# Patient Record
Sex: FEMALE | Race: OTHER | HISPANIC OR LATINO
[De-identification: names, ages, dates, MRNs, and addresses within clinical notes are randomized per-mention and may not be internally consistent; named-entity substitution may affect disease eponyms.]

---

## 2018-11-30 ENCOUNTER — TRANSCRIPTION ENCOUNTER (OUTPATIENT)
Age: 83
End: 2018-11-30

## 2018-12-01 ENCOUNTER — INPATIENT (INPATIENT)
Facility: HOSPITAL | Age: 83
LOS: 2 days | Discharge: NURSING, REHAB OR LONG-TERM CARE | End: 2018-12-04
Attending: ORTHOPAEDIC SURGERY | Admitting: ORTHOPAEDIC SURGERY
Payer: MEDICARE

## 2018-12-01 ENCOUNTER — RESULT REVIEW (OUTPATIENT)
Age: 83
End: 2018-12-01

## 2018-12-01 VITALS
OXYGEN SATURATION: 99 % | RESPIRATION RATE: 16 BRPM | HEART RATE: 120 BPM | TEMPERATURE: 98 F | SYSTOLIC BLOOD PRESSURE: 144 MMHG | DIASTOLIC BLOOD PRESSURE: 101 MMHG

## 2018-12-01 DIAGNOSIS — D72.829 ELEVATED WHITE BLOOD CELL COUNT, UNSPECIFIED: ICD-10-CM

## 2018-12-01 DIAGNOSIS — Z01.818 ENCOUNTER FOR OTHER PREPROCEDURAL EXAMINATION: ICD-10-CM

## 2018-12-01 DIAGNOSIS — S72.009A FRACTURE OF UNSPECIFIED PART OF NECK OF UNSPECIFIED FEMUR, INITIAL ENCOUNTER FOR CLOSED FRACTURE: ICD-10-CM

## 2018-12-01 PROBLEM — Z00.00 ENCOUNTER FOR PREVENTIVE HEALTH EXAMINATION: Status: ACTIVE | Noted: 2018-12-01

## 2018-12-01 LAB
ALBUMIN SERPL ELPH-MCNC: 4.2 G/DL — SIGNIFICANT CHANGE UP (ref 3.3–5)
ALP SERPL-CCNC: 241 U/L — HIGH (ref 40–120)
ALT FLD-CCNC: 20 U/L — SIGNIFICANT CHANGE UP (ref 4–33)
APPEARANCE UR: CLEAR — SIGNIFICANT CHANGE UP
APTT BLD: 22.8 SEC — LOW (ref 27.5–36.3)
AST SERPL-CCNC: 34 U/L — HIGH (ref 4–32)
BACTERIA # UR AUTO: NEGATIVE — SIGNIFICANT CHANGE UP
BASOPHILS # BLD AUTO: 0.01 K/UL — SIGNIFICANT CHANGE UP (ref 0–0.2)
BASOPHILS NFR BLD AUTO: 0.1 % — SIGNIFICANT CHANGE UP (ref 0–2)
BASOPHILS NFR SPEC: 0 % — SIGNIFICANT CHANGE UP (ref 0–2)
BILIRUB SERPL-MCNC: 2.8 MG/DL — HIGH (ref 0.2–1.2)
BILIRUB UR-MCNC: NEGATIVE — SIGNIFICANT CHANGE UP
BLASTS # FLD: 0 % — SIGNIFICANT CHANGE UP (ref 0–0)
BLOOD UR QL VISUAL: SIGNIFICANT CHANGE UP
BUN SERPL-MCNC: 28 MG/DL — HIGH (ref 7–23)
CALCIUM SERPL-MCNC: 9.9 MG/DL — SIGNIFICANT CHANGE UP (ref 8.4–10.5)
CHLORIDE SERPL-SCNC: 101 MMOL/L — SIGNIFICANT CHANGE UP (ref 98–107)
CO2 SERPL-SCNC: 24 MMOL/L — SIGNIFICANT CHANGE UP (ref 22–31)
COLOR SPEC: YELLOW — SIGNIFICANT CHANGE UP
CREAT SERPL-MCNC: 0.6 MG/DL — SIGNIFICANT CHANGE UP (ref 0.5–1.3)
EOSINOPHIL # BLD AUTO: 0 K/UL — SIGNIFICANT CHANGE UP (ref 0–0.5)
EOSINOPHIL NFR BLD AUTO: 0 % — SIGNIFICANT CHANGE UP (ref 0–6)
EOSINOPHIL NFR FLD: 0 % — SIGNIFICANT CHANGE UP (ref 0–6)
GIANT PLATELETS BLD QL SMEAR: PRESENT — SIGNIFICANT CHANGE UP
GLUCOSE SERPL-MCNC: 113 MG/DL — HIGH (ref 70–99)
GLUCOSE UR-MCNC: NEGATIVE — SIGNIFICANT CHANGE UP
HCT VFR BLD CALC: 41.9 % — SIGNIFICANT CHANGE UP (ref 34.5–45)
HGB BLD-MCNC: 13.4 G/DL — SIGNIFICANT CHANGE UP (ref 11.5–15.5)
IMM GRANULOCYTES # BLD AUTO: 0.07 # — SIGNIFICANT CHANGE UP
IMM GRANULOCYTES NFR BLD AUTO: 0.6 % — SIGNIFICANT CHANGE UP (ref 0–1.5)
INR BLD: 1.1 — SIGNIFICANT CHANGE UP (ref 0.88–1.17)
KETONES UR-MCNC: SIGNIFICANT CHANGE UP
LEUKOCYTE ESTERASE UR-ACNC: NEGATIVE — SIGNIFICANT CHANGE UP
LYMPHOCYTES # BLD AUTO: 0.45 K/UL — LOW (ref 1–3.3)
LYMPHOCYTES # BLD AUTO: 4.1 % — LOW (ref 13–44)
LYMPHOCYTES NFR SPEC AUTO: 0.9 % — LOW (ref 13–44)
MAGNESIUM SERPL-MCNC: 2.3 MG/DL — SIGNIFICANT CHANGE UP (ref 1.6–2.6)
MANUAL SMEAR VERIFICATION: SIGNIFICANT CHANGE UP
MCHC RBC-ENTMCNC: 32 % — SIGNIFICANT CHANGE UP (ref 32–36)
MCHC RBC-ENTMCNC: 32.2 PG — SIGNIFICANT CHANGE UP (ref 27–34)
MCV RBC AUTO: 100.7 FL — HIGH (ref 80–100)
METAMYELOCYTES # FLD: 0 % — SIGNIFICANT CHANGE UP (ref 0–1)
MONOCYTES # BLD AUTO: 1.07 K/UL — HIGH (ref 0–0.9)
MONOCYTES NFR BLD AUTO: 9.8 % — SIGNIFICANT CHANGE UP (ref 2–14)
MONOCYTES NFR BLD: 11.4 % — HIGH (ref 2–9)
MORPHOLOGY BLD-IMP: NORMAL — SIGNIFICANT CHANGE UP
MYELOCYTES NFR BLD: 0 % — SIGNIFICANT CHANGE UP (ref 0–0)
NEUTROPHIL AB SER-ACNC: 87.7 % — HIGH (ref 43–77)
NEUTROPHILS # BLD AUTO: 9.3 K/UL — HIGH (ref 1.8–7.4)
NEUTROPHILS NFR BLD AUTO: 85.4 % — HIGH (ref 43–77)
NEUTS BAND # BLD: 0 % — SIGNIFICANT CHANGE UP (ref 0–6)
NITRITE UR-MCNC: NEGATIVE — SIGNIFICANT CHANGE UP
NRBC # FLD: 0 — SIGNIFICANT CHANGE UP
OTHER - HEMATOLOGY %: 0 — SIGNIFICANT CHANGE UP
PH UR: 5.5 — SIGNIFICANT CHANGE UP (ref 5–8)
PHOSPHATE SERPL-MCNC: 2.7 MG/DL — SIGNIFICANT CHANGE UP (ref 2.5–4.5)
PLATELET # BLD AUTO: 199 K/UL — SIGNIFICANT CHANGE UP (ref 150–400)
PLATELET COUNT - ESTIMATE: NORMAL — SIGNIFICANT CHANGE UP
PMV BLD: 9 FL — SIGNIFICANT CHANGE UP (ref 7–13)
POTASSIUM SERPL-MCNC: 3.5 MMOL/L — SIGNIFICANT CHANGE UP (ref 3.5–5.3)
POTASSIUM SERPL-SCNC: 3.5 MMOL/L — SIGNIFICANT CHANGE UP (ref 3.5–5.3)
PROMYELOCYTES # FLD: 0 % — SIGNIFICANT CHANGE UP (ref 0–0)
PROT SERPL-MCNC: 7.3 G/DL — SIGNIFICANT CHANGE UP (ref 6–8.3)
PROT UR-MCNC: SIGNIFICANT CHANGE UP
PROTHROM AB SERPL-ACNC: 12.6 SEC — SIGNIFICANT CHANGE UP (ref 9.8–13.1)
RBC # BLD: 4.16 M/UL — SIGNIFICANT CHANGE UP (ref 3.8–5.2)
RBC # FLD: 15.1 % — HIGH (ref 10.3–14.5)
RBC CASTS # UR COMP ASSIST: SIGNIFICANT CHANGE UP (ref 0–?)
RH IG SCN BLD-IMP: POSITIVE — SIGNIFICANT CHANGE UP
SODIUM SERPL-SCNC: 142 MMOL/L — SIGNIFICANT CHANGE UP (ref 135–145)
SP GR SPEC: 1.02 — SIGNIFICANT CHANGE UP (ref 1–1.04)
TROPONIN T, HIGH SENSITIVITY: 25 NG/L — SIGNIFICANT CHANGE UP (ref ?–14)
TROPONIN T, HIGH SENSITIVITY: 28 NG/L — SIGNIFICANT CHANGE UP (ref ?–14)
UROBILINOGEN FLD QL: NORMAL — SIGNIFICANT CHANGE UP
VARIANT LYMPHS # BLD: 0 % — SIGNIFICANT CHANGE UP
WBC # BLD: 10.9 K/UL — HIGH (ref 3.8–10.5)
WBC # FLD AUTO: 10.9 K/UL — HIGH (ref 3.8–10.5)
WBC UR QL: SIGNIFICANT CHANGE UP (ref 0–?)

## 2018-12-01 PROCEDURE — 88305 TISSUE EXAM BY PATHOLOGIST: CPT | Mod: 26

## 2018-12-01 PROCEDURE — 93010 ELECTROCARDIOGRAM REPORT: CPT

## 2018-12-01 PROCEDURE — 73522 X-RAY EXAM HIPS BI 3-4 VIEWS: CPT | Mod: 26

## 2018-12-01 PROCEDURE — 88311 DECALCIFY TISSUE: CPT | Mod: 26

## 2018-12-01 PROCEDURE — 71045 X-RAY EXAM CHEST 1 VIEW: CPT | Mod: 26

## 2018-12-01 PROCEDURE — 73564 X-RAY EXAM KNEE 4 OR MORE: CPT | Mod: 26,50

## 2018-12-01 PROCEDURE — 73552 X-RAY EXAM OF FEMUR 2/>: CPT | Mod: 26,RT

## 2018-12-01 PROCEDURE — 99223 1ST HOSP IP/OBS HIGH 75: CPT | Mod: GC

## 2018-12-01 RX ORDER — DOCUSATE SODIUM 100 MG
100 CAPSULE ORAL THREE TIMES A DAY
Qty: 0 | Refills: 0 | Status: DISCONTINUED | OUTPATIENT
Start: 2018-12-01 | End: 2018-12-04

## 2018-12-01 RX ORDER — OXYCODONE HYDROCHLORIDE 5 MG/1
2.5 TABLET ORAL EVERY 4 HOURS
Qty: 0 | Refills: 0 | Status: DISCONTINUED | OUTPATIENT
Start: 2018-12-01 | End: 2018-12-03

## 2018-12-01 RX ORDER — SENNA PLUS 8.6 MG/1
2 TABLET ORAL AT BEDTIME
Qty: 0 | Refills: 0 | Status: DISCONTINUED | OUTPATIENT
Start: 2018-12-01 | End: 2018-12-04

## 2018-12-01 RX ORDER — SODIUM CHLORIDE 9 MG/ML
1000 INJECTION INTRAMUSCULAR; INTRAVENOUS; SUBCUTANEOUS
Qty: 0 | Refills: 0 | Status: COMPLETED | OUTPATIENT
Start: 2018-12-01 | End: 2018-12-02

## 2018-12-01 RX ORDER — ENOXAPARIN SODIUM 100 MG/ML
40 INJECTION SUBCUTANEOUS ONCE
Qty: 0 | Refills: 0 | Status: COMPLETED | OUTPATIENT
Start: 2018-12-01 | End: 2018-12-01

## 2018-12-01 RX ORDER — ACETAMINOPHEN 500 MG
975 TABLET ORAL EVERY 8 HOURS
Qty: 0 | Refills: 0 | Status: DISCONTINUED | OUTPATIENT
Start: 2018-12-01 | End: 2018-12-03

## 2018-12-01 RX ORDER — OXYCODONE HYDROCHLORIDE 5 MG/1
5 TABLET ORAL EVERY 4 HOURS
Qty: 0 | Refills: 0 | Status: DISCONTINUED | OUTPATIENT
Start: 2018-12-01 | End: 2018-12-03

## 2018-12-01 RX ORDER — LANOLIN ALCOHOL/MO/W.PET/CERES
3 CREAM (GRAM) TOPICAL AT BEDTIME
Qty: 0 | Refills: 0 | Status: DISCONTINUED | OUTPATIENT
Start: 2018-12-01 | End: 2018-12-04

## 2018-12-01 RX ADMIN — Medication 100 MILLIGRAM(S): at 22:40

## 2018-12-01 RX ADMIN — SENNA PLUS 2 TABLET(S): 8.6 TABLET ORAL at 22:40

## 2018-12-01 RX ADMIN — SODIUM CHLORIDE 125 MILLILITER(S): 9 INJECTION INTRAMUSCULAR; INTRAVENOUS; SUBCUTANEOUS at 15:44

## 2018-12-01 NOTE — ED PROVIDER NOTE - OBJECTIVE STATEMENT
89 y/o female no PMH presents to fall/difficulty ambulating/FTT. Pt. arrives with sister who is aiding in history - states that 2 days ago sister hear thump upstairs, apparently patient was in the kitchen upstairs and slipped on the tile floor falling down on her knees but was able to get up on her own and did not hit her head. Fall was unwitnessed - sister states that since the fall patient has had difficulty ambulating even with walker/cane and today couldn't walk at all and was crawling on floor to try to use bathroom and could not even lift herself up to get into bed which prompted sister to call EMS. Pt. lives at home upstairs from sister - before fall was ambulatory on her own and did not require assistance. Pt. denies head trauma nausea vomit slurred speech.

## 2018-12-01 NOTE — CONSULT NOTE ADULT - ASSESSMENT
89 yo F with no PMH presents for mechanical fall found to have right femoral neck subcapital fracture. Consult requested for pre-op evaluation. 89 yo F with no PMH presents for mechanical fall found to have right femoral neck subcapital fracture. Consult requested for pre-op evaluation for right hip hemiarthroplasty with possible total hip replacement.

## 2018-12-01 NOTE — PATIENT PROFILE ADULT - LIVES WITH
sibling(s) (2 sisters), private home, (+) steps-to-enter/exit home with Unilateral Handrail, (+) [id number] in the home with Bilateral Handrails/sibling(s)

## 2018-12-01 NOTE — ED ADULT NURSE NOTE - OBJECTIVE STATEMENT
Pt. A&Ox3, coming from home via EMS for fall. States she became weak and fell 2 days ago. c/o pain and stiffness to right knee. Knee appears swollen. Unable to ambulate today due to pain. Normally ambulates with cane at baseline. Lives with her 2 sisters. #20g IV placed in left FA, labs sent. MD at bedside for eval. VS done as charted, breathing well on RA. Will continue to monitor.

## 2018-12-01 NOTE — CONSULT NOTE ADULT - PROBLEM SELECTOR RECOMMENDATION 9
-RCRI score is 0  -patient with METS>4 -RCRI score is 0  -patient with METS>4  -patient is at low risk for a moderate risk procedure  -patient is medically optimized for the procedure -Patient seen for pre-op evaluation. Denies any cardiac or pulmonary history. Without any known medical problems. Cardiac exam without any murmurs. EKG initially with qtc in 600s however repeat EKG with qtc in 400s. No q waves noted.  -Check magnesium and phos and supplement if low.  -RCRI score is 0  -patient with METS>4  -patient is at low risk for a moderate risk procedure  -Risks/benefits discussed with patient and her sister Radha and she and her sister wishes to proceed with planned procedure.

## 2018-12-01 NOTE — ED PROVIDER NOTE - MEDICAL DECISION MAKING DETAILS
89 y/o female c/o bl knee pain/leg pain w/ difficulty ambulating  -labs, xray  -case management evaluation  -probable admission for pt eval and rehab

## 2018-12-01 NOTE — ED ADULT TRIAGE NOTE - CHIEF COMPLAINT QUOTE
Pt fell 2 days ago for unknown reason and hasn't been able to ambulate since then.  Came today for increased weakness.  Denies Cp/SOB/dizziness/lightheadedness/N/V/D, or fever.  Barely able to bear weight.  Ryan LE with no external rotations or shortening.  Denies PMH.  Not on any meds.  HR irregular in triage

## 2018-12-01 NOTE — ED PROVIDER NOTE - ATTENDING CONTRIBUTION TO CARE
Attending note:   After face to face evaluation of this patient, I concur with above noted hx, pe, and care plan for this patient.  89 y/o F with slip and fall 2 days ago c/o r knee pain and difficulty ambulating.  PAtient crawling today unable to get up.   Evaluation in progress

## 2018-12-01 NOTE — CONSULT NOTE ADULT - SUBJECTIVE AND OBJECTIVE BOX
HPI:      PAST MEDICAL & SURGICAL HISTORY:  No pertinent past medical history      Review of Systems:   CONSTITUTIONAL: No fever, weight loss, or fatigue  EYES: No eye pain, visual disturbances, or discharge  ENMT:  No difficulty hearing, tinnitus, vertigo; No sinus or throat pain  NECK: No pain or stiffness  BREASTS: No pain, masses, or nipple discharge  RESPIRATORY: No cough, wheezing, chills or hemoptysis; No shortness of breath  CARDIOVASCULAR: No chest pain, palpitations, dizziness, or leg swelling  GASTROINTESTINAL: No abdominal or epigastric pain. No nausea, vomiting, or hematemesis; No diarrhea or constipation. No melena or hematochezia.  GENITOURINARY: No dysuria, frequency, hematuria, or incontinence  NEUROLOGICAL: No headaches, memory loss, loss of strength, numbness, or tremors  SKIN: No itching, burning, rashes, or lesions   LYMPH NODES: No enlarged glands  ENDOCRINE: No heat or cold intolerance; No hair loss  MUSCULOSKELETAL: No joint pain or swelling; No muscle, back, or extremity pain  PSYCHIATRIC: No depression, anxiety, mood swings, or difficulty sleeping  HEME/LYMPH: No easy bruising, or bleeding gums  ALLERY AND IMMUNOLOGIC: No hives or eczema    Allergies    No Known Allergies    Intolerances        Social History:     FAMILY HISTORY:      MEDICATIONS  (STANDING):  docusate sodium 100 milliGRAM(s) Oral three times a day  enoxaparin Injectable 40 milliGRAM(s) SubCutaneous once  senna 2 Tablet(s) Oral at bedtime  sodium chloride 0.9%. 1000 milliLiter(s) (125 mL/Hr) IV Continuous <Continuous>    MEDICATIONS  (PRN):  acetaminophen   Tablet .. 975 milliGRAM(s) Oral every 8 hours PRN Mild Pain (1 - 3)  melatonin 3 milliGRAM(s) Oral at bedtime PRN Insomnia  oxyCODONE    IR 2.5 milliGRAM(s) Oral every 4 hours PRN Moderate Pain (4 - 6)  oxyCODONE    IR 5 milliGRAM(s) Oral every 4 hours PRN Severe Pain (7 - 10)        CAPILLARY BLOOD GLUCOSE        I&O's Summary      PHYSICAL EXAM:  GENERAL: NAD, well-developed  HEAD:  Atraumatic, Normocephalic  EYES: EOMI, PERRLA, conjunctiva and sclera clear  NECK: Supple, No JVD  CHEST/LUNG: Clear to auscultation bilaterally; No wheeze  HEART: Regular rate and rhythm; No murmurs, rubs, or gallops  ABDOMEN: Soft, Nontender, Nondistended; Bowel sounds present  EXTREMITIES:  2+ Peripheral Pulses, No clubbing, cyanosis, or edema  PSYCH: AAOx3  NEUROLOGY: non-focal  SKIN: No rashes or lesions    LABS:                        13.4   10.90 )-----------( 199      ( 01 Dec 2018 12:50 )             41.9     12-01    142  |  101  |  28<H>  ----------------------------<  113<H>  3.5   |  24  |  0.60    Ca    9.9      01 Dec 2018 12:50    TPro  7.3  /  Alb  4.2  /  TBili  2.8<H>  /  DBili  x   /  AST  34<H>  /  ALT  20  /  AlkPhos  241<H>  12-01    PT/INR - ( 01 Dec 2018 15:35 )   PT: 12.6 SEC;   INR: 1.10          PTT - ( 01 Dec 2018 15:35 )  PTT:22.8 SEC          RADIOLOGY & ADDITIONAL TESTS:    Imaging Personally Reviewed:    Consultant(s) Notes Reviewed:      Care Discussed with Consultants/Other Providers: HPI:  89 y/o female no PMH presents fall. Pt fell two days ago at home.    PAST MEDICAL & SURGICAL HISTORY:  No pertinent past medical history      Review of Systems:   CONSTITUTIONAL: No fever, weight loss, or fatigue  EYES: No eye pain, visual disturbances, or discharge  ENMT:  No difficulty hearing, tinnitus, vertigo; No sinus or throat pain  NECK: No pain or stiffness  BREASTS: No pain, masses, or nipple discharge  RESPIRATORY: No cough, wheezing, chills or hemoptysis; No shortness of breath  CARDIOVASCULAR: No chest pain, palpitations, dizziness, or leg swelling  GASTROINTESTINAL: No abdominal or epigastric pain. No nausea, vomiting, or hematemesis; No diarrhea or constipation. No melena or hematochezia.  GENITOURINARY: No dysuria, frequency, hematuria, or incontinence  NEUROLOGICAL: No headaches, memory loss, loss of strength, numbness, or tremors  SKIN: No itching, burning, rashes, or lesions   LYMPH NODES: No enlarged glands  ENDOCRINE: No heat or cold intolerance; No hair loss  MUSCULOSKELETAL: No joint pain or swelling; No muscle, back, or extremity pain  PSYCHIATRIC: No depression, anxiety, mood swings, or difficulty sleeping  HEME/LYMPH: No easy bruising, or bleeding gums  ALLERY AND IMMUNOLOGIC: No hives or eczema    Allergies    No Known Allergies    Intolerances        Social History:     FAMILY HISTORY:      MEDICATIONS  (STANDING):  docusate sodium 100 milliGRAM(s) Oral three times a day  enoxaparin Injectable 40 milliGRAM(s) SubCutaneous once  senna 2 Tablet(s) Oral at bedtime  sodium chloride 0.9%. 1000 milliLiter(s) (125 mL/Hr) IV Continuous <Continuous>    MEDICATIONS  (PRN):  acetaminophen   Tablet .. 975 milliGRAM(s) Oral every 8 hours PRN Mild Pain (1 - 3)  melatonin 3 milliGRAM(s) Oral at bedtime PRN Insomnia  oxyCODONE    IR 2.5 milliGRAM(s) Oral every 4 hours PRN Moderate Pain (4 - 6)  oxyCODONE    IR 5 milliGRAM(s) Oral every 4 hours PRN Severe Pain (7 - 10)        CAPILLARY BLOOD GLUCOSE        I&O's Summary      PHYSICAL EXAM:  GENERAL: NAD, well-developed  HEAD:  Atraumatic, Normocephalic  EYES: EOMI, PERRLA, conjunctiva and sclera clear  NECK: Supple, No JVD  CHEST/LUNG: Clear to auscultation bilaterally; No wheeze  HEART: Regular rate and rhythm; No murmurs, rubs, or gallops  ABDOMEN: Soft, Nontender, Nondistended; Bowel sounds present  EXTREMITIES:  2+ Peripheral Pulses, No clubbing, cyanosis, or edema  PSYCH: AAOx3  NEUROLOGY: non-focal  SKIN: No rashes or lesions    LABS:                        13.4   10.90 )-----------( 199      ( 01 Dec 2018 12:50 )             41.9     12-01    142  |  101  |  28<H>  ----------------------------<  113<H>  3.5   |  24  |  0.60    Ca    9.9      01 Dec 2018 12:50    TPro  7.3  /  Alb  4.2  /  TBili  2.8<H>  /  DBili  x   /  AST  34<H>  /  ALT  20  /  AlkPhos  241<H>  12-01    PT/INR - ( 01 Dec 2018 15:35 )   PT: 12.6 SEC;   INR: 1.10          PTT - ( 01 Dec 2018 15:35 )  PTT:22.8 SEC          RADIOLOGY & ADDITIONAL TESTS:    Imaging Personally Reviewed:    Consultant(s) Notes Reviewed:      Care Discussed with Consultants/Other Providers: HPI:  89 y/o female no PMH presents fall. Pt fell two days ago at home in the kitchen when she slip on a rug. No cp, dizziness at the time of fall. She landed on her knees and subsequently had difficulty ambulating with the cane. At baseline she ambulate with the cane is able to walk up a flight of stairway. No hx of CAD, CVA, DM, renal, pulmonary and liver disease. No hx of reaction to anesthesia.      PAST MEDICAL & SURGICAL HISTORY:  No pertinent past medical history      Review of Systems:   CONSTITUTIONAL: No fever, weight loss, or fatigue  EYES: No eye pain, visual disturbances, or discharge  ENMT:  No difficulty hearing, tinnitus, vertigo; No sinus or throat pain  NECK: No pain or stiffness  BREASTS: No pain, masses, or nipple discharge  RESPIRATORY: No cough, wheezing, chills or hemoptysis; No shortness of breath  CARDIOVASCULAR: No chest pain, palpitations, dizziness, or leg swelling  GASTROINTESTINAL: No abdominal or epigastric pain. No nausea, vomiting, or hematemesis; No diarrhea or constipation. No melena or hematochezia.  GENITOURINARY: No dysuria, frequency, hematuria, or incontinence  NEUROLOGICAL: No headaches, memory loss, loss of strength, numbness, or tremors  SKIN: No itching, burning, rashes, or lesions   LYMPH NODES: No enlarged glands  ENDOCRINE: No heat or cold intolerance; No hair loss  MUSCULOSKELETAL: No joint pain or swelling; No muscle, back, or extremity pain  PSYCHIATRIC: No depression, anxiety, mood swings, or difficulty sleeping  HEME/LYMPH: No easy bruising, or bleeding gums  ALLERY AND IMMUNOLOGIC: No hives or eczema    Allergies    No Known Allergies    Intolerances        Social History:     FAMILY HISTORY:      MEDICATIONS  (STANDING):  docusate sodium 100 milliGRAM(s) Oral three times a day  enoxaparin Injectable 40 milliGRAM(s) SubCutaneous once  senna 2 Tablet(s) Oral at bedtime  sodium chloride 0.9%. 1000 milliLiter(s) (125 mL/Hr) IV Continuous <Continuous>    MEDICATIONS  (PRN):  acetaminophen   Tablet .. 975 milliGRAM(s) Oral every 8 hours PRN Mild Pain (1 - 3)  melatonin 3 milliGRAM(s) Oral at bedtime PRN Insomnia  oxyCODONE    IR 2.5 milliGRAM(s) Oral every 4 hours PRN Moderate Pain (4 - 6)  oxyCODONE    IR 5 milliGRAM(s) Oral every 4 hours PRN Severe Pain (7 - 10)        CAPILLARY BLOOD GLUCOSE        I&O's Summary      PHYSICAL EXAM:  GENERAL: NAD, well-developed  HEAD:  Atraumatic, Normocephalic  EYES: EOMI, PERRLA, conjunctiva and sclera clear  NECK: Supple, No JVD  CHEST/LUNG: Clear to auscultation bilaterally; No wheeze  HEART: Regular rate and rhythm; No murmurs, rubs, or gallops  ABDOMEN: Soft, Nontender, Nondistended; Bowel sounds present  EXTREMITIES:  +erythema of right knee with no swelling or warmth, decreased active ROM of right hip  PSYCH: AAOx3  NEUROLOGY: non-focal  SKIN: No rashes or lesions    LABS:                        13.4   10.90 )-----------( 199      ( 01 Dec 2018 12:50 )             41.9     12-01    142  |  101  |  28<H>  ----------------------------<  113<H>  3.5   |  24  |  0.60    Ca    9.9      01 Dec 2018 12:50    TPro  7.3  /  Alb  4.2  /  TBili  2.8<H>  /  DBili  x   /  AST  34<H>  /  ALT  20  /  AlkPhos  241<H>  12-01    PT/INR - ( 01 Dec 2018 15:35 )   PT: 12.6 SEC;   INR: 1.10          PTT - ( 01 Dec 2018 15:35 )  PTT:22.8 SEC          RADIOLOGY & ADDITIONAL TESTS:    Imaging Personally Reviewed:    Consultant(s) Notes Reviewed:      Care Discussed with Consultants/Other Providers: HPI:  89 y/o female no PMH presents fall. Pt fell two days ago at home in the kitchen when she slip on a rug. No cp, dizziness at the time of fall. She landed on her knees and subsequently had difficulty ambulating with the cane. At baseline she ambulate with the cane is able to walk up a flight of stairway. No hx of CAD, CVA, DM, renal, pulmonary and liver disease. No hx of reaction to anesthesia. Denies taking any OTC or herbal meds.     PAST MEDICAL & SURGICAL HISTORY:  No pertinent past medical history      Review of Systems:   CONSTITUTIONAL: No fever, weight loss, or fatigue  EYES: No eye pain, visual disturbances, or discharge  ENMT:  No difficulty hearing, tinnitus, vertigo; No sinus or throat pain  NECK: No pain or stiffness  BREASTS: No pain, masses, or nipple discharge  RESPIRATORY: No cough, wheezing, chills or hemoptysis; No shortness of breath  CARDIOVASCULAR: No chest pain, palpitations, dizziness, or leg swelling  GASTROINTESTINAL: No abdominal or epigastric pain. No nausea, vomiting, or hematemesis; No diarrhea or constipation. No melena or hematochezia.  GENITOURINARY: No dysuria, frequency, hematuria, or incontinence  NEUROLOGICAL: No headaches, memory loss, loss of strength, numbness, or tremors  SKIN: No itching, burning, rashes, or lesions   LYMPH NODES: No enlarged glands  ENDOCRINE: No heat or cold intolerance; No hair loss  MUSCULOSKELETAL: No joint pain or swelling; No muscle, back, or extremity pain  PSYCHIATRIC: No depression, anxiety, mood swings, or difficulty sleeping  HEME/LYMPH: No easy bruising, or bleeding gums  ALLERY AND IMMUNOLOGIC: No hives or eczema    Allergies    No Known Allergies    Intolerances        Social History:     FAMILY HISTORY:      MEDICATIONS  (STANDING):  docusate sodium 100 milliGRAM(s) Oral three times a day  enoxaparin Injectable 40 milliGRAM(s) SubCutaneous once  senna 2 Tablet(s) Oral at bedtime  sodium chloride 0.9%. 1000 milliLiter(s) (125 mL/Hr) IV Continuous <Continuous>    MEDICATIONS  (PRN):  acetaminophen   Tablet .. 975 milliGRAM(s) Oral every 8 hours PRN Mild Pain (1 - 3)  melatonin 3 milliGRAM(s) Oral at bedtime PRN Insomnia  oxyCODONE    IR 2.5 milliGRAM(s) Oral every 4 hours PRN Moderate Pain (4 - 6)  oxyCODONE    IR 5 milliGRAM(s) Oral every 4 hours PRN Severe Pain (7 - 10)        CAPILLARY BLOOD GLUCOSE        I&O's Summary      PHYSICAL EXAM:  GENERAL: NAD, well-developed  HEAD:  Atraumatic, Normocephalic  EYES: EOMI, PERRLA, conjunctiva and sclera clear  NECK: Supple, No JVD  CHEST/LUNG: Clear to auscultation bilaterally; No wheeze  HEART: Regular rate and rhythm; No murmurs, rubs, or gallops  ABDOMEN: Soft, Nontender, Nondistended; Bowel sounds present  EXTREMITIES:  +erythema of right knee with no swelling or warmth, decreased active ROM of right hip  PSYCH: AAOx3  NEUROLOGY: non-focal  SKIN: No rashes or lesions    LABS:                        13.4   10.90 )-----------( 199      ( 01 Dec 2018 12:50 )             41.9     12-01    142  |  101  |  28<H>  ----------------------------<  113<H>  3.5   |  24  |  0.60    Ca    9.9      01 Dec 2018 12:50    TPro  7.3  /  Alb  4.2  /  TBili  2.8<H>  /  DBili  x   /  AST  34<H>  /  ALT  20  /  AlkPhos  241<H>  12-01    PT/INR - ( 01 Dec 2018 15:35 )   PT: 12.6 SEC;   INR: 1.10          PTT - ( 01 Dec 2018 15:35 )  PTT:22.8 SEC          RADIOLOGY & ADDITIONAL TESTS:  EKG: prolonged Qtc 633    Imaging Personally Reviewed:    Consultant(s) Notes Reviewed:      Care Discussed with Consultants/Other Providers: HPI:  89 y/o female no PMH presents fall. Pt fell two days ago at home in the kitchen when she slip on a rug. No cp, dizziness at the time of fall. She landed on her knees and subsequently had difficulty ambulating with the cane. At baseline she ambulate with the cane is able to walk up a flight of stairway. No hx of CAD, CVA, DM, renal, pulmonary and liver disease. No hx of reaction to anesthesia. Denies taking any OTC or herbal meds.     PAST MEDICAL & SURGICAL HISTORY:  No pertinent past medical history      Review of Systems:   CONSTITUTIONAL: No fever, weight loss, or fatigue  EYES: No eye pain, visual disturbances, or discharge  ENMT:  No difficulty hearing, tinnitus, vertigo; No sinus or throat pain  NECK: No pain or stiffness  BREASTS: No pain, masses, or nipple discharge  RESPIRATORY: No cough, wheezing, chills or hemoptysis; No shortness of breath  CARDIOVASCULAR: No chest pain, palpitations, dizziness, or leg swelling  GASTROINTESTINAL: No abdominal or epigastric pain. No nausea, vomiting, or hematemesis; No diarrhea or constipation. No melena or hematochezia.  GENITOURINARY: No dysuria, frequency, hematuria, or incontinence  NEUROLOGICAL: No headaches, memory loss, loss of strength, numbness, or tremors  SKIN: No itching, burning, rashes, or lesions   LYMPH NODES: No enlarged glands  ENDOCRINE: No heat or cold intolerance; No hair loss  MUSCULOSKELETAL: No joint pain or swelling; No muscle, back, or extremity pain  PSYCHIATRIC: No depression, anxiety, mood swings, or difficulty sleeping  HEME/LYMPH: No easy bruising, or bleeding gums  ALLERY AND IMMUNOLOGIC: No hives or eczema    Allergies    No Known Allergies    Intolerances        Social History:     FAMILY HISTORY:      MEDICATIONS  (STANDING):  docusate sodium 100 milliGRAM(s) Oral three times a day  enoxaparin Injectable 40 milliGRAM(s) SubCutaneous once  senna 2 Tablet(s) Oral at bedtime  sodium chloride 0.9%. 1000 milliLiter(s) (125 mL/Hr) IV Continuous <Continuous>    MEDICATIONS  (PRN):  acetaminophen   Tablet .. 975 milliGRAM(s) Oral every 8 hours PRN Mild Pain (1 - 3)  melatonin 3 milliGRAM(s) Oral at bedtime PRN Insomnia  oxyCODONE    IR 2.5 milliGRAM(s) Oral every 4 hours PRN Moderate Pain (4 - 6)  oxyCODONE    IR 5 milliGRAM(s) Oral every 4 hours PRN Severe Pain (7 - 10)        CAPILLARY BLOOD GLUCOSE        I&O's Summary      PHYSICAL EXAM:  GENERAL: NAD, well-developed  HEAD:  Atraumatic, Normocephalic  EYES: EOMI, PERRLA, conjunctiva and sclera clear  NECK: Supple, No JVD  CHEST/LUNG: Clear to auscultation bilaterally; No wheeze  HEART: Regular rate and rhythm; No murmurs, rubs, or gallops  ABDOMEN: Soft, Nontender, Nondistended; Bowel sounds present  EXTREMITIES:  +erythema of right knee with no swelling or warmth, decreased active ROM of right hip  PSYCH: AAOx3  NEUROLOGY: non-focal  SKIN: No rashes or lesions    LABS:                        13.4   10.90 )-----------( 199      ( 01 Dec 2018 12:50 )             41.9     12-01    142  |  101  |  28<H>  ----------------------------<  113<H>  3.5   |  24  |  0.60    Ca    9.9      01 Dec 2018 12:50    TPro  7.3  /  Alb  4.2  /  TBili  2.8<H>  /  DBili  x   /  AST  34<H>  /  ALT  20  /  AlkPhos  241<H>  12-01    PT/INR - ( 01 Dec 2018 15:35 )   PT: 12.6 SEC;   INR: 1.10          PTT - ( 01 Dec 2018 15:35 )  PTT:22.8 SEC          RADIOLOGY & ADDITIONAL TESTS:  EKG: prolonged Qtc 633 initially with repeat Qtc at 449    Imaging Personally Reviewed:    Consultant(s) Notes Reviewed:      Care Discussed with Consultants/Other Providers: HPI:  89 y/o female no PMH presents fall. Pt fell two days ago at home in the kitchen when she slip on a rug. No cp, dizziness at the time of fall. She landed on her knees and subsequently had difficulty ambulating with the cane. At baseline she ambulate with the cane is able to walk up a flight of stairway. No hx of CAD, CVA, DM, renal, pulmonary and liver disease. Unknown reaction to anesthesia as she has never had any surgeries in past. Denies taking any OTC or herbal meds. Denies having chest pain or SOB with ambulation.     PAST MEDICAL & SURGICAL HISTORY:  No pertinent past medical history      Review of Systems:   CONSTITUTIONAL: No fever, weight loss, or fatigue  EYES: No eye pain, visual disturbances, or discharge  ENMT:  No difficulty hearing, tinnitus, vertigo; No sinus or throat pain  NECK: No pain or stiffness  BREASTS: No pain, masses, or nipple discharge  RESPIRATORY: No cough, wheezing, chills or hemoptysis; No shortness of breath  CARDIOVASCULAR: No chest pain, palpitations, dizziness, or leg swelling  GASTROINTESTINAL: No abdominal or epigastric pain. No nausea, vomiting, or hematemesis; No diarrhea or constipation. No melena or hematochezia.  GENITOURINARY: No dysuria, frequency, hematuria, or incontinence  NEUROLOGICAL: No headaches, memory loss, loss of strength, numbness, or tremors  SKIN: No itching, burning, rashes, or lesions   LYMPH NODES: No enlarged glands  ENDOCRINE: No heat or cold intolerance; No hair loss  MUSCULOSKELETAL: No joint pain or swelling; No muscle, back, or extremity pain  PSYCHIATRIC: No depression, anxiety, mood swings, or difficulty sleeping  HEME/LYMPH: No easy bruising, or bleeding gums  ALLERY AND IMMUNOLOGIC: No hives or eczema    Allergies    No Known Allergies    Intolerances    Social History: Lives with sister. Social alcohol use.     FAMILY HISTORY:      MEDICATIONS  (STANDING):  docusate sodium 100 milliGRAM(s) Oral three times a day  enoxaparin Injectable 40 milliGRAM(s) SubCutaneous once  senna 2 Tablet(s) Oral at bedtime  sodium chloride 0.9%. 1000 milliLiter(s) (125 mL/Hr) IV Continuous <Continuous>    MEDICATIONS  (PRN):  acetaminophen   Tablet .. 975 milliGRAM(s) Oral every 8 hours PRN Mild Pain (1 - 3)  melatonin 3 milliGRAM(s) Oral at bedtime PRN Insomnia  oxyCODONE    IR 2.5 milliGRAM(s) Oral every 4 hours PRN Moderate Pain (4 - 6)  oxyCODONE    IR 5 milliGRAM(s) Oral every 4 hours PRN Severe Pain (7 - 10)    Vital Signs Last 24 Hrs  T(C): 36.9 (01 Dec 2018 18:04), Max: 36.9 (01 Dec 2018 11:38)  T(F): 98.5 (01 Dec 2018 18:04), Max: 98.5 (01 Dec 2018 18:04)  HR: 94 (01 Dec 2018 18:04) (94 - 120)  BP: 137/93 (01 Dec 2018 18:04) (137/93 - 155/94)  BP(mean): --  RR: 19 (01 Dec 2018 18:04) (16 - 22)  SpO2: 97% (01 Dec 2018 18:04) (96% - 99%)    PHYSICAL EXAM:  GENERAL: NAD, well-developed  HEAD:  Atraumatic, Normocephalic  EYES: EOMI, PERRLA, conjunctiva and sclera clear  NECK: Supple, No JVD  CHEST/LUNG: Clear to auscultation bilaterally; No wheeze  HEART: Regular rate and rhythm; No murmurs, rubs, or gallops  ABDOMEN: Soft, Nontender, Nondistended; Bowel sounds present  EXTREMITIES:  +erythema of right knee with no swelling or warmth, decreased active ROM of right hip  PSYCH: AAOx3  NEUROLOGY: non-focal  SKIN: No rashes or lesions    LABS:                        13.4   10.90 )-----------( 199      ( 01 Dec 2018 12:50 )             41.9     12-01    142  |  101  |  28<H>  ----------------------------<  113<H>  3.5   |  24  |  0.60    Ca    9.9      01 Dec 2018 12:50    TPro  7.3  /  Alb  4.2  /  TBili  2.8<H>  /  DBili  x   /  AST  34<H>  /  ALT  20  /  AlkPhos  241<H>  12-01    PT/INR - ( 01 Dec 2018 15:35 )   PT: 12.6 SEC;   INR: 1.10          PTT - ( 01 Dec 2018 15:35 )  PTT:22.8 SEC          RADIOLOGY & ADDITIONAL TESTS:  EKG: prolonged Qtc 633 initially with repeat Qtc at 449. PACs with no q waves or ST elevations/depression.     Imaging Personally Reviewed:    Consultant(s) Notes Reviewed:      Care Discussed with Consultants/Other Providers:

## 2018-12-01 NOTE — H&P ADULT - HISTORY OF PRESENT ILLNESS
89 year old female presented to the [facility] ED following fall at home about 2 days ago. Patient was walking in her house, when she tripped and fell onto her right side. She felt immediate pain and has not been able to ambulate since the fall. However, patient did not want to go to the hospital until she was convinced by her sister. She then presented to the [facility] ED on 12/1/18. No CP/SOB. No Head strike or LOC. No fevers/chills. Patient has history of L hip fracture in 2010 treated with hemiarthroplasty by Dr. Santiago. She ambulates with a cane at baseline.

## 2018-12-01 NOTE — H&P ADULT - ASSESSMENT
A/P: 89 year old female with R femoral neck fracture  - Admit to Ortho  - OR Planning  - Preoperative Labs: CBC, BMP, PT/INR, Type and Screen, UA, CXR, EKG  - Consent in chart  - Medicine Clearance  - NPO  - Hold anticoagulation for OR

## 2018-12-02 DIAGNOSIS — E87.6 HYPOKALEMIA: ICD-10-CM

## 2018-12-02 DIAGNOSIS — Z96.642 PRESENCE OF LEFT ARTIFICIAL HIP JOINT: Chronic | ICD-10-CM

## 2018-12-02 DIAGNOSIS — S72.009A FRACTURE OF UNSPECIFIED PART OF NECK OF UNSPECIFIED FEMUR, INITIAL ENCOUNTER FOR CLOSED FRACTURE: ICD-10-CM

## 2018-12-02 LAB
APTT BLD: 29 SEC — SIGNIFICANT CHANGE UP (ref 27.5–36.3)
BUN SERPL-MCNC: 20 MG/DL — SIGNIFICANT CHANGE UP (ref 7–23)
BUN SERPL-MCNC: 26 MG/DL — HIGH (ref 7–23)
CALCIUM SERPL-MCNC: 8.3 MG/DL — LOW (ref 8.4–10.5)
CALCIUM SERPL-MCNC: 9 MG/DL — SIGNIFICANT CHANGE UP (ref 8.4–10.5)
CHLORIDE SERPL-SCNC: 107 MMOL/L — SIGNIFICANT CHANGE UP (ref 98–107)
CHLORIDE SERPL-SCNC: 108 MMOL/L — HIGH (ref 98–107)
CO2 SERPL-SCNC: 23 MMOL/L — SIGNIFICANT CHANGE UP (ref 22–31)
CO2 SERPL-SCNC: 23 MMOL/L — SIGNIFICANT CHANGE UP (ref 22–31)
CREAT SERPL-MCNC: 0.56 MG/DL — SIGNIFICANT CHANGE UP (ref 0.5–1.3)
CREAT SERPL-MCNC: 0.64 MG/DL — SIGNIFICANT CHANGE UP (ref 0.5–1.3)
GLUCOSE SERPL-MCNC: 101 MG/DL — HIGH (ref 70–99)
GLUCOSE SERPL-MCNC: 111 MG/DL — HIGH (ref 70–99)
HCT VFR BLD CALC: 34.9 % — SIGNIFICANT CHANGE UP (ref 34.5–45)
HCT VFR BLD CALC: 38.1 % — SIGNIFICANT CHANGE UP (ref 34.5–45)
HGB BLD-MCNC: 11 G/DL — LOW (ref 11.5–15.5)
HGB BLD-MCNC: 12 G/DL — SIGNIFICANT CHANGE UP (ref 11.5–15.5)
INR BLD: 1.11 — SIGNIFICANT CHANGE UP (ref 0.88–1.17)
MAGNESIUM SERPL-MCNC: 2 MG/DL — SIGNIFICANT CHANGE UP (ref 1.6–2.6)
MCHC RBC-ENTMCNC: 31.5 % — LOW (ref 32–36)
MCHC RBC-ENTMCNC: 31.5 % — LOW (ref 32–36)
MCHC RBC-ENTMCNC: 31.5 PG — SIGNIFICANT CHANGE UP (ref 27–34)
MCHC RBC-ENTMCNC: 33 PG — SIGNIFICANT CHANGE UP (ref 27–34)
MCV RBC AUTO: 100 FL — SIGNIFICANT CHANGE UP (ref 80–100)
MCV RBC AUTO: 104.8 FL — HIGH (ref 80–100)
NRBC # FLD: 0 — SIGNIFICANT CHANGE UP
NRBC # FLD: 0.02 — SIGNIFICANT CHANGE UP
PHOSPHATE SERPL-MCNC: 2.3 MG/DL — LOW (ref 2.5–4.5)
PLATELET # BLD AUTO: 157 K/UL — SIGNIFICANT CHANGE UP (ref 150–400)
PLATELET # BLD AUTO: 195 K/UL — SIGNIFICANT CHANGE UP (ref 150–400)
PMV BLD: 9.1 FL — SIGNIFICANT CHANGE UP (ref 7–13)
PMV BLD: 9.2 FL — SIGNIFICANT CHANGE UP (ref 7–13)
POTASSIUM SERPL-MCNC: 3.3 MMOL/L — LOW (ref 3.5–5.3)
POTASSIUM SERPL-MCNC: 3.5 MMOL/L — SIGNIFICANT CHANGE UP (ref 3.5–5.3)
POTASSIUM SERPL-SCNC: 3.3 MMOL/L — LOW (ref 3.5–5.3)
POTASSIUM SERPL-SCNC: 3.5 MMOL/L — SIGNIFICANT CHANGE UP (ref 3.5–5.3)
PROTHROM AB SERPL-ACNC: 12.7 SEC — SIGNIFICANT CHANGE UP (ref 9.8–13.1)
RBC # BLD: 3.33 M/UL — LOW (ref 3.8–5.2)
RBC # BLD: 3.81 M/UL — SIGNIFICANT CHANGE UP (ref 3.8–5.2)
RBC # FLD: 15.2 % — HIGH (ref 10.3–14.5)
RBC # FLD: 15.2 % — HIGH (ref 10.3–14.5)
RH IG SCN BLD-IMP: POSITIVE — SIGNIFICANT CHANGE UP
SODIUM SERPL-SCNC: 142 MMOL/L — SIGNIFICANT CHANGE UP (ref 135–145)
SODIUM SERPL-SCNC: 143 MMOL/L — SIGNIFICANT CHANGE UP (ref 135–145)
WBC # BLD: 5.14 K/UL — SIGNIFICANT CHANGE UP (ref 3.8–10.5)
WBC # BLD: 7.04 K/UL — SIGNIFICANT CHANGE UP (ref 3.8–10.5)
WBC # FLD AUTO: 5.14 K/UL — SIGNIFICANT CHANGE UP (ref 3.8–10.5)
WBC # FLD AUTO: 7.04 K/UL — SIGNIFICANT CHANGE UP (ref 3.8–10.5)

## 2018-12-02 PROCEDURE — 27125 PARTIAL HIP REPLACEMENT: CPT | Mod: RT

## 2018-12-02 PROCEDURE — 72170 X-RAY EXAM OF PELVIS: CPT | Mod: 26

## 2018-12-02 PROCEDURE — 99232 SBSQ HOSP IP/OBS MODERATE 35: CPT

## 2018-12-02 RX ORDER — POTASSIUM PHOSPHATE, MONOBASIC POTASSIUM PHOSPHATE, DIBASIC 236; 224 MG/ML; MG/ML
15 INJECTION, SOLUTION INTRAVENOUS ONCE
Qty: 0 | Refills: 0 | Status: COMPLETED | OUTPATIENT
Start: 2018-12-02 | End: 2018-12-02

## 2018-12-02 RX ORDER — SODIUM CHLORIDE 9 MG/ML
1000 INJECTION, SOLUTION INTRAVENOUS
Qty: 0 | Refills: 0 | Status: DISCONTINUED | OUTPATIENT
Start: 2018-12-02 | End: 2018-12-03

## 2018-12-02 RX ORDER — CEFAZOLIN SODIUM 1 G
2000 VIAL (EA) INJECTION EVERY 8 HOURS
Qty: 0 | Refills: 0 | Status: COMPLETED | OUTPATIENT
Start: 2018-12-02 | End: 2018-12-03

## 2018-12-02 RX ORDER — ASPIRIN/CALCIUM CARB/MAGNESIUM 324 MG
325 TABLET ORAL
Qty: 0 | Refills: 0 | Status: DISCONTINUED | OUTPATIENT
Start: 2018-12-03 | End: 2018-12-04

## 2018-12-02 RX ORDER — POTASSIUM CHLORIDE 20 MEQ
20 PACKET (EA) ORAL ONCE
Qty: 0 | Refills: 0 | Status: COMPLETED | OUTPATIENT
Start: 2018-12-02 | End: 2018-12-02

## 2018-12-02 RX ORDER — ACETAMINOPHEN 500 MG
650 TABLET ORAL EVERY 6 HOURS
Qty: 0 | Refills: 0 | Status: DISCONTINUED | OUTPATIENT
Start: 2018-12-02 | End: 2018-12-04

## 2018-12-02 RX ADMIN — Medication 100 MILLIGRAM(S): at 17:13

## 2018-12-02 RX ADMIN — SODIUM CHLORIDE 85 MILLILITER(S): 9 INJECTION, SOLUTION INTRAVENOUS at 21:58

## 2018-12-02 RX ADMIN — POTASSIUM PHOSPHATE, MONOBASIC POTASSIUM PHOSPHATE, DIBASIC 62.5 MILLIMOLE(S): 236; 224 INJECTION, SOLUTION INTRAVENOUS at 06:02

## 2018-12-02 RX ADMIN — SODIUM CHLORIDE 125 MILLILITER(S): 9 INJECTION INTRAMUSCULAR; INTRAVENOUS; SUBCUTANEOUS at 17:12

## 2018-12-02 RX ADMIN — Medication 100 MILLIGRAM(S): at 22:00

## 2018-12-02 RX ADMIN — SODIUM CHLORIDE 125 MILLILITER(S): 9 INJECTION INTRAMUSCULAR; INTRAVENOUS; SUBCUTANEOUS at 02:34

## 2018-12-02 RX ADMIN — Medication 20 MILLIEQUIVALENT(S): at 19:22

## 2018-12-02 RX ADMIN — SENNA PLUS 2 TABLET(S): 8.6 TABLET ORAL at 22:00

## 2018-12-02 NOTE — BRIEF OPERATIVE NOTE - PROCEDURE
<<-----Click on this checkbox to enter Procedure Bipolar hemiarthroplasty of right hip  12/02/2018    Active  DSTAL

## 2018-12-03 ENCOUNTER — TRANSCRIPTION ENCOUNTER (OUTPATIENT)
Age: 83
End: 2018-12-03

## 2018-12-03 DIAGNOSIS — R00.0 TACHYCARDIA, UNSPECIFIED: ICD-10-CM

## 2018-12-03 DIAGNOSIS — Z29.9 ENCOUNTER FOR PROPHYLACTIC MEASURES, UNSPECIFIED: ICD-10-CM

## 2018-12-03 DIAGNOSIS — D50.0 IRON DEFICIENCY ANEMIA SECONDARY TO BLOOD LOSS (CHRONIC): ICD-10-CM

## 2018-12-03 LAB
BACTERIA UR CULT: SIGNIFICANT CHANGE UP
BUN SERPL-MCNC: 23 MG/DL — SIGNIFICANT CHANGE UP (ref 7–23)
CALCIUM SERPL-MCNC: 8.7 MG/DL — SIGNIFICANT CHANGE UP (ref 8.4–10.5)
CHLORIDE SERPL-SCNC: 109 MMOL/L — HIGH (ref 98–107)
CO2 SERPL-SCNC: 22 MMOL/L — SIGNIFICANT CHANGE UP (ref 22–31)
CREAT SERPL-MCNC: 0.68 MG/DL — SIGNIFICANT CHANGE UP (ref 0.5–1.3)
GLUCOSE SERPL-MCNC: 123 MG/DL — HIGH (ref 70–99)
HCT VFR BLD CALC: 34.2 % — LOW (ref 34.5–45)
HGB BLD-MCNC: 10.6 G/DL — LOW (ref 11.5–15.5)
MCHC RBC-ENTMCNC: 31 % — LOW (ref 32–36)
MCHC RBC-ENTMCNC: 32.1 PG — SIGNIFICANT CHANGE UP (ref 27–34)
MCV RBC AUTO: 103.6 FL — HIGH (ref 80–100)
NRBC # FLD: 0 — SIGNIFICANT CHANGE UP
PLATELET # BLD AUTO: 178 K/UL — SIGNIFICANT CHANGE UP (ref 150–400)
PMV BLD: 9.6 FL — SIGNIFICANT CHANGE UP (ref 7–13)
POTASSIUM SERPL-MCNC: 4.4 MMOL/L — SIGNIFICANT CHANGE UP (ref 3.5–5.3)
POTASSIUM SERPL-SCNC: 4.4 MMOL/L — SIGNIFICANT CHANGE UP (ref 3.5–5.3)
RBC # BLD: 3.3 M/UL — LOW (ref 3.8–5.2)
RBC # FLD: 14.8 % — HIGH (ref 10.3–14.5)
SODIUM SERPL-SCNC: 142 MMOL/L — SIGNIFICANT CHANGE UP (ref 135–145)
SPECIMEN SOURCE: SIGNIFICANT CHANGE UP
WBC # BLD: 8.26 K/UL — SIGNIFICANT CHANGE UP (ref 3.8–10.5)
WBC # FLD AUTO: 8.26 K/UL — SIGNIFICANT CHANGE UP (ref 3.8–10.5)

## 2018-12-03 PROCEDURE — 99233 SBSQ HOSP IP/OBS HIGH 50: CPT

## 2018-12-03 RX ORDER — TRAMADOL HYDROCHLORIDE 50 MG/1
25 TABLET ORAL
Qty: 0 | Refills: 0 | Status: DISCONTINUED | OUTPATIENT
Start: 2018-12-03 | End: 2018-12-03

## 2018-12-03 RX ORDER — POLYETHYLENE GLYCOL 3350 17 G/17G
17 POWDER, FOR SOLUTION ORAL DAILY
Qty: 0 | Refills: 0 | Status: DISCONTINUED | OUTPATIENT
Start: 2018-12-03 | End: 2018-12-04

## 2018-12-03 RX ORDER — ASCORBIC ACID 60 MG
500 TABLET,CHEWABLE ORAL DAILY
Qty: 0 | Refills: 0 | Status: DISCONTINUED | OUTPATIENT
Start: 2018-12-03 | End: 2018-12-04

## 2018-12-03 RX ORDER — FAMOTIDINE 10 MG/ML
20 INJECTION INTRAVENOUS DAILY
Qty: 0 | Refills: 0 | Status: DISCONTINUED | OUTPATIENT
Start: 2018-12-03 | End: 2018-12-04

## 2018-12-03 RX ORDER — TRAMADOL HYDROCHLORIDE 50 MG/1
25 TABLET ORAL
Qty: 0 | Refills: 0 | Status: DISCONTINUED | OUTPATIENT
Start: 2018-12-03 | End: 2018-12-04

## 2018-12-03 RX ORDER — ONDANSETRON 8 MG/1
4 TABLET, FILM COATED ORAL EVERY 6 HOURS
Qty: 0 | Refills: 0 | Status: DISCONTINUED | OUTPATIENT
Start: 2018-12-03 | End: 2018-12-04

## 2018-12-03 RX ORDER — SODIUM CHLORIDE 9 MG/ML
250 INJECTION INTRAMUSCULAR; INTRAVENOUS; SUBCUTANEOUS ONCE
Qty: 0 | Refills: 0 | Status: COMPLETED | OUTPATIENT
Start: 2018-12-03 | End: 2018-12-03

## 2018-12-03 RX ORDER — ACETAMINOPHEN 500 MG
650 TABLET ORAL EVERY 6 HOURS
Qty: 0 | Refills: 0 | Status: DISCONTINUED | OUTPATIENT
Start: 2018-12-03 | End: 2018-12-04

## 2018-12-03 RX ADMIN — Medication 1 TABLET(S): at 12:03

## 2018-12-03 RX ADMIN — Medication 650 MILLIGRAM(S): at 12:03

## 2018-12-03 RX ADMIN — Medication 3 MILLIGRAM(S): at 21:27

## 2018-12-03 RX ADMIN — Medication 650 MILLIGRAM(S): at 17:42

## 2018-12-03 RX ADMIN — Medication 100 MILLIGRAM(S): at 07:04

## 2018-12-03 RX ADMIN — Medication 100 MILLIGRAM(S): at 14:57

## 2018-12-03 RX ADMIN — Medication 100 MILLIGRAM(S): at 01:31

## 2018-12-03 RX ADMIN — Medication 975 MILLIGRAM(S): at 02:01

## 2018-12-03 RX ADMIN — Medication 500 MILLIGRAM(S): at 12:03

## 2018-12-03 RX ADMIN — Medication 325 MILLIGRAM(S): at 07:04

## 2018-12-03 RX ADMIN — Medication 975 MILLIGRAM(S): at 01:31

## 2018-12-03 RX ADMIN — POLYETHYLENE GLYCOL 3350 17 GRAM(S): 17 POWDER, FOR SOLUTION ORAL at 12:03

## 2018-12-03 RX ADMIN — SODIUM CHLORIDE 85 MILLILITER(S): 9 INJECTION, SOLUTION INTRAVENOUS at 08:01

## 2018-12-03 RX ADMIN — SODIUM CHLORIDE 1000 MILLILITER(S): 9 INJECTION INTRAMUSCULAR; INTRAVENOUS; SUBCUTANEOUS at 11:59

## 2018-12-03 RX ADMIN — Medication 325 MILLIGRAM(S): at 17:42

## 2018-12-03 RX ADMIN — SENNA PLUS 2 TABLET(S): 8.6 TABLET ORAL at 21:27

## 2018-12-03 RX ADMIN — FAMOTIDINE 20 MILLIGRAM(S): 10 INJECTION INTRAVENOUS at 14:57

## 2018-12-03 NOTE — OCCUPATIONAL THERAPY INITIAL EVALUATION ADULT - LOWER BODY DRESSING, PREVIOUS LEVEL OF FUNCTION, OT EVAL
independent Patient sent by Dr. 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 Foot XR shows DJD. Neva ABREU, scribed for and in the presence of ED Attending Dr. Briggs for this note.

## 2018-12-03 NOTE — PHYSICAL THERAPY INITIAL EVALUATION ADULT - GAIT DEVIATIONS NOTED, PT EVAL
decreased stride length/decreased weight-shifting ability/increased time in double stance/decreased michell

## 2018-12-03 NOTE — DISCHARGE NOTE ADULT - NS AS DC FOLLOWUP STROKE INST
Smoking Cessation/hip fracture, partial hip replacement, aspirin, fall prevention hip fracture, partial hip replacement, aspirin, fall prevention Influenza vaccination ([facility] Pub Date: August 7, 2015)/hip fracture, partial hip replacement, aspirin, fall prevention/Smoking Cessation Influenza vaccination ([facility] Pub Date: August 7, 2015)/hip fracture, partial hip replacement, aspirin, fall prevention

## 2018-12-03 NOTE — PROGRESS NOTE ADULT - PROBLEM SELECTOR PLAN 2
Pain well controlled; continue management and pain control per ortho recs with tylenol prn, and oxycodone prn -likely reactive secondary to pain/fracture; patient with no signs of active infection and hemodynamically stable; will continue to monitor closely.  -resolved

## 2018-12-03 NOTE — DISCHARGE NOTE ADULT - MEDICATION SUMMARY - MEDICATIONS TO TAKE
I will START or STAY ON the medications listed below when I get home from the hospital:    acetaminophen 325 mg oral tablet  -- 2 tab(s) by mouth every 6 hours as needed for Mild pain  -- Indication: For Pain control    traMADol 50 mg oral tablet  -- 0.5 tab(s) by mouth 2 times a day, As needed, as needed for moderate to severe pain  -- Indication: For Pain control    aspirin 325 mg oral delayed release tablet  -- 1 tab(s) by mouth 2 times a day (with meals)  -- Indication: For blood thinner for postop clot prevention MUST TAKE WITH FOOD    famotidine 20 mg oral tablet  -- 1 tab(s) by mouth once a day  -- Indication: For stomach protectant TAKE WITH BLOOD THINNER ASPIRIN    docusate sodium 100 mg oral capsule  -- 1 cap(s) by mouth 3 times a day  over the counter for constipation caused by pain meds   -- Indication: For stool softener    senna oral tablet  -- 2 tab(s) by mouth once a day (at bedtime)  over the counter for constipation caused by pain meds   -- Indication: For bowel stimulant    melatonin 3 mg oral tablet  -- 1 tab(s) by mouth once a day (at bedtime), As needed, Insomnia  -- Indication: For As needed for insomnia    calcium-vitamin D 500 mg-200 intl units oral tablet  -- 1 tab(s) by mouth once a day  -- Indication: For supplement    ascorbic acid 500 mg oral tablet  -- 1 tab(s) by mouth once a day  -- Indication: For supplement

## 2018-12-03 NOTE — DISCHARGE NOTE ADULT - HOSPITAL COURSE
89yo female is s/p Right hip hemiarthroplasty 12/2/2018, without any intraoperative complications, for femoral neck fracture after mechanical fall.  Patient is doing well and stable for discharge.  Patient is tolerating physical therapy: weight bearing to Right leg, out of bed for gait training, STRICT ANTERIOR AND POSTERIOR HIP PRECAUTIONS.  Keep dressing on as is until day of staple removal.  Staples/sutures to be removed in Dr. Norton's office 14 days from date of surgery.  Patient is on Aspirin (MUST TAKE WITH FOOD) for anticoagulation.  Orthopaedic Surgeon Dr. Norton would like patient to call private MD/Internist for appointment postop to maintain continuity of care.  Follow up with Dr. Norton's office in 1-2 weeks. 89yo female is s/p Right hip hemiarthroplasty 12/2/2018, without any intraoperative complications, for femoral neck fracture after mechanical fall.  Patient is doing well and stable for discharge.  Patient is tolerating physical therapy: weight bearing to Right leg, out of bed for gait training, STRICT ANTERIOR AND POSTERIOR HIP PRECAUTIONS.  Keep dressing on as is until day of staple removal.  Staples/sutures to be removed in Dr. Norton's office 14 days from date of surgery.  Patient is on Aspirin (MUST TAKE WITH FOOD) for anticoagulation.  Post operative dopplers negative for blood clot. Orthopaedic Surgeon Dr. Norton would like patient to call private MD/Internist for appointment postop to maintain continuity of care.  Follow up with Dr. Norton's office in 1-2 weeks.

## 2018-12-03 NOTE — PHYSICAL THERAPY INITIAL EVALUATION ADULT - ADDITIONAL COMMENTS
Pt. left in bed ost-PT, per RN request, in NAD with all lines/tubes intact & table/TV/call bell within reach.  RN Odette hubbard.  (+) bed alarm engaged.

## 2018-12-03 NOTE — OCCUPATIONAL THERAPY INITIAL EVALUATION ADULT - ADDITIONAL COMMENTS
89 year old F presented to the [facility] following fall at home about 2 days ago. Patient was walking in her house, when she tripped and fell onto her right side. Hip xray: sightly superolaterally displaced right femoral neck subcapital fracture.

## 2018-12-03 NOTE — DISCHARGE NOTE ADULT - CONDITIONS AT DISCHARGE
Pt. is afebrile and offers no complaints. In no acute distress. Right hip dressing: clean, dry and intact. Pt is ambulating with a walker, tolerating diet well, and voiding in adequate amounts. Pt. is afebrile and offers no complaints. In no acute distress. Right hip dressing: clean, dry and intact. Pt is ambulating with a walker, tolerating diet well, and voiding in adequate amounts. Right ischium suspected DTI 2.5 X 2- mepilex applied. Left buttock stage 1 1.5 X 1.5 mepilex applied. Pt. is afebrile and offers no complaints. In no acute distress. Right hip dressing: clean, dry and intact. Pt is ambulating with a walker, tolerating diet well, and voiding in adequate amounts. Mepilex applied to bilateral ischiums for skin protection. No pressure injury, discoloration of skin

## 2018-12-03 NOTE — DISCHARGE NOTE ADULT - CARE PLAN
Principal Discharge DX:	Closed fracture of neck of right femur, initial encounter  Goal:	pain control -> pain med may cause drowsiness, refrain from activities require decision making; physical therapy to help resume activities of daily living  Assessment and plan of treatment:	call Surgeon's office to make appointment in 1 week Dr. Norton [phone]  weight bearing as tolerated to Right leg

## 2018-12-03 NOTE — PHYSICAL THERAPY INITIAL EVALUATION ADULT - ACTIVE RANGE OF MOTION EXAMINATION, REHAB EVAL
deficits as listed below/Right LE WFL's except ~ 0-65 degrees hip flexion/extension deficits as listed below/Right LE WFL's except ~ 0-65 degrees hip flexion/extension (Active-Assistive ROM)

## 2018-12-03 NOTE — PHYSICAL THERAPY INITIAL EVALUATION ADULT - PATIENT PROFILE REVIEW, REHAB EVAL
ACTIVITY: out of bed --> chair; consult with Odette Claudio RN --> pt. OK for out of bed with PT, but should be returned to bed for safety/yes

## 2018-12-03 NOTE — DISCHARGE NOTE ADULT - INSTRUCTIONS
Make a follow up appointment with Dr. Norton. Call MD if you develop a fever, or if there is redness, swelling, drainage or pain not relieved by pain medication. No heavy lifting, bending, or straining to move your bowels. Take over the counter stool softeners as needed to prevent constipation which may be caused by pain medication. Make a follow up appointment with Dr. Norton. Call MD if you develop a fever, or if there is redness, swelling, drainage or pain not relieved by pain medication. No heavy lifting, bending, or straining to move your bowels. Take over the counter stool softeners as needed to prevent constipation which may be caused by pain medication. Pressure injury reduction techniques: turn and position patient Q 2-4 hours, provide adequate nutrition, encourage ambulation. Check mepilex daily for draining and change as instructed. resume same diet as prior to surgery

## 2018-12-03 NOTE — PHYSICAL THERAPY INITIAL EVALUATION ADULT - DIAGNOSIS, PT EVAL
closed fracture of neck of femur, s/p fall at home; ortho trauma/surgery --> s/p Right hip hemiarthroplasty

## 2018-12-03 NOTE — DISCHARGE NOTE ADULT - CARE PROVIDER_API CALL
Prakash Norton), Orthopaedic Surgery  [address] [address]  Phone: [phone]  Fax: [phone]

## 2018-12-03 NOTE — PROGRESS NOTE ADULT - PROBLEM SELECTOR PLAN 1
-likely reactive secondary to pain/fracture; patient with no signs of active infection and hemodynamically stable; will continue to monitor closely.  -resolved -could be 2/2 pain and blood loss  -recommend 250cc IVF bolus and encourage use of incentive spirometer  -monitor HR closely

## 2018-12-03 NOTE — DISCHARGE NOTE ADULT - REASON FOR ADMISSION
R Hip Fracture mechanical fall sustained Right hip femoral neck fracture  surgery Right hip hemiarthroplasty 12/2/2018

## 2018-12-03 NOTE — DISCHARGE NOTE ADULT - ADDITIONAL INSTRUCTIONS
post surgical care  89yo female is s/p Right hip hemiarthroplasty 12/2/2018, without any intraoperative complications, for femoral neck fracture after mechanical fall.  Patient is doing well and stable for discharge.  Patient is tolerating physical therapy: weight bearing to Right leg, out of bed for gait training, STRICT ANTERIOR AND POSTERIOR HIP PRECAUTIONS.  Keep dressing on as is until day of staple removal.  Staples/sutures to be removed in Dr. Norton's office 14 days from date of surgery.  Patient is on Aspirin (MUST TAKE WITH FOOD) for anticoagulation.  Orthopaedic Surgeon Dr. Norton would like patient to call private MD/Internist for appointment postop to maintain continuity of care.  Follow up with Dr. Norton's office in 1-2 weeks.

## 2018-12-03 NOTE — OCCUPATIONAL THERAPY INITIAL EVALUATION ADULT - PERTINENT HX OF CURRENT PROBLEM, REHAB EVAL
89 year old F presented to the [facility] following fall at home about 2 days ago. Patient was walking in her house, when she tripped and fell onto her right side. Hip x-ray: sightly superolaterally displaced right femoral neck subcapital fracture. 89 year old F presented to the [facility] following fall at home about 2 days ago. Patient was walking in her house, when she tripped and fell onto her right side. Hip xray: sightly superolaterally displaced right femoral neck subcapital fracture. Pt now s/p right hip hemiarthroplasty

## 2018-12-03 NOTE — PHYSICAL THERAPY INITIAL EVALUATION ADULT - PERTINENT HX OF CURRENT PROBLEM, REHAB EVAL
Pt. is a 89 y/o female admitted to [facility] on 12/01/18 with a dx of closed fracture of neck of femur, s/p fall at home.  PT consult request secondary to ortho trauma/surgery --> s/p Right hip hemiarthroplasty.  No PMH/PSH.  CXR = opacity.

## 2018-12-03 NOTE — DISCHARGE NOTE ADULT - PLAN OF CARE
pain control -> pain med may cause drowsiness, refrain from activities require decision making; physical therapy to help resume activities of daily living call Surgeon's office to make appointment in 1 week Dr. Norton [phone]  weight bearing as tolerated to Right leg

## 2018-12-03 NOTE — OCCUPATIONAL THERAPY INITIAL EVALUATION ADULT - LIVES WITH, PROFILE
Pt lives with sisters in a house with steps to manage. Pt has access to both a stall shower and bathtub within home.

## 2018-12-03 NOTE — DISCHARGE NOTE ADULT - PATIENT PORTAL LINK FT
You can access the [facility] Patient Portal, offered by [facility], by registering with the following website: [email or web address]

## 2018-12-03 NOTE — PHYSICAL THERAPY INITIAL EVALUATION ADULT - CRITERIA FOR SKILLED THERAPEUTIC INTERVENTIONS
impairments found/rehab potential/therapy frequency/anticipated discharge recommendation/risk reduction/prevention/Inpatient restorative rehab/predicted duration of therapy intervention

## 2018-12-03 NOTE — PROGRESS NOTE ADULT - PROBLEM SELECTOR PLAN 3
repleted, f/u -Pain well controlled; continue management and pain control per ortho recs with tylenol prn, and oxycodone prn  -would recommend d/c'ing oxycodone  -TOV today  -PT lucy

## 2018-12-03 NOTE — PHYSICAL THERAPY INITIAL EVALUATION ADULT - LIVES WITH, PROFILE
other relative/(2 sisters), private home, (+) steps-to-enter/exit home with Unilateral Handrail, (+) [id number] in the home with Bilateral Handrails

## 2018-12-03 NOTE — PHYSICAL THERAPY INITIAL EVALUATION ADULT - ASR EQUIP NEEDS DISCH PT EVAL
current recommendation for inpatient rehab; if pt. is discharged home, a rolling walker will likely be required for safe discharge (pt. currently owns and uses a Straight cane and a "walking stick"

## 2018-12-04 VITALS
TEMPERATURE: 98 F | SYSTOLIC BLOOD PRESSURE: 132 MMHG | DIASTOLIC BLOOD PRESSURE: 76 MMHG | RESPIRATION RATE: 17 BRPM | HEART RATE: 102 BPM | OXYGEN SATURATION: 98 %

## 2018-12-04 PROCEDURE — 93970 EXTREMITY STUDY: CPT | Mod: 26

## 2018-12-04 PROCEDURE — 99233 SBSQ HOSP IP/OBS HIGH 50: CPT

## 2018-12-04 PROCEDURE — 99238 HOSP IP/OBS DSCHRG MGMT 30/<: CPT

## 2018-12-04 RX ORDER — ASCORBIC ACID 60 MG
1 TABLET,CHEWABLE ORAL
Qty: 0 | Refills: 0 | DISCHARGE
Start: 2018-12-04

## 2018-12-04 RX ORDER — SENNA PLUS 8.6 MG/1
2 TABLET ORAL
Qty: 0 | Refills: 0 | COMMUNITY
Start: 2018-12-04

## 2018-12-04 RX ORDER — FAMOTIDINE 10 MG/ML
1 INJECTION INTRAVENOUS
Qty: 0 | Refills: 0 | COMMUNITY
Start: 2018-12-04

## 2018-12-04 RX ORDER — DOCUSATE SODIUM 100 MG
1 CAPSULE ORAL
Qty: 0 | Refills: 0 | DISCHARGE
Start: 2018-12-04

## 2018-12-04 RX ORDER — ACETAMINOPHEN 500 MG
2 TABLET ORAL
Qty: 0 | Refills: 0 | DISCHARGE
Start: 2018-12-04

## 2018-12-04 RX ORDER — ACETAMINOPHEN 500 MG
2 TABLET ORAL
Qty: 0 | Refills: 0 | COMMUNITY
Start: 2018-12-04

## 2018-12-04 RX ORDER — SENNA PLUS 8.6 MG/1
2 TABLET ORAL
Qty: 0 | Refills: 0 | DISCHARGE
Start: 2018-12-04

## 2018-12-04 RX ORDER — ASPIRIN/CALCIUM CARB/MAGNESIUM 324 MG
1 TABLET ORAL
Qty: 0 | Refills: 0 | COMMUNITY
Start: 2018-12-04

## 2018-12-04 RX ORDER — LANOLIN ALCOHOL/MO/W.PET/CERES
1 CREAM (GRAM) TOPICAL
Qty: 0 | Refills: 0 | DISCHARGE
Start: 2018-12-04

## 2018-12-04 RX ORDER — DOCUSATE SODIUM 100 MG
1 CAPSULE ORAL
Qty: 0 | Refills: 0 | COMMUNITY
Start: 2018-12-04

## 2018-12-04 RX ORDER — TRAMADOL HYDROCHLORIDE 50 MG/1
0.5 TABLET ORAL
Qty: 0 | Refills: 0 | COMMUNITY
Start: 2018-12-04

## 2018-12-04 RX ADMIN — Medication 325 MILLIGRAM(S): at 06:00

## 2018-12-04 RX ADMIN — FAMOTIDINE 20 MILLIGRAM(S): 10 INJECTION INTRAVENOUS at 13:30

## 2018-12-04 RX ADMIN — Medication 100 MILLIGRAM(S): at 06:00

## 2018-12-04 RX ADMIN — Medication 500 MILLIGRAM(S): at 13:30

## 2018-12-04 RX ADMIN — Medication 100 MILLIGRAM(S): at 13:30

## 2018-12-04 RX ADMIN — Medication 650 MILLIGRAM(S): at 06:01

## 2018-12-04 RX ADMIN — Medication 650 MILLIGRAM(S): at 13:30

## 2018-12-04 RX ADMIN — Medication 650 MILLIGRAM(S): at 06:00

## 2018-12-04 RX ADMIN — POLYETHYLENE GLYCOL 3350 17 GRAM(S): 17 POWDER, FOR SOLUTION ORAL at 13:30

## 2018-12-04 RX ADMIN — Medication 1 TABLET(S): at 13:30

## 2018-12-04 NOTE — PROGRESS NOTE ADULT - REASON FOR ADMISSION
R Hip Fracture

## 2018-12-04 NOTE — PROGRESS NOTE ADULT - PROBLEM SELECTOR PLAN 5
-likely secondary to post-op changes; pt hemodynamically stable; will monitor   -patient with elevated MCV, would recommend checking B12 and folic acid as outpatient
-likely secondary to post-op changes; pt hemodynamically stable; will monitor   -patient with elevated MCV, would recommend checking B12 and folic acid

## 2018-12-04 NOTE — PROGRESS NOTE ADULT - PROBLEM SELECTOR PLAN 3
-Pain well controlled; continue management and pain control per ortho recs with tylenol prn and tramadol prn  -passed TOV   -PT eval recommending rehab

## 2018-12-04 NOTE — PROGRESS NOTE ADULT - PROBLEM SELECTOR PLAN 2
-likely reactive secondary to pain/fracture; patient with no signs of active infection and hemodynamically stable  -resolved

## 2018-12-04 NOTE — PROGRESS NOTE ADULT - PROBLEM SELECTOR PLAN 1
-could be 2/2 pain and blood loss vs. pain vs. thrombus  -patient s/p 250cc IVF bolus yesterday ; no improvement in tachycardia longterm  -continue to encourage use of incentive spirometer  -will obtain bilateral LE dopplers to r/o DVT  -monitor HR closely

## 2018-12-04 NOTE — PROGRESS NOTE ADULT - SUBJECTIVE AND OBJECTIVE BOX
CHIEF COMPLAINT: f/u hip fracture    SUBJECTIVE / OVERNIGHT EVENTS: Patient seen and examined. No acute events overnight. Pain well controlled and patient without any complaints. Patient slightly delirious on rounds this morning per orthopedic PA.    MEDICATIONS  (STANDING):  aspirin enteric coated 325 milliGRAM(s) Oral two times a day  docusate sodium 100 milliGRAM(s) Oral three times a day  lactated ringers. 1000 milliLiter(s) (85 mL/Hr) IV Continuous <Continuous>  senna 2 Tablet(s) Oral at bedtime  sodium chloride 0.9% Bolus 250 milliLiter(s) IV Bolus once    MEDICATIONS  (PRN):  acetaminophen   Tablet .. 650 milliGRAM(s) Oral every 6 hours PRN Temp greater or equal to 38C (100.4F)  acetaminophen   Tablet .. 975 milliGRAM(s) Oral every 8 hours PRN Mild Pain (1 - 3)  melatonin 3 milliGRAM(s) Oral at bedtime PRN Insomnia  oxyCODONE    IR 2.5 milliGRAM(s) Oral every 4 hours PRN Moderate Pain (4 - 6)  oxyCODONE    IR 5 milliGRAM(s) Oral every 4 hours PRN Severe Pain (7 - 10)    VITALS:  T(F): 98.2 (18 @ 10:24), Max: 99.5 (18 @ 15:00)  HR: 100 (18 @ 10:24) (80 - 105)  BP: 125/77 (18 @ 10:24) (114/85 - 145/94)  RR: 16 (18 @ 10:24) (16 - 32)  SpO2: 97% (18 @ 10:24)    PHYSICAL EXAM:  GENERAL: NAD, well-developed  NECK: Supple, No JVD  CHEST/LUNG: Clear to auscultation bilaterally; No wheeze  HEART: Regular rate and rhythm; No murmurs, rubs, or gallops  ABDOMEN: Soft, Nontender, Nondistended; Bowel sounds present  EXTREMITIES:  2+ Peripheral Pulses, No clubbing, cyanosis, or edema  SKIN: Dressing C/D/I    LABS:              10.6                 142  | 22   | 23           8.26  >-----------< 178     ------------------------< 123                   34.2                 4.4  | 109  | 0.68                                         Ca 8.7   Mg x     Ph x        H/H trend  - @ 06:10   HgB  10.6  HCT  34.2  12- @ 11:42   HgB  11.0  HCT  34.9  12 @ 03:49   HgB  12.0  HCT  38.1  12- @ 12:50   HgB  13.4  HCT  41.9       TPro  7.3  /  Alb  4.2      TBili  2.8  /  DBili  x         AST  34  /  ALT  20            AlkPhos  241      INR: 1.11 ;    PT: 12.7 SEC;    PTT: 29.0 SEC    Urinalysis Basic - ( 01 Dec 2018 15:40 )  Color: YELLOW / Appearance: CLEAR / S.024 / pH: 5.5  Gluc: NEGATIVE / Ketone: TRACE  / Bili: NEGATIVE / Urobili: NORMAL   Blood: TRACE / Protein: SMALL / Nitrite: NEGATIVE   Leuk Esterase: NEGATIVE / RBC: 3-5 / WBC 3-5   Sq Epi: x / Non Sq Epi: x / Bacteria: NEGATIVE    RADIOLOGY & ADDITIONAL TESTS:  Imaging Personally Reviewed: [x] Yes  < from: Xray Pelvis AP only (18 @ 11:27) >  IMPRESSION:  Cementless right hip hemiarthroplasty prosthesis implanted.    Intact and aligned hardware and no periprosthetic fractures.    Postoperative soft tissue changes.    Surgical skin staples overlie operative site.    Correlate with intraoperative findings.    Intact aligned and uncomplicated left hip prosthesis with cemented   femoral stem.    < end of copied text >    [ ] Consultant(s) Notes Reviewed:  [x] Care Discussed with Consultants/Other Providers: Orthopedic PA - discussed management of delirium
No acute events overnight. Pain well controlled with pain medication.    Procedure: R Hip Hemiarthroplasty  Surgeon: Errol        142  |  101  |  28<H>  ----------------------------<  113<H>  3.5   |  24  |  0.60    Ca    9.9      01 Dec 2018 12:50  Phos  2.7       Mg     2.3         TPro  7.3  /  Alb  4.2  /  TBili  2.8<H>  /  DBili  x   /  AST  34<H>  /  ALT  20  /  AlkPhos  241<H>                            13.4   10.90 )-----------( 199      ( 01 Dec 2018 12:50 )             41.9     PT/INR - ( 01 Dec 2018 15:35 )   PT: 12.6 SEC;   INR: 1.10          PTT - ( 01 Dec 2018 15:35 )  PTT:22.8 SEC  Urinalysis Basic - ( 01 Dec 2018 15:40 )    Color: YELLOW / Appearance: CLEAR / S.024 / pH: 5.5  Gluc: NEGATIVE / Ketone: TRACE  / Bili: NEGATIVE / Urobili: NORMAL   Blood: TRACE / Protein: SMALL / Nitrite: NEGATIVE   Leuk Esterase: NEGATIVE / RBC: 3-5 / WBC 3-5   Sq Epi: x / Non Sq Epi: x / Bacteria: NEGATIVE        - EKG in chart  - UA negative    89y Female to undergo Right hip hemiarthroplasty  - NPO  - IVF when NPO  - Consent in chart  - Appreciate medical clearance  - Plan for OR today
Orthopaedic Surgery Progress Note    Subjective:   Patient seen and examined  No acute events overnight  Pain well controlled    Objective:  T(C): 36.9 (18 @ 05:55), Max: 37.5 (18 @ 15:00)  HR: 99 (18 @ 05:55) (75 - 105)  BP: 130/82 (18 @ 05:55) (114/85 - 148/107)  RR: 16 (18 @ 05:55) (10 - 32)  SpO2: 96% (18 @ 05:55) (92% - 100%)  Wt(kg): --     @ 07:01  -   @ 07:00  --------------------------------------------------------  IN: 0 mL / OUT: 700 mL / NET: -700 mL     @ 07:01  -   @ 06:02  --------------------------------------------------------  IN: 625 mL / OUT: 1305 mL / NET: -680 mL        PE    NAD, AAOx2  RLE:   dressing C/D/I  motor intact GS/TA/EHL  SILT S/S/SP/DP  WWP                          11.0   5.14  )-----------( 157      ( 02 Dec 2018 11:42 )             34.9         143  |  108<H>  |  20  ----------------------------<  111<H>  3.3<L>   |  23  |  0.56    Ca    8.3<L>      02 Dec 2018 11:42  Phos  2.3       Mg     2.0         TPro  7.3  /  Alb  4.2  /  TBili  2.8<H>  /  DBili  x   /  AST  34<H>  /  ALT  20  /  AlkPhos  241<H>  01    PT/INR - ( 02 Dec 2018 03:49 )   PT: 12.7 SEC;   INR: 1.11          PTT - ( 02 Dec 2018 03:49 )  PTT:29.0 SEC  Urinalysis Basic - ( 01 Dec 2018 15:40 )    Color: YELLOW / Appearance: CLEAR / S.024 / pH: 5.5  Gluc: NEGATIVE / Ketone: TRACE  / Bili: NEGATIVE / Urobili: NORMAL   Blood: TRACE / Protein: SMALL / Nitrite: NEGATIVE   Leuk Esterase: NEGATIVE / RBC: 3-5 / WBC 3-5   Sq Epi: x / Non Sq Epi: x / Bacteria: NEGATIVE        89y Female s/p R hip maksim  - Pain control  - WBAT  - Anterior dislocation precautions  - PT/OT/OOB  - DVT ppx  - Dispo planning
Orthopaedic Surgery Progress Note    Subjective:   Patient seen and examined  No acute events overnight  Pain well controlled  Mild tachycardia overnight, improved after 1/4L bolus    Objective:  T(C): 36.4 (12-04-18 @ 05:42), Max: 37.3 (12-03-18 @ 17:10)  HR: 101 (12-04-18 @ 05:42) (90 - 101)  BP: 141/81 (12-03-18 @ 21:23) (125/77 - 149/95)  RR: 16 (12-04-18 @ 05:42) (16 - 17)  SpO2: 98% (12-04-18 @ 05:42) (95% - 98%)  Wt(kg): --    12-02 @ 07:01  -  12-03 @ 07:00  --------------------------------------------------------  IN: 625 mL / OUT: 1305 mL / NET: -680 mL    12-03 @ 07:01  -  12-04 @ 06:10  --------------------------------------------------------  IN: 0 mL / OUT: 526 mL / NET: -526 mL        PE    NAD, ambulating from bathroom with walker  RLE:   dressing C/D/I  motor intact GS/TA/EHL  SILT S/S/SP/DP  WWP                          10.6   8.26  )-----------( 178      ( 03 Dec 2018 06:10 )             34.2     12-03    142  |  109<H>  |  23  ----------------------------<  123<H>  4.4   |  22  |  0.68    Ca    8.7      03 Dec 2018 06:10            89y Female s/p R hip maksim  - Pain control  - WBAT  - Anterior dislocation precautions  - PT/OT/OOB  - DVT ppx  - Dispo planning
Patient is a 89y old  Female who presents with a chief complaint of R Hip Fracture (02 Dec 2018 02:03)    SUBJECTIVE / OVERNIGHT EVENTS:  S/p surgery earlier today.  Doing well.  C/o legs feeling little heavy.  No chest pain, SOB.    MEDICATIONS  (STANDING):  ceFAZolin   IVPB 2000 milliGRAM(s) IV Intermittent every 8 hours  docusate sodium 100 milliGRAM(s) Oral three times a day  senna 2 Tablet(s) Oral at bedtime    MEDICATIONS  (PRN):  acetaminophen   Tablet .. 650 milliGRAM(s) Oral every 6 hours PRN Temp greater or equal to 38C (100.4F)  acetaminophen   Tablet .. 975 milliGRAM(s) Oral every 8 hours PRN Mild Pain (1 - 3)  melatonin 3 milliGRAM(s) Oral at bedtime PRN Insomnia  oxyCODONE    IR 2.5 milliGRAM(s) Oral every 4 hours PRN Moderate Pain (4 - 6)  oxyCODONE    IR 5 milliGRAM(s) Oral every 4 hours PRN Severe Pain (7 - 10)    I&O's Summary    01 Dec 2018 07:01  -  02 Dec 2018 07:00  --------------------------------------------------------  IN: 0 mL / OUT: 700 mL / NET: -700 mL    02 Dec 2018 07:01  -  02 Dec 2018 20:07  --------------------------------------------------------  IN: 625 mL / OUT: 480 mL / NET: 145 mL    Vital Signs Last 24 Hrs  T(C): 36.9 (02 Dec 2018 16:59), Max: 37.5 (02 Dec 2018 15:00)  T(F): 98.5 (02 Dec 2018 16:59), Max: 99.5 (02 Dec 2018 15:00)  HR: 104 (02 Dec 2018 16:59) (75 - 105)  BP: 144/88 (02 Dec 2018 16:59) (114/85 - 148/107)  BP(mean): 91 (02 Dec 2018 13:45) (80 - 104)  RR: 18 (02 Dec 2018 16:59) (10 - 32)  SpO2: 99% (02 Dec 2018 16:59) (92% - 100%)    PHYSICAL EXAM:  GENERAL: NAD, well-developed  HEAD:  Atraumatic, Normocephalic  EYES: EOMI, PERRLA, conjunctiva and sclera clear  NECK: Supple, No JVD  CHEST/LUNG: Clear to auscultation bilaterally; No wheeze  HEART: Regular rate and rhythm; No murmurs, rubs, or gallops  ABDOMEN: Soft, Nontender, Nondistended; Bowel sounds present  EXTREMITIES:  2+ Peripheral Pulses, No clubbing, cyanosis, or edema  PSYCH: calm, interactive, oriented Dec 3, 2018  NEUROLOGY: non-focal  SKIN: No rashes or lesions    LABS:             11.0   5.14  )-----------( 157      ( 02 Dec 2018 11:42 )             34.9     143  |  108<H>  |  20  ----------------------------<  111<H>  3.3<L>   |  23  |  0.56    Ca    8.3<L>      02 Dec 2018 11:42  Phos  2.3     12-02  Mg     2.0     12-02    TPro  7.3  /  Alb  4.2  /  TBili  2.8<H>  /  DBili  x   /  AST  34<H>  /  ALT  20  /  AlkPhos  241<H>  12-01    PT/INR - ( 02 Dec 2018 03:49 )   PT: 12.7 SEC;   INR: 1.11     PTT - ( 02 Dec 2018 03:49 )  PTT:29.0 SEC    Urinalysis Basic - ( 01 Dec 2018 15:40 )  Color: YELLOW / Appearance: CLEAR / S.024 / pH: 5.5  Gluc: NEGATIVE / Ketone: TRACE  / Bili: NEGATIVE / Urobili: NORMAL   Blood: TRACE / Protein: SMALL / Nitrite: NEGATIVE   Leuk Esterase: NEGATIVE / RBC: 3-5 / WBC 3-5   Sq Epi: x / Non Sq Epi: x / Bacteria: NEGATIVE    RADIOLOGY & ADDITIONAL TESTS:    Imaging Personally Reviewed:    Consultant(s) Notes Reviewed:      Care Discussed with Consultants/Other Providers: ortho
S/B Orthopaedic Attending - Prakash Norton MD    S/P Left hip Hemiarthroplasty POD#2    Afebrile  Vital Signs Stable    Left Hip: Wound Dry                        No Swelling                        Nil NVD                        Pain - controlled    PA- Soft  Calves - Soft    Plan:   1. Physical Therapy  2. WBAT Walker  3. Hip Precautions   5. Static Quads  6. Gluteal Sets  7. SCDS  8. Incentive spirometer  9. Ice Compress q4h for 20 minutes  10. Elevation - 2 pillows under heels.  11. Anticoagulation - Aspirin 325mg PO q12 x 6 weeks  12. D/C Planning - Rehab today  13. Office Review 2 weeks post
CHIEF COMPLAINT: f/u tachycardia    SUBJECTIVE / OVERNIGHT EVENTS: Patient seen and examined. No acute events overnight. Pain well controlled and patient without any complaints.    MEDICATIONS  (STANDING):  acetaminophen   Tablet .. 650 milliGRAM(s) Oral every 6 hours  ascorbic acid 500 milliGRAM(s) Oral daily  aspirin enteric coated 325 milliGRAM(s) Oral two times a day  calcium carbonate 1250 mG  + Vitamin D (OsCal 500 + D) 1 Tablet(s) Oral daily  docusate sodium 100 milliGRAM(s) Oral three times a day  famotidine    Tablet 20 milliGRAM(s) Oral daily  polyethylene glycol 3350 17 Gram(s) Oral daily  senna 2 Tablet(s) Oral at bedtime    MEDICATIONS  (PRN):  acetaminophen   Tablet .. 650 milliGRAM(s) Oral every 6 hours PRN Temp greater or equal to 38C (100.4F)  melatonin 3 milliGRAM(s) Oral at bedtime PRN Insomnia  ondansetron Injectable 4 milliGRAM(s) IV Push every 6 hours PRN Nausea and/or Vomiting  traMADol 25 milliGRAM(s) Oral two times a day PRN as needed for moderate to severe pain      VITALS:  T(F): 97.7 (12-04-18 @ 09:32), Max: 99.2 (12-03-18 @ 17:10)  HR: 102 (12-04-18 @ 09:32) (90 - 102)  BP: 132/76 (12-04-18 @ 09:32) (132/76 - 172/105)  RR: 17 (12-04-18 @ 09:32) (16 - 17)  SpO2: 98% (12-04-18 @ 09:32)    PHYSICAL EXAM:  GENERAL: NAD, well-developed  NECK: Supple, No JVD  CHEST/LUNG: Clear to auscultation bilaterally; No wheeze  HEART: Regular rate and rhythm; No murmurs, rubs, or gallops  ABDOMEN: Soft, Nontender, Nondistended; Bowel sounds present  EXTREMITIES:  2+ Peripheral Pulses, No clubbing, cyanosis, or edema  SKIN: Dressing C/D/I    LABS:              10.6                 142  | 22   | 23           8.26  >-----------< 178     ------------------------< 123                   34.2                 4.4  | 109  | 0.68                                         Ca 8.7   Mg x     Ph x        RADIOLOGY & ADDITIONAL TESTS:  Imaging Personally Reviewed: [x] Yes  LE doppler - pending    [ ] Consultant(s) Notes Reviewed:  [x] Care Discussed with Consultants/Other Providers: Orthopedic PA - discussed obtaining bilateral LE dopplers in setting of persistent tachycardia

## 2018-12-06 ENCOUNTER — INBOUND DOCUMENT (OUTPATIENT)
Age: 83
End: 2018-12-06

## 2018-12-07 LAB — SURGICAL PATHOLOGY STUDY: SIGNIFICANT CHANGE UP

## 2019-01-12 NOTE — PATIENT PROFILE ADULT - TOBACCO USE
LUNA MADRID:  Discussed case with Dr. Frank Qiu who is on call for Dr. Jennings who requests admission to Dr. Abreu.  Pt accepted to Dr. Abreu.  Tele PA text paged at this time. Never smoker

## 2019-02-19 ENCOUNTER — INPATIENT (INPATIENT)
Facility: HOSPITAL | Age: 84
LOS: 5 days | Discharge: NURSING, REHAB OR LONG-TERM CARE | End: 2019-02-25
Attending: HOSPITALIST | Admitting: HOSPITALIST
Payer: MEDICARE

## 2019-02-19 VITALS
DIASTOLIC BLOOD PRESSURE: 64 MMHG | RESPIRATION RATE: 16 BRPM | HEART RATE: 100 BPM | TEMPERATURE: 98 F | SYSTOLIC BLOOD PRESSURE: 114 MMHG | OXYGEN SATURATION: 99 %

## 2019-02-19 DIAGNOSIS — Z96.642 PRESENCE OF LEFT ARTIFICIAL HIP JOINT: Chronic | ICD-10-CM

## 2019-02-19 LAB
ALBUMIN SERPL ELPH-MCNC: 3.5 G/DL — SIGNIFICANT CHANGE UP (ref 3.3–5)
ALP SERPL-CCNC: 468 U/L — HIGH (ref 40–120)
ALT FLD-CCNC: 31 U/L — SIGNIFICANT CHANGE UP (ref 4–33)
ANION GAP SERPL CALC-SCNC: 17 MMO/L — HIGH (ref 7–14)
AST SERPL-CCNC: 67 U/L — HIGH (ref 4–32)
BASOPHILS # BLD AUTO: 0.02 K/UL — SIGNIFICANT CHANGE UP (ref 0–0.2)
BASOPHILS NFR BLD AUTO: 0.1 % — SIGNIFICANT CHANGE UP (ref 0–2)
BILIRUB SERPL-MCNC: 3.2 MG/DL — HIGH (ref 0.2–1.2)
BUN SERPL-MCNC: 25 MG/DL — HIGH (ref 7–23)
CALCIUM SERPL-MCNC: 9.2 MG/DL — SIGNIFICANT CHANGE UP (ref 8.4–10.5)
CHLORIDE SERPL-SCNC: 101 MMOL/L — SIGNIFICANT CHANGE UP (ref 98–107)
CO2 SERPL-SCNC: 21 MMOL/L — LOW (ref 22–31)
CREAT SERPL-MCNC: 0.77 MG/DL — SIGNIFICANT CHANGE UP (ref 0.5–1.3)
EOSINOPHIL # BLD AUTO: 0 K/UL — SIGNIFICANT CHANGE UP (ref 0–0.5)
EOSINOPHIL NFR BLD AUTO: 0 % — SIGNIFICANT CHANGE UP (ref 0–6)
GLUCOSE SERPL-MCNC: 116 MG/DL — HIGH (ref 70–99)
HCT VFR BLD CALC: 37.2 % — SIGNIFICANT CHANGE UP (ref 34.5–45)
HGB BLD-MCNC: 11.1 G/DL — LOW (ref 11.5–15.5)
IMM GRANULOCYTES NFR BLD AUTO: 0.4 % — SIGNIFICANT CHANGE UP (ref 0–1.5)
LYMPHOCYTES # BLD AUTO: 0.27 K/UL — LOW (ref 1–3.3)
LYMPHOCYTES # BLD AUTO: 1.7 % — LOW (ref 13–44)
MCHC RBC-ENTMCNC: 29.8 % — LOW (ref 32–36)
MCHC RBC-ENTMCNC: 31.4 PG — SIGNIFICANT CHANGE UP (ref 27–34)
MCV RBC AUTO: 105.1 FL — HIGH (ref 80–100)
MONOCYTES # BLD AUTO: 1.01 K/UL — HIGH (ref 0–0.9)
MONOCYTES NFR BLD AUTO: 6.5 % — SIGNIFICANT CHANGE UP (ref 2–14)
NEUTROPHILS # BLD AUTO: 14.1 K/UL — HIGH (ref 1.8–7.4)
NEUTROPHILS NFR BLD AUTO: 91.3 % — HIGH (ref 43–77)
NRBC # FLD: 0 K/UL — LOW (ref 25–125)
PLATELET # BLD AUTO: 226 K/UL — SIGNIFICANT CHANGE UP (ref 150–400)
PMV BLD: 8.7 FL — SIGNIFICANT CHANGE UP (ref 7–13)
POTASSIUM SERPL-MCNC: 3.8 MMOL/L — SIGNIFICANT CHANGE UP (ref 3.5–5.3)
POTASSIUM SERPL-SCNC: 3.8 MMOL/L — SIGNIFICANT CHANGE UP (ref 3.5–5.3)
PROT SERPL-MCNC: 6 G/DL — SIGNIFICANT CHANGE UP (ref 6–8.3)
RBC # BLD: 3.54 M/UL — LOW (ref 3.8–5.2)
RBC # FLD: 15.2 % — HIGH (ref 10.3–14.5)
SODIUM SERPL-SCNC: 139 MMOL/L — SIGNIFICANT CHANGE UP (ref 135–145)
WBC # BLD: 15.46 K/UL — HIGH (ref 3.8–10.5)
WBC # FLD AUTO: 15.46 K/UL — HIGH (ref 3.8–10.5)

## 2019-02-19 RX ORDER — SODIUM CHLORIDE 9 MG/ML
1000 INJECTION INTRAMUSCULAR; INTRAVENOUS; SUBCUTANEOUS ONCE
Qty: 0 | Refills: 0 | Status: COMPLETED | OUTPATIENT
Start: 2019-02-19 | End: 2019-02-19

## 2019-02-19 NOTE — ED PROVIDER NOTE - ATTENDING CONTRIBUTION TO CARE
AJM: Patient seen with resident and agree with above note. 89F PMH L hip fx in Dec 2018 s/p hip arthroplasty presents with weakness.  Pt states this afternoon, she was trying to get off stool to her walker and was unable to.  She called out to her sister who lives with her at this time who found patient with her leg in an odd position.  Pt was brought to ED for further care.  Pt denies falling.  Denies head trauma, LOC. States she does have pain in her back with lifting her leg.  Denies sob, weakness, cough.  Denies nausea or vomiting. Denies loss of urinary or fecal incontinence. On exam pt is rigoring. concern for bacteremia/sepsis. will obtain sepsis workup, abx, admit

## 2019-02-19 NOTE — ED PROVIDER NOTE - OBJECTIVE STATEMENT
89F PMH L hip fx in Dec 2018 s/p hip arthroplasty 89F PMH L hip fx in Dec 2018 s/p hip arthroplasty presents with weakness.  Pt states this afternoon, she was trying to get off stool to her walker and was unable to.  She called out to her sister who lives with her at this time who found patient with her leg in an odd position.  Pt was brought to ED for further care.  Pt denies falling.  Denies head trauma, LOC.  States she does have pain in her back with lifting her leg.  Denies sob, weakness, cough.  Denies nausea or vomiting. Denies loss of urinary or fecal incontinence.

## 2019-02-19 NOTE — ED PROVIDER NOTE - CLINICAL SUMMARY MEDICAL DECISION MAKING FREE TEXT BOX
89F PMH fall presenting with subjective weakness.  Will r/o infectious causes of weakness.  CBC, CMP, UA, blood cultures pending.

## 2019-02-19 NOTE — ED PROVIDER NOTE - NEURO NEGATIVE STATEMENT, MLM
no loss of consciousness, no gait abnormality, no headache, no sensory deficits; endorses weakness in legs bilaterally

## 2019-02-19 NOTE — ED ADULT NURSE NOTE - OBJECTIVE STATEMENT
89 yo F received to room 20 AAO4, nonambulatory c/o acute onset weakness. pt states she was trying to get up from a chair this afternoon and she had "no strength in her legs," baseline ambulates around apt with a walker, no recent falls/trauma, no infectious symptoms, no neuro deficits, denies any pmhx, hip sx in December 2018, on daily ASA, appears comfortable in stretcher, VSS, 20G placed in right forearm, labs drawn and sent, awaiting MD ayala and further orders at this time, will ctm

## 2019-02-19 NOTE — ED PROVIDER NOTE - PROGRESS NOTE DETAILS
Pt reevaluated and states she is having chills.  Wrapped in blankets.  Pt found to have objective fevers with leukocytosis.  Will admit for infectious workup. AJM: pt signed out to dr jett pending lab results and ua. concern for sepsis given rigors. abx ordered

## 2019-02-19 NOTE — ED CLERICAL - NS ED CLERK NOTE PRE-ARRIVAL INFORMATION; ADDITIONAL PRE-ARRIVAL INFORMATION
This patient is enrolled in the comprehensive joint replacement (CJR) program and has active care navigation.     -This patient can be followed up by the care navigation team within 24 hours. To arrange close follow-up or to obtain additional clinical information about this patient, please call the contact number above.     -Please call the hospitalist for consultation ([phone]) PRIOR to admission or observation.  The hospitalist is part of the care team and can assist in acute medical management.     -Please call the orthopedic resident ([phone]) for ALL patients who are admitted or placed in observation.

## 2019-02-19 NOTE — ED ADULT TRIAGE NOTE - CHIEF COMPLAINT QUOTE
Pt c/o weakness and unable to walk since this am.  S/P hip Sx 12/1.  Denies fall or trauma. Pt c/o weakness and unable to walk since this am.  S/P hip Sx 12/4.  Denies fall or trauma.

## 2019-02-20 ENCOUNTER — TRANSCRIPTION ENCOUNTER (OUTPATIENT)
Age: 84
End: 2019-02-20

## 2019-02-20 DIAGNOSIS — K83.09 OTHER CHOLANGITIS: ICD-10-CM

## 2019-02-20 DIAGNOSIS — R26.9 UNSPECIFIED ABNORMALITIES OF GAIT AND MOBILITY: ICD-10-CM

## 2019-02-20 DIAGNOSIS — F43.29 ADJUSTMENT DISORDER WITH OTHER SYMPTOMS: ICD-10-CM

## 2019-02-20 DIAGNOSIS — R53.1 WEAKNESS: ICD-10-CM

## 2019-02-20 DIAGNOSIS — S72.009A FRACTURE OF UNSPECIFIED PART OF NECK OF UNSPECIFIED FEMUR, INITIAL ENCOUNTER FOR CLOSED FRACTURE: ICD-10-CM

## 2019-02-20 DIAGNOSIS — R62.7 ADULT FAILURE TO THRIVE: ICD-10-CM

## 2019-02-20 DIAGNOSIS — D72.825 BANDEMIA: ICD-10-CM

## 2019-02-20 DIAGNOSIS — F20.9 SCHIZOPHRENIA, UNSPECIFIED: ICD-10-CM

## 2019-02-20 DIAGNOSIS — R74.8 ABNORMAL LEVELS OF OTHER SERUM ENZYMES: ICD-10-CM

## 2019-02-20 DIAGNOSIS — D75.89 OTHER SPECIFIED DISEASES OF BLOOD AND BLOOD-FORMING ORGANS: ICD-10-CM

## 2019-02-20 DIAGNOSIS — E86.0 DEHYDRATION: ICD-10-CM

## 2019-02-20 DIAGNOSIS — Z29.9 ENCOUNTER FOR PROPHYLACTIC MEASURES, UNSPECIFIED: ICD-10-CM

## 2019-02-20 LAB
ALBUMIN SERPL ELPH-MCNC: 3.1 G/DL — LOW (ref 3.3–5)
ALP SERPL-CCNC: 440 U/L — HIGH (ref 40–120)
ALT FLD-CCNC: 35 U/L — HIGH (ref 4–33)
ANION GAP SERPL CALC-SCNC: 19 MMO/L — HIGH (ref 7–14)
ANISOCYTOSIS BLD QL: SLIGHT — SIGNIFICANT CHANGE UP
APPEARANCE UR: SIGNIFICANT CHANGE UP
AST SERPL-CCNC: 75 U/L — HIGH (ref 4–32)
B PERT DNA SPEC QL NAA+PROBE: NOT DETECTED — SIGNIFICANT CHANGE UP
BACTERIA # UR AUTO: NEGATIVE — SIGNIFICANT CHANGE UP
BASE EXCESS BLDV CALC-SCNC: -13 MMOL/L — SIGNIFICANT CHANGE UP
BASE EXCESS BLDV CALC-SCNC: 0 MMOL/L — SIGNIFICANT CHANGE UP
BASOPHILS # BLD AUTO: 0.03 K/UL — SIGNIFICANT CHANGE UP (ref 0–0.2)
BASOPHILS NFR BLD AUTO: 0.2 % — SIGNIFICANT CHANGE UP (ref 0–2)
BASOPHILS NFR SPEC: 0 % — SIGNIFICANT CHANGE UP (ref 0–2)
BILIRUB SERPL-MCNC: 4.6 MG/DL — HIGH (ref 0.2–1.2)
BILIRUB UR-MCNC: SIGNIFICANT CHANGE UP
BLASTS # FLD: 0 % — SIGNIFICANT CHANGE UP (ref 0–0)
BLOOD GAS VENOUS - CREATININE: < 0.36 MG/DL — LOW (ref 0.5–1.3)
BLOOD GAS VENOUS - CREATININE: SIGNIFICANT CHANGE UP MG/DL (ref 0.5–1.3)
BLOOD UR QL VISUAL: NEGATIVE — SIGNIFICANT CHANGE UP
BUN SERPL-MCNC: 24 MG/DL — HIGH (ref 7–23)
C PNEUM DNA SPEC QL NAA+PROBE: NOT DETECTED — SIGNIFICANT CHANGE UP
CALCIUM SERPL-MCNC: 8.6 MG/DL — SIGNIFICANT CHANGE UP (ref 8.4–10.5)
CHLORIDE BLDV-SCNC: 108 MMOL/L — SIGNIFICANT CHANGE UP (ref 96–108)
CHLORIDE BLDV-SCNC: > 120 MMOL/L — HIGH (ref 96–108)
CHLORIDE SERPL-SCNC: 105 MMOL/L — SIGNIFICANT CHANGE UP (ref 98–107)
CO2 SERPL-SCNC: 20 MMOL/L — LOW (ref 22–31)
COLOR SPEC: YELLOW — SIGNIFICANT CHANGE UP
CREAT SERPL-MCNC: 0.74 MG/DL — SIGNIFICANT CHANGE UP (ref 0.5–1.3)
EOSINOPHIL # BLD AUTO: 0 K/UL — SIGNIFICANT CHANGE UP (ref 0–0.5)
EOSINOPHIL NFR BLD AUTO: 0 % — SIGNIFICANT CHANGE UP (ref 0–6)
EOSINOPHIL NFR FLD: 0 % — SIGNIFICANT CHANGE UP (ref 0–6)
FLUAV H1 2009 PAND RNA SPEC QL NAA+PROBE: NOT DETECTED — SIGNIFICANT CHANGE UP
FLUAV H1 RNA SPEC QL NAA+PROBE: NOT DETECTED — SIGNIFICANT CHANGE UP
FLUAV H3 RNA SPEC QL NAA+PROBE: NOT DETECTED — SIGNIFICANT CHANGE UP
FLUAV SUBTYP SPEC NAA+PROBE: NOT DETECTED — SIGNIFICANT CHANGE UP
FLUBV RNA SPEC QL NAA+PROBE: NOT DETECTED — SIGNIFICANT CHANGE UP
GAS PNL BLDV: 139 MMOL/L — SIGNIFICANT CHANGE UP (ref 136–146)
GAS PNL BLDV: 143 MMOL/L — SIGNIFICANT CHANGE UP (ref 136–146)
GIANT PLATELETS BLD QL SMEAR: PRESENT — SIGNIFICANT CHANGE UP
GLUCOSE BLDV-MCNC: 61 — LOW (ref 70–99)
GLUCOSE BLDV-MCNC: 83 — SIGNIFICANT CHANGE UP (ref 70–99)
GLUCOSE SERPL-MCNC: 79 MG/DL — SIGNIFICANT CHANGE UP (ref 70–99)
GLUCOSE UR-MCNC: NEGATIVE — SIGNIFICANT CHANGE UP
HADV DNA SPEC QL NAA+PROBE: NOT DETECTED — SIGNIFICANT CHANGE UP
HCO3 BLDV-SCNC: 14 MMOL/L — LOW (ref 20–27)
HCO3 BLDV-SCNC: 24 MMOL/L — SIGNIFICANT CHANGE UP (ref 20–27)
HCOV PNL SPEC NAA+PROBE: SIGNIFICANT CHANGE UP
HCT VFR BLD CALC: 35.1 % — SIGNIFICANT CHANGE UP (ref 34.5–45)
HCT VFR BLDV CALC: 19.6 % — CRITICAL LOW (ref 34.5–45)
HCT VFR BLDV CALC: 33.7 % — LOW (ref 34.5–45)
HGB BLD-MCNC: 10.6 G/DL — LOW (ref 11.5–15.5)
HGB BLDV-MCNC: 10.9 G/DL — LOW (ref 11.5–15.5)
HGB BLDV-MCNC: 6.2 G/DL — CRITICAL LOW (ref 11.5–15.5)
HMPV RNA SPEC QL NAA+PROBE: NOT DETECTED — SIGNIFICANT CHANGE UP
HPIV1 RNA SPEC QL NAA+PROBE: NOT DETECTED — SIGNIFICANT CHANGE UP
HPIV2 RNA SPEC QL NAA+PROBE: NOT DETECTED — SIGNIFICANT CHANGE UP
HPIV3 RNA SPEC QL NAA+PROBE: NOT DETECTED — SIGNIFICANT CHANGE UP
HPIV4 RNA SPEC QL NAA+PROBE: NOT DETECTED — SIGNIFICANT CHANGE UP
HYALINE CASTS # UR AUTO: HIGH
IMM GRANULOCYTES NFR BLD AUTO: 0.9 % — SIGNIFICANT CHANGE UP (ref 0–1.5)
INR BLD: 1.53 — HIGH (ref 0.88–1.17)
KETONES UR-MCNC: NEGATIVE — SIGNIFICANT CHANGE UP
LACTATE BLDV-MCNC: 1.7 MMOL/L — SIGNIFICANT CHANGE UP (ref 0.5–2)
LACTATE BLDV-MCNC: 2.1 MMOL/L — HIGH (ref 0.5–2)
LEUKOCYTE ESTERASE UR-ACNC: NEGATIVE — SIGNIFICANT CHANGE UP
LYMPHOCYTES # BLD AUTO: 0.29 K/UL — LOW (ref 1–3.3)
LYMPHOCYTES # BLD AUTO: 1.8 % — LOW (ref 13–44)
LYMPHOCYTES NFR SPEC AUTO: 0 % — LOW (ref 13–44)
MACROCYTES BLD QL: SLIGHT — SIGNIFICANT CHANGE UP
MAGNESIUM SERPL-MCNC: 1.8 MG/DL — SIGNIFICANT CHANGE UP (ref 1.6–2.6)
MCHC RBC-ENTMCNC: 30.2 % — LOW (ref 32–36)
MCHC RBC-ENTMCNC: 31.6 PG — SIGNIFICANT CHANGE UP (ref 27–34)
MCV RBC AUTO: 104.8 FL — HIGH (ref 80–100)
METAMYELOCYTES # FLD: 7 % — HIGH (ref 0–1)
METHOD TYPE: SIGNIFICANT CHANGE UP
MONOCYTES # BLD AUTO: 0.57 K/UL — SIGNIFICANT CHANGE UP (ref 0–0.9)
MONOCYTES NFR BLD AUTO: 3.5 % — SIGNIFICANT CHANGE UP (ref 2–14)
MONOCYTES NFR BLD: 7 % — SIGNIFICANT CHANGE UP (ref 2–9)
MYELOCYTES NFR BLD: 0 % — SIGNIFICANT CHANGE UP (ref 0–0)
NEUTROPHIL AB SER-ACNC: 49.1 % — SIGNIFICANT CHANGE UP (ref 43–77)
NEUTROPHILS # BLD AUTO: 15.43 K/UL — HIGH (ref 1.8–7.4)
NEUTROPHILS NFR BLD AUTO: 93.6 % — HIGH (ref 43–77)
NEUTS BAND # BLD: 35.1 % — HIGH (ref 0–6)
NITRITE UR-MCNC: NEGATIVE — SIGNIFICANT CHANGE UP
NRBC # FLD: 0 K/UL — LOW (ref 25–125)
ORGANISM # SPEC MICROSCOPIC CNT: SIGNIFICANT CHANGE UP
ORGANISM # SPEC MICROSCOPIC CNT: SIGNIFICANT CHANGE UP
OTHER - HEMATOLOGY %: 0 — SIGNIFICANT CHANGE UP
OVALOCYTES BLD QL SMEAR: SLIGHT — SIGNIFICANT CHANGE UP
PCO2 BLDV: 25 MMHG — LOW (ref 41–51)
PCO2 BLDV: 40 MMHG — LOW (ref 41–51)
PH BLDV: 7.3 PH — LOW (ref 7.32–7.43)
PH BLDV: 7.4 PH — SIGNIFICANT CHANGE UP (ref 7.32–7.43)
PH UR: 6 — SIGNIFICANT CHANGE UP (ref 5–8)
PHOSPHATE SERPL-MCNC: 3.2 MG/DL — SIGNIFICANT CHANGE UP (ref 2.5–4.5)
PLATELET # BLD AUTO: 188 K/UL — SIGNIFICANT CHANGE UP (ref 150–400)
PLATELET COUNT - ESTIMATE: NORMAL — SIGNIFICANT CHANGE UP
PMV BLD: 8.8 FL — SIGNIFICANT CHANGE UP (ref 7–13)
PO2 BLDV: 36 MMHG — SIGNIFICANT CHANGE UP (ref 35–40)
PO2 BLDV: 46 MMHG — HIGH (ref 35–40)
POIKILOCYTOSIS BLD QL AUTO: SLIGHT — SIGNIFICANT CHANGE UP
POTASSIUM BLDV-SCNC: 1.7 MMOL/L — CRITICAL LOW (ref 3.4–4.5)
POTASSIUM BLDV-SCNC: 3 MMOL/L — LOW (ref 3.4–4.5)
POTASSIUM SERPL-MCNC: 3.3 MMOL/L — LOW (ref 3.5–5.3)
POTASSIUM SERPL-SCNC: 3.3 MMOL/L — LOW (ref 3.5–5.3)
PROMYELOCYTES # FLD: 0 % — SIGNIFICANT CHANGE UP (ref 0–0)
PROT SERPL-MCNC: 5 G/DL — LOW (ref 6–8.3)
PROT UR-MCNC: 20 — SIGNIFICANT CHANGE UP
PROTHROM AB SERPL-ACNC: 17.2 SEC — HIGH (ref 9.8–13.1)
RBC # BLD: 3.35 M/UL — LOW (ref 3.8–5.2)
RBC # FLD: 15.3 % — HIGH (ref 10.3–14.5)
RBC CASTS # UR COMP ASSIST: SIGNIFICANT CHANGE UP (ref 0–?)
RSV RNA SPEC QL NAA+PROBE: NOT DETECTED — SIGNIFICANT CHANGE UP
RV+EV RNA SPEC QL NAA+PROBE: NOT DETECTED — SIGNIFICANT CHANGE UP
SAO2 % BLDV: 55.7 % — LOW (ref 60–85)
SAO2 % BLDV: 78.4 % — SIGNIFICANT CHANGE UP (ref 60–85)
SODIUM SERPL-SCNC: 144 MMOL/L — SIGNIFICANT CHANGE UP (ref 135–145)
SP GR SPEC: 1.02 — SIGNIFICANT CHANGE UP (ref 1–1.04)
SPECIMEN SOURCE: SIGNIFICANT CHANGE UP
SPECIMEN SOURCE: SIGNIFICANT CHANGE UP
SQUAMOUS # UR AUTO: SIGNIFICANT CHANGE UP
TSH SERPL-MCNC: 0.75 UIU/ML — SIGNIFICANT CHANGE UP (ref 0.27–4.2)
UROBILINOGEN FLD QL: HIGH
VARIANT LYMPHS # BLD: 1.8 % — SIGNIFICANT CHANGE UP
WBC # BLD: 16.46 K/UL — HIGH (ref 3.8–10.5)
WBC # FLD AUTO: 16.46 K/UL — HIGH (ref 3.8–10.5)
WBC UR QL: SIGNIFICANT CHANGE UP (ref 0–?)

## 2019-02-20 PROCEDURE — 99223 1ST HOSP IP/OBS HIGH 75: CPT | Mod: GC

## 2019-02-20 PROCEDURE — 43262 ENDO CHOLANGIOPANCREATOGRAPH: CPT | Mod: GC

## 2019-02-20 PROCEDURE — 99233 SBSQ HOSP IP/OBS HIGH 50: CPT | Mod: GC

## 2019-02-20 PROCEDURE — 90792 PSYCH DIAG EVAL W/MED SRVCS: CPT

## 2019-02-20 PROCEDURE — 99291 CRITICAL CARE FIRST HOUR: CPT

## 2019-02-20 PROCEDURE — 76705 ECHO EXAM OF ABDOMEN: CPT | Mod: 26

## 2019-02-20 PROCEDURE — 43264 ERCP REMOVE DUCT CALCULI: CPT | Mod: GC

## 2019-02-20 RX ORDER — POTASSIUM CHLORIDE 20 MEQ
20 PACKET (EA) ORAL ONCE
Qty: 0 | Refills: 0 | Status: COMPLETED | OUTPATIENT
Start: 2019-02-20 | End: 2019-02-20

## 2019-02-20 RX ORDER — SODIUM CHLORIDE 9 MG/ML
1000 INJECTION, SOLUTION INTRAVENOUS ONCE
Qty: 0 | Refills: 0 | Status: COMPLETED | OUTPATIENT
Start: 2019-02-20 | End: 2019-02-20

## 2019-02-20 RX ORDER — ACETAMINOPHEN 500 MG
650 TABLET ORAL ONCE
Qty: 0 | Refills: 0 | Status: COMPLETED | OUTPATIENT
Start: 2019-02-20 | End: 2019-02-20

## 2019-02-20 RX ORDER — POTASSIUM CHLORIDE 20 MEQ
10 PACKET (EA) ORAL
Qty: 0 | Refills: 0 | Status: COMPLETED | OUTPATIENT
Start: 2019-02-20 | End: 2019-02-20

## 2019-02-20 RX ORDER — SODIUM CHLORIDE 9 MG/ML
1000 INJECTION, SOLUTION INTRAVENOUS
Qty: 0 | Refills: 0 | Status: DISCONTINUED | OUTPATIENT
Start: 2019-02-20 | End: 2019-02-20

## 2019-02-20 RX ORDER — CEFTRIAXONE 500 MG/1
1 INJECTION, POWDER, FOR SOLUTION INTRAMUSCULAR; INTRAVENOUS ONCE
Qty: 0 | Refills: 0 | Status: COMPLETED | OUTPATIENT
Start: 2019-02-20 | End: 2019-02-20

## 2019-02-20 RX ORDER — PANTOPRAZOLE SODIUM 20 MG/1
40 TABLET, DELAYED RELEASE ORAL DAILY
Qty: 0 | Refills: 0 | Status: DISCONTINUED | OUTPATIENT
Start: 2019-02-20 | End: 2019-02-25

## 2019-02-20 RX ORDER — HEPARIN SODIUM 5000 [USP'U]/ML
5000 INJECTION INTRAVENOUS; SUBCUTANEOUS EVERY 12 HOURS
Qty: 0 | Refills: 0 | Status: DISCONTINUED | OUTPATIENT
Start: 2019-02-20 | End: 2019-02-25

## 2019-02-20 RX ORDER — SALICYLIC ACID 0.5 %
1 CLEANSER (GRAM) TOPICAL
Qty: 0 | Refills: 0 | Status: DISCONTINUED | OUTPATIENT
Start: 2019-02-20 | End: 2019-02-25

## 2019-02-20 RX ORDER — PIPERACILLIN AND TAZOBACTAM 4; .5 G/20ML; G/20ML
3.38 INJECTION, POWDER, LYOPHILIZED, FOR SOLUTION INTRAVENOUS EVERY 8 HOURS
Qty: 0 | Refills: 0 | Status: DISCONTINUED | OUTPATIENT
Start: 2019-02-20 | End: 2019-02-20

## 2019-02-20 RX ORDER — POTASSIUM CHLORIDE 20 MEQ
40 PACKET (EA) ORAL ONCE
Qty: 0 | Refills: 0 | Status: COMPLETED | OUTPATIENT
Start: 2019-02-20 | End: 2019-02-20

## 2019-02-20 RX ORDER — ERTAPENEM SODIUM 1 G/1
1000 INJECTION, POWDER, LYOPHILIZED, FOR SOLUTION INTRAMUSCULAR; INTRAVENOUS EVERY 24 HOURS
Qty: 0 | Refills: 0 | Status: DISCONTINUED | OUTPATIENT
Start: 2019-02-20 | End: 2019-02-25

## 2019-02-20 RX ADMIN — Medication 20 MILLIEQUIVALENT(S): at 14:07

## 2019-02-20 RX ADMIN — SODIUM CHLORIDE 1000 MILLILITER(S): 9 INJECTION INTRAMUSCULAR; INTRAVENOUS; SUBCUTANEOUS at 00:07

## 2019-02-20 RX ADMIN — Medication 100 MILLIEQUIVALENT(S): at 16:14

## 2019-02-20 RX ADMIN — PANTOPRAZOLE SODIUM 40 MILLIGRAM(S): 20 TABLET, DELAYED RELEASE ORAL at 14:07

## 2019-02-20 RX ADMIN — Medication 100 MILLIEQUIVALENT(S): at 23:38

## 2019-02-20 RX ADMIN — Medication 100 MILLIEQUIVALENT(S): at 19:42

## 2019-02-20 RX ADMIN — HEPARIN SODIUM 5000 UNIT(S): 5000 INJECTION INTRAVENOUS; SUBCUTANEOUS at 19:42

## 2019-02-20 RX ADMIN — Medication 1 APPLICATION(S): at 22:51

## 2019-02-20 RX ADMIN — Medication 650 MILLIGRAM(S): at 00:26

## 2019-02-20 RX ADMIN — ERTAPENEM SODIUM 120 MILLIGRAM(S): 1 INJECTION, POWDER, LYOPHILIZED, FOR SOLUTION INTRAMUSCULAR; INTRAVENOUS at 22:50

## 2019-02-20 RX ADMIN — SODIUM CHLORIDE 75 MILLILITER(S): 9 INJECTION, SOLUTION INTRAVENOUS at 05:36

## 2019-02-20 RX ADMIN — HEPARIN SODIUM 5000 UNIT(S): 5000 INJECTION INTRAVENOUS; SUBCUTANEOUS at 06:20

## 2019-02-20 RX ADMIN — CEFTRIAXONE 100 GRAM(S): 500 INJECTION, POWDER, FOR SOLUTION INTRAMUSCULAR; INTRAVENOUS at 00:27

## 2019-02-20 RX ADMIN — Medication 40 MILLIEQUIVALENT(S): at 07:55

## 2019-02-20 RX ADMIN — PIPERACILLIN AND TAZOBACTAM 25 GRAM(S): 4; .5 INJECTION, POWDER, LYOPHILIZED, FOR SOLUTION INTRAVENOUS at 10:24

## 2019-02-20 RX ADMIN — SODIUM CHLORIDE 4000 MILLILITER(S): 9 INJECTION, SOLUTION INTRAVENOUS at 13:49

## 2019-02-20 RX ADMIN — SODIUM CHLORIDE 1000 MILLILITER(S): 9 INJECTION, SOLUTION INTRAVENOUS at 06:55

## 2019-02-20 NOTE — DISCHARGE NOTE ADULT - MEDICATION SUMMARY - MEDICATIONS TO TAKE
I will START or STAY ON the medications listed below when I get home from the hospital:    acetaminophen 325 mg oral tablet  -- 2 tab(s) by mouth every 6 hours as needed for pain  -- Indication: For Pain control    vitamin A & D topical ointment  -- 1 application on skin 2 times a day  -- Indication: For Prophylactic measure    docusate sodium 100 mg oral capsule  -- 1 cap(s) by mouth 2 times a day, hold for loose stool  -- Indication: For constipation     senna oral tablet  -- 2 tab(s) by mouth once a day (at bedtime), As Needed - for constipation  -- Indication: For constipation     melatonin 3 mg oral tablet  -- 1 tab(s) by mouth once a day (at bedtime), As needed, Insomnia  -- Indication: For insomnia     Cipro 500 mg oral tablet  -- 1 tab(s) by mouth every 12 hours through 3/6/19  -- Indication: For E coli bacteremia    calcium-vitamin D 500 mg-200 intl units oral tablet  -- 1 tab(s) by mouth once a day  -- Indication: For Prophylactic measure    Multiple Vitamins oral tablet  -- 1 tab(s) by mouth once a day  -- Indication: For Prophylactic measure    ascorbic acid 500 mg oral tablet  -- 1 tab(s) by mouth once a day  -- Indication: For Prophylactic measure    cholecalciferol oral tablet  -- 1000 unit(s) by mouth once a day  -- Indication: For Prophylactic measure

## 2019-02-20 NOTE — H&P ADULT - HISTORY OF PRESENT ILLNESS
89 year old female, with past history significant for arthroplasty of the left hip (12/2018), presented to the ED secondary to weakness.  Seen and evaluated at bedside;    Vital signs upon ED presentation as follows:  BP = 89 year old female, with past history significant for Arthroplasty of the left hip (12/2018), and Gait difficulty (uses walker) presented to the ED secondary to weakness.  Seen and evaluated at bedside with sister present; very thin and dehydrated appearing.  NAD.  Patient reports back discomfort (unable to pinpoint exactly where) and inability to lift the leg due to heaviness.  Unable to clarify further.  Sister reports that patient, who goes up and down stairs independently, with use of walker, and whose bedroom is on the second floor, was found on the floor after she went to use the toilet.  Two sisters tried to lift patient, but were unable to do so and patient was unable to get up on her own.  Patient's right leg was bent awkwardly under her body and it was difficult to reposition same to it's normal stance.  Although a low stool was eventually placed under patient, she was still unable to get up off the floor.    No reports of fevers or sweating, but patient complains of chills; always feels cold.  Noted to be under multiple (at least 4 blankets, plus sheets) in the ED.  Describes decreased oral intake.  States that she feels hungry, but does is unable to eat much of her meals.    Sister comments that patient is very adverse to take any prescribed medications - including aspirin.    Vital signs upon ED presentation as follows:  BP = 114/64, HR = 100, RR = 16, T = 36.8 C/98.2 F (T-max = 38 C/100.4F), O2 Sat = 99% on RA.  Diagnosed with Weakness.  Bandemia of 35.1 noted.  Prescribed Ceftriaxone x one, Tylenol 650 mg x one and NaCl x one in the ED

## 2019-02-20 NOTE — H&P ADULT - PROBLEM SELECTOR PLAN 5
- MCV currently 105.1  - may be complicating gait difficulty  - TSH within acceptable range  - f/u Vitamin B12, folate levels in the AM  - fall precautions - unclear etiology, but likely associated with infection  - does not take medications at home  - does not consume alcohol  - follow trend with repeat lab-work

## 2019-02-20 NOTE — PROVIDER CONTACT NOTE (OTHER) - ASSESSMENT
Pt A&O4. Pt needs emergent ERCP as per provider. Pt is refusing ERCP and cannot understand risks of refusing procedure. at this and needs psych assessment for capacity.

## 2019-02-20 NOTE — H&P ADULT - PROBLEM SELECTOR PLAN 2
- in the setting of malnutrition, sepsis of unknown cause, deconditioning  - Nutrition consult in the AM (pls f/u)  - regular diet w/ Ensure 1 can BID  - IVF hydration  - f/u AM lab-work  - fall precautions  - ambulate with walker, as tolerated  - Physical therapy evaluation

## 2019-02-20 NOTE — H&P ADULT - PROBLEM SELECTOR PLAN 6
- appears to have left foot drop  - uses walker at baseline  - recent left hip arthroplasty (12/2018) and is s/p rehab  - f/u PT eval  - f/u AM lab-work - MCV currently 105.1  - may be complicating gait difficulty  - TSH within acceptable range  - f/u Vitamin B12, folate levels in the AM  - fall precautions

## 2019-02-20 NOTE — CONSULT NOTE ADULT - ASSESSMENT
89 yo F PMHx recent L hip arthroplasty s/p femoral neck fracture, initially p/w weakness 2/2 sepsis from ascending cholangitis w/ choledocholithiasis c/b gram negative bacteremia. She was noted to be hypotensive to 90s systolic unresponsive to fluids. She is now undergoing emergent ERCP.     - Please continue plan for emergent ERCP  - Please call MICU after ERCP for further evaluation    Sheridan Trivedi MD  Internal Medicine, PGY-3  Pager [phone]

## 2019-02-20 NOTE — PROVIDER CONTACT NOTE (OTHER) - ACTION/TREATMENT ORDERED:
Psych consulted, continue to monitor BP manually, reached out to MICU providers to assess pt. Continue educating pt on need for procedure.

## 2019-02-20 NOTE — PROVIDER CONTACT NOTE (CRITICAL VALUE NOTIFICATION) - ASSESSMENT
Pt A&O4. VS stable. Pt denies malaise, chills. Pt denies chest pain, SOB, dizziness. Pt has zosyn currently running.

## 2019-02-20 NOTE — PROVIDER CONTACT NOTE (OTHER) - SITUATION
Pt's BP 92/60 manually. pt has already received 1000 mL bolus of LR, 2nd 1000mL bolus running at this time. provider has been with pt throughout afternoon for low BP and US abdomen results

## 2019-02-20 NOTE — CONSULT NOTE ADULT - SUBJECTIVE AND OBJECTIVE BOX
HPI: 89 year old female, with past history significant for Arthroplasty of the left hip (2018), and Gait difficulty (uses walker) presented to the ED secondary to weakness.  Seen and evaluated at bedside with sister present; very thin and dehydrated appearing.  NAD.  Patient reports back discomfort (unable to pinpoint exactly where) and inability to lift the leg due to heaviness.  Unable to clarify further.  Sister reports that patient, who goes up and down stairs independently, with use of walker, and whose bedroom is on the second floor, was found on the floor after she went to use the toilet.  Two sisters tried to lift patient, but were unable to do so and patient was unable to get up on her own.  Patient's right leg was bent awkwardly under her body and it was difficult to reposition same to it's normal stance.  Although a low stool was eventually placed under patient, she was still unable to get up off the floor.    No reports of fevers or sweating, but patient complains of chills; always feels cold.  Noted to be under multiple (at least 4 blankets, plus sheets) in the ED.  Describes decreased oral intake.  States that she feels hungry, but does is unable to eat much of her meals.    Sister comments that patient is very adverse to take any prescribed medications - including aspirin.    Vital signs upon ED presentation as follows:  BP = 114/64, HR = 100, RR = 16, T = 36.8 C/98.2 F (T-max = 38 C/100.4F), O2 Sat = 99% on RA.  Diagnosed with Weakness.  Bandemia of 35.1 noted.  Prescribed Ceftriaxone x one, Tylenol 650 mg x one and NaCl x one in the ED (2019 04:31)      PAST MEDICAL & SURGICAL HISTORY:  Sacral ulcer: ~ resolved (during rehab)  Gait difficulty: ~ uses walker  History of total left hip replacement      Allergies    No Known Allergies    Intolerances        ANTIMICROBIALS:  piperacillin/tazobactam IVPB. 3.375 every 8 hours      OTHER MEDS:  heparin  Injectable 5000 Unit(s) SubCutaneous every 12 hours  lactated ringers. 1000 milliLiter(s) IV Continuous <Continuous>  pantoprazole   Suspension 40 milliGRAM(s) Oral daily  potassium chloride   Powder 20 milliEquivalent(s) Oral once  vitamin A &amp; D Ointment 1 Application(s) Topical two times a day      SOCIAL HISTORY:    Marital Status:    Occupation:   Lives with:     Substance Use (street drugs):   Tobacco Usage:    Alcohol Usage: Social EtOH    FAMILY HISTORY:  Family history of cerebrovascular accident (CVA) (Mother): mother  Family history of diabetes mellitus (Father, Mother): parents      ROS:  Unobtainable because:   All other systems negative     Constitutional: no fever, no chills, no weight loss, no night sweats  Eye: no eye pain, no redness, no vision changes  ENT:  no sore throat, no rhinorrhea  Cardiovascular:  no chest pain, no palpitation  Respiratory:  no SOB, no cough  GI:  no abd pain, no vomiting, no diarrhea  urinary: no dysuria, no hematuria, no flank pain  : no  discharge or bleeding  musculoskeletal:  no joint pain, no joint swelling  skin:  no rash  neurology:  no headache, no seizure, no change in mental status  psych: no anxiety, no depression     Physical Exam:    General:    NAD, non toxic  Head: atraumatic, normocephalic  Eyes: normal sclera and conjunctiva  ENT:   no oropharyngeal lesions, no LAD, neck supple  Cardio:    regular S1,S2, no murmur  Respiratory:   clear b/l, no wheezing  abd:   soft, BS +, not tender, no hepatosplenomegaly  :     no CVAT, no suprapubic tenderness, no celeste  Musculoskeletal : no joint swelling, no edema  Skin:    no rash  vascular: no lines, normal pulses  Neurologic:     no focal deficits  psych: normal affect, no suicidal ideation      Drug Dosing Weight  Height (cm): 165.1 (02 Dec 2018 07:19)  Weight (kg): 40.2 (02 Dec 2018 08:06)  BMI (kg/m2): 14.7 (02 Dec 2018 08:06)  BSA (m2): 1.4 (02 Dec 2018 08:06)    Vital Signs Last 24 Hrs  T(F): 97.8 (19 @ 07:43), Max: 100.4 (19 @ 23:51)    Vital Signs Last 24 Hrs  HR: 77 (19 @ 07:43) (77 - 100)  BP: 95/65 (19 @ 07:43) (95/65 - 134/79)  RR: 18 (19 @ 07:43)  SpO2: 97% (19 @ 07:43) (97% - 100%)  Wt(kg): --                          10.6   16.46 )-----------( 188      ( 2019 05:30 )             35.1           144  |  105  |  24<H>  ----------------------------<  79  3.3<L>   |  20<L>  |  0.74    Ca    8.6      2019 05:30  Phos  3.2       Mg     1.8         TPro  5.0<L>  /  Alb  3.1<L>  /  TBili  4.6<H>  /  DBili  x   /  AST  75<H>  /  ALT  35<H>  /  AlkPhos  440<H>        Urinalysis Basic - ( 2019 02:55 )    Color: YELLOW / Appearance: Lt TURBID / S.018 / pH: 6.0  Gluc: NEGATIVE / Ketone: NEGATIVE  / Bili: TRACE / Urobili: SMALL   Blood: NEGATIVE / Protein: 20 / Nitrite: NEGATIVE   Leuk Esterase: NEGATIVE / RBC: 3-5 / WBC 3-5   Sq Epi: OCC / Non Sq Epi: x / Bacteria: NEGATIVE        MICROBIOLOGY:  v              RADIOLOGY: HPI: 89 year old female, with past history significant for Arthroplasty of the right hip (2018), and Gait difficulty (uses walker) presented to the ED secondary to weakness. Pt states she was sitting on a bench, wanted to get up to use bathroom and couldn't. [place] very weak. No fall/trauma. C/o some lower back pain. Denies fevers, chills, cough, rhinorrhea, sore throat, cp, abd pain, n/v, diarrhea, dysuria, sob. Uses walker/cane to ambulate- denies bed sores. Of note recently admitted  to 18 for mechanical fall, hip fx requiring R hip hemiarthroplasty; no pain w hips or erythema.       PAST MEDICAL & SURGICAL HISTORY:  Sacral ulcer: ~ resolved (during rehab)  Gait difficulty: ~ uses walker  History of total left hip replacement      Allergies    No Known Allergies    Intolerances        ANTIMICROBIALS:  piperacillin/tazobactam IVPB. 3.375 every 8 hours      OTHER MEDS:  heparin  Injectable 5000 Unit(s) SubCutaneous every 12 hours  lactated ringers. 1000 milliLiter(s) IV Continuous <Continuous>  pantoprazole   Suspension 40 milliGRAM(s) Oral daily  potassium chloride   Powder 20 milliEquivalent(s) Oral once  vitamin A &amp; D Ointment 1 Application(s) Topical two times a day      SOCIAL HISTORY:  Lives with: sisters    Substance Use (street drugs): denies  Tobacco Usage:  denies  Alcohol Usage: denies    FAMILY HISTORY:  Family history of cerebrovascular accident (CVA) (Mother): mother  Family history of diabetes mellitus (Father, Mother): parents    ROS:   All other systems negative     Constitutional: no fever, no chills  Eye: no eye pain, no redness, no vision changes  ENT:  no sore throat, no rhinorrhea  Cardiovascular:  no chest pain, no palpitation  Respiratory:  no SOB, no cough  GI:  no abd pain, no vomiting, no diarrhea  urinary: no dysuria  musculoskeletal:  no joint pain, no joint swelling  skin:  no rash  neurology:  no headache    Physical Exam:    General:    NAD, non toxic  Head: atraumatic, normocephalic  Eyes: normal sclera and conjunctiva  ENT:   neck supple  Cardio:    regular S1,S2  Respiratory:   clear b/l, no wheezing  abd:   soft, BS +, not tender, no hepatosplenomegaly  :     no celeste  Musculoskeletal : no joint swelling, no edema, R hip surgical incision healed w no surrounding erythema or ttp  Skin:    no rash  Neurologic:     no focal deficits  psych: normal affect      Drug Dosing Weight  Height (cm): 165.1 (02 Dec 2018 07:19)  Weight (kg): 40.2 (02 Dec 2018 08:06)  BMI (kg/m2): 14.7 (02 Dec 2018 08:06)  BSA (m2): 1.4 (02 Dec 2018 08:06)    Vital Signs Last 24 Hrs  T(F): 97.8 (19 @ 07:43), Max: 100.4 (19 @ 23:51)    Vital Signs Last 24 Hrs  HR: 77 (19 @ 07:43) (77 - 100)  BP: 95/65 (19 @ 07:43) (95/65 - 134/79)  RR: 18 (19 @ 07:43)  SpO2: 97% (19 @ 07:43) (97% - 100%)  Wt(kg): --                          10.6   16.46 )-----------( 188      ( 2019 05:30 )             35.1       02-20    144  |  105  |  24<H>  ----------------------------<  79  3.3<L>   |  20<L>  |  0.74    Ca    8.6      2019 05:30  Phos  3.2     02-20  Mg     1.8         TPro  5.0<L>  /  Alb  3.1<L>  /  TBili  4.6<H>  /  DBili  x   /  AST  75<H>  /  ALT  35<H>  /  AlkPhos  440<H>  02-20      Urinalysis Basic - ( 2019 02:55 )    Color: YELLOW / Appearance: Lt TURBID / S.018 / pH: 6.0  Gluc: NEGATIVE / Ketone: NEGATIVE  / Bili: TRACE / Urobili: SMALL   Blood: NEGATIVE / Protein: 20 / Nitrite: NEGATIVE   Leuk Esterase: NEGATIVE / RBC: 3-5 / WBC 3-5   Sq Epi: OCC / Non Sq Epi: x / Bacteria: NEGATIVE        MICROBIOLOGY:  v              RADIOLOGY: HPI: 89 year old female, with past history significant for Arthroplasty of the right hip (2018), and Gait difficulty (uses walker) presented to the ED secondary to weakness. Pt states she was sitting on a bench, wanted to get up to use bathroom and couldn't. [place] very weak. No fall/trauma. C/o some lower back pain. Denies fevers, chills, cough, rhinorrhea, sore throat, cp, abd pain, n/v, diarrhea, dysuria, sob. Uses walker/cane to ambulate- denies bed sores. Of note recently admitted  to 18 for mechanical fall, hip fx requiring R hip hemiarthroplasty; no pain w hips or erythema.       PAST MEDICAL & SURGICAL HISTORY:  Sacral ulcer: ~ resolved (during rehab)  Gait difficulty: ~ uses walker  History of total left hip replacement      Allergies    No Known Allergies    Intolerances        ANTIMICROBIALS:  piperacillin/tazobactam IVPB. 3.375 every 8 hours      OTHER MEDS:  heparin  Injectable 5000 Unit(s) SubCutaneous every 12 hours  lactated ringers. 1000 milliLiter(s) IV Continuous <Continuous>  pantoprazole   Suspension 40 milliGRAM(s) Oral daily  potassium chloride   Powder 20 milliEquivalent(s) Oral once  vitamin A &amp; D Ointment 1 Application(s) Topical two times a day      SOCIAL HISTORY:  Lives with: sisters    Substance Use (street drugs): denies  Tobacco Usage:  denies  Alcohol Usage: denies    FAMILY HISTORY:  Family history of cerebrovascular accident (CVA) (Mother): mother  Family history of diabetes mellitus (Father, Mother): parents    ROS:   All other systems negative     Constitutional: no fever, no chills  Eye: no eye pain, no redness, no vision changes  ENT:  no sore throat, no rhinorrhea  Cardiovascular:  no chest pain, no palpitation  Respiratory:  no SOB, no cough  GI:  no abd pain, no vomiting, no diarrhea  urinary: no dysuria  musculoskeletal:  no joint pain, no joint swelling  skin:  no rash  neurology:  no headache    Physical Exam:    General:    NAD, non toxic  Head: atraumatic, normocephalic  Eyes: normal sclera and conjunctiva  ENT:   neck supple  Cardio:    regular S1,S2  Respiratory:   clear b/l, no wheezing  abd:   soft, BS +, not tender, no hepatosplenomegaly  :     no celeste  Musculoskeletal : no joint swelling, no edema, R hip surgical incision healed w no surrounding erythema or ttp  Skin:    no rash  Neurologic:     no focal deficits  psych: normal affect      Drug Dosing Weight  Height (cm): 165.1 (02 Dec 2018 07:19)  Weight (kg): 40.2 (02 Dec 2018 08:06)  BMI (kg/m2): 14.7 (02 Dec 2018 08:06)  BSA (m2): 1.4 (02 Dec 2018 08:06)    Vital Signs Last 24 Hrs  T(F): 97.8 (19 @ 07:43), Max: 100.4 (19 @ 23:51)    Vital Signs Last 24 Hrs  HR: 77 (19 @ 07:43) (77 - 100)  BP: 95/65 (19 @ 07:43) (95/65 - 134/79)  RR: 18 (19 @ 07:43)  SpO2: 97% (19 @ 07:43) (97% - 100%)  Wt(kg): --                          10.6   16.46 )-----------( 188      ( 2019 05:30 )             35.1       02-20    144  |  105  |  24<H>  ----------------------------<  79  3.3<L>   |  20<L>  |  0.74    Ca    8.6      2019 05:30  Phos  3.2     02-20  Mg     1.8         TPro  5.0<L>  /  Alb  3.1<L>  /  TBili  4.6<H>  /  DBili  x   /  AST  75<H>  /  ALT  35<H>  /  AlkPhos  440<H>  02-20      Urinalysis Basic - ( 2019 02:55 )    Color: YELLOW / Appearance: Lt TURBID / S.018 / pH: 6.0  Gluc: NEGATIVE / Ketone: NEGATIVE  / Bili: TRACE / Urobili: SMALL   Blood: NEGATIVE / Protein: 20 / Nitrite: NEGATIVE   Leuk Esterase: NEGATIVE / RBC: 3-5 / WBC 3-5   Sq Epi: OCC / Non Sq Epi: x / Bacteria: NEGATIVE        MICROBIOLOGY:  BCx now w GNR        RADIOLOGY:  No imaging HPI: 89 year old female, with past history significant for Arthroplasty of the right hip (2018), and Gait difficulty (uses walker) presented to the ED secondary to weakness. Pt states she was sitting on a bench, wanted to get up to use bathroom and couldn't. [place] very weak. No fall/trauma. C/o some lower back pain. Denies fevers, chills, cough, rhinorrhea, sore throat, cp, abd pain, n/v, diarrhea, dysuria, sob. Uses walker/cane to ambulate- denies bed sores. Of note recently admitted  to 18 for mechanical fall, hip fx requiring R hip hemiarthroplasty; no pain w hips or erythema.     PAST MEDICAL & SURGICAL HISTORY:  Sacral ulcer: ~ resolved (during rehab)  Gait difficulty: ~ uses walker  History of total left hip replacement    Allergies    No Known Allergies    ANTIMICROBIALS:  piperacillin/tazobactam IVPB. 3.375 every 8 hours    OTHER MEDS:  heparin  Injectable 5000 Unit(s) SubCutaneous every 12 hours  lactated ringers. 1000 milliLiter(s) IV Continuous <Continuous>  pantoprazole   Suspension 40 milliGRAM(s) Oral daily  potassium chloride   Powder 20 milliEquivalent(s) Oral once  vitamin A &amp; D Ointment 1 Application(s) Topical two times a day    SOCIAL HISTORY:  Lives with: sisters    Substance Use (street drugs): denies  Tobacco Usage:  denies  Alcohol Usage: denies    FAMILY HISTORY:  Family history of cerebrovascular accident (CVA) (Mother): mother  Family history of diabetes mellitus (Father, Mother): parents    ROS:   All other systems negative     Constitutional: no fever, no chills  Eye: no eye pain, no redness, no vision changes  ENT:  no sore throat, no rhinorrhea  Cardiovascular:  no chest pain, no palpitation  Respiratory:  no SOB, no cough  GI:  no abd pain, no vomiting, no diarrhea  urinary: no dysuria  musculoskeletal:  no joint pain, no joint swelling  skin:  no rash  neurology:  no headache    Physical Exam:    General:    NAD, non toxic  Head: atraumatic, normocephalic  Eyes: normal sclera and conjunctiva  ENT:   neck supple  Cardio:    regular S1,S2  Respiratory:   clear b/l, no wheezing  abd:   soft, BS +, not tender, no hepatosplenomegaly  :     no celeste  Musculoskeletal : no joint swelling, no edema, R hip surgical incision healed w no surrounding erythema or ttp  Skin:    no rash  Neurologic:     no focal deficits  psych: normal affect    Drug Dosing Weight  Height (cm): 165.1 (02 Dec 2018 07:19)  Weight (kg): 40.2 (02 Dec 2018 08:06)  BMI (kg/m2): 14.7 (02 Dec 2018 08:06)  BSA (m2): 1.4 (02 Dec 2018 08:06)    Vital Signs Last 24 Hrs  T(F): 97.8 (19 @ 07:43), Max: 100.4 (19 @ 23:51)    Vital Signs Last 24 Hrs  HR: 77 (19 @ 07:43) (77 - 100)  BP: 95/65 (19 @ 07:43) (95/65 - 134/79)  RR: 18 (19 @ 07:43)  SpO2: 97% (19 @ 07:43) (97% - 100%)    Labs:                        10.6   16.46 )-----------( 188      ( 2019 05:30 )             35.1     02-20    144  |  105  |  24<H>  ----------------------------<  79  3.3<L>   |  20<L>  |  0.74    Ca    8.6      2019 05:30  Phos  3.2     02-20  Mg     1.8         TPro  5.0<L>  /  Alb  3.1<L>  /  TBili  4.6<H>  /  DBili  x   /  AST  75<H>  /  ALT  35<H>  /  AlkPhos  440<H>  02-20    Urinalysis Basic - ( 2019 02:55 )    Color: YELLOW / Appearance: Lt TURBID / S.018 / pH: 6.0  Gluc: NEGATIVE / Ketone: NEGATIVE  / Bili: TRACE / Urobili: SMALL   Blood: NEGATIVE / Protein: 20 / Nitrite: NEGATIVE   Leuk Esterase: NEGATIVE / RBC: 3-5 / WBC 3-5   Sq Epi: OCC / Non Sq Epi: x / Bacteria: NEGATIVE    MICROBIOLOGY:  BCx now w GNR E coli    RADIOLOGY:     USG:    IMPRESSION:     Severe intra and extrahepatic biliary ductal dilatation with evidence of   choledocholithiasis. The common bile duct is tortuous and the distal   portion is not well visualized. Cross-sectional imaging is recommended   for further evaluation/characterization.

## 2019-02-20 NOTE — BEHAVIORAL HEALTH ASSESSMENT NOTE - NSBHCHARTREVIEWVS_PSY_A_CORE FT
Vital Signs Last 24 Hrs  T(C): 36.6 (20 Feb 2019 13:26), Max: 38 (19 Feb 2019 23:51)  T(F): 97.9 (20 Feb 2019 13:26), Max: 100.4 (19 Feb 2019 23:51)  HR: 87 (20 Feb 2019 13:26) (77 - 100)  BP: 92/60 (20 Feb 2019 15:30) (90/52 - 134/79)  BP(mean): --  RR: 18 (20 Feb 2019 13:26) (16 - 18)  SpO2: 99% (20 Feb 2019 13:26) (97% - 100%)

## 2019-02-20 NOTE — CONSULT NOTE ADULT - ASSESSMENT
Impression:  1. Sepsis concerning for ascending cholangitis 2/2 cbd stone    Plan:  1. I explained to patient at length that she likely has cholangitis and needs urgent ERCP. I explained the procedure along with its risks and possibility of general anesthesia and she states she does not want any procedure even though it may be fatal delaying therapeutic ERCP. She understands that the stone may not pass by itself and that her cholangitis will worsen if no intervened on and she fully understands. Dr. Livingston medicine attending made aware and present at bedside for the discussion. This was relayed to her sister Radha Rouse as well.  2. Continue with antibiotics  3. iv fluids  4. Trend cbc, coags, cmp  5. Keep NPO Impression:  1. Sepsis concerning for ascending cholangitis 2/2 cbd stone    Plan:  1. I explained to patient at length that she likely has cholangitis and needs urgent ERCP. I explained the procedure along with its risks and possibility of general anesthesia and she states she does not want any procedure even though it may be fatal delaying therapeutic ERCP. She understands that the stone may not pass by itself and that her cholangitis will worsen if no intervened on and she fully understands. Dr. Livingston medicine attending made aware and present at bedside for the discussion. This was relayed to her sister Radha Rouse as well who states that the patient herself makes her own decisions.   2. Continue with antibiotics  3. iv fluids  4. Trend cbc, coags, cmp  5. Keep NPO Impression:  1. Sepsis concerning for ascending cholangitis 2/2 cbd stone    Plan:  1. Plan for ERCP urgently. risks of procedure explained to patient and family  2. Continue with antibiotics  3. iv fluids  4. Trend cbc, coags, cmp  5. Keep NPO

## 2019-02-20 NOTE — CONSULT NOTE ADULT - ATTENDING COMMENTS
89 year old female, with past history significant for Arthroplasty of the right hip (12/2018), and Gait difficulty (uses walker) presented to the ED secondary to weakness.  Fever, leukocytosis, bandemia  E coli bacteremia  USG with evidence for bile duct dilation, cholangitis  Patient presently refusing procedure but unclear capacity  With significant systemic infection, expand to Ertapenem  Overall, Cholangitis, E coli bacteremia, sepsis, hypotension, leukocytosis, fever  - Ertapenem 1g q 24  - DC Zosyn  - F/U GI. ? role for ERCP for source control  - Trend LFTs    Scott Shrestha MD  Pager [phone]  After 5pm and on weekends call [phone]    I was physically present for the key portions of the evaluation and management service provided. I saw and examined the patient. I agree with the above history, physical, and plan except for any discrepancies which I have documented in “Attending Attestation.” Please refer to “Attending Attestation” for final plan. 89 year old female, with past history significant for Arthroplasty of the right hip (12/2018), and Gait difficulty (uses walker) presented to the ED secondary to weakness.  Fever, leukocytosis, bandemia  E coli bacteremia  USG with evidence for bile duct dilation, cholangitis  Patient presently refusing procedure but unclear capacity  With significant systemic infection, expand to Ertapenem  Overall, Cholangitis, E coli bacteremia, sepsis, hypotension, leukocytosis, fever  - Ertapenem 1g q 24  - DC Zosyn  - F/U GI. ? role for ERCP for source control  - Trend LFTs  - F/U MICU eval  - Discussed with medicine NP    Scott Shrestha MD  Pager [phone]  After 5pm and on weekends call [phone]    I was physically present for the key portions of the evaluation and management service provided. I saw and examined the patient. I agree with the above history, physical, and plan except for any discrepancies which I have documented in “Attending Attestation.” Please refer to “Attending Attestation” for final plan.

## 2019-02-20 NOTE — CONSULT NOTE ADULT - ASSESSMENT
89 year old female, with past history significant for Arthroplasty of the right hip (12/2018), and Gait difficulty (uses walker) presented to the ED secondary to weakness 2/2 GNR bacteremia w suspicion of abd source given transaminitis.     BCx now growing GNR in 3/4 bottles  UA negative    - f/u abd u/s, if negative would consider ct a/p to look for focus of infection  - c/w zosyn at current dose for now  - monitor LFTs  - follow up cultures  - follow up pending cxr and xr hip and l-spine    full note/recs to follow 89 year old female, with past history significant for Arthroplasty of the right hip (12/2018), and Gait difficulty (uses walker) presented to the ED secondary to weakness 2/2 GNR bacteremia w suspicion of abd source given transaminitis.     BCx now growing GNR in 3/4 bottles  UA negative    - f/u abd u/s, if negative would consider ct a/p to look for focus of infection  - c/w zosyn at current dose for now  - monitor LFTs  - follow up cultures  - follow up pending cxr and xr hip and l-spine

## 2019-02-20 NOTE — PHYSICAL THERAPY INITIAL EVALUATION ADULT - PATIENT PROFILE REVIEW, REHAB EVAL
yes/PT orders received: ambulate with assistance. Consult with RN Mary LEES, pt OK to participate in PT evaluation.

## 2019-02-20 NOTE — H&P ADULT - CONSTITUTIONAL COMMENTS
very cachectic female with sunken eyes, bitemporal wasting, generalized muscle wasting w/ prominent bones, lying propped up in stretcher under multiple bed linen.  NAD.

## 2019-02-20 NOTE — DISCHARGE NOTE ADULT - CARE PROVIDER_API CALL
Bill Gastelum)  Gastroenterology; Internal Medicine  [address] [place] [address] [address]  [place] [address]  Phone: [phone]  Fax: [phone]  Follow Up Time:

## 2019-02-20 NOTE — DISCHARGE NOTE ADULT - HOSPITAL COURSE
89 year old female, with past history significant for Arthroplasty of the left hip (12/2018), and Gait difficulty (uses walker) presented to the ED secondary to weakness 89F recent L hip arthroplasty s/p femoral neck fracture, p/w weakness, hospital course significant for sepsis and gram negative mauri bacteremia 2/2 ascending cholangitis in setting of choledocholithasis, sepsis 2/2 ascending cholangitis with sonographic findings c/w with choledocholithiasis. s/p emergent ERCP and tolerated procedure well, treated w/IV ertapenem, pt was able to tolerate diet  Paranoid schizophrenia - Pt not on meds, no psych meds 89F recent L hip arthroplasty s/p femoral neck fracture, p/w weakness, hospital course significant for sepsis and gram negative mauri bacteremia 2/2 ascending cholangitis in setting of choledocholithasis, sepsis 2/2 ascending cholangitis with sonographic findings c/w with choledocholithiasis. s/p emergent ERCP and tolerated procedure well, treated w/IV ertapenem, pt was able to tolerate diet. Pt will need to have repeat ERCP in 4-6 weeks for stone and stent removal   Paranoid schizophrenia - Pt not on meds, no psych meds   Pt is optimized for discharge to rehab

## 2019-02-20 NOTE — H&P ADULT - FAMILY HISTORY
Father  Still living? Unknown  Family history of diabetes mellitus, Age at diagnosis: Age Unknown     Mother  Still living? No  Family history of diabetes mellitus, Age at diagnosis: Age Unknown  Family history of cerebrovascular accident (CVA), Age at diagnosis: Age Unknown

## 2019-02-20 NOTE — DISCHARGE NOTE ADULT - PLAN OF CARE
resolved infection You were diagnosed w/ascending cholangitis, underwent emergent ERCP   - Initial blood cultures were positive for  E coli, Repeat BCx negative   Treated w/ IV ertapenem, pansensitive,   - You will need to scheduled a follow up appt with GI for repeat  ERCP in 4-6 weeks for stone and stent removal. *****************[facility]; Internal Medicine. [address] [place] [address], [address]. [place] [address]. Phone: [phone]                                                                                     Please complete total of 6 weeks of antibiotic as ordered. You were diagnosed w/ascending cholangitis, underwent emergent ERCP   - Initial blood cultures were positive for  E coli, Repeat BCx negative   Treated w/ IV ertapenem and switched to Cipro 500mg every 12 through 3/6/19  - You will need to scheduled a follow up appt with GI for repeat  ERCP in 4-6 weeks for stone and stent removal. *****************[facility]; Internal Medicine. [address] [place] [address], [address]. [place] [address]. Phone: [phone]                                                                                     Please complete total of 6 weeks of antibiotic as ordered.

## 2019-02-20 NOTE — CHART NOTE - NSCHARTNOTEFT_GEN_A_CORE
ABd US showed: Severe intra and extrahepatic biliary ductal dilatation with evidence of choledocholithiasis. Tortuous common bile duct. Pt is refusing ERCP. Psych consult called for capacity.   ADS [id number]

## 2019-02-20 NOTE — PROGRESS NOTE ADULT - SUBJECTIVE AND OBJECTIVE BOX
Patient is a 89y old  Female who presents with a chief complaint of Weakness (2019 11:32)      SUBJECTIVE / OVERNIGHT EVENTS: Pt seen and examined early this afternoon as she was in ultrasound.  Currently, pt states she feels much better.  Pt now growing 3/4 GNR rods in Bcx, sonogram results reviewed.  Progressively more hypotensive, however mentating well.       MEDICATIONS  (STANDING):  heparin  Injectable 5000 Unit(s) SubCutaneous every 12 hours  lactated ringers Bolus 1000 milliLiter(s) IV Bolus once  pantoprazole   Suspension 40 milliGRAM(s) Oral daily  piperacillin/tazobactam IVPB. 3.375 Gram(s) IV Intermittent every 8 hours  potassium chloride   Powder 20 milliEquivalent(s) Oral once  potassium chloride  10 mEq/100 mL IVPB 10 milliEquivalent(s) IV Intermittent every 1 hour  vitamin A &amp; D Ointment 1 Application(s) Topical two times a day        Vital Signs Last 24 Hrs  T(C): 36.6 (2019 13:26), Max: 38 (2019 23:51)  T(F): 97.9 (2019 13:26), Max: 100.4 (2019 23:51)  HR: 87 (2019 13:26) (77 - 100)  BP: 90/52 (2019 13:26) (90/52 - 134/79)  BP(mean): --  RR: 18 (2019 13:26) (16 - 18)  SpO2: 99% (2019 13:26) (97% - 100%)  CAPILLARY BLOOD GLUCOSE        PHYSICAL EXAM  GENERAL: NAD, well-developed, frail appearing   HEAD:  Atraumatic, Normocephalic  EYES: EOMI, PERRLA, conjunctiva and sclera clear  NECK: Supple, No JVD  CHEST/LUNG: Clear to auscultation bilaterally; No wheeze  HEART: Regular rate and rhythm; No murmurs, rubs, or gallops  ABDOMEN: Soft, Nontender, Nondistended; Bowel sounds present, negative deng's sign   EXTREMITIES:  2+ Peripheral Pulses, No clubbing, cyanosis, or edema  PSYCH: AAOx3  SKIN: No rashes or lesions    LABS:                        10.6   16.46 )-----------( 188      ( 2019 05:30 )             35.1         144  |  105  |  24<H>  ----------------------------<  79  3.3<L>   |  20<L>  |  0.74    Ca    8.6      2019 05:30  Phos  3.2       Mg     1.8         TPro  5.0<L>  /  Alb  3.1<L>  /  TBili  4.6<H>  /  DBili  x   /  AST  75<H>  /  ALT  35<H>  /  AlkPhos  440<H>        Urinalysis Basic - ( 2019 02:55 )    Color: YELLOW / Appearance: Lt TURBID / S.018 / pH: 6.0  Gluc: NEGATIVE / Ketone: NEGATIVE  / Bili: TRACE / Urobili: SMALL   Blood: NEGATIVE / Protein: 20 / Nitrite: NEGATIVE   Leuk Esterase: NEGATIVE / RBC: 3-5 / WBC 3-5   Sq Epi: OCC / Non Sq Epi: x / Bacteria: NEGATIVE      Culture - Blood (19 @ 00:43)    Specimen Source: BLOOD VENOUS    Gram Stain Blood:   ***** CRITICAL RESULT *****  PERSON CALLED / READ-BACK: MELISSA MIX/EDDIE/CAROLYN  DATE / TIME CALLED: 19 1223  CALLED BY: DEJA SANTA^Gram Neg Rods  AFTER: 10 HOURS INCUBATION  BOTTLE: ANAEROBIC BOTTLE    Culture - Blood (19 @ 00:43)    Culture - Blood:   ***Blood Panel PCR results on this specimen are available  approximately 3 hours after the Gram stain result***  Gram stain, PCR, and/or culture results may not always  correspond due to difference in methodologies  ------------------------------------------------------------  This PCR assay was performed using [employer].  The  following targets are tested for:  Enterococcus, vancomycin  resistant enterococci, Listeria monocytogenes,  coagulase  negative staphylococci, S. aureus, methicillin resistant S.  aureus, Streptococcus agalactiae (Group B), S. pneumoniae,  S. pyogenes (Group A), Acinetobacter baumannii, Enterobacter  cloacae, E. coli, Klebsiella oxytoca, K. pneumoniae, Proteus  sp., Serratia marcescens, Haemophilus influenzae, Neisseria  meningitidis, Pseudomonas aeruginosa, Candida albicans, C.  glabrata, C. krusei, C. parapsilosis, C. tropicalis and the  KPC resistance gene.  **NOTE: Due to technical problems, Proteus sp. will NOT be  reported as part of the BCID paneluntil further notice.    -  Escherichia coli: + DETECT SACHI    Specimen Source: BLOOD VENOUS    Organism: BLOOD CULTURE PCR    Gram Stain Blood:   ***** CRITICAL RESULT *****  PERSON CALLED / READ-BACK: MELISSA MIX/EDDIE/Y  DATE / TIME CALLED: 19 1212  CALLED BY: DEJA SANTA^Gram Neg Rods  AFTER: 10 HOURS INCUBATION  BOTTLE: AEROBIC   ANAEROBIC BOTTLES    Organism Identification: BLOOD CULTURE PCR    Method Type: PCR      RADIOLOGY & ADDITIONAL TESTS:    Imaging Personally Reviewed:  < from: US Abdomen Limited (19 @ 12:23) >  IMPRESSION:     Severe intra and extrahepatic biliary ductal dilatation with evidence of   choledocholithiasis. The common bile ductis tortuous and the distal   portion is not well visualized. Cross-sectional imaging is recommended   for further evaluation/characterization.      < end of copied text >    Care Discussed with Consultants/Other Providers: GI

## 2019-02-20 NOTE — PROGRESS NOTE ADULT - PROBLEM SELECTOR PLAN 1
- Admitted with sepsis 2/2 ascending cholangitis with sonographic findings c/w with choledocholithiasis.  - Advanced GI team aware, to evaluate urgently  - Will bolus additional 1L LR now and resume maintenance fluids.  If no response, will need ICU consult for pressor support  - Keep NPO   - Continue with IV Zosyn, repeat Bcx in AM, f/u sensitivities of positive Bcx  - Bandemia in setting of acute infection   - Repeat Lactate in AM - Admitted with sepsis 2/2 ascending cholangitis with sonographic findings c/w with choledocholithiasis.  - Advanced GI team aware, to evaluate urgently  - Will bolus additional 1L LR now and resume maintenance fluids.  If no response, will need ICU consult for pressor support  - Keep NPO   - Continue with IV Zosyn, repeat Bcx in AM, f/u sensitivities of positive Bcx  - Bandemia in setting of acute infection   - Repeat Lactate in AM    ADDENDUM:  - Pt offered ERCP by GI team, however pt is adamantly refusing despite extensive counseling that infection may worsen and potentially lead to death.  The patient is unable to explain the implications of her infection and the consequences it may have if ERCP is not pursued.  To me, the patient lacks medical decision making capacity.  Psych also consulted to assist with capacity.  I also spoke with patient's sister (Radha), who states that patient has always been difficult and made decisions for her self.  There is now HCP/living will in place.    - ICU consulted as well as BP persistently low despite LR bolus. - Admitted with sepsis 2/2 ascending cholangitis with sonographic findings c/w with choledocholithiasis.  - Advanced GI team aware, to evaluate urgently  - Will bolus additional 1L LR now and resume maintenance fluids.  If no response, will need ICU consult for pressor support (on review of prior records, pt's baseline SBP is in 120s-130s)   - Keep NPO   - Continue with IV Zosyn, repeat Bcx in AM, f/u sensitivities of positive Bcx  - Bandemia in setting of acute infection   - Repeat Lactate in AM    ADDENDUM:  - Pt offered ERCP by GI team, however pt is adamantly refusing despite extensive counseling that infection may worsen and potentially lead to death.  The patient is unable to explain the implications of her infection and the consequences it may have if ERCP is not pursued.  To me, the patient lacks medical decision making capacity.  Psych also consulted to assist with capacity.  I also spoke with patient's sister (Radha), who states that patient has always been difficult and made decisions for her self.  There is now HCP/living will in place.    - ICU consulted as well as BP persistently low despite LR bolus. - Admitted with sepsis 2/2 ascending cholangitis with sonographic findings c/w with choledocholithiasis.  - Advanced GI team aware, to evaluate urgently  - Will bolus additional 1L LR now and resume maintenance fluids.  If no response, will need ICU consult for pressor support (on review of prior records, pt's baseline SBP is in 120s-130s)   - Keep NPO   - Continue with IV Zosyn, repeat Bcx in AM, f/u sensitivities of positive Bcx  - Bandemia in setting of acute infection   - Repeat Lactate in AM    ADDENDUM:  - Pt offered ERCP by GI team, however pt is adamantly refusing despite extensive counseling that infection may worsen and potentially lead to death.  The patient is unable to explain the implications of her infection and the consequences it may have if ERCP is not pursued.  To me, the patient lacks medical decision making capacity.  Psych also consulted to assist with capacity.  I also spoke with patient's sister (Radha), who states that patient has always been difficult and made decisions for her self.  There is now HCP/living will in place.    - ICU consulted as well as BP persistently low despite LR bolus.  Additional 30 minutes of critical care time spent

## 2019-02-20 NOTE — H&P ADULT - ASSESSMENT
89 year old female, with past history significant for Arthroplasty of the left hip (12/2018), and Gait difficulty (uses walker) presented to the ED secondary to weakness.  Vital signs upon ED presentation as follows:  BP = 114/64, HR = 100, RR = 16, T = 36.8 C/98.2 F (T-max = 38 C/100.4F), O2 Sat = 99% on RA.  Diagnosed with Weakness.

## 2019-02-20 NOTE — BEHAVIORAL HEALTH ASSESSMENT NOTE - NSBHCHARTREVIEWLAB_PSY_A_CORE FT
CBC Full  -  ( 20 Feb 2019 05:30 )  WBC Count : 16.46 K/uL  Hemoglobin : 10.6 g/dL  Hematocrit : 35.1 %  Platelet Count - Automated : 188 K/uL  Mean Cell Volume : 104.8 fL  Mean Cell Hemoglobin : 31.6 pg  Mean Cell Hemoglobin Concentration : 30.2 %  Auto Neutrophil # : 15.43 K/uL  Auto Lymphocyte # : 0.29 K/uL  Auto Monocyte # : 0.57 K/uL  Auto Eosinophil # : 0.00 K/uL  Auto Basophil # : 0.03 K/uL  Auto Neutrophil % : 93.6 %  Auto Lymphocyte % : 1.8 %  Auto Monocyte % : 3.5 %  Auto Eosinophil % : 0.0 %  Auto Basophil % : 0.2 %  02-20    144  |  105  |  24<H>  ----------------------------<  79  3.3<L>   |  20<L>  |  0.74    Ca    8.6      20 Feb 2019 05:30  Phos  3.2     02-20  Mg     1.8     02-20    TPro  5.0<L>  /  Alb  3.1<L>  /  TBili  4.6<H>  /  DBili  x   /  AST  75<H>  /  ALT  35<H>  /  AlkPhos  440<H>  02-20

## 2019-02-20 NOTE — BEHAVIORAL HEALTH ASSESSMENT NOTE - HPI (INCLUDE ILLNESS QUALITY, SEVERITY, DURATION, TIMING, CONTEXT, MODIFYING FACTORS, ASSOCIATED SIGNS AND SYMPTOMS)
Briefly, the patient is a 89 year old woman, single, retired, lives at home with 2 elderly sisters, has a medical history known for hip arthroplasty, no known psychiatric history, presented for weakness and decreased ambulation. Labs notable for leucocytosis, elevated bilirubin. US Abdomen showed dilated CBD with stone. Admitted for sepsis concerning for ascending cholangitis secondary to CBD stone. Patient needs an urgent ERCP which she is refusing. Psychiatry consultation requested to assess her capacity to refuse ERCP.    Met with the patient. Lying covered in her bed sheet, frail. She is alert, and oriented. When asked about the reasons for her admission, she states- ' I fell and I could not get up from the stool to my walker'. Discussed the current medical issues- CBD stone, cholangitis (inflammation of the biliary tract), sepsis (infection in your blood). When heard about this, she states- ' Oh I have that for years. No problem dear'. Discussed the risks of not doing the surgery-death and patient states- ' I would like to defer my opinion to later dear'. When asked to at least reiterate the information (above) given to her, she states- ' Oh I am fine dear'.   Mood is stable, denies any depressive symptoms, denies any si or hi, denies any psychosis when asked. Briefly, the patient is a 89 year old woman, single, retired, lives at home with 2 elderly sisters, has a medical history known for hip arthroplasty, with possible psychiatric history notable for schizophrenia (as per collateral documented in medicine notes), remote psychiatric admission in past, not on current psychotropics, presented for weakness and decreased ambulation. Labs notable for leucocytosis, elevated bilirubin. US Abdomen showed dilated CBD with stone. Admitted for sepsis concerning for ascending cholangitis secondary to CBD stone. Patient needs an urgent ERCP which she is refusing. Psychiatry consultation requested to assess her capacity to refuse ERCP.    Met with the patient. Lying covered in her bed sheet, frail. She is alert, and oriented. When asked about the reasons for her admission, she states- ' I fell and I could not get up from the stool to my walker'. Discussed the current medical issues- CBD stone, cholangitis (inflammation of the biliary tract), sepsis (infection in your blood). When heard about this, she states- ' Oh I have that for years. No problem dear'. Discussed the risks of not doing the surgery-death and patient states- ' I would like to defer my opinion to later dear'. When asked to at least reiterate the information (above) given to her, she states- ' Oh I am fine dear'.   Mood is stable, denies any depressive symptoms, denies any si or hi, denies any psychosis when asked.

## 2019-02-20 NOTE — PHYSICAL THERAPY INITIAL EVALUATION ADULT - ADDITIONAL COMMENTS
Patient reports she lives with her 2 sisters in a private house, 1 flight of stairs to negotiate. Patient reports she was previously independent in all ADLs and used a rolling walker to ambulate.     Patient was left sitting in chair, all lines/tubes intact and call treadwell within reach, EDDIE hubbard.

## 2019-02-20 NOTE — DISCHARGE NOTE ADULT - CARE PLAN
Principal Discharge DX:	Bandemia without diagnosis of specific infection  Secondary Diagnosis:	Weakness  Secondary Diagnosis:	Sacral ulcer  Secondary Diagnosis:	Failure to thrive in adult Principal Discharge DX:	Bandemia without diagnosis of specific infection  Goal:	resolved infection  Assessment and plan of treatment:	You were diagnosed w/ascending cholangitis, underwent emergent ERCP   - Initial blood cultures were positive for  E coli, Repeat BCx negative   Treated w/ IV ertapenem, pansensitive,   - You will need to scheduled a follow up appt with GI for repeat  ERCP in 4-6 weeks for stone and stent removal. *****************[facility]; Internal Medicine. [address] [place] [address], [address]. [place] [address]. Phone: [phone]                                                                                     Please complete total of 6 weeks of antibiotic as ordered.  Secondary Diagnosis:	Weakness  Secondary Diagnosis:	Sacral ulcer  Secondary Diagnosis:	Failure to thrive in adult Principal Discharge DX:	Bandemia without diagnosis of specific infection  Goal:	resolved infection  Assessment and plan of treatment:	You were diagnosed w/ascending cholangitis, underwent emergent ERCP   - Initial blood cultures were positive for  E coli, Repeat BCx negative   Treated w/ IV ertapenem and switched to Cipro 500mg every 12 through 3/6/19  - You will need to scheduled a follow up appt with GI for repeat  ERCP in 4-6 weeks for stone and stent removal. *****************[facility]; Internal Medicine. [address] [facility], [address]. [place] [address]. Phone: [phone]                                                                                     Please complete total of 6 weeks of antibiotic as ordered.

## 2019-02-20 NOTE — CONSULT NOTE ADULT - SUBJECTIVE AND OBJECTIVE BOX
CHIEF COMPLAINT: hypotension 2/2 sepsis from ascending cholangitis    HPI:  89 yo F PMHx recent L hip arthroplasty s/p femoral neck fracture, initially p/w weakness 2/2 sepsis from ascending cholangitis w/ choledocholithiasis c/b gram negative bacteremia. On assessment of patient appeared to be resting comfortably in bed.       PAST MEDICAL & SURGICAL HISTORY:  Sacral ulcer: ~ resolved (during rehab)  Gait difficulty: ~ uses walker  History of total left hip replacement      FAMILY HISTORY:  Family history of cerebrovascular accident (CVA) (Mother): mother  Family history of diabetes mellitus (Father, Mother): parents      SOCIAL HISTORY:  Smoking: __ packs x ___ years  EtOH Use:  Marital Status:  Occupation:  Recent Travel:  Country of Birth:  Advance Directives:    Allergies    No Known Allergies    Intolerances        HOME MEDICATIONS:    REVIEW OF SYSTEMS:  Constitutional:   Eyes:  ENT:  CV:  Resp:  GI:  :  MSK:  Integumentary:  Neurological:  Psychiatric:  Endocrine:  Hematologic/Lymphatic:  Allergic/Immunologic:  [ ] All other systems negative  [ ] Unable to assess ROS because ________    OBJECTIVE:  ICU Vital Signs Last 24 Hrs  T(C): 36.2 (2019 16:25), Max: 38 (2019 23:51)  T(F): 97.2 (2019 16:25), Max: 100.4 (2019 23:51)  HR: 79 (2019 16:25) (77 - 100)  BP: 98/62 (2019 16:25) (90/52 - 134/79)  BP(mean): --  ABP: --  ABP(mean): --  RR: 18 (2019 16:25) (16 - 18)  SpO2: 99% (2019 13:26) (97% - 100%)        CAPILLARY BLOOD GLUCOSE          PHYSICAL EXAM:  General:   HEENT:   Lymph Nodes:  Neck:   Respiratory:   Cardiovascular:   Abdomen:   Extremities:   Skin:   Neurological:  Psychiatry:    LINES:     HOSPITAL MEDICATIONS:  MEDICATIONS  (STANDING):  ertapenem  IVPB 1000 milliGRAM(s) IV Intermittent every 24 hours  heparin  Injectable 5000 Unit(s) SubCutaneous every 12 hours  pantoprazole   Suspension 40 milliGRAM(s) Oral daily  potassium chloride  10 mEq/100 mL IVPB 10 milliEquivalent(s) IV Intermittent every 1 hour  vitamin A &amp; D Ointment 1 Application(s) Topical two times a day    MEDICATIONS  (PRN):      LABS:                        10.6   16.46 )-----------( 188      ( 2019 05:30 )             35.1         144  |  105  |  24<H>  ----------------------------<  79  3.3<L>   |  20<L>  |  0.74    Ca    8.6      2019 05:30  Phos  3.2       Mg     1.8         TPro  5.0<L>  /  Alb  3.1<L>  /  TBili  4.6<H>  /  DBili  x   /  AST  75<H>  /  ALT  35<H>  /  AlkPhos  440<H>      PT/INR - ( 2019 14:45 )   PT: 17.2 SEC;   INR: 1.53            Urinalysis Basic - ( 2019 02:55 )    Color: YELLOW / Appearance: Lt TURBID / S.018 / pH: 6.0  Gluc: NEGATIVE / Ketone: NEGATIVE  / Bili: TRACE / Urobili: SMALL   Blood: NEGATIVE / Protein: 20 / Nitrite: NEGATIVE   Leuk Esterase: NEGATIVE / RBC: 3-5 / WBC 3-5   Sq Epi: OCC / Non Sq Epi: x / Bacteria: NEGATIVE        Venous Blood Gas:   @ 05:20  7.40/40/46/24/78.4  VBG Lactate: 2.1  Venous Blood Gas:   @ 00:10  7.30/25/36/14/55.7  VBG Lactate: 1.7      MICROBIOLOGY:     RADIOLOGY:  [ ] Reviewed and interpreted by me    EKG: CHIEF COMPLAINT: hypotension 2/2 sepsis from ascending cholangitis    HPI:  89 yo F PMHx recent L hip arthroplasty s/p femoral neck fracture, initially p/w weakness 2/2 sepsis from ascending cholangitis w/ choledocholithiasis c/b gram negative bacteremia. She was hypotensive to 92/60 and has received 3 L. MICU was consulted for concern of persistent hypotension despite hydration. GI was consulted for ERCP and is currently planned for urgent ERCP today. Patient did not appear to have capacity to refuse emergent lifesaving treatment as she was not able to relay the risks associated with procedure.     On assessment patient appeared comfortable, she had no complaints.       PAST MEDICAL & SURGICAL HISTORY:  Sacral ulcer: ~ resolved (during rehab)  Gait difficulty: ~ uses walker  History of total left hip replacement      FAMILY HISTORY:  Family history of cerebrovascular accident (CVA) (Mother): mother  Family history of diabetes mellitus (Father, Mother): parents      SOCIAL HISTORY:  Smoking: __ packs x ___ years  EtOH Use:  Marital Status:  Occupation:  Recent Travel:  Country of Birth:  Advance Directives:    Allergies    No Known Allergies    Intolerances        HOME MEDICATIONS:    REVIEW OF SYSTEMS:  No lightheadedness, dizziness, no shortness of breath, chest pain, abdominal pain, lower extremity swelling. Has had mild jaundice.     OBJECTIVE:  ICU Vital Signs Last 24 Hrs  T(C): 36.2 (2019 16:25), Max: 38 (2019 23:51)  T(F): 97.2 (2019 16:25), Max: 100.4 (2019 23:51)  HR: 79 (2019 16:25) (77 - 100)  BP: 98/62 (2019 16:25) (90/52 - 134/79)  BP(mean): --  ABP: --  ABP(mean): --  RR: 18 (2019 16:25) (16 - 18)  SpO2: 99% (2019 13:26) (97% - 100%)        CAPILLARY BLOOD GLUCOSE          PHYSICAL EXAM:  General: NAD, mild jaundice  Respiratory: CTAB   Cardiovascular: RRR  Abdomen: soft, distended, no guarding or rigidity  Extremities: no edema  Skin: no rashes, mild yellowing  Neurological: Oriented to name, location and date  Psychiatry: calm    LINES:     HOSPITAL MEDICATIONS:  MEDICATIONS  (STANDING):  ertapenem  IVPB 1000 milliGRAM(s) IV Intermittent every 24 hours  heparin  Injectable 5000 Unit(s) SubCutaneous every 12 hours  pantoprazole   Suspension 40 milliGRAM(s) Oral daily  potassium chloride  10 mEq/100 mL IVPB 10 milliEquivalent(s) IV Intermittent every 1 hour  vitamin A &amp; D Ointment 1 Application(s) Topical two times a day    MEDICATIONS  (PRN):      LABS:                        10.6   16.46 )-----------( 188      ( 2019 05:30 )             35.1     02    144  |  105  |  24<H>  ----------------------------<  79  3.3<L>   |  20<L>  |  0.74    Ca    8.6      2019 05:30  Phos  3.2       Mg     1.8         TPro  5.0<L>  /  Alb  3.1<L>  /  TBili  4.6<H>  /  DBili  x   /  AST  75<H>  /  ALT  35<H>  /  AlkPhos  440<H>      PT/INR - ( 2019 14:45 )   PT: 17.2 SEC;   INR: 1.53            Urinalysis Basic - ( 2019 02:55 )    Color: YELLOW / Appearance: Lt TURBID / S.018 / pH: 6.0  Gluc: NEGATIVE / Ketone: NEGATIVE  / Bili: TRACE / Urobili: SMALL   Blood: NEGATIVE / Protein: 20 / Nitrite: NEGATIVE   Leuk Esterase: NEGATIVE / RBC: 3-5 / WBC 3-5   Sq Epi: OCC / Non Sq Epi: x / Bacteria: NEGATIVE        Venous Blood Gas:   @ 05:20  7.40/40/46/24/78.4  VBG Lactate: 2.1  Venous Blood Gas:   @ 00:10  7.30/25/36/14/55.7  VBG Lactate: 1.7      MICROBIOLOGY:     RADIOLOGY:  [ ] Reviewed and interpreted by me    EKG:

## 2019-02-20 NOTE — H&P ADULT - PROBLEM SELECTOR PLAN 4
- MCV currently 105.1  - may be complicating gait difficulty  - TSH within acceptable range  - f/u Vitamin B12, folate levels in the AM  - fall precautions - unclear etiology, but likely associated with infection  - does not take medications at home  - does not consume alcohol  - follow trend with repeat lab-work - BUN/Cr = 25/0.77  - in the setting of decreased oral intake  - dry oral mucosa with pasty lips  - patient complains of thirst (but comments that she rarely feels thirsty)  - s/p 1 liter NaCl in the ED; 1 liter LR with maintenance IVF subsequently prescribed  - f/u for improvement

## 2019-02-20 NOTE — PROGRESS NOTE ADULT - PROBLEM SELECTOR PLAN 2
- MCV currently 105.1  - may be complicating gait difficulty  - TSH within acceptable range  - f/u Vitamin B12, folate levels in the AM  - fall precautions - IMPROVE: 3  - on HSQ for DVT ppx

## 2019-02-20 NOTE — CONSULT NOTE ADULT - SUBJECTIVE AND OBJECTIVE BOX
Chief Complaint:  Patient is a 89y old  Female who presents with a chief complaint of Weakness (2019 13:42)      Interval Events: 89 year old female brought in for weakness and unable to lift herself out of bed. She lives at home with her sisters and notes that she was weaker yesterday which prompted them to bring her in. In the hospital she has developed leukocytosis with elevated tbili. US abd showed dilated cbd with possible stone. She denies any abdominal pain, fevers or chills.    Allergies:  No Known Allergies      Hospital Medications:  heparin  Injectable 5000 Unit(s) SubCutaneous every 12 hours  pantoprazole   Suspension 40 milliGRAM(s) Oral daily  piperacillin/tazobactam IVPB. 3.375 Gram(s) IV Intermittent every 8 hours  potassium chloride   Powder 20 milliEquivalent(s) Oral once  potassium chloride  10 mEq/100 mL IVPB 10 milliEquivalent(s) IV Intermittent every 1 hour  vitamin A &amp; D Ointment 1 Application(s) Topical two times a day      PMHX/PSHX:  Sacral ulcer  Gait difficulty  No pertinent past medical history  History of total left hip replacement  No significant past surgical history      Family history:  no pertinent history in first degree relative    ROS:     General:  No fevers, chills  Eyes:  Good vision  ENT:  No odynophagia, dysphagia  CV:  No pain, palpitations  Resp:  No dyspnea, cough, tachypnea, wheezing  GI:  See HPI  Muscle:  No pain, weakness  Skin:  No rash, edema      PHYSICAL EXAM:     GENERAL:  No distress  HEENT: conjunctivae clear  CHEST:  Full & symmetric excursion, no increased effort  HEART:  Regular rhythm  ABDOMEN:  Soft, non-tender, non-distended  EXTREMITIES:  no edema  SKIN:  Dry/warm  NEURO:  Alert and oriented x4 with good understanding of current situation    Vital Signs:  Vital Signs Last 24 Hrs  T(C): 36.6 (2019 13:26), Max: 38 (2019 23:51)  T(F): 97.9 (2019 13:26), Max: 100.4 (2019 23:51)  HR: 87 (2019 13:26) (77 - 100)  BP: 90/52 (2019 13:26) (90/52 - 134/79)  BP(mean): --  RR: 18 (2019 13:26) (16 - 18)  SpO2: 99% (2019 13:26) (97% - 100%)  Daily     Daily     LABS:                        10.6   16.46 )-----------( 188      ( 2019 05:30 )             35.1     02-20    144  |  105  |  24<H>  ----------------------------<  79  3.3<L>   |  20<L>  |  0.74    Ca    8.6      2019 05:30  Phos  3.2     02-  Mg     1.8         TPro  5.0<L>  /  Alb  3.1<L>  /  TBili  4.6<H>  /  DBili  x   /  AST  75<H>  /  ALT  35<H>  /  AlkPhos  440<H>  -20    LIVER FUNCTIONS - ( 2019 05:30 )  Alb: 3.1 g/dL / Pro: 5.0 g/dL / ALK PHOS: 440 u/L / ALT: 35 u/L / AST: 75 u/L / GGT: x             Urinalysis Basic - ( 2019 02:55 )    Color: YELLOW / Appearance: Lt TURBID / S.018 / pH: 6.0  Gluc: NEGATIVE / Ketone: NEGATIVE  / Bili: TRACE / Urobili: SMALL   Blood: NEGATIVE / Protein: 20 / Nitrite: NEGATIVE   Leuk Esterase: NEGATIVE / RBC: 3-5 / WBC 3-5   Sq Epi: OCC / Non Sq Epi: x / Bacteria: NEGATIVE          Imaging:    < from: US Abdomen Limited (19 @ 12:23) >    FINDINGS:    Liver: Within normal limits.    Bile ducts: There is significant intra and extrahepatic biliary ductal   dilatation with the common bile duct measuring up to 20 mm. Common bile   duct appears tortuous and in the distal aspect contains echogenic   calculi. The distal CBD is not well visualized.    Gallbladder: Mildly distended without evidence of cholelithiasis. No   pericholecystic fluid or gallbladder wall thickening. No sonographic   Lindsey sign.        Pancreas: Limited evaluation.    Right kidney: 9.7 cm. No hydronephrosis.    Ascites: None.    IVC: Visualized portions are within normal limits.    IMPRESSION:     Severe intra and extrahepatic biliary ductal dilatation with evidence of   choledocholithiasis. The common bile ductis tortuous and the distal   portion is not well visualized. Cross-sectional imaging is recommended   for further evaluation/characterization.    These findings were discussed with  JEN Barnes at 2019 12:55 PM by   Dr. Mcdowell with read back confirmation.      < end of copied text > Chief Complaint:  Patient is a 89y old  Female who presents with a chief complaint of Weakness (2019 13:42)      Interval Events: 89 year old female brought in for weakness and unable to lift herself out of bed. She lives at home with her sisters and notes that she was weaker yesterday which prompted them to bring her in. In the hospital she has developed leukocytosis with elevated tbili. US abd showed dilated cbd with possible stone. She denies any abdominal pain, fevers or chills.    Allergies:  No Known Allergies      Hospital Medications:  heparin  Injectable 5000 Unit(s) SubCutaneous every 12 hours  pantoprazole   Suspension 40 milliGRAM(s) Oral daily  piperacillin/tazobactam IVPB. 3.375 Gram(s) IV Intermittent every 8 hours  potassium chloride   Powder 20 milliEquivalent(s) Oral once  potassium chloride  10 mEq/100 mL IVPB 10 milliEquivalent(s) IV Intermittent every 1 hour  vitamin A &amp; D Ointment 1 Application(s) Topical two times a day      PMHX/PSHX:  Sacral ulcer  Gait difficulty  No pertinent past medical history  History of total left hip replacement  No significant past surgical history      Family history:  no pertinent history in first degree relative    ROS:     General:  No fevers, chills  Eyes:  Good vision  ENT:  No odynophagia, dysphagia  CV:  No pain, palpitations  Resp:  No dyspnea, cough, tachypnea, wheezing  GI:  See HPI  Muscle:  No pain, weakness  Skin:  No rash, edema      PHYSICAL EXAM:     GENERAL:  No distress  HEENT: conjunctivae clear  CHEST:  Full & symmetric excursion, no increased effort  HEART:  Regular rhythm  ABDOMEN:  Soft, non-tender, non-distended  EXTREMITIES:  no edema  SKIN:  Dry/warm  NEURO:  Alert and oriented x4     Vital Signs:  Vital Signs Last 24 Hrs  T(C): 36.6 (2019 13:26), Max: 38 (2019 23:51)  T(F): 97.9 (2019 13:26), Max: 100.4 (2019 23:51)  HR: 87 (2019 13:26) (77 - 100)  BP: 90/52 (2019 13:26) (90/52 - 134/79)  BP(mean): --  RR: 18 (2019 13:26) (16 - 18)  SpO2: 99% (2019 13:26) (97% - 100%)  Daily     Daily     LABS:                        10.6   16.46 )-----------( 188      ( 2019 05:30 )             35.1     -20    144  |  105  |  24<H>  ----------------------------<  79  3.3<L>   |  20<L>  |  0.74    Ca    8.6      2019 05:30  Phos  3.2       Mg     1.8         TPro  5.0<L>  /  Alb  3.1<L>  /  TBili  4.6<H>  /  DBili  x   /  AST  75<H>  /  ALT  35<H>  /  AlkPhos  440<H>      LIVER FUNCTIONS - ( 2019 05:30 )  Alb: 3.1 g/dL / Pro: 5.0 g/dL / ALK PHOS: 440 u/L / ALT: 35 u/L / AST: 75 u/L / GGT: x             Urinalysis Basic - ( 2019 02:55 )    Color: YELLOW / Appearance: Lt TURBID / S.018 / pH: 6.0  Gluc: NEGATIVE / Ketone: NEGATIVE  / Bili: TRACE / Urobili: SMALL   Blood: NEGATIVE / Protein: 20 / Nitrite: NEGATIVE   Leuk Esterase: NEGATIVE / RBC: 3-5 / WBC 3-5   Sq Epi: OCC / Non Sq Epi: x / Bacteria: NEGATIVE          Imaging:    < from: US Abdomen Limited (19 @ 12:23) >    FINDINGS:    Liver: Within normal limits.    Bile ducts: There is significant intra and extrahepatic biliary ductal   dilatation with the common bile duct measuring up to 20 mm. Common bile   duct appears tortuous and in the distal aspect contains echogenic   calculi. The distal CBD is not well visualized.    Gallbladder: Mildly distended without evidence of cholelithiasis. No   pericholecystic fluid or gallbladder wall thickening. No sonographic   Lindsey sign.        Pancreas: Limited evaluation.    Right kidney: 9.7 cm. No hydronephrosis.    Ascites: None.    IVC: Visualized portions are within normal limits.    IMPRESSION:     Severe intra and extrahepatic biliary ductal dilatation with evidence of   choledocholithiasis. The common bile ductis tortuous and the distal   portion is not well visualized. Cross-sectional imaging is recommended   for further evaluation/characterization.    These findings were discussed with  JEN Barnes at 2019 12:55 PM by   Dr. Mcdowell with read back confirmation.      < end of copied text >

## 2019-02-20 NOTE — H&P ADULT - PROBLEM SELECTOR PLAN 3
- appears to have left foot drop  - uses walker at baseline  - recent left hip arthroplasty (12/2018) and is s/p rehab  - f/u PT eval  - f/u AM lab-work - significantly cachectic, muscle wasting, sunken eyes, generalized weakness  - reports decreased oral intake, despite hunger symptom; does not feel for food after starting to eat (no difficulty swallowing)  - albumin = 3.5 in the setting of dehydration  - Nutrition consult in the AM  - f/u AM lab-work  - may need GI consult  - will give short course of PPI for now

## 2019-02-20 NOTE — PHYSICAL THERAPY INITIAL EVALUATION ADULT - GAIT DEVIATIONS NOTED, PT EVAL
+Left foot drop/decreased step length/decreased michell/decreased velocity of limb motion/decreased stride length/decreased weight-shifting ability/increased time in double stance/footdrop

## 2019-02-20 NOTE — DISCHARGE NOTE ADULT - PATIENT PORTAL LINK FT
You can access the [facility] Portal, offered by [facility], by registering with the following website: [email or web address]

## 2019-02-20 NOTE — DISCHARGE NOTE ADULT - MEDICATION SUMMARY - MEDICATIONS TO STOP TAKING
I will STOP taking the medications listed below when I get home from the hospital:    traMADol 50 mg oral tablet  -- 0.5 tab(s) by mouth 2 times a day, As needed, as needed for moderate to severe pain    aspirin 325 mg oral delayed release tablet  -- 1 tab(s) by mouth 2 times a day (with meals)    famotidine 20 mg oral tablet  -- 1 tab(s) by mouth once a day

## 2019-02-20 NOTE — BEHAVIORAL HEALTH ASSESSMENT NOTE - SUMMARY
Briefly, the patient is a 89 year old woman, single, retired, lives at home with 2 elderly sisters, has a medical history known for hip arthroplasty, no known psychiatric history, presented for weakness and decreased ambulation. Labs notable for leucocytosis, elevated bilirubin. US Abdomen showed dilated CBD with stone. Admitted for sepsis concerning for ascending cholangitis secondary to CBD stone. Patient needs an urgent ERCP which she is refusing. Psychiatry consultation requested to assess her capacity to refuse ERCP.    STANDARD FOR CAPACITY: 4 elements required:  1. Understanding, i.e., the ability to comprehend the disclosed information about the nature and purpose of the procedures involved, as well as risks / benefits of treatment / nontreatment / participating / not participating AND  2. Appreciation, i.e., the ability to appreciate the significance of the disclosed information and the potential risks and benefits for one’s own situation and condition, AND  3. Reasoning, i.e., the ability to engage in a reasoning process about the risks and benefits of participating versus alternatives AND  4. Ability to express a choice about whether or not to participate.     Patient is alert, oriented, but is unable to appreciate her current medical issues, urgency of the situation, risks of refusing the surgery. Based on the above, at this point in time, patient appears to LACK reasonable capacity to make an informed choice regarding ERCP. It is advised that her sister be contacted to discuss risk-benefits of ERCP and to get consent. Briefly, the patient is a 89 year old woman, single, retired, lives at home with 2 elderly sisters, has a medical history known for hip arthroplasty, with possible psychiatric history notable for schizophrenia (as per collateral documented in medicine notes), remote psychiatric admission in past, not on current psychotropics, presented for weakness and decreased ambulation. Labs notable for leucocytosis, elevated bilirubin. US Abdomen showed dilated CBD with stone. Admitted for sepsis concerning for ascending cholangitis secondary to CBD stone. Patient needs an urgent ERCP which she is refusing. Psychiatry consultation requested to assess her capacity to refuse ERCP.    STANDARD FOR CAPACITY: 4 elements required:  1. Understanding, i.e., the ability to comprehend the disclosed information about the nature and purpose of the procedures involved, as well as risks / benefits of treatment / nontreatment / participating / not participating AND  2. Appreciation, i.e., the ability to appreciate the significance of the disclosed information and the potential risks and benefits for one’s own situation and condition, AND  3. Reasoning, i.e., the ability to engage in a reasoning process about the risks and benefits of participating versus alternatives AND  4. Ability to express a choice about whether or not to participate.     Patient is alert, oriented, but is unable to appreciate her current medical issues, urgency of the situation, risks of refusing the surgery. Based on the above, at this point in time, patient appears to LACK reasonable capacity to make an informed choice regarding ERCP. It is advised that her sister be contacted to discuss risk-benefits of ERCP and to get consent.

## 2019-02-20 NOTE — PHYSICAL THERAPY INITIAL EVALUATION ADULT - PERTINENT HX OF CURRENT PROBLEM, REHAB EVAL
Pt is a 89 year old female admitted to [facility] on 2/20 with weakness. PMH: Arthroplasty of the left hip (12/2018), and Gait difficulty.

## 2019-02-20 NOTE — CHART NOTE - NSCHARTNOTEFT_GEN_A_CORE
Pt remains persistently hypotensive despite 2nd LR bolus.  Repeat manual BP 98/60.  IV abx switched to Ertapenem for ESBL coverage.  Case discussed with ICU attending Dr. Tomas who will be accepting patient to MICU.  GI team made aware and will discuss scheduling of ERCP.    As per patient's sister, the pt also has a former diagnosis of paranoid schizophrenia, which she has not sought regular psychiatric care for and is not on any medications.  She has been psychiatrically hospitalized many years ago, but failed ot followup with any doctors post discharge.  Her sister Radha Rouse is willing to consent for any invasive procedures and is agreeable to ERCP.

## 2019-02-20 NOTE — CHART NOTE - NSCHARTNOTEFT_GEN_A_CORE
Patient seen and examined with attending interventional endoscopist.  Discussion with patient, attending; noted psych evaluation that patient unable to consent for herself and sister documentation of hx of untreated paranoid schizophrenia requiring hospitalization in the past.  Sister is next of kin and consented previously for patient.  Consent for emergent ERCP and (general anesthesia) obtained from sister given persistent hypotension/septic shock.    ERCP completed without complication.  One 10x5 plastic biliary stent placed after sphincterotomy performed. Good flow of bile post-procedure.  Patient will need repeat ERCP for stone and stent removal in 4-6 weeks.  Patient to return to floor/ICU for closer monitoring, IV antibiotics.

## 2019-02-20 NOTE — H&P ADULT - NSHPLABSRESULTS_GEN_ALL_CORE
10.6   16.46 )-----------( 188      ( 2019 05:30 )             35.1       02-    144  |  105  |  24<H>  ----------------------------<  79  3.3<L>   |  20<L>  |  0.74    Ca    8.6      2019 05:30  Phos  3.2       Mg     1.8         TPro  5.0<L>  /  Alb  3.1<L>  /  TBili  4.6<H>  /  DBili  x   /  AST  75<H>  /  ALT  35<H>  /  AlkPhos  440<H>            Urinalysis Basic - ( 2019 02:55 )    Color: YELLOW / Appearance: Lt TURBID / S.018 / pH: 6.0  Gluc: NEGATIVE / Ketone: NEGATIVE  / Bili: TRACE / Urobili: SMALL   Blood: NEGATIVE / Protein: 20 / Nitrite: NEGATIVE   Leuk Esterase: NEGATIVE / RBC: 3-5 / WBC 3-5   Sq Epi: OCC / Non Sq Epi: x / Bacteria: NEGATIVE      Lactate Trend    CAPILLARY BLOOD GLUCOSE

## 2019-02-20 NOTE — H&P ADULT - PROBLEM SELECTOR PLAN 7
- appears to have left foot drop  - uses walker at baseline  - recent left hip arthroplasty (12/2018) and is s/p rehab  - f/u PT eval  - f/u AM lab-work

## 2019-02-20 NOTE — H&P ADULT - PROBLEM SELECTOR PLAN 1
- bands = 35.1.  Lactate initially 1.7, now 2.1 after IVF hydration and abx  - no specific nidus of infection appreciated; UA turbid in appearance, but no LE, bacteria in sample (pt also asymptomatic relative to same)  - CXR ordered (pls f/u)  - has minor dry cough (attributed to dehydration, per patient)  - initially complained of non-localized back pain and no tenderness appreciated to mild/moderate palpation over the back  - f/u blood cultures, urine culture  - ensure optimal hydration  - s/p ceftriaxone 1 gram in the ED; will change to zosyn for now  - could add vancomycin if no improvement  - pls call ID consult  - if patient again febrile, suggest resending cultures - bands = 35.1.  Lactate initially 1.7, now 2.1 after IVF hydration and abx  - no specific nidus of infection appreciated; UA turbid in appearance, but no LE, bacteria in sample (pt also asymptomatic relative to same)  - CXR ordered (pls f/u)  - has minor dry cough (attributed to dehydration, per patient)  - initially complained of non-localized back pain and no tenderness appreciated to mild/moderate palpation over the back (f/u X-rays; ordered)  - complains of chills (no fevers or sweating)  - f/u blood cultures, urine culture  - ensure optimal hydration  - s/p ceftriaxone 1 gram in the ED; will change to zosyn for now  - could add vancomycin if no improvement  - pls call ID consult  - if patient again febrile, suggest resending cultures  - f/u vital signs - bands = 35.1.  Lactate initially 1.7, now 2.1 after IVF hydration and abx  - no specific nidus of infection appreciated; UA turbid in appearance, but no LE, bacteria in sample (pt also asymptomatic relative to same)  - RVP negative  - CXR ordered (pls f/u)  - has minor dry cough (attributed to dehydration, per patient)  - initially complained of non-localized back pain and no tenderness appreciated to mild/moderate palpation over the back (f/u X-rays; ordered)  - complains of chills (no fevers or sweating)  - f/u blood cultures, urine culture  - ensure optimal hydration  - s/p ceftriaxone 1 gram in the ED; will change to zosyn for now  - could add vancomycin if no improvement  - pls call ID consult  - if patient again febrile, suggest resending cultures  - f/u vital signs

## 2019-02-20 NOTE — DISCHARGE NOTE ADULT - CARE PROVIDERS DIRECT ADDRESSES
[email or web address]

## 2019-02-20 NOTE — H&P ADULT - MOTOR
weakness of left foot to dorsiflexion and plantar flexion, but able to lift all extremities against gravity

## 2019-02-21 DIAGNOSIS — F20.0 PARANOID SCHIZOPHRENIA: ICD-10-CM

## 2019-02-21 LAB
ALBUMIN SERPL ELPH-MCNC: 2.6 G/DL — LOW (ref 3.3–5)
ALBUMIN SERPL ELPH-MCNC: 2.6 G/DL — LOW (ref 3.3–5)
ALP SERPL-CCNC: 349 U/L — HIGH (ref 40–120)
ALP SERPL-CCNC: 349 U/L — HIGH (ref 40–120)
ALT FLD-CCNC: 28 U/L — SIGNIFICANT CHANGE UP (ref 4–33)
ALT FLD-CCNC: 28 U/L — SIGNIFICANT CHANGE UP (ref 4–33)
ANION GAP SERPL CALC-SCNC: 15 MMO/L — HIGH (ref 7–14)
ANION GAP SERPL CALC-SCNC: 15 MMO/L — HIGH (ref 7–14)
AST SERPL-CCNC: 40 U/L — HIGH (ref 4–32)
AST SERPL-CCNC: 40 U/L — HIGH (ref 4–32)
BACTERIA UR CULT: SIGNIFICANT CHANGE UP
BASOPHILS # BLD AUTO: 0.03 K/UL — SIGNIFICANT CHANGE UP (ref 0–0.2)
BASOPHILS NFR BLD AUTO: 0.1 % — SIGNIFICANT CHANGE UP (ref 0–2)
BILIRUB SERPL-MCNC: 1 MG/DL — SIGNIFICANT CHANGE UP (ref 0.2–1.2)
BILIRUB SERPL-MCNC: 1 MG/DL — SIGNIFICANT CHANGE UP (ref 0.2–1.2)
BUN SERPL-MCNC: 28 MG/DL — HIGH (ref 7–23)
BUN SERPL-MCNC: 28 MG/DL — HIGH (ref 7–23)
CALCIUM SERPL-MCNC: 8.7 MG/DL — SIGNIFICANT CHANGE UP (ref 8.4–10.5)
CALCIUM SERPL-MCNC: 8.7 MG/DL — SIGNIFICANT CHANGE UP (ref 8.4–10.5)
CHLORIDE SERPL-SCNC: 103 MMOL/L — SIGNIFICANT CHANGE UP (ref 98–107)
CHLORIDE SERPL-SCNC: 103 MMOL/L — SIGNIFICANT CHANGE UP (ref 98–107)
CO2 SERPL-SCNC: 20 MMOL/L — LOW (ref 22–31)
CO2 SERPL-SCNC: 20 MMOL/L — LOW (ref 22–31)
CREAT SERPL-MCNC: 0.75 MG/DL — SIGNIFICANT CHANGE UP (ref 0.5–1.3)
CREAT SERPL-MCNC: 0.75 MG/DL — SIGNIFICANT CHANGE UP (ref 0.5–1.3)
EOSINOPHIL # BLD AUTO: 0 K/UL — SIGNIFICANT CHANGE UP (ref 0–0.5)
EOSINOPHIL NFR BLD AUTO: 0 % — SIGNIFICANT CHANGE UP (ref 0–6)
GLUCOSE SERPL-MCNC: 118 MG/DL — HIGH (ref 70–99)
GLUCOSE SERPL-MCNC: 118 MG/DL — HIGH (ref 70–99)
HCT VFR BLD CALC: 36.6 % — SIGNIFICANT CHANGE UP (ref 34.5–45)
HGB BLD-MCNC: 11.1 G/DL — LOW (ref 11.5–15.5)
IMM GRANULOCYTES NFR BLD AUTO: 2.5 % — HIGH (ref 0–1.5)
INR BLD: 1.23 — HIGH (ref 0.88–1.17)
LACTATE SERPL-SCNC: 1.1 MMOL/L — SIGNIFICANT CHANGE UP (ref 0.5–2)
LYMPHOCYTES # BLD AUTO: 0.55 K/UL — LOW (ref 1–3.3)
LYMPHOCYTES # BLD AUTO: 2.5 % — LOW (ref 13–44)
MAGNESIUM SERPL-MCNC: 2 MG/DL — SIGNIFICANT CHANGE UP (ref 1.6–2.6)
MCHC RBC-ENTMCNC: 30.3 % — LOW (ref 32–36)
MCHC RBC-ENTMCNC: 31.1 PG — SIGNIFICANT CHANGE UP (ref 27–34)
MCV RBC AUTO: 102.5 FL — HIGH (ref 80–100)
MONOCYTES # BLD AUTO: 0.27 K/UL — SIGNIFICANT CHANGE UP (ref 0–0.9)
MONOCYTES NFR BLD AUTO: 1.2 % — LOW (ref 2–14)
NEUTROPHILS # BLD AUTO: 20.33 K/UL — HIGH (ref 1.8–7.4)
NEUTROPHILS NFR BLD AUTO: 93.7 % — HIGH (ref 43–77)
NRBC # FLD: 0 K/UL — LOW (ref 25–125)
ORGANISM # SPEC MICROSCOPIC CNT: SIGNIFICANT CHANGE UP
PHOSPHATE SERPL-MCNC: 3.9 MG/DL — SIGNIFICANT CHANGE UP (ref 2.5–4.5)
PLATELET # BLD AUTO: 202 K/UL — SIGNIFICANT CHANGE UP (ref 150–400)
PMV BLD: 9.1 FL — SIGNIFICANT CHANGE UP (ref 7–13)
POTASSIUM SERPL-MCNC: 4.9 MMOL/L — SIGNIFICANT CHANGE UP (ref 3.5–5.3)
POTASSIUM SERPL-MCNC: 4.9 MMOL/L — SIGNIFICANT CHANGE UP (ref 3.5–5.3)
POTASSIUM SERPL-SCNC: 4.9 MMOL/L — SIGNIFICANT CHANGE UP (ref 3.5–5.3)
POTASSIUM SERPL-SCNC: 4.9 MMOL/L — SIGNIFICANT CHANGE UP (ref 3.5–5.3)
PROT SERPL-MCNC: 5.3 G/DL — LOW (ref 6–8.3)
PROT SERPL-MCNC: 5.3 G/DL — LOW (ref 6–8.3)
PROTHROM AB SERPL-ACNC: 13.7 SEC — HIGH (ref 9.8–13.1)
RBC # BLD: 3.57 M/UL — LOW (ref 3.8–5.2)
RBC # FLD: 15.5 % — HIGH (ref 10.3–14.5)
SODIUM SERPL-SCNC: 138 MMOL/L — SIGNIFICANT CHANGE UP (ref 135–145)
SODIUM SERPL-SCNC: 138 MMOL/L — SIGNIFICANT CHANGE UP (ref 135–145)
SPECIMEN SOURCE: SIGNIFICANT CHANGE UP
WBC # BLD: 21.72 K/UL — HIGH (ref 3.8–10.5)
WBC # FLD AUTO: 21.72 K/UL — HIGH (ref 3.8–10.5)

## 2019-02-21 PROCEDURE — 99232 SBSQ HOSP IP/OBS MODERATE 35: CPT | Mod: GC

## 2019-02-21 PROCEDURE — 99233 SBSQ HOSP IP/OBS HIGH 50: CPT

## 2019-02-21 PROCEDURE — 99232 SBSQ HOSP IP/OBS MODERATE 35: CPT

## 2019-02-21 RX ADMIN — ERTAPENEM SODIUM 120 MILLIGRAM(S): 1 INJECTION, POWDER, LYOPHILIZED, FOR SOLUTION INTRAMUSCULAR; INTRAVENOUS at 19:56

## 2019-02-21 RX ADMIN — PANTOPRAZOLE SODIUM 40 MILLIGRAM(S): 20 TABLET, DELAYED RELEASE ORAL at 13:53

## 2019-02-21 RX ADMIN — Medication 1 APPLICATION(S): at 06:53

## 2019-02-21 RX ADMIN — HEPARIN SODIUM 5000 UNIT(S): 5000 INJECTION INTRAVENOUS; SUBCUTANEOUS at 19:55

## 2019-02-21 RX ADMIN — HEPARIN SODIUM 5000 UNIT(S): 5000 INJECTION INTRAVENOUS; SUBCUTANEOUS at 06:52

## 2019-02-21 NOTE — PROGRESS NOTE BEHAVIORAL HEALTH - RISK ASSESSMENT
low risk for self harm or hurting others: chronic schizophrenia but no overt psychosis and no agitation, no si or hi, very supportive family, dependent on family for needs, family states that there is no safety issues at home

## 2019-02-21 NOTE — PROGRESS NOTE ADULT - PROBLEM SELECTOR PLAN 2
- Pt not on meds, no psych followup as per sister.  Has been psychiatrically hospitalized many years ago   - Inpatient psych team following, pt currently not agitated, calm/cooperative

## 2019-02-21 NOTE — PROGRESS NOTE ADULT - SUBJECTIVE AND OBJECTIVE BOX
Patient is a 89y old  Female who presents with a chief complaint of Weakness (2019 06:21)      SUBJECTIVE / OVERNIGHT EVENTS: Pt with no acute complaints, denies abdominal pain, fevers/chills, chest pain, SOB.        MEDICATIONS  (STANDING):  ertapenem  IVPB 1000 milliGRAM(s) IV Intermittent every 24 hours  heparin  Injectable 5000 Unit(s) SubCutaneous every 12 hours  pantoprazole   Suspension 40 milliGRAM(s) Oral daily  vitamin A &amp; D Ointment 1 Application(s) Topical two times a day      Vital Signs Last 24 Hrs  T(C): 36.5 (2019 10:15), Max: 36.6 (2019 21:10)  T(F): 97.7 (2019 10:15), Max: 97.8 (2019 21:10)  HR: 89 (2019 10:15) (79 - 95)  BP: 117/79 (2019 10:15) (92/60 - 144/89)  BP(mean): --  RR: 18 (2019 10:15) (18 - 18)  SpO2: 98% (2019 10:15) (97% - 98%)  CAPILLARY BLOOD GLUCOSE        I&O's Summary    2019 07:01  -  2019 13:43  --------------------------------------------------------  IN: 150 mL / OUT: 0 mL / NET: 150 mL      PHYSICAL EXAM  GENERAL: NAD, well-developed, cachectic   HEAD:  Atraumatic, Normocephalic  EYES: EOMI, PERRLA, conjunctiva and sclera clear  NECK: Supple, No JVD  CHEST/LUNG: Clear to auscultation bilaterally; No wheeze  HEART: Regular rate and rhythm; No murmurs, rubs, or gallops  ABDOMEN: Soft, Nontender, Nondistended; Bowel sounds present  EXTREMITIES:  2+ Peripheral Pulses, No clubbing, cyanosis, or edema  PSYCH: AAOx2  SKIN: No rashes or lesions    LABS:                        11.1   21.72 )-----------( 202      ( 2019 06:06 )             36.6         138  |  103  |  28<H>  ----------------------------<  118<H>  4.9   |  20<L>  |  0.75    Ca    8.7      2019 06:06  Phos  3.9       Mg     2.0         TPro  5.3<L>  /  Alb  2.6<L>  /  TBili  1.0  /  DBili  x   /  AST  40<H>  /  ALT  28  /  AlkPhos  349<H>      PT/INR - ( 2019 06:06 )   PT: 13.7 SEC;   INR: 1.23         Urinalysis Basic - ( 2019 02:55 )    Color: YELLOW / Appearance: Lt TURBID / S.018 / pH: 6.0  Gluc: NEGATIVE / Ketone: NEGATIVE  / Bili: TRACE / Urobili: SMALL   Blood: NEGATIVE / Protein: 20 / Nitrite: NEGATIVE   Leuk Esterase: NEGATIVE / RBC: 3-5 / WBC 3-5   Sq Epi: OCC / Non Sq Epi: x / Bacteria: NEGATIVE      Culture - Urine (19 @ 04:52)    Culture - Urine:   NO GROWTH AT 24 HOURS    Specimen Source: URINE MIDSTREAM    Culture - Blood (19 @ 00:43)    Culture - Blood:   EC^Escherichia coli    Specimen Source: BLOOD VENOUS    Gram Stain Blood:   ***** CRITICAL RESULT *****  PERSON CALLED / READ-BACK: MELISSA MIX/EDDIE/Y  DATE / TIME CALLED: 19 1223  CALLED BY: DEJA SANTA^Gram Neg Rods  AFTER: 10 HOURS INCUBATION  BOTTLE: ANAEROBIC BOTTLE      RADIOLOGY & ADDITIONAL TESTS:    Imaging Personally Reviewed:  Culture - Blood (19 @ 00:43)    -  Escherichia coli: + DETECT SACHI    Culture - Blood:   ***Blood Panel PCR results on this specimen are available  approximately 3 hours after the Gram stain result***  Gram stain, PCR, and/or culture results may not always  correspond due to difference in methodologies  ------------------------------------------------------------  This PCR assay was performed using [employer].  The  following targets are tested for:  Enterococcus, vancomycin  resistant enterococci, Listeria monocytogenes,  coagulase  negative staphylococci, S. aureus, methicillin resistant S.  aureus, Streptococcus agalactiae (Group B), S. pneumoniae,  S. pyogenes (Group A), Acinetobacter baumannii, Enterobacter  cloacae, E. coli, Klebsiella oxytoca, K. pneumoniae, Proteus  sp., Serratia marcescens, Haemophilus influenzae, Neisseria  meningitidis, Pseudomonas aeruginosa, Candida albicans, C.  glabrata, C. krusei, C. parapsilosis, C. tropicalis and the  KPC resistance gene.  **NOTE: Due to technical problems, Proteus sp. will NOT be  reported as part of the BCID paneluntil further notice.    Specimen Source: BLOOD VENOUS    Organism: BLOOD CULTURE PCR    Organism: Escherichia coli    Gram Stain Blood:   ***** CRITICAL RESULT *****  PERSON CALLED / READ-BACK: MELISSA MIX/EDDIE/Y  DATE / TIME CALLED: 19 1212  CALLED BY: DEJA SANTA^Gram Neg Rods  AFTER: 10 HOURS INCUBATION  BOTTLE: AEROBIC   ANAEROBIC BOTTLES    Organism Identification: BLOOD CULTURE PCR  Escherichia coli    Method Type: PCR    Consultant(s) Notes Reviewed:  JACKIE CHAPPELL

## 2019-02-21 NOTE — PROGRESS NOTE BEHAVIORAL HEALTH - NSBHADMITCOUNSEL_PSY_A_CORE
importance of adherence to chosen treatment/instructions for management, treatment and follow up/risk factor reduction/client/family/caregiver education

## 2019-02-21 NOTE — PROGRESS NOTE BEHAVIORAL HEALTH - NSBHCHARTREVIEWLAB_PSY_A_CORE FT
CBC Full  -  ( 21 Feb 2019 06:06 )  WBC Count : 21.72 K/uL  Hemoglobin : 11.1 g/dL  Hematocrit : 36.6 %  Platelet Count - Automated : 202 K/uL  Mean Cell Volume : 102.5 fL  Mean Cell Hemoglobin : 31.1 pg  Mean Cell Hemoglobin Concentration : 30.3 %  Auto Neutrophil # : 20.33 K/uL  Auto Lymphocyte # : 0.55 K/uL  Auto Monocyte # : 0.27 K/uL  Auto Eosinophil # : 0.00 K/uL  Auto Basophil # : 0.03 K/uL  Auto Neutrophil % : 93.7 %  Auto Lymphocyte % : 2.5 %  Auto Monocyte % : 1.2 %  Auto Eosinophil % : 0.0 %  Auto Basophil % : 0.1 %  02-21    138  |  103  |  28<H>  ----------------------------<  118<H>  4.9   |  20<L>  |  0.75    Ca    8.7      21 Feb 2019 06:06  Phos  3.9     02-21  Mg     2.0     02-21    TPro  5.3<L>  /  Alb  2.6<L>  /  TBili  1.0  /  DBili  x   /  AST  40<H>  /  ALT  28  /  AlkPhos  349<H>  02-21

## 2019-02-21 NOTE — PROGRESS NOTE BEHAVIORAL HEALTH - SUMMARY
Briefly, the patient is a 89 year old woman, single, retired, lives at home with 2 elderly sisters, has a medical history known for hip arthroplasty, with possible psychiatric history notable for schizophrenia (as per collateral documented in medicine notes), remote psychiatric admission in past, not on current psychotropics, presented for weakness and decreased ambulation. Labs notable for leucocytosis, elevated bilirubin. US Abdomen showed dilated CBD with stone. Admitted for sepsis concerning for ascending cholangitis secondary to CBD stone. Patient needs an urgent ERCP which she is refusing. Psychiatry consultation requested to assess her capacity to refuse ERCP.    2/21: Remains at her psychiatric baseline with no si or hi or psychosis and no safety issues at home

## 2019-02-21 NOTE — PROGRESS NOTE ADULT - SUBJECTIVE AND OBJECTIVE BOX
Chief Complaint:  Patient is a 89y old  Female who presents with a chief complaint of Weakness (2019 17:46)      Interval Events: No adverse events overnight.  Pt remains on floor.  Pt is s/p emergent ERCP with biliary sphincterotomy and stent placement yesterday.    Allergies:  No Known Allergies      Hospital Medications:  ertapenem  IVPB 1000 milliGRAM(s) IV Intermittent every 24 hours  heparin  Injectable 5000 Unit(s) SubCutaneous every 12 hours  pantoprazole   Suspension 40 milliGRAM(s) Oral daily  vitamin A &amp; D Ointment 1 Application(s) Topical two times a day      PMHX/PSHX:  Sacral ulcer  Gait difficulty  No pertinent past medical history  History of total left hip replacement  No significant past surgical history      Family history:  Family history of cerebrovascular accident (CVA) (Mother)  Family history of diabetes mellitus (Father, Mother)  No pertinent family history in first degree relatives      ROS:     General:  No wt loss, fevers, chills, night sweats, fatigue,   Eyes:  Good vision, no reported pain  ENT:  No sore throat, pain, runny nose, dysphagia  CV:  No pain, palpitations, hypo/hypertension  Resp:  No dyspnea, cough, tachypnea, wheezing  GI:  See HPI  :  No pain, bleeding, incontinence, nocturia  Muscle:  No pain, weakness  Neuro:  No weakness, tingling, memory problems  Psych:  No fatigue, insomnia, mood problems, depression  Endocrine:  No polyuria, polydipsia, cold/heat intolerance  Heme:  No petechiae, ecchymosis, easy bruisability  Skin:  No rash, edema      PHYSICAL EXAM:     GENERAL:  Appears stated age, well-groomed, well-nourished, no distress  HEENT:  NC/AT,  conjunctivae clear, sclera -anicteric  CHEST:  Full & symmetric excursion, no increased effort  ABDOMEN:  Soft, non-tender, non-distended,   EXTREMITIES:  no cyanosis,clubbing or edema  SKIN:  No rash  NEURO:  Alert, oriented    Vital Signs:  Vital Signs Last 24 Hrs  T(C): 36.6 (2019 21:10), Max: 36.6 (2019 07:43)  T(F): 97.8 (2019 21:10), Max: 97.9 (2019 13:26)  HR: 81 (2019 21:10) (77 - 89)  BP: 120/83 (2019 21:10) (90/52 - 144/89)  BP(mean): --  RR: 18 (2019 21:10) (18 - 18)  SpO2: 97% (2019 21:10) (97% - 99%)  Daily Height in cm: 165.1 (2019 16:25)    Daily     LABS:                        10.6   16.46 )-----------( 188      ( 2019 05:30 )             35.1     0220    144  |  105  |  24<H>  ----------------------------<  79  3.3<L>   |  20<L>  |  0.74    Ca    8.6      2019 05:30  Phos  3.2       Mg     1.8         TPro  5.0<L>  /  Alb  3.1<L>  /  TBili  4.6<H>  /  DBili  x   /  AST  75<H>  /  ALT  35<H>  /  AlkPhos  440<H>  20    LIVER FUNCTIONS - ( 2019 05:30 )  Alb: 3.1 g/dL / Pro: 5.0 g/dL / ALK PHOS: 440 u/L / ALT: 35 u/L / AST: 75 u/L / GGT: x           PT/INR - ( 2019 14:45 )   PT: 17.2 SEC;   INR: 1.53            Urinalysis Basic - ( 2019 02:55 )    Color: YELLOW / Appearance: Lt TURBID / S.018 / pH: 6.0  Gluc: NEGATIVE / Ketone: NEGATIVE  / Bili: TRACE / Urobili: SMALL   Blood: NEGATIVE / Protein: 20 / Nitrite: NEGATIVE   Leuk Esterase: NEGATIVE / RBC: 3-5 / WBC 3-5   Sq Epi: OCC / Non Sq Epi: x / Bacteria: NEGATIVE          Imaging:  ERCP note to be uploaded to [facility]

## 2019-02-21 NOTE — PROGRESS NOTE ADULT - PROBLEM SELECTOR PLAN 1
- Admitted with sepsis 2/2 ascending cholangitis with sonographic findings c/w with choledocholithiasis.  Sepsis now resolving   - Pt now s/p emergent ERCP and tolerated procedure well  - Transaminitis/bilirubin improving, monitor daily   - F/U BCx sensitivities, repeat Bcx from this AM pending for clearance   - Continue with IV ertapenem for now  - Advance diet as tolerated

## 2019-02-21 NOTE — PROGRESS NOTE BEHAVIORAL HEALTH - NSBHFUPINTERVALHXFT_PSY_A_CORE
Met with the patient. Calm, pleasant, polite, engages well with MD. Denies any mood symptoms, denies any si or hi, denies any a/vh or paranoia. Alert and oriented  Care was coordinated with sister Maribel who is by her side who states that patient does have a history of schizophrenia, and was last hospitalized in the 50's. She has not been on medications, but remains psychiatrically stable. Patient is not keen on medications and this is her baseline. There is no aggression or agitation at home and there is no safety concerns.

## 2019-02-21 NOTE — PROGRESS NOTE BEHAVIORAL HEALTH - NSBHCONSULTMEDS_PSY_A_CORE FT
Even though patient has a diagnosis of schizophrenia, her last psychiatric hospitalization was in the 50's with no current psychosis, no agitation at home, no impulsivity and no safety concerns from family. Patient is not keen on medications at this point, and sister in agreement as well.   There is no acute need to start a psychotropic at this point.

## 2019-02-21 NOTE — PROGRESS NOTE BEHAVIORAL HEALTH - NSBHCHARTREVIEWVS_PSY_A_CORE FT
Vital Signs Last 24 Hrs  T(C): 36.7 (21 Feb 2019 20:40), Max: 37.1 (21 Feb 2019 14:00)  T(F): 98.1 (21 Feb 2019 20:40), Max: 98.8 (21 Feb 2019 14:00)  HR: 83 (21 Feb 2019 20:40) (83 - 96)  BP: 129/76 (21 Feb 2019 20:40) (117/79 - 129/76)  BP(mean): --  RR: 20 (21 Feb 2019 20:40) (16 - 20)  SpO2: 98% (21 Feb 2019 20:40) (95% - 98%)

## 2019-02-21 NOTE — PROGRESS NOTE BEHAVIORAL HEALTH - NSBHCONSULTFOLLOWAFTERCARE_PSY_A_CORE FT
if patient and family were to agree, she can f/u at the [facility] aopd: [phone], ext: 2

## 2019-02-22 LAB
-  AMIKACIN: SIGNIFICANT CHANGE UP
-  AMPICILLIN/SULBACTAM: SIGNIFICANT CHANGE UP
-  AMPICILLIN: SIGNIFICANT CHANGE UP
-  AZTREONAM: SIGNIFICANT CHANGE UP
-  CEFAZOLIN: SIGNIFICANT CHANGE UP
-  CEFEPIME: SIGNIFICANT CHANGE UP
-  CEFOXITIN: SIGNIFICANT CHANGE UP
-  CEFTAZIDIME: SIGNIFICANT CHANGE UP
-  CEFTRIAXONE: SIGNIFICANT CHANGE UP
-  CIPROFLOXACIN: SIGNIFICANT CHANGE UP
-  ERTAPENEM: SIGNIFICANT CHANGE UP
-  GENTAMICIN: SIGNIFICANT CHANGE UP
-  IMIPENEM: SIGNIFICANT CHANGE UP
-  LEVOFLOXACIN: SIGNIFICANT CHANGE UP
-  MEROPENEM: SIGNIFICANT CHANGE UP
-  PIPERACILLIN/TAZOBACTAM: SIGNIFICANT CHANGE UP
-  TIGECYCLINE: SIGNIFICANT CHANGE UP
-  TOBRAMYCIN: SIGNIFICANT CHANGE UP
-  TRIMETHOPRIM/SULFAMETHOXAZOLE: SIGNIFICANT CHANGE UP
ALBUMIN SERPL ELPH-MCNC: 2.7 G/DL — LOW (ref 3.3–5)
ALBUMIN SERPL ELPH-MCNC: 2.7 G/DL — LOW (ref 3.3–5)
ALP SERPL-CCNC: 302 U/L — HIGH (ref 40–120)
ALP SERPL-CCNC: 302 U/L — HIGH (ref 40–120)
ALT FLD-CCNC: 19 U/L — SIGNIFICANT CHANGE UP (ref 4–33)
ALT FLD-CCNC: 19 U/L — SIGNIFICANT CHANGE UP (ref 4–33)
ANION GAP SERPL CALC-SCNC: 12 MMO/L — SIGNIFICANT CHANGE UP (ref 7–14)
ANION GAP SERPL CALC-SCNC: 12 MMO/L — SIGNIFICANT CHANGE UP (ref 7–14)
AST SERPL-CCNC: 21 U/L — SIGNIFICANT CHANGE UP (ref 4–32)
AST SERPL-CCNC: 21 U/L — SIGNIFICANT CHANGE UP (ref 4–32)
BACTERIA BLD CULT: SIGNIFICANT CHANGE UP
BILIRUB SERPL-MCNC: 0.8 MG/DL — SIGNIFICANT CHANGE UP (ref 0.2–1.2)
BILIRUB SERPL-MCNC: 0.8 MG/DL — SIGNIFICANT CHANGE UP (ref 0.2–1.2)
BUN SERPL-MCNC: 23 MG/DL — SIGNIFICANT CHANGE UP (ref 7–23)
BUN SERPL-MCNC: 23 MG/DL — SIGNIFICANT CHANGE UP (ref 7–23)
CALCIUM SERPL-MCNC: 9.2 MG/DL — SIGNIFICANT CHANGE UP (ref 8.4–10.5)
CALCIUM SERPL-MCNC: 9.2 MG/DL — SIGNIFICANT CHANGE UP (ref 8.4–10.5)
CHLORIDE SERPL-SCNC: 109 MMOL/L — HIGH (ref 98–107)
CHLORIDE SERPL-SCNC: 109 MMOL/L — HIGH (ref 98–107)
CO2 SERPL-SCNC: 23 MMOL/L — SIGNIFICANT CHANGE UP (ref 22–31)
CO2 SERPL-SCNC: 23 MMOL/L — SIGNIFICANT CHANGE UP (ref 22–31)
CREAT SERPL-MCNC: 0.68 MG/DL — SIGNIFICANT CHANGE UP (ref 0.5–1.3)
CREAT SERPL-MCNC: 0.68 MG/DL — SIGNIFICANT CHANGE UP (ref 0.5–1.3)
E COLI DNA BLD POS QL NAA+NON-PROBE: SIGNIFICANT CHANGE UP
GLUCOSE SERPL-MCNC: 95 MG/DL — SIGNIFICANT CHANGE UP (ref 70–99)
GLUCOSE SERPL-MCNC: 95 MG/DL — SIGNIFICANT CHANGE UP (ref 70–99)
HCT VFR BLD CALC: 35.3 % — SIGNIFICANT CHANGE UP (ref 34.5–45)
HGB BLD-MCNC: 10.4 G/DL — LOW (ref 11.5–15.5)
MAGNESIUM SERPL-MCNC: 2.2 MG/DL — SIGNIFICANT CHANGE UP (ref 1.6–2.6)
MCHC RBC-ENTMCNC: 29.5 % — LOW (ref 32–36)
MCHC RBC-ENTMCNC: 30.8 PG — SIGNIFICANT CHANGE UP (ref 27–34)
MCV RBC AUTO: 104.4 FL — HIGH (ref 80–100)
METHOD TYPE: SIGNIFICANT CHANGE UP
NRBC # FLD: 0 K/UL — LOW (ref 25–125)
PHOSPHATE SERPL-MCNC: 2.7 MG/DL — SIGNIFICANT CHANGE UP (ref 2.5–4.5)
PLATELET # BLD AUTO: 178 K/UL — SIGNIFICANT CHANGE UP (ref 150–400)
PMV BLD: 9.1 FL — SIGNIFICANT CHANGE UP (ref 7–13)
POTASSIUM SERPL-MCNC: 4.4 MMOL/L — SIGNIFICANT CHANGE UP (ref 3.5–5.3)
POTASSIUM SERPL-MCNC: 4.4 MMOL/L — SIGNIFICANT CHANGE UP (ref 3.5–5.3)
POTASSIUM SERPL-SCNC: 4.4 MMOL/L — SIGNIFICANT CHANGE UP (ref 3.5–5.3)
POTASSIUM SERPL-SCNC: 4.4 MMOL/L — SIGNIFICANT CHANGE UP (ref 3.5–5.3)
PROT SERPL-MCNC: 5.5 G/DL — LOW (ref 6–8.3)
PROT SERPL-MCNC: 5.5 G/DL — LOW (ref 6–8.3)
RBC # BLD: 3.38 M/UL — LOW (ref 3.8–5.2)
RBC # FLD: 15.4 % — HIGH (ref 10.3–14.5)
SODIUM SERPL-SCNC: 144 MMOL/L — SIGNIFICANT CHANGE UP (ref 135–145)
SODIUM SERPL-SCNC: 144 MMOL/L — SIGNIFICANT CHANGE UP (ref 135–145)
SPECIMEN SOURCE: SIGNIFICANT CHANGE UP
SPECIMEN SOURCE: SIGNIFICANT CHANGE UP
VIT B12 SERPL-MCNC: 564 PG/ML — SIGNIFICANT CHANGE UP (ref 200–900)
WBC # BLD: 16.47 K/UL — HIGH (ref 3.8–10.5)
WBC # FLD AUTO: 16.47 K/UL — HIGH (ref 3.8–10.5)

## 2019-02-22 PROCEDURE — 99233 SBSQ HOSP IP/OBS HIGH 50: CPT

## 2019-02-22 PROCEDURE — 99232 SBSQ HOSP IP/OBS MODERATE 35: CPT

## 2019-02-22 RX ADMIN — Medication 1 APPLICATION(S): at 18:50

## 2019-02-22 RX ADMIN — HEPARIN SODIUM 5000 UNIT(S): 5000 INJECTION INTRAVENOUS; SUBCUTANEOUS at 05:33

## 2019-02-22 RX ADMIN — Medication 1 APPLICATION(S): at 05:33

## 2019-02-22 RX ADMIN — PANTOPRAZOLE SODIUM 40 MILLIGRAM(S): 20 TABLET, DELAYED RELEASE ORAL at 12:32

## 2019-02-22 RX ADMIN — ERTAPENEM SODIUM 120 MILLIGRAM(S): 1 INJECTION, POWDER, LYOPHILIZED, FOR SOLUTION INTRAMUSCULAR; INTRAVENOUS at 18:50

## 2019-02-22 RX ADMIN — HEPARIN SODIUM 5000 UNIT(S): 5000 INJECTION INTRAVENOUS; SUBCUTANEOUS at 18:50

## 2019-02-22 NOTE — PROGRESS NOTE ADULT - PROBLEM SELECTOR PLAN 1
- Admitted with sepsis 2/2 ascending cholangitis with sonographic findings c/w with choledocholithiasis.  Sepsis now resolving   - Pt now s/p emergent ERCP and tolerated procedure well  - Transaminitis/bilirubin improving, monitor daily   - F/U BCx sensitivities and repeat BCx   - Continue with IV ertapenem for now  - Advance diet as tolerated

## 2019-02-22 NOTE — DIETITIAN INITIAL EVALUATION ADULT. - NS AS NUTRI INTERV MEALS SNACK
Other (specify)/1. Suggest: PO diet rx: Clear Liquids; PO supplement: Ensure Clear 1 can x 3 daily (will provide additional ~660 Kcal, ~24 gm Protein);              2. Encourage & assist Pt with meals; Monitor PO diet tolerance;              3. Suggest: Once clinically indicated advance PO diet to Full Liquids -> Soft as tolerated;                4. Monitor labs, weekly weights, hydration status;

## 2019-02-22 NOTE — PROGRESS NOTE ADULT - SUBJECTIVE AND OBJECTIVE BOX
CC: F/U for Bacteremia    Saw/spoke to patient. No fevers, no chills. Feels improved. No new complaints.    Allergies  No Known Allergies    ANTIMICROBIALS:  ertapenem  IVPB 1000 every 24 hours    PE:    Vital Signs Last 24 Hrs  T(C): 36.4 (22 Feb 2019 05:31), Max: 37.1 (21 Feb 2019 14:00)  T(F): 97.5 (22 Feb 2019 05:31), Max: 98.8 (21 Feb 2019 14:00)  HR: 90 (22 Feb 2019 05:31) (83 - 96)  BP: 144/94 (22 Feb 2019 05:31) (118/75 - 144/94)  RR: 18 (22 Feb 2019 05:31) (16 - 20)  SpO2: 97% (22 Feb 2019 05:31) (95% - 98%)    Gen: AOx3, NAD, well appearing  CV: S1+S2 normal, nontachycardic  Resp: Clear bilat, no resp distress, no crackles/wheezes  Abd: Soft, nontender, +BS  Ext: No LE edema, no wounds    LABS:                        11.1   21.72 )-----------( 202      ( 21 Feb 2019 06:06 )             36.6     02-22    144  |  109<H>  |  23  ----------------------------<  95  4.4   |  23  |  0.68    Ca    9.2      22 Feb 2019 06:30  Phos  2.7     02-22  Mg     2.2     02-22    TPro  5.5<L>  /  Alb  2.7<L>  /  TBili  0.8  /  DBili  x   /  AST  21  /  ALT  19  /  AlkPhos  302<H>  02-22    MICROBIOLOGY:    BLOOD PERIPHERAL  02-21-19  NGTD    BLOOD VENOUS  02-20-19 --  --  BLOOD CULTURE PCR  Escherichia coli    RADIOLOGY:    2/20 USG:    IMPRESSION:     Severe intra and extrahepatic biliary ductal dilatation with evidence of   choledocholithiasis. The common bile duct is tortuous and the distal   portion is not well visualized. Cross-sectional imaging is recommended   for further evaluation/characterization.

## 2019-02-22 NOTE — PROGRESS NOTE ADULT - SUBJECTIVE AND OBJECTIVE BOX
CC: Patient is a 89y old  Female who presents with a chief complaint of Weakness (21 Feb 2019 13:43)    SUBJECTIVE / OVERNIGHT EVENTS: Feels much better, improved weakness and abdominal discomfort. Denies headache, malaise, fevers, chills, visual changes, chest pain, dyspnea, cough, abdominal pain, nausea, vomiting, diarrhea, constipation, dysuria, hematuria, polyuria, pruritis, joint pain    MEDICATIONS  (STANDING):  ertapenem  IVPB 1000 milliGRAM(s) IV Intermittent every 24 hours  heparin  Injectable 5000 Unit(s) SubCutaneous every 12 hours  pantoprazole   Suspension 40 milliGRAM(s) Oral daily  vitamin A &amp; D Ointment 1 Application(s) Topical two times a day    MEDICATIONS  (PRN):      Vital Signs Last 24 Hrs  T(C): 36.7 (22 Feb 2019 10:00), Max: 36.9 (21 Feb 2019 17:08)  T(F): 98 (22 Feb 2019 10:00), Max: 98.4 (21 Feb 2019 17:08)  HR: 85 (22 Feb 2019 10:00) (83 - 90)  BP: 135/95 (22 Feb 2019 10:00) (118/75 - 144/94)  BP(mean): --  RR: 18 (22 Feb 2019 10:00) (16 - 20)  SpO2: 99% (22 Feb 2019 10:00) (95% - 99%)  CAPILLARY BLOOD GLUCOSE    PHYSICAL EXAM:  GENERAL: NAD, well-developed  HEAD:  Atraumatic, Normocephalic  EYES: EOMI, conjunctiva and sclera clear  NECK: Supple, No JVD  CHEST/LUNG: Clear to auscultation bilaterally; No wheeze  HEART: Regular rate and rhythm; No murmurs, rubs, or gallops  ABDOMEN: Soft, Nontender, Nondistended; Bowel sounds present  EXTREMITIES:  2+ Peripheral Pulses, No clubbing, cyanosis, or edema  PSYCH: AAOx3  NEUROLOGY: non-focal  SKIN: No rashes or lesions    LABS:                        10.4   16.47 )-----------( 178      ( 22 Feb 2019 10:40 )             35.3     02-22    144  |  109<H>  |  23  ----------------------------<  95  4.4   |  23  |  0.68    Ca    9.2      22 Feb 2019 06:30  Phos  2.7     02-22  Mg     2.2     02-22    TPro  5.5<L>  /  Alb  2.7<L>  /  TBili  0.8  /  DBili  x   /  AST  21  /  ALT  19  /  AlkPhos  302<H>  02-22    PT/INR - ( 21 Feb 2019 06:06 )   PT: 13.7 SEC;   INR: 1.23            Consultant(s) Notes Reviewed:  ID, GI CC: Patient is a 89y old  Female who presents with a chief complaint of Weakness (21 Feb 2019 13:43)    SUBJECTIVE / OVERNIGHT EVENTS: Feels much better, improved weakness and abdominal discomfort. Denies headache, malaise, fevers, chills, visual changes, chest pain, dyspnea, cough, abdominal pain, nausea, vomiting, diarrhea, constipation, dysuria, hematuria, polyuria, pruritis, joint pain    MEDICATIONS  (STANDING):  ertapenem  IVPB 1000 milliGRAM(s) IV Intermittent every 24 hours  heparin  Injectable 5000 Unit(s) SubCutaneous every 12 hours  pantoprazole   Suspension 40 milliGRAM(s) Oral daily  vitamin A &amp; D Ointment 1 Application(s) Topical two times a day    MEDICATIONS  (PRN):      Vital Signs Last 24 Hrs  T(C): 36.7 (22 Feb 2019 10:00), Max: 36.9 (21 Feb 2019 17:08)  T(F): 98 (22 Feb 2019 10:00), Max: 98.4 (21 Feb 2019 17:08)  HR: 85 (22 Feb 2019 10:00) (83 - 90)  BP: 135/95 (22 Feb 2019 10:00) (118/75 - 144/94)  BP(mean): --  RR: 18 (22 Feb 2019 10:00) (16 - 20)  SpO2: 99% (22 Feb 2019 10:00) (95% - 99%)  CAPILLARY BLOOD GLUCOSE    PHYSICAL EXAM:  GENERAL: NAD, frail  HEAD:  Atraumatic, Normocephalic  EYES: EOMI, conjunctiva and sclera clear  NECK: Supple, No JVD  CHEST/LUNG: Clear to auscultation bilaterally; No wheeze  HEART: Regular rate and rhythm; No murmurs, rubs, or gallops  ABDOMEN: Soft, Nontender, Nondistended; Bowel sounds present  EXTREMITIES:  2+ Peripheral Pulses, No clubbing, cyanosis, or edema  PSYCH: AAOx3  NEUROLOGY: non-focal  SKIN: No rashes or lesions    LABS:                        10.4   16.47 )-----------( 178      ( 22 Feb 2019 10:40 )             35.3     02-22    144  |  109<H>  |  23  ----------------------------<  95  4.4   |  23  |  0.68    Ca    9.2      22 Feb 2019 06:30  Phos  2.7     02-22  Mg     2.2     02-22    TPro  5.5<L>  /  Alb  2.7<L>  /  TBili  0.8  /  DBili  x   /  AST  21  /  ALT  19  /  AlkPhos  302<H>  02-22    PT/INR - ( 21 Feb 2019 06:06 )   PT: 13.7 SEC;   INR: 1.23            Consultant(s) Notes Reviewed:  ID, GI

## 2019-02-22 NOTE — DIETITIAN INITIAL EVALUATION ADULT. - PHYSICAL APPEARANCE
underweight/emaciated/Nutrition focused physical exam conducted - found signs of malnutrition [ ] absent [X] present Subcutaneous fat loss: [moderate] Buccal fat region, [severe] Ribs region. Muscle wasting: [moderate] Temples region, [severe] Clavicle region [severe] Shoulder region;/other (specify)

## 2019-02-22 NOTE — DIETITIAN INITIAL EVALUATION ADULT. - NS AS NUTRI INTERV MEDICAL AND FOOD SUPPLEMENTS
Ensure Clear 1 can x 3 daily (will provide additional ~660 Kcal, ~24 gm Protein)/Commercial beverage

## 2019-02-22 NOTE — DIETITIAN INITIAL EVALUATION ADULT. - OTHER INFO
Nutrition Consult X Cachectic, weak female with decreased oral intake. Pt 89 yo female with Ascending Cholangitis; S/P ERCP. At time of visit Pt appears alert, oriented, and somewhat disoriented/forgetful (?). Per Pt her appetite usually is “enough”. No report of chewing/swallowing difficulty; no report of nausea/vomiting/diarrhea @ present. Of note Pt on Clear Liquids @ present. Pt wants to eat “substantial food”. Per Pt she did not move her bowel for past 3 day; she needs substantial food to help move her bowel. Per Pt her height: ~65” & her UBW: ~120#. Pt also stated she lost weight: ~10# in past 3 months 2/2 institutionalizations. Of note Pt’s weight: 100# (2/20/19). RDN offered PO supplements, Pt agreed to try Ensure Clear. At home Pt & her two other sisters usually prepare their own food reported. Food preferences discussed. At time of visit Pt physically appears very thin. Pt never been a big person reported. Nutrition focus physical findings mentioned above. Case discussed with Nurse. RDN remains available, Pt & nurse made aware.

## 2019-02-23 DIAGNOSIS — E43 UNSPECIFIED SEVERE PROTEIN-CALORIE MALNUTRITION: ICD-10-CM

## 2019-02-23 LAB
24R-OH-CALCIDIOL SERPL-MCNC: 20.6 NG/ML — LOW (ref 30–80)
ALBUMIN SERPL ELPH-MCNC: 2.9 G/DL — LOW (ref 3.3–5)
ALP SERPL-CCNC: 336 U/L — HIGH (ref 40–120)
ALT FLD-CCNC: 17 U/L — SIGNIFICANT CHANGE UP (ref 4–33)
ANION GAP SERPL CALC-SCNC: 10 MMO/L — SIGNIFICANT CHANGE UP (ref 7–14)
AST SERPL-CCNC: 22 U/L — SIGNIFICANT CHANGE UP (ref 4–32)
BACTERIA BLD CULT: SIGNIFICANT CHANGE UP
BASOPHILS # BLD AUTO: 0.01 K/UL — SIGNIFICANT CHANGE UP (ref 0–0.2)
BASOPHILS NFR BLD AUTO: 0.1 % — SIGNIFICANT CHANGE UP (ref 0–2)
BILIRUB SERPL-MCNC: 0.8 MG/DL — SIGNIFICANT CHANGE UP (ref 0.2–1.2)
BUN SERPL-MCNC: 14 MG/DL — SIGNIFICANT CHANGE UP (ref 7–23)
CALCIUM SERPL-MCNC: 9.2 MG/DL — SIGNIFICANT CHANGE UP (ref 8.4–10.5)
CHLORIDE SERPL-SCNC: 108 MMOL/L — HIGH (ref 98–107)
CO2 SERPL-SCNC: 27 MMOL/L — SIGNIFICANT CHANGE UP (ref 22–31)
CREAT SERPL-MCNC: 0.62 MG/DL — SIGNIFICANT CHANGE UP (ref 0.5–1.3)
EOSINOPHIL # BLD AUTO: 0.15 K/UL — SIGNIFICANT CHANGE UP (ref 0–0.5)
EOSINOPHIL NFR BLD AUTO: 1.9 % — SIGNIFICANT CHANGE UP (ref 0–6)
GLUCOSE SERPL-MCNC: 70 MG/DL — SIGNIFICANT CHANGE UP (ref 70–99)
HCT VFR BLD CALC: 40.2 % — SIGNIFICANT CHANGE UP (ref 34.5–45)
HGB BLD-MCNC: 11.7 G/DL — SIGNIFICANT CHANGE UP (ref 11.5–15.5)
IMM GRANULOCYTES NFR BLD AUTO: 0.4 % — SIGNIFICANT CHANGE UP (ref 0–1.5)
LYMPHOCYTES # BLD AUTO: 0.95 K/UL — LOW (ref 1–3.3)
LYMPHOCYTES # BLD AUTO: 12.2 % — LOW (ref 13–44)
MAGNESIUM SERPL-MCNC: 2.1 MG/DL — SIGNIFICANT CHANGE UP (ref 1.6–2.6)
MCHC RBC-ENTMCNC: 29.1 % — LOW (ref 32–36)
MCHC RBC-ENTMCNC: 30.9 PG — SIGNIFICANT CHANGE UP (ref 27–34)
MCV RBC AUTO: 106.1 FL — HIGH (ref 80–100)
MONOCYTES # BLD AUTO: 0.46 K/UL — SIGNIFICANT CHANGE UP (ref 0–0.9)
MONOCYTES NFR BLD AUTO: 5.9 % — SIGNIFICANT CHANGE UP (ref 2–14)
NEUTROPHILS # BLD AUTO: 6.2 K/UL — SIGNIFICANT CHANGE UP (ref 1.8–7.4)
NEUTROPHILS NFR BLD AUTO: 79.5 % — HIGH (ref 43–77)
NRBC # FLD: 0 K/UL — LOW (ref 25–125)
PHOSPHATE SERPL-MCNC: 2.4 MG/DL — LOW (ref 2.5–4.5)
PLATELET # BLD AUTO: 198 K/UL — SIGNIFICANT CHANGE UP (ref 150–400)
PMV BLD: 9.3 FL — SIGNIFICANT CHANGE UP (ref 7–13)
POTASSIUM SERPL-MCNC: 4 MMOL/L — SIGNIFICANT CHANGE UP (ref 3.5–5.3)
POTASSIUM SERPL-SCNC: 4 MMOL/L — SIGNIFICANT CHANGE UP (ref 3.5–5.3)
PROT SERPL-MCNC: 5.7 G/DL — LOW (ref 6–8.3)
RBC # BLD: 3.79 M/UL — LOW (ref 3.8–5.2)
RBC # FLD: 15.2 % — HIGH (ref 10.3–14.5)
SODIUM SERPL-SCNC: 145 MMOL/L — SIGNIFICANT CHANGE UP (ref 135–145)
WBC # BLD: 7.8 K/UL — SIGNIFICANT CHANGE UP (ref 3.8–10.5)
WBC # FLD AUTO: 7.8 K/UL — SIGNIFICANT CHANGE UP (ref 3.8–10.5)

## 2019-02-23 PROCEDURE — 99232 SBSQ HOSP IP/OBS MODERATE 35: CPT

## 2019-02-23 RX ORDER — SODIUM,POTASSIUM PHOSPHATES 278-250MG
1 POWDER IN PACKET (EA) ORAL
Qty: 0 | Refills: 0 | Status: COMPLETED | OUTPATIENT
Start: 2019-02-23 | End: 2019-02-24

## 2019-02-23 RX ORDER — CHOLECALCIFEROL (VITAMIN D3) 125 MCG
1000 CAPSULE ORAL DAILY
Qty: 0 | Refills: 0 | Status: DISCONTINUED | OUTPATIENT
Start: 2019-02-23 | End: 2019-02-25

## 2019-02-23 RX ADMIN — PANTOPRAZOLE SODIUM 40 MILLIGRAM(S): 20 TABLET, DELAYED RELEASE ORAL at 12:08

## 2019-02-23 RX ADMIN — ERTAPENEM SODIUM 120 MILLIGRAM(S): 1 INJECTION, POWDER, LYOPHILIZED, FOR SOLUTION INTRAMUSCULAR; INTRAVENOUS at 17:55

## 2019-02-23 RX ADMIN — Medication 1 APPLICATION(S): at 06:20

## 2019-02-23 RX ADMIN — Medication 1 TABLET(S): at 12:08

## 2019-02-23 RX ADMIN — HEPARIN SODIUM 5000 UNIT(S): 5000 INJECTION INTRAVENOUS; SUBCUTANEOUS at 17:55

## 2019-02-23 RX ADMIN — Medication 1 TABLET(S): at 17:54

## 2019-02-23 RX ADMIN — HEPARIN SODIUM 5000 UNIT(S): 5000 INJECTION INTRAVENOUS; SUBCUTANEOUS at 06:20

## 2019-02-23 RX ADMIN — Medication 1000 UNIT(S): at 12:08

## 2019-02-23 NOTE — PROGRESS NOTE ADULT - PROBLEM SELECTOR PLAN 3
Patient assessed by dietitian to have severe protein calorie malnutrition, this is in accordance with her general appearance. To c/w diet recommendations as per Nutrition team

## 2019-02-23 NOTE — PROGRESS NOTE ADULT - SUBJECTIVE AND OBJECTIVE BOX
CC: Patient is a 89y old  Female who presents with a chief complaint of Weakness (22 Feb 2019 14:10)    SUBJECTIVE / OVERNIGHT EVENTS: Denies headache, weakness, malaise, fevers, chills, visual changes, chest pain, dyspnea, cough, abdominal pain, nausea, vomiting, diarrhea, constipation, dysuria, hematuria, polyuria, pruritis, joint pain    MEDICATIONS  (STANDING):  cholecalciferol 1000 Unit(s) Oral daily  ertapenem  IVPB 1000 milliGRAM(s) IV Intermittent every 24 hours  heparin  Injectable 5000 Unit(s) SubCutaneous every 12 hours  pantoprazole   Suspension 40 milliGRAM(s) Oral daily  potassium acid phosphate/sodium acid phosphate tablet (K-PHOS No. 2) 1 Tablet(s) Oral three times a day with meals  vitamin A &amp; D Ointment 1 Application(s) Topical two times a day    MEDICATIONS  (PRN):      Vital Signs Last 24 Hrs  T(C): 36.6 (23 Feb 2019 12:02), Max: 36.8 (22 Feb 2019 17:03)  T(F): 97.8 (23 Feb 2019 12:02), Max: 98.3 (22 Feb 2019 17:03)  HR: 91 (23 Feb 2019 12:02) (79 - 91)  BP: 148/90 (23 Feb 2019 12:02) (130/73 - 156/94)  BP(mean): --  RR: 18 (23 Feb 2019 12:02) (18 - 18)  SpO2: 98% (23 Feb 2019 12:02) (94% - 98%)  CAPILLARY BLOOD GLUCOSE      PHYSICAL EXAM:  GENERAL: NAD, frail   HEAD:  Atraumatic, Normocephalic  EYES: EOMI, conjunctiva and sclera clear  NECK: Supple, No JVD  CHEST/LUNG: Clear to auscultation bilaterally; No wheeze  HEART: Regular rate and rhythm; No murmurs, rubs, or gallops  ABDOMEN: Soft, Nontender, Nondistended; Bowel sounds present  EXTREMITIES:  2+ Peripheral Pulses, No clubbing, cyanosis, or edema  PSYCH: AAOx3  NEUROLOGY: non-focal  SKIN: No rashes or lesions    LABS:                        11.7   7.80  )-----------( 198      ( 23 Feb 2019 05:41 )             40.2     02-23    145  |  108<H>  |  14  ----------------------------<  70  4.0   |  27  |  0.62    Ca    9.2      23 Feb 2019 05:41  Phos  2.4     02-23  Mg     2.1     02-23    TPro  5.7<L>  /  Alb  2.9<L>  /  TBili  0.8  /  DBili  x   /  AST  22  /  ALT  17  /  AlkPhos  336<H>  02-23

## 2019-02-24 LAB
ALBUMIN SERPL ELPH-MCNC: 2.9 G/DL — LOW (ref 3.3–5)
ALP SERPL-CCNC: 328 U/L — HIGH (ref 40–120)
ALT FLD-CCNC: 16 U/L — SIGNIFICANT CHANGE UP (ref 4–33)
ANION GAP SERPL CALC-SCNC: 10 MMO/L — SIGNIFICANT CHANGE UP (ref 7–14)
AST SERPL-CCNC: 17 U/L — SIGNIFICANT CHANGE UP (ref 4–32)
BASOPHILS # BLD AUTO: 0.01 K/UL — SIGNIFICANT CHANGE UP (ref 0–0.2)
BASOPHILS NFR BLD AUTO: 0.2 % — SIGNIFICANT CHANGE UP (ref 0–2)
BILIRUB SERPL-MCNC: 0.9 MG/DL — SIGNIFICANT CHANGE UP (ref 0.2–1.2)
BUN SERPL-MCNC: 11 MG/DL — SIGNIFICANT CHANGE UP (ref 7–23)
CALCIUM SERPL-MCNC: 8.9 MG/DL — SIGNIFICANT CHANGE UP (ref 8.4–10.5)
CHLORIDE SERPL-SCNC: 105 MMOL/L — SIGNIFICANT CHANGE UP (ref 98–107)
CO2 SERPL-SCNC: 29 MMOL/L — SIGNIFICANT CHANGE UP (ref 22–31)
CREAT SERPL-MCNC: 0.59 MG/DL — SIGNIFICANT CHANGE UP (ref 0.5–1.3)
EOSINOPHIL # BLD AUTO: 0.33 K/UL — SIGNIFICANT CHANGE UP (ref 0–0.5)
EOSINOPHIL NFR BLD AUTO: 7.7 % — HIGH (ref 0–6)
GLUCOSE SERPL-MCNC: 89 MG/DL — SIGNIFICANT CHANGE UP (ref 70–99)
HCT VFR BLD CALC: 40 % — SIGNIFICANT CHANGE UP (ref 34.5–45)
HGB BLD-MCNC: 12 G/DL — SIGNIFICANT CHANGE UP (ref 11.5–15.5)
IMM GRANULOCYTES NFR BLD AUTO: 0.2 % — SIGNIFICANT CHANGE UP (ref 0–1.5)
LYMPHOCYTES # BLD AUTO: 0.93 K/UL — LOW (ref 1–3.3)
LYMPHOCYTES # BLD AUTO: 21.6 % — SIGNIFICANT CHANGE UP (ref 13–44)
MAGNESIUM SERPL-MCNC: 2 MG/DL — SIGNIFICANT CHANGE UP (ref 1.6–2.6)
MCHC RBC-ENTMCNC: 30 % — LOW (ref 32–36)
MCHC RBC-ENTMCNC: 30.7 PG — SIGNIFICANT CHANGE UP (ref 27–34)
MCV RBC AUTO: 102.3 FL — HIGH (ref 80–100)
MONOCYTES # BLD AUTO: 0.43 K/UL — SIGNIFICANT CHANGE UP (ref 0–0.9)
MONOCYTES NFR BLD AUTO: 10 % — SIGNIFICANT CHANGE UP (ref 2–14)
NEUTROPHILS # BLD AUTO: 2.6 K/UL — SIGNIFICANT CHANGE UP (ref 1.8–7.4)
NEUTROPHILS NFR BLD AUTO: 60.3 % — SIGNIFICANT CHANGE UP (ref 43–77)
NRBC # FLD: 0 K/UL — LOW (ref 25–125)
PHOSPHATE SERPL-MCNC: 3 MG/DL — SIGNIFICANT CHANGE UP (ref 2.5–4.5)
PLATELET # BLD AUTO: 186 K/UL — SIGNIFICANT CHANGE UP (ref 150–400)
PMV BLD: 9 FL — SIGNIFICANT CHANGE UP (ref 7–13)
POTASSIUM SERPL-MCNC: 3.7 MMOL/L — SIGNIFICANT CHANGE UP (ref 3.5–5.3)
POTASSIUM SERPL-SCNC: 3.7 MMOL/L — SIGNIFICANT CHANGE UP (ref 3.5–5.3)
PROT SERPL-MCNC: 5.6 G/DL — LOW (ref 6–8.3)
RBC # BLD: 3.91 M/UL — SIGNIFICANT CHANGE UP (ref 3.8–5.2)
RBC # FLD: 14.9 % — HIGH (ref 10.3–14.5)
SODIUM SERPL-SCNC: 144 MMOL/L — SIGNIFICANT CHANGE UP (ref 135–145)
WBC # BLD: 4.31 K/UL — SIGNIFICANT CHANGE UP (ref 3.8–10.5)
WBC # FLD AUTO: 4.31 K/UL — SIGNIFICANT CHANGE UP (ref 3.8–10.5)

## 2019-02-24 PROCEDURE — 99232 SBSQ HOSP IP/OBS MODERATE 35: CPT

## 2019-02-24 RX ADMIN — Medication 1 TABLET(S): at 11:43

## 2019-02-24 RX ADMIN — HEPARIN SODIUM 5000 UNIT(S): 5000 INJECTION INTRAVENOUS; SUBCUTANEOUS at 05:29

## 2019-02-24 RX ADMIN — Medication 1 APPLICATION(S): at 05:29

## 2019-02-24 RX ADMIN — Medication 1 APPLICATION(S): at 17:54

## 2019-02-24 RX ADMIN — HEPARIN SODIUM 5000 UNIT(S): 5000 INJECTION INTRAVENOUS; SUBCUTANEOUS at 17:54

## 2019-02-24 RX ADMIN — Medication 1000 UNIT(S): at 11:43

## 2019-02-24 RX ADMIN — PANTOPRAZOLE SODIUM 40 MILLIGRAM(S): 20 TABLET, DELAYED RELEASE ORAL at 11:44

## 2019-02-24 RX ADMIN — ERTAPENEM SODIUM 120 MILLIGRAM(S): 1 INJECTION, POWDER, LYOPHILIZED, FOR SOLUTION INTRAMUSCULAR; INTRAVENOUS at 17:53

## 2019-02-24 NOTE — PROGRESS NOTE ADULT - SUBJECTIVE AND OBJECTIVE BOX
CC: Patient is a 89y old  Female who presents with a chief complaint of Weakness (23 Feb 2019 14:24)    SUBJECTIVE / OVERNIGHT EVENTS: Denies headache, weakness, malaise, fevers, chills, visual changes, chest pain, dyspnea, cough, abdominal pain, nausea, vomiting, diarrhea, constipation, dysuria, hematuria, polyuria, pruritis, joint pain    MEDICATIONS  (STANDING):  cholecalciferol 1000 Unit(s) Oral daily  ertapenem  IVPB 1000 milliGRAM(s) IV Intermittent every 24 hours  heparin  Injectable 5000 Unit(s) SubCutaneous every 12 hours  pantoprazole   Suspension 40 milliGRAM(s) Oral daily  vitamin A &amp; D Ointment 1 Application(s) Topical two times a day    MEDICATIONS  (PRN):      Vital Signs Last 24 Hrs  T(C): 36.6 (24 Feb 2019 09:51), Max: 36.7 (23 Feb 2019 17:29)  T(F): 97.9 (24 Feb 2019 09:51), Max: 98 (23 Feb 2019 17:29)  HR: 87 (24 Feb 2019 09:51) (77 - 87)  BP: 120/82 (24 Feb 2019 09:51) (120/82 - 151/92)  BP(mean): --  RR: 18 (24 Feb 2019 09:51) (18 - 18)  SpO2: 98% (24 Feb 2019 09:51) (96% - 98%)  CAPILLARY BLOOD GLUCOSE    PHYSICAL EXAM:  GENERAL: NAD, frail   HEAD:  Atraumatic, Normocephalic  EYES: EOMI, conjunctiva and sclera clear  NECK: Supple, No JVD  CHEST/LUNG: Clear to auscultation bilaterally; No wheeze  HEART: Regular rate and rhythm; No murmurs, rubs, or gallops  ABDOMEN: Soft, Nontender, Nondistended; Bowel sounds present  EXTREMITIES:  2+ Peripheral Pulses, No clubbing, cyanosis, or edema  PSYCH: AAOx3  NEUROLOGY: non-focal  SKIN: No rashes or lesions      LABS:                        12.0   4.31  )-----------( 186      ( 24 Feb 2019 06:20 )             40.0     02-24    144  |  105  |  11  ----------------------------<  89  3.7   |  29  |  0.59    Ca    8.9      24 Feb 2019 06:20  Phos  3.0     02-24  Mg     2.0     02-24    TPro  5.6<L>  /  Alb  2.9<L>  /  TBili  0.9  /  DBili  x   /  AST  17  /  ALT  16  /  AlkPhos  328<H>  02-24

## 2019-02-24 NOTE — PROGRESS NOTE ADULT - PROBLEM SELECTOR PLAN 1
Resolved sepsis 2/2 ascending cholangitis s/p emergent ERCP (no periprocedural complication)  - Repeat BCx negative to date   - Continue with IV ertapenem for now, inquire with ID about switching to PO abx  - Once ID recommendations established - medically stable for discharge - has to f/u with GI for repeat ERCP (see GI note 2/21/19)

## 2019-02-25 VITALS
OXYGEN SATURATION: 100 % | SYSTOLIC BLOOD PRESSURE: 132 MMHG | DIASTOLIC BLOOD PRESSURE: 82 MMHG | RESPIRATION RATE: 18 BRPM | HEART RATE: 88 BPM | TEMPERATURE: 98 F

## 2019-02-25 DIAGNOSIS — R78.81 BACTEREMIA: ICD-10-CM

## 2019-02-25 LAB
ALBUMIN SERPL ELPH-MCNC: 3.2 G/DL — LOW (ref 3.3–5)
ALP SERPL-CCNC: 360 U/L — HIGH (ref 40–120)
ALT FLD-CCNC: 17 U/L — SIGNIFICANT CHANGE UP (ref 4–33)
ANION GAP SERPL CALC-SCNC: 11 MMO/L — SIGNIFICANT CHANGE UP (ref 7–14)
AST SERPL-CCNC: 20 U/L — SIGNIFICANT CHANGE UP (ref 4–32)
BASOPHILS # BLD AUTO: 0.03 K/UL — SIGNIFICANT CHANGE UP (ref 0–0.2)
BASOPHILS NFR BLD AUTO: 0.6 % — SIGNIFICANT CHANGE UP (ref 0–2)
BILIRUB SERPL-MCNC: 0.9 MG/DL — SIGNIFICANT CHANGE UP (ref 0.2–1.2)
BUN SERPL-MCNC: 15 MG/DL — SIGNIFICANT CHANGE UP (ref 7–23)
CALCIUM SERPL-MCNC: 9.1 MG/DL — SIGNIFICANT CHANGE UP (ref 8.4–10.5)
CHLORIDE SERPL-SCNC: 102 MMOL/L — SIGNIFICANT CHANGE UP (ref 98–107)
CO2 SERPL-SCNC: 28 MMOL/L — SIGNIFICANT CHANGE UP (ref 22–31)
CREAT SERPL-MCNC: 0.56 MG/DL — SIGNIFICANT CHANGE UP (ref 0.5–1.3)
EOSINOPHIL # BLD AUTO: 0.58 K/UL — HIGH (ref 0–0.5)
EOSINOPHIL NFR BLD AUTO: 10.8 % — HIGH (ref 0–6)
GLUCOSE SERPL-MCNC: 88 MG/DL — SIGNIFICANT CHANGE UP (ref 70–99)
HCT VFR BLD CALC: 41.8 % — SIGNIFICANT CHANGE UP (ref 34.5–45)
HGB BLD-MCNC: 12.2 G/DL — SIGNIFICANT CHANGE UP (ref 11.5–15.5)
IMM GRANULOCYTES NFR BLD AUTO: 0.2 % — SIGNIFICANT CHANGE UP (ref 0–1.5)
LYMPHOCYTES # BLD AUTO: 1.18 K/UL — SIGNIFICANT CHANGE UP (ref 1–3.3)
LYMPHOCYTES # BLD AUTO: 21.9 % — SIGNIFICANT CHANGE UP (ref 13–44)
MAGNESIUM SERPL-MCNC: 2.2 MG/DL — SIGNIFICANT CHANGE UP (ref 1.6–2.6)
MCHC RBC-ENTMCNC: 29.2 % — LOW (ref 32–36)
MCHC RBC-ENTMCNC: 30.2 PG — SIGNIFICANT CHANGE UP (ref 27–34)
MCV RBC AUTO: 103.5 FL — HIGH (ref 80–100)
MONOCYTES # BLD AUTO: 0.6 K/UL — SIGNIFICANT CHANGE UP (ref 0–0.9)
MONOCYTES NFR BLD AUTO: 11.1 % — SIGNIFICANT CHANGE UP (ref 2–14)
NEUTROPHILS # BLD AUTO: 2.99 K/UL — SIGNIFICANT CHANGE UP (ref 1.8–7.4)
NEUTROPHILS NFR BLD AUTO: 55.4 % — SIGNIFICANT CHANGE UP (ref 43–77)
NRBC # FLD: 0 K/UL — LOW (ref 25–125)
PHOSPHATE SERPL-MCNC: 2.5 MG/DL — SIGNIFICANT CHANGE UP (ref 2.5–4.5)
PLATELET # BLD AUTO: 207 K/UL — SIGNIFICANT CHANGE UP (ref 150–400)
PMV BLD: 9.6 FL — SIGNIFICANT CHANGE UP (ref 7–13)
POTASSIUM SERPL-MCNC: 4.1 MMOL/L — SIGNIFICANT CHANGE UP (ref 3.5–5.3)
POTASSIUM SERPL-SCNC: 4.1 MMOL/L — SIGNIFICANT CHANGE UP (ref 3.5–5.3)
PROT SERPL-MCNC: 5.7 G/DL — LOW (ref 6–8.3)
RBC # BLD: 4.04 M/UL — SIGNIFICANT CHANGE UP (ref 3.8–5.2)
RBC # FLD: 14.8 % — HIGH (ref 10.3–14.5)
SODIUM SERPL-SCNC: 141 MMOL/L — SIGNIFICANT CHANGE UP (ref 135–145)
WBC # BLD: 5.39 K/UL — SIGNIFICANT CHANGE UP (ref 3.8–10.5)
WBC # FLD AUTO: 5.39 K/UL — SIGNIFICANT CHANGE UP (ref 3.8–10.5)

## 2019-02-25 PROCEDURE — 99232 SBSQ HOSP IP/OBS MODERATE 35: CPT

## 2019-02-25 PROCEDURE — 99239 HOSP IP/OBS DSCHRG MGMT >30: CPT

## 2019-02-25 RX ORDER — CHOLECALCIFEROL (VITAMIN D3) 125 MCG
1000 CAPSULE ORAL
Qty: 0 | Refills: 0 | DISCHARGE
Start: 2019-02-25

## 2019-02-25 RX ADMIN — Medication 1 TABLET(S): at 15:23

## 2019-02-25 RX ADMIN — Medication 1 APPLICATION(S): at 06:06

## 2019-02-25 RX ADMIN — HEPARIN SODIUM 5000 UNIT(S): 5000 INJECTION INTRAVENOUS; SUBCUTANEOUS at 06:06

## 2019-02-25 RX ADMIN — Medication 1000 UNIT(S): at 15:22

## 2019-02-25 NOTE — PROGRESS NOTE ADULT - SUBJECTIVE AND OBJECTIVE BOX
CC: F/U bacteremia    Saw/spoke to patient. No fevers, no chills. Doing well over weekend. Planned for transfer to rehab.    Allergies  No Known Allergies    ANTIMICROBIALS:  ertapenem  IVPB 1000 every 24 hours    PE:    Vital Signs Last 24 Hrs  T(C): 36.6 (25 Feb 2019 10:15), Max: 36.6 (24 Feb 2019 21:09)  T(F): 97.8 (25 Feb 2019 10:15), Max: 97.9 (24 Feb 2019 21:09)  HR: 84 (25 Feb 2019 10:15) (80 - 87)  BP: 134/88 (25 Feb 2019 10:15) (134/88 - 144/95)  RR: 18 (25 Feb 2019 10:15) (18 - 18)  SpO2: 98% (25 Feb 2019 06:04) (96% - 98%)    Gen: AOx3, NAD, non-toxic, pleasant  CV: S1+S2 normal, nontachycardic  Resp: Clear bilat, no resp distress, no crackles/wheezes  Abd: Soft, nontender, +BS  Ext: No LE edema, no wounds    LABS:                        12.2   5.39  )-----------( 207      ( 25 Feb 2019 06:36 )             41.8     02-25    141  |  102  |  15  ----------------------------<  88  4.1   |  28  |  0.56    Ca    9.1      25 Feb 2019 06:36  Phos  2.5     02-25  Mg     2.2     02-25    TPro  5.7<L>  /  Alb  3.2<L>  /  TBili  0.9  /  DBili  x   /  AST  20  /  ALT  17  /  AlkPhos  360<H>  02-25    MICROBIOLOGY:    BLOOD PERIPHERAL  02-21-19  NGTD        BLOOD VENOUS  02-20-19 --  --  BLOOD CULTURE PCR  Escherichia coli S FQ    RADIOLOGY:    2/20 USG:    IMPRESSION:     Severe intra and extrahepatic biliary ductal dilatation with evidence of   choledocholithiasis. The common bile duct is tortuous and the distal   portion is not well visualized. Cross-sectional imaging is recommended   for further evaluation/characterization.

## 2019-02-25 NOTE — PROGRESS NOTE ADULT - PROBLEM/PLAN-1
Patient notified that her 3/21/18 shows NO signs of disease recurrence. A lucency is noted in one of her molars - she does report issues/pain/temperature sensitivity in that area. She will make a dentist appt.
DISPLAY PLAN FREE TEXT

## 2019-02-25 NOTE — PROGRESS NOTE ADULT - PROBLEM SELECTOR PROBLEM 1
Ascending cholangitis

## 2019-02-25 NOTE — PROGRESS NOTE ADULT - ASSESSMENT
89 year old female, with past history significant for Arthroplasty of the right hip (12/2018), and Gait difficulty (uses walker) presented to the ED secondary to weakness.  Fever, leukocytosis, bandemia  E coli bacteremia, S FQ  USG with evidence for bile duct dilation, cholangitis  S/p ERCP stent placement  Marked improvement in bilirubinemia, clinically remains well  Leukocytosis resolved, no fevers  Overall, Cholangitis, E coli bacteremia, sepsis, hypotension, leukocytosis, fever  - Ertapenem 1g q 24  - When DC planning can change to Cipro 500mg q 12 through 3/6/19  - F/U GI, appreciate procedure; F/U for ? stent removal in future  - Discussed with medicine NP    Scott Shrestha MD  Pager [phone]  After 5pm and on weekends call [phone]
89 year old female, with past history significant for Arthroplasty of the right hip (12/2018), and Gait difficulty (uses walker) presented to the ED secondary to weakness.  Fever, leukocytosis, bandemia  E coli bacteremia, S pending  USG with evidence for bile duct dilation, cholangitis  S/p ERCP stent placement  Marked improvement in bilirubinemia  Still significant leukocytosis, no fevers, generally well appearing  Overall, Cholangitis, E coli bacteremia, sepsis, hypotension, leukocytosis, fever  - Ertapenem 1g q 24  - F/U GI, appreciate procedure  - F/U BCXs  - Trend LFTs  - If any signs acute worsening, consider single doses aminoglycoside (but anticipate Erta to be adequate)    Scott Shrestha MD  Pager [phone]  After 5pm and on weekends call [phone]
89 year old female, with past history significant for Arthroplasty of the right hip (12/2018), and Gait difficulty (uses walker) presented to the ED secondary to weakness.  Fever, leukocytosis, bandemia  E coli bacteremia, S pending  USG with evidence for bile duct dilation, cholangitis  S/p ERCP stent placement  Marked improvement in bilirubinemia  Still significant leukocytosis, no fevers, well appearing  Overall, Cholangitis, E coli bacteremia, sepsis, hypotension, leukocytosis, fever  - Ertapenem 1g q 24  - F/U GI, appreciate procedure  - F/U BCXs  - Trend LFTs (improving)  - Final abx plan depending on culture    Scott Shrestha MD  Pager [phone]  After 5pm and on weekends call [phone]
89F recent L hip arthroplasty s/p femoral neck fracture, p/w weakness, hospital course significant for sepsis and gram negative mauri bacteremia 2/2 ascending cholangitis in setting of choledocholithasis
Impression:  1) Acute cholangitis 2/2 CBD stone, s/p ERCP with biliary sphincterotomy and 10x5 plastic stent placement  2) H/o paranoid schizophrenia, per psych unable to consent for herself    Recs:  - followup LFTs today, CBC, fever curve, pressures  - followup clinically for post - ERCP abdominal pain  - CLD as tolerated this morning, if does well can advance diet  - continue IV antibiotics  - patient will need repeat ERCP in 4-6 weeks for stone and stent removal.  [phone]
89F recent L hip arthroplasty s/p femoral neck fracture, p/w weakness, hospital course significant for sepsis and gram negative mauri bacteremia 2/2 ascending cholangitis in setting of choledocholithasis
89 yo F recent L hip arthroplasty s/p femoral neck fracture, p/w weakness, hospital course significant for sepsis and gram negative mauri bacteremia 2/2 ascending cholangitis in setting of choledocholithiasis s/p ERCP, now clinically doing well.

## 2019-02-25 NOTE — PROGRESS NOTE ADULT - SUBJECTIVE AND OBJECTIVE BOX
Patient is a 89y old  Female who presents with a chief complaint of Weakness    SUBJECTIVE / OVERNIGHT EVENTS:    Reports she feels fine, no CP, SOB, f/c/n/v  Ate pancakes for breakfast, no stomach pain  Voiding, reports no BM     MEDICATIONS  (STANDING):  cholecalciferol 1000 Unit(s) Oral daily  ertapenem  IVPB 1000 milliGRAM(s) IV Intermittent every 24 hours  heparin  Injectable 5000 Unit(s) SubCutaneous every 12 hours  pantoprazole   Suspension 40 milliGRAM(s) Oral daily  vitamin A &amp; D Ointment 1 Application(s) Topical two times a day    T(C): 36.6 (02-25-19 @ 10:15), Max: 36.6 (02-24-19 @ 21:09)  HR: 84 (02-25-19 @ 10:15) (80 - 87)  BP: 134/88 (02-25-19 @ 10:15) (134/88 - 144/95)  RR: 18 (02-25-19 @ 10:15) (18 - 18)  SpO2: 98% (02-25-19 @ 06:04) (96% - 98%)    I&O's Summary    24 Feb 2019 07:01  -  25 Feb 2019 07:00  --------------------------------------------------------  IN: 0 mL / OUT: 350 mL / NET: -350 mL    PHYSICAL EXAM:  GENERAL: NAD, frail   HEAD:  Atraumatic, Normocephalic  CHEST/LUNG: Clear to auscultation bilaterally; No wheeze  HEART: Regular rate and rhythm; No murmurs, rubs, or gallops  ABDOMEN: Soft, Nontender, Nondistended; Bowel sounds present  EXTREMITIES:  2+ Peripheral Pulses, No clubbing, cyanosis, or edema  PSYCH: AAOx3, forgetful   NEUROLOGY: non-focal  SKIN: No rashes or lesions    LABS:                        12.2   5.39  )-----------( 207      ( 25 Feb 2019 06:36 )             41.8     02-25    141  |  102  |  15  ----------------------------<  88  4.1   |  28  |  0.56    Ca    9.1      25 Feb 2019 06:36  Phos  2.5     02-25  Mg     2.2     02-25    TPro  5.7<L>  /  Alb  3.2<L>  /  TBili  0.9  /  DBili  x   /  AST  20  /  ALT  17  /  AlkPhos  360<H>  02-25    Blood Cx: NG    Consultant(s) Notes Reviewed:    Care Discussed with Consultants/Other Providers:

## 2019-02-25 NOTE — PROGRESS NOTE ADULT - PROBLEM SELECTOR PLAN 2
Pt not on meds, no psych followup as per sister.  Has been psychiatrically hospitalized many years ago.  Inpatient psych team following, pt currently not agitated, calm/cooperative  - OP f/u Likely due to above, 2/20 cultures positive, cleared on 2/21  - c/w ertapenum for now, f/u ID

## 2019-02-25 NOTE — PROGRESS NOTE ADULT - PROBLEM SELECTOR PLAN 4
IMPROVE: 3 on HSQ for DVT ppx  DIspo planning to [facility], sisters have chosen a location  dispo time 32 min Patient assessed by dietitian to have severe protein calorie malnutrition. BMI <18 with low TP and albumin.   - c/w diet recommendations as per Nutrition team

## 2019-02-25 NOTE — PROGRESS NOTE ADULT - PROBLEM SELECTOR PLAN 5
IMPROVE: 3 on HSQ for DVT ppx  DIspo planning to [facility], sisters have chosen a location  dispo time 32 min

## 2019-02-25 NOTE — PROGRESS NOTE ADULT - PROBLEM SELECTOR PLAN 3
Patient assessed by dietitian to have severe protein calorie malnutrition, this is in accordance with her general appearance.   - c/w diet recommendations as per Nutrition team Pt not on meds, no psych followup as per sister.  Has been psychiatrically hospitalized many years ago.  Inpatient psych team following, pt currently not agitated, calm/cooperative  - OP f/u

## 2019-02-25 NOTE — PROGRESS NOTE ADULT - PROVIDER SPECIALTY LIST ADULT
Gastroenterology
Hospitalist
Infectious Disease
Hospitalist

## 2019-02-25 NOTE — PROGRESS NOTE ADULT - PROBLEM SELECTOR PLAN 1
Resolved; sepsis 2/2 ascending cholangitis s/p emergent ERCP   - Repeat BCx negative to date   - On IV ertapenem, pansensitive E coli, f/u ID re: abx choice, suspect 14 day course  - will need OP f/u with GI for repeat ERCP (see GI note 2/21/19)

## 2019-02-26 LAB
BACTERIA BLD CULT: SIGNIFICANT CHANGE UP
BACTERIA BLD CULT: SIGNIFICANT CHANGE UP

## 2019-11-03 ENCOUNTER — INPATIENT (INPATIENT)
Facility: HOSPITAL | Age: 84
LOS: 11 days | Discharge: NURSING, REHAB OR LONG-TERM CARE | End: 2019-11-15
Attending: INTERNAL MEDICINE | Admitting: INTERNAL MEDICINE
Payer: SELF-PAY

## 2019-11-03 VITALS
SYSTOLIC BLOOD PRESSURE: 139 MMHG | HEART RATE: 116 BPM | RESPIRATION RATE: 16 BRPM | DIASTOLIC BLOOD PRESSURE: 88 MMHG | TEMPERATURE: 98 F | OXYGEN SATURATION: 100 %

## 2019-11-03 DIAGNOSIS — Z96.642 PRESENCE OF LEFT ARTIFICIAL HIP JOINT: Chronic | ICD-10-CM

## 2019-11-03 PROCEDURE — 71045 X-RAY EXAM CHEST 1 VIEW: CPT | Mod: 26

## 2019-11-03 PROCEDURE — 73130 X-RAY EXAM OF HAND: CPT | Mod: 26,LT

## 2019-11-03 PROCEDURE — 73030 X-RAY EXAM OF SHOULDER: CPT | Mod: 26,LT

## 2019-11-03 PROCEDURE — 70450 CT HEAD/BRAIN W/O DYE: CPT | Mod: 26

## 2019-11-03 PROCEDURE — 73502 X-RAY EXAM HIP UNI 2-3 VIEWS: CPT | Mod: 26,RT

## 2019-11-03 NOTE — ED ADULT TRIAGE NOTE - CHIEF COMPLAINT QUOTE
Pt. from home with c/o unwitnessed fall. stating she landed she landed on her L arm; not sure what caused her to fall. As per sister she heard her fall and found her on the floor responsive. Noted with LAC to R forehead.

## 2019-11-03 NOTE — ED PROVIDER NOTE - CLINICAL SUMMARY MEDICAL DECISION MAKING FREE TEXT BOX
89yF accompanied by daughter with no significant pmh presents after unwitnessed fall with head trauma. Will evaluate with ekg, labs, UA, CT head, CXR, Xray L shoulder, Xray pelvis/R hip, pain management and reassess

## 2019-11-03 NOTE — ED PROVIDER NOTE - NS ED ROS FT
GENERAL: No fever or chills, EYES: no change in vision, HEENT: no trouble swallowing or speaking, CARDIAC: no chest pain, PULMONARY: no cough or SOB, GI: no abdominal pain, no nausea, no vomiting, no diarrhea or constipation, : No changes in urination, SKIN: no rashes, NEURO: no headache,    MSK: +L upper back  pain, R hip pain No joint pain ~Arik De Anda M.D. Resident

## 2019-11-03 NOTE — ED PROVIDER NOTE - PHYSICAL EXAMINATION
Gen: AAOx3, non-toxic  Head: NCAT, L forehead laceration  HEENT: EOMI, oral mucosa moist, normal conjunctiva  Lung: CTAB, no respiratory distress, speaking in full sentences  CV: RRR, no murmurs, rubs or gallops  Abd: soft, NTND, no guarding, no CVA tenderness  MSK: no visible deformities, ttp L posterior shoulder            Inability to elevate R leg 2/2 pain, ttp R hip  Neuro: No focal sensory or motor deficits  Skin: Warm, well perfused, no rash  Psych: normal affect.   ~Arik De Anda M.D. Resident
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
## 2019-11-03 NOTE — ED PROVIDER NOTE - PROGRESS NOTE DETAILS
Adilson LEWIS: Ortho has been consulted about fracture on Xray and will come evaluate patient Erik LEWIS: Pt signed out to me.  Initial XR showing distal radius fx with volar angulation.  Ortho consulted.  Dedicated wrist and forearm imaging done.  I spoke with radiology, who are unsure if this is an acute fracture.  Upon re-exam pt has no point tenderness to left wrist.  d/w ortho and contacted daughter who reports an old wrist fracture to that wrist.  Given this hx and no swelling or tenderness to wrist, will treat as a chronic fx.

## 2019-11-04 ENCOUNTER — TRANSCRIPTION ENCOUNTER (OUTPATIENT)
Age: 84
End: 2019-11-04

## 2019-11-04 DIAGNOSIS — S62.648A: ICD-10-CM

## 2019-11-04 DIAGNOSIS — E80.6 OTHER DISORDERS OF BILIRUBIN METABOLISM: ICD-10-CM

## 2019-11-04 DIAGNOSIS — Z29.9 ENCOUNTER FOR PROPHYLACTIC MEASURES, UNSPECIFIED: ICD-10-CM
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
## 2019-11-04 DIAGNOSIS — S92.919A UNSPECIFIED FRACTURE OF UNSPECIFIED TOE(S), INITIAL ENCOUNTER FOR CLOSED FRACTURE: ICD-10-CM

## 2019-11-04 DIAGNOSIS — Z02.9 ENCOUNTER FOR ADMINISTRATIVE EXAMINATIONS, UNSPECIFIED: ICD-10-CM

## 2019-11-04 DIAGNOSIS — E46 UNSPECIFIED PROTEIN-CALORIE MALNUTRITION: ICD-10-CM

## 2019-11-04 DIAGNOSIS — R74.8 ABNORMAL LEVELS OF OTHER SERUM ENZYMES: ICD-10-CM

## 2019-11-04 DIAGNOSIS — F20.9 SCHIZOPHRENIA, UNSPECIFIED: ICD-10-CM

## 2019-11-04 DIAGNOSIS — N18.2 CHRONIC KIDNEY DISEASE, STAGE 2 (MILD): ICD-10-CM

## 2019-11-04 PROBLEM — R26.9 UNSPECIFIED ABNORMALITIES OF GAIT AND MOBILITY: Chronic | Status: ACTIVE | Noted: 2019-02-20

## 2019-11-04 PROBLEM — L98.429 NON-PRESSURE CHRONIC ULCER OF BACK WITH UNSPECIFIED SEVERITY: Chronic | Status: ACTIVE | Noted: 2019-02-20

## 2019-11-04 LAB
ALBUMIN SERPL ELPH-MCNC: 3.9 G/DL — SIGNIFICANT CHANGE UP (ref 3.3–5)
ALP SERPL-CCNC: 299 U/L — HIGH (ref 40–120)
ALT FLD-CCNC: 18 U/L — SIGNIFICANT CHANGE UP (ref 4–33)
ANION GAP SERPL CALC-SCNC: 11 MMO/L — SIGNIFICANT CHANGE UP (ref 7–14)
APPEARANCE UR: CLEAR — SIGNIFICANT CHANGE UP
AST SERPL-CCNC: 26 U/L — SIGNIFICANT CHANGE UP (ref 4–32)
BACTERIA # UR AUTO: NEGATIVE — SIGNIFICANT CHANGE UP
BASOPHILS # BLD AUTO: 0.02 K/UL — SIGNIFICANT CHANGE UP (ref 0–0.2)
BASOPHILS NFR BLD AUTO: 0.2 % — SIGNIFICANT CHANGE UP (ref 0–2)
BILIRUB SERPL-MCNC: 1.4 MG/DL — HIGH (ref 0.2–1.2)
BILIRUB UR-MCNC: NEGATIVE — SIGNIFICANT CHANGE UP
BLOOD UR QL VISUAL: NEGATIVE — SIGNIFICANT CHANGE UP
BUN SERPL-MCNC: 26 MG/DL — HIGH (ref 7–23)
CALCIUM SERPL-MCNC: 9.9 MG/DL — SIGNIFICANT CHANGE UP (ref 8.4–10.5)
CHLORIDE SERPL-SCNC: 103 MMOL/L — SIGNIFICANT CHANGE UP (ref 98–107)
CO2 SERPL-SCNC: 28 MMOL/L — SIGNIFICANT CHANGE UP (ref 22–31)
COLOR SPEC: YELLOW — SIGNIFICANT CHANGE UP
CREAT SERPL-MCNC: 0.6 MG/DL — SIGNIFICANT CHANGE UP (ref 0.5–1.3)
EOSINOPHIL # BLD AUTO: 0.01 K/UL — SIGNIFICANT CHANGE UP (ref 0–0.5)
EOSINOPHIL NFR BLD AUTO: 0.1 % — SIGNIFICANT CHANGE UP (ref 0–6)
GLUCOSE SERPL-MCNC: 146 MG/DL — HIGH (ref 70–99)
GLUCOSE UR-MCNC: NEGATIVE — SIGNIFICANT CHANGE UP
HCT VFR BLD CALC: 40.8 % — SIGNIFICANT CHANGE UP (ref 34.5–45)
HGB BLD-MCNC: 12.9 G/DL — SIGNIFICANT CHANGE UP (ref 11.5–15.5)
HYALINE CASTS # UR AUTO: NEGATIVE — SIGNIFICANT CHANGE UP
IMM GRANULOCYTES NFR BLD AUTO: 0.6 % — SIGNIFICANT CHANGE UP (ref 0–1.5)
KETONES UR-MCNC: NEGATIVE — SIGNIFICANT CHANGE UP
LEUKOCYTE ESTERASE UR-ACNC: NEGATIVE — SIGNIFICANT CHANGE UP
LYMPHOCYTES # BLD AUTO: 0.42 K/UL — LOW (ref 1–3.3)
LYMPHOCYTES # BLD AUTO: 5 % — LOW (ref 13–44)
MCHC RBC-ENTMCNC: 31.6 % — LOW (ref 32–36)
MCHC RBC-ENTMCNC: 32.9 PG — SIGNIFICANT CHANGE UP (ref 27–34)
MCV RBC AUTO: 104.1 FL — HIGH (ref 80–100)
MONOCYTES # BLD AUTO: 0.93 K/UL — HIGH (ref 0–0.9)
MONOCYTES NFR BLD AUTO: 11 % — SIGNIFICANT CHANGE UP (ref 2–14)
NEUTROPHILS # BLD AUTO: 7.02 K/UL — SIGNIFICANT CHANGE UP (ref 1.8–7.4)
NEUTROPHILS NFR BLD AUTO: 83.1 % — HIGH (ref 43–77)
NITRITE UR-MCNC: NEGATIVE — SIGNIFICANT CHANGE UP
NRBC # FLD: 0 K/UL — SIGNIFICANT CHANGE UP (ref 0–0)
PH UR: 6 — SIGNIFICANT CHANGE UP (ref 5–8)
PLATELET # BLD AUTO: 197 K/UL — SIGNIFICANT CHANGE UP (ref 150–400)
PMV BLD: 8.7 FL — SIGNIFICANT CHANGE UP (ref 7–13)
POTASSIUM SERPL-MCNC: 3.7 MMOL/L — SIGNIFICANT CHANGE UP (ref 3.5–5.3)
POTASSIUM SERPL-SCNC: 3.7 MMOL/L — SIGNIFICANT CHANGE UP (ref 3.5–5.3)
PROT SERPL-MCNC: 6.7 G/DL — SIGNIFICANT CHANGE UP (ref 6–8.3)
PROT UR-MCNC: 30 — SIGNIFICANT CHANGE UP
RBC # BLD: 3.92 M/UL — SIGNIFICANT CHANGE UP (ref 3.8–5.2)
RBC # FLD: 14.6 % — HIGH (ref 10.3–14.5)
RBC CASTS # UR COMP ASSIST: SIGNIFICANT CHANGE UP (ref 0–?)
SODIUM SERPL-SCNC: 142 MMOL/L — SIGNIFICANT CHANGE UP (ref 135–145)
SP GR SPEC: 1.03 — SIGNIFICANT CHANGE UP (ref 1–1.04)
SQUAMOUS # UR AUTO: SIGNIFICANT CHANGE UP
UROBILINOGEN FLD QL: SIGNIFICANT CHANGE UP
WBC # BLD: 8.45 K/UL — SIGNIFICANT CHANGE UP (ref 3.8–10.5)
WBC # FLD AUTO: 8.45 K/UL — SIGNIFICANT CHANGE UP (ref 3.8–10.5)
WBC UR QL: SIGNIFICANT CHANGE UP (ref 0–?)

## 2019-11-04 PROCEDURE — 73110 X-RAY EXAM OF WRIST: CPT | Mod: 26,LT

## 2019-11-04 PROCEDURE — 99223 1ST HOSP IP/OBS HIGH 75: CPT

## 2019-11-04 PROCEDURE — 73090 X-RAY EXAM OF FOREARM: CPT | Mod: 26,LT

## 2019-11-04 PROCEDURE — 99024 POSTOP FOLLOW-UP VISIT: CPT

## 2019-11-04 RX ORDER — ENOXAPARIN SODIUM 100 MG/ML
30 INJECTION SUBCUTANEOUS DAILY
Refills: 0 | Status: DISCONTINUED | OUTPATIENT
Start: 2019-11-04 | End: 2019-11-07

## 2019-11-04 RX ORDER — TETANUS TOXOID, REDUCED DIPHTHERIA TOXOID AND ACELLULAR PERTUSSIS VACCINE, ADSORBED 5; 2.5; 8; 8; 2.5 [IU]/.5ML; [IU]/.5ML; UG/.5ML; UG/.5ML; UG/.5ML
0.5 SUSPENSION INTRAMUSCULAR ONCE
Refills: 0 | Status: COMPLETED | OUTPATIENT
Start: 2019-11-04 | End: 2019-11-04

## 2019-11-04 RX ORDER — MULTIVIT-MIN/FERROUS GLUCONATE 9 MG/15 ML
1 LIQUID (ML) ORAL DAILY
Refills: 0 | Status: DISCONTINUED | OUTPATIENT
Start: 2019-11-04 | End: 2019-11-04

## 2019-11-04 RX ORDER — TRAMADOL HYDROCHLORIDE 50 MG/1
25 TABLET ORAL EVERY 6 HOURS
Refills: 0 | Status: DISCONTINUED | OUTPATIENT
Start: 2019-11-04 | End: 2019-11-07

## 2019-11-04 RX ORDER — ACETAMINOPHEN 500 MG
650 TABLET ORAL EVERY 6 HOURS
Refills: 0 | Status: DISCONTINUED | OUTPATIENT
Start: 2019-11-04 | End: 2019-11-15

## 2019-11-04 RX ORDER — MORPHINE SULFATE 50 MG/1
4 CAPSULE, EXTENDED RELEASE ORAL ONCE
Refills: 0 | Status: DISCONTINUED | OUTPATIENT
Start: 2019-11-04 | End: 2019-11-04

## 2019-11-04 RX ORDER — MOXIFLOXACIN HYDROCHLORIDE TABLETS, 400 MG 400 MG/1
1 TABLET, FILM COATED ORAL
Qty: 0 | Refills: 0 | DISCHARGE

## 2019-11-04 RX ORDER — SODIUM CHLORIDE 9 MG/ML
1000 INJECTION INTRAMUSCULAR; INTRAVENOUS; SUBCUTANEOUS
Refills: 0 | Status: DISCONTINUED | OUTPATIENT
Start: 2019-11-04 | End: 2019-11-15

## 2019-11-04 RX ADMIN — TRAMADOL HYDROCHLORIDE 25 MILLIGRAM(S): 50 TABLET ORAL at 18:02

## 2019-11-04 RX ADMIN — Medication 1 TABLET(S): at 12:07

## 2019-11-04 RX ADMIN — TETANUS TOXOID, REDUCED DIPHTHERIA TOXOID AND ACELLULAR PERTUSSIS VACCINE, ADSORBED 0.5 MILLILITER(S): 5; 2.5; 8; 8; 2.5 SUSPENSION INTRAMUSCULAR at 00:15

## 2019-11-04 RX ADMIN — SODIUM CHLORIDE 75 MILLILITER(S): 9 INJECTION INTRAMUSCULAR; INTRAVENOUS; SUBCUTANEOUS at 12:08

## 2019-11-04 RX ADMIN — ENOXAPARIN SODIUM 30 MILLIGRAM(S): 100 INJECTION SUBCUTANEOUS at 12:08

## 2019-11-04 RX ADMIN — TRAMADOL HYDROCHLORIDE 25 MILLIGRAM(S): 50 TABLET ORAL at 17:16

## 2019-11-04 RX ADMIN — MORPHINE SULFATE 4 MILLIGRAM(S): 50 CAPSULE, EXTENDED RELEASE ORAL at 02:22

## 2019-11-04 NOTE — H&P ADULT - NSHPSOCIALHISTORY_GEN_ALL_CORE
no IVDU, tobacco or ETOH no IVDU, tobacco or ETOH  lives at home with sister, uses walker to ambulate

## 2019-11-04 NOTE — H&P ADULT - NSHPLABSRESULTS_GEN_ALL_CORE
admission labs personally reviewed: elevated alk phos, t bili, CKD2      EKG: self-reviewed, sinus tachycardia with LAD    CXR: self-reviewed, no focal consolidation admission labs personally reviewed: elevated alk phos, t bili, CKD2      EKG: self-reviewed, sinus tachycardia with LAD    CXR: self-reviewed, no new focal consolidation

## 2019-11-04 NOTE — H&P ADULT - PROBLEM SELECTOR PROBLEM 3
Alkaline phosphatase elevation Schizophrenia, unspecified type Closed nondisplaced fracture of proximal phalanx of middle finger, unspecified laterality, initial encounter

## 2019-11-04 NOTE — H&P ADULT - PROBLEM SELECTOR PLAN 5
-renally dose meds and monitor Cr -appears cachetic, nutrition consulted  -MCV elevated and will check B12 and folate

## 2019-11-04 NOTE — H&P ADULT - PROBLEM SELECTOR PLAN 8
Transitions of Care Status:  1.  Name of PCP: no PCP  2.  PCP Contacted on Admission: [ ] Y    [x ] N    3.  PCP contacted at Discharge: [ ] Y    [ ] N    [ ] N/A  4.  Post-Discharge Appointment Date and Location:  5.  Summary of Handoff given to PCP:
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
## 2019-11-04 NOTE — H&P ADULT - PROBLEM SELECTOR PLAN 6
-improve score 2 (age and limited mobility), dvt ppx: lovenox qd  -diet regular  -dispo: pending abn distension eval and PT eval -renally dose meds and monitor Cr

## 2019-11-04 NOTE — H&P ADULT - NSHPREVIEWOFSYSTEMS_GEN_ALL_CORE
REVIEW OF SYSTEMS:    CONSTITUTIONAL: No weakness, fevers or chills  EYES/ENT: No visual changes;  no throat pain   NECK: No pain or stiffness  RESPIRATORY: No cough, wheezing, hemoptysis; No shortness of breath  CARDIOVASCULAR: No chest pain or palpitations  GASTROINTESTINAL: No abdominal or epigastric pain. No nausea, vomiting, or hematemesis; No diarrhea or constipation. No melena or hematochezia.  GENITOURINARY: No dysuria, change in frequency or hematuria  NEUROLOGICAL: No numbness or weakness  SKIN: No itching, burning, rashes, or lesions   MSK: see above hpi   Psych: No depression   All other review of systems is negative unless indicated above.

## 2019-11-04 NOTE — H&P ADULT - NSHPPHYSICALEXAM_GEN_ALL_CORE
Vital Signs Last 24 Hrs  T(C): 36.7 (11-04-19 @ 08:58), Max: 36.8 (11-04-19 @ 01:06)  T(F): 98.1 (11-04-19 @ 08:58), Max: 98.2 (11-04-19 @ 01:06)  HR: 98 (11-04-19 @ 08:58) (53 - 116)  BP: 140/89 (11-04-19 @ 08:58) (137/75 - 153/85)  BP(mean): --  RR: 16 (11-04-19 @ 08:58) (16 - 18)  SpO2: 96% (11-04-19 @ 08:58) (93% - 100%)    GENERAL: NAD  HEENT: EOMI, MMM, no oropharyngeal lesions or erythema appreciated  Pulm: normal work of breathing, CTABL  CV: RRR, S1&S2+, no m/r/g appreciated  ABDOMEN: soft, nt, nd, no hepatosplenomegaly  MSK: nl ROM  EXTREMITIES:  no appreciable edema in b/l LE  Neuro: A&Ox3, no focal deficits  SKIN: warm and dry, no visible rash Vital Signs Last 24 Hrs  T(C): 36.7 (11-04-19 @ 08:58), Max: 36.8 (11-04-19 @ 01:06)  T(F): 98.1 (11-04-19 @ 08:58), Max: 98.2 (11-04-19 @ 01:06)  HR: 98 (11-04-19 @ 08:58) (53 - 116)  BP: 140/89 (11-04-19 @ 08:58) (137/75 - 153/85)  BP(mean): --  RR: 16 (11-04-19 @ 08:58) (16 - 18)  SpO2: 96% (11-04-19 @ 08:58) (93% - 100%)    GENERAL: NAD  HEENT: EOMI, MMM, no oropharyngeal lesions or erythema appreciated  Pulm: normal work of breathing, CTABL  CV: RRR, S1&S2+, no m/r/g appreciated  ABDOMEN: soft, distended, no rebound or guarding, non-tender  MSK: nl ROM  EXTREMITIES:  no appreciable edema in b/l LE  Neuro: A&Ox3, no focal deficits  SKIN: warm and dry, no visible rash Vital Signs Last 24 Hrs  T(C): 36.7 (11-04-19 @ 08:58), Max: 36.8 (11-04-19 @ 01:06)  T(F): 98.1 (11-04-19 @ 08:58), Max: 98.2 (11-04-19 @ 01:06)  HR: 98 (11-04-19 @ 08:58) (53 - 116)  BP: 140/89 (11-04-19 @ 08:58) (137/75 - 153/85)  BP(mean): --  RR: 16 (11-04-19 @ 08:58) (16 - 18)  SpO2: 96% (11-04-19 @ 08:58) (93% - 100%)    GENERAL: NAD  HEENT: EOMI, MMM, no oropharyngeal lesions or erythema appreciated  Pulm: normal work of breathing, CTABL  CV: RRR, S1&S2+, no m/r/g appreciated  ABDOMEN: soft, distended, no rebound or guarding, non-tender  MSK: left middle finger in splint, ROM in extremities intact   EXTREMITIES:  no appreciable edema in b/l LE  Neuro: A&Ox3, no focal deficits  SKIN: warm and dry, no visible rash

## 2019-11-04 NOTE — H&P ADULT - NSICDXFAMILYHX_GEN_ALL_CORE_FT
FAMILY HISTORY:  Family history of cerebrovascular accident (CVA), mother  Family history of diabetes mellitus, parents

## 2019-11-04 NOTE — H&P ADULT - PROBLEM SELECTOR PLAN 7
Transitions of Care Status:  1.  Name of PCP: no PCP  2.  PCP Contacted on Admission: [ ] Y    [x ] N    3.  PCP contacted at Discharge: [ ] Y    [ ] N    [ ] N/A  4.  Post-Discharge Appointment Date and Location:  5.  Summary of Handoff given to PCP: -improve score 2 (age and limited mobility), dvt ppx: lovenox qd  -diet regular  -dispo: pending abn distension eval and PT eval

## 2019-11-04 NOTE — H&P ADULT - PROBLEM SELECTOR PLAN 2
-in setting of inc alk phos (chronic) and abn distension   -will check RUQ US  -if bili continues to trend up, will c/s GI for eval given h/o choledocholithiasis and stent placement.   -needs outpt GI f/u for stent removal -in setting of inc alk phos (chronic) and abn distension   -will check RUQ US  -pending RUQ US results or if bili continues to trend up, will c/s GI for eval given h/o choledocholithiasis and stent placement.

## 2019-11-04 NOTE — H&P ADULT - PROBLEM SELECTOR PLAN 1
-admit after fall, appears to be mechanical in nature in setting of known gait instability. no symptoms prior to fall   -EKG sinus tachycardia, will give IVF and check orthostatics   -PT eval

## 2019-11-04 NOTE — DISCHARGE NOTE PROVIDER - HOSPITAL COURSE
89F h/o paranoid schizophrenia (managed off medication), cholelithiasis/choledocholithiasis, left hip arthroplasty, gait instability, presents after fall at home.  CT head negative Patient is a 89F h/o paranoid schizophrenia (managed off medication), cholelithiasis/choledocholithiasis, left hip arthroplasty, gait instability,    Presents after mechanical fall at home. Patient with +Phalanx fx and +Hyperbilirubinemia.     Ortho consulted for closed nondisplaced fracture of proximal phalanx of middle finger. splint placed. No need for orthopedic interventions    WBAT LUE. Plastics was also consulted . They recommend volar splint and outpatient follow up within 4 weeks.    Hyperbilirubinemia likely in setting of inc alk phos and abd distension ,RUQ US showed intrahepatic and extrahepatic biliary ductal dilation.     Small volume upper abdominal ascites . CT of  chest/abd/pelvis: Left lower lobe pulmonary embolus. Cholelithiasis and choledocholithiasis.     There was an age-indeterminate right pubic bone fracture. Bilateral LE US negative for DVT. Patient was started on a heparin drip.    GI consulted. Pt initally refusing stent removal, psych consulted for capacity, deemed not to have capacity and to defer to family     member next of kin: sister. Cardiology cleared for ERCP which was performed on 11/12. Eliquis initated on 11/13                orthostatics negative     PT eval: Rehab                                                         -improve score 2 (age and limited mobility),         PPX     -lovenox qd Patient is a 89F h/o paranoid schizophrenia (managed off medication), cholelithiasis/choledocholithiasis, left hip arthroplasty and gait instability    Present after mechanical fall at home. Orthostatics negative. Patient with +Phalanx fx and +Hyperbilirubinemia.     Ortho consulted for closed nondisplaced fracture of proximal phalanx of middle finger. splint placed. No need for orthopedic interventions    WBAT LUE. Plastics was also consulted . They recommend volar splint and outpatient follow up within 4 weeks.    Hyperbilirubinemia likely in setting of inc alk phos and abd distension ,RUQ US showed intrahepatic and extrahepatic biliary ductal dilation.     Small volume upper abdominal ascites . CT of  chest/abd/pelvis: Left lower lobe pulmonary embolus. Cholelithiasis and choledocholithiasis.     There was an age-indeterminate right pubic bone fracture. Bilateral LE US negative for DVT. Patient was started on a heparin drip.    GI consulted. Pt initally refused stent removal, psych consulted for capacity, deemed not to have capacity and deferred to family     member next of kin: sister. Cardiology cleared for ERCP. S/P ERCP w/ stent removal, on 11/12. Patient tolerated procedure well    Eliquis was  initiated on 11/13. HGB stable.        PT eval: Rehab

## 2019-11-04 NOTE — H&P ADULT - ASSESSMENT
89F no pmh accompanied by daughter, presents after fall at home, a/f fall eval, pain control and pending PT eval: 89F h/o paranoid schizophrenia (managed off medication), cholelithiasis/choledocholithiasis, left hip arthroplasty, gait instability, presents after fall at home and found to have abn distension in setting of inc alk phos and t bili, a/f eval, pain control and pending PT eval:

## 2019-11-04 NOTE — DISCHARGE NOTE PROVIDER - NSDCCPCAREPLAN_GEN_ALL_CORE_FT
PRINCIPAL DISCHARGE DIAGNOSIS  Diagnosis: Fall  Assessment and Plan of Treatment: PRINCIPAL DISCHARGE DIAGNOSIS  Diagnosis: Fall  Assessment and Plan of Treatment:       SECONDARY DISCHARGE DIAGNOSES  Diagnosis: Phalanx fracture, foot  Assessment and Plan of Treatment: result of your fall. You were followed by the orthopedic team during this admission. They recommended no need for surgical intervention    Diagnosis: Closed nondisplaced fracture of proximal phalanx of middle finger, unspecified laterality, initial encounter  Assessment and Plan of Treatment: A splint was placed. Please follow up with hand surgery within 4 weeks of discharge.    Diagnosis: Protein calorie malnutrition  Assessment and Plan of Treatment: encourage eating    Diagnosis: Schizophrenia, unspecified type  Assessment and Plan of Treatment: stable off medication. -appears cachetic      Diagnosis: Hyperbilirubinemia  Assessment and Plan of Treatment: Hyperbilirubinemia    Diagnosis: CKD (chronic kidney disease), stage II  Assessment and Plan of Treatment: renally dose meds and monitor Cr    Diagnosis: Pulmonary embolism  Assessment and Plan of Treatment: Continue Eliquis PRINCIPAL DISCHARGE DIAGNOSIS  Diagnosis: Fall  Assessment and Plan of Treatment: Rehab      SECONDARY DISCHARGE DIAGNOSES  Diagnosis: Fracture of phalanx of hand  Assessment and Plan of Treatment: result of your fall. You were followed by the orthopedic team during this admission. They recommended no need for surgical intervention. Hand surgery recommended placement of a splint. You will need to follow up outpatient within 4 weeks.    Diagnosis: Closed nondisplaced fracture of proximal phalanx of middle finger, unspecified laterality, initial encounter  Assessment and Plan of Treatment: A splint was placed. Please follow up with hand surgery within 4 weeks of discharge.    Diagnosis: Protein calorie malnutrition  Assessment and Plan of Treatment: encourage eating  b12 and folate normal.    Diagnosis: Schizophrenia, unspecified type  Assessment and Plan of Treatment: stable off medication. -appears cachetic      Diagnosis: Hyperbilirubinemia  Assessment and Plan of Treatment: Hyperbilirubinemia    Diagnosis: CKD (chronic kidney disease), stage II  Assessment and Plan of Treatment: renally dose meds and monitor Cr    Diagnosis: Pulmonary embolism  Assessment and Plan of Treatment: Continue Eliquis

## 2019-11-04 NOTE — H&P ADULT - PROBLEM SELECTOR PLAN 4
-appears cachetic, nutrition consulted -appears cachetic, nutrition consulted  -MCV elevated and will check B12 and folate stable off medication

## 2019-11-04 NOTE — DISCHARGE NOTE PROVIDER - NSDCMRMEDTOKEN_GEN_ALL_CORE_FT
calcium-vitamin D 500 mg-200 intl units oral tablet: 1 tab(s) orally once a day  Centrum oral tablet: 1 tab(s) orally once a day acetaminophen 325 mg oral tablet: 2 tab(s) orally every 6 hours, As needed, Mild Pain (1 - 3)  apixaban 5 mg oral tablet: 2 tab(s) orally every 12 hours  calcium-vitamin D 500 mg-200 intl units oral tablet: 1 tab(s) orally once a day  Centrum oral tablet: 1 tab(s) orally once a day  melatonin 3 mg oral tablet: 1 tab(s) orally once a day (at bedtime), As needed, Insomnia  metoprolol tartrate 25 mg oral tablet: 1 tab(s) orally 2 times a day  simvastatin 20 mg oral tablet: 1 tab(s) orally once a day (at bedtime)  traMADol 50 mg oral tablet: 0.5 tab(s) orally every 6 hours, As needed, moderate to severe pain (4-10)
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
## 2019-11-04 NOTE — CONSULT NOTE ADULT - SUBJECTIVE AND OBJECTIVE BOX
Orthopedic Surgery Consult Note    89yFemale presents with s/p mechanical fall. Patient endorses headstrike but no LOC. Denies radiation of pain. Denies numbness, tingling or burning in affected hand/fingers. Patient denies any other injuries. Denies pain to L wrist. Ortho consulted with concern for L distal radius fx on Xray. Per patients sister, patient had suffered previous L wrist fracture which was treated in a cast many years ago.     PAST MEDICAL & SURGICAL HISTORY:  Sacral ulcer: ~ resolved (during rehab)  Gait difficulty: ~ uses walker  History of total left hip replacement    Allergies    No Known Allergies    Intolerances          PE  Gen: NAD  LUE:  Skin intact, no deformity of wrist, + ecchymosis  Full painless ROM of wrist, NO +ttp about wrist, no ttp about elbow  Forearm comparments soft  Motor intact AIN/PIN/U  SILT M/U/R, 2+ rad    Imaging:  XR Left old well healed distal radius fx with volar angulation        A/P: 89yFemale with old left distal radius fracture. Currently on TTP on L wrist and has full ROM. No new orthopaedic injury at this time.   - No acute orthopaedic interventions  - WBAT LUE  -Ortho stable for DC Orthopedic Surgery Consult Note    89yFemale presents with s/p mechanical fall. Patient endorses headstrike but no LOC. Denies radiation of pain. Denies numbness, tingling or burning in affected hand/fingers. Patient denies any other injuries. Denies pain to L wrist. Ortho consulted with concern for L distal radius fx on Xray. Per patients sister, patient had suffered previous L wrist fracture which was treated in a cast many years ago.     PAST MEDICAL & SURGICAL HISTORY:  Sacral ulcer: ~ resolved (during rehab)  Gait difficulty: ~ uses walker  History of total left hip replacement    Allergies    No Known Allergies    Intolerances          PE  Gen: NAD  LUE:  Skin intact, no deformity of wrist, + ecchymosis  Full painless ROM of wrist, NO +ttp about wrist, no ttp about elbow  Forearm comparments soft  Motor intact AIN/PIN/U  SILT M/U/R, 2+ rad    Imaging:  XR Left old well healed distal radius fx with volar angulation        A/P: 89yFemale with old left distal radius fracture. Currently on TTP on L wrist and has full ROM. No new orthopaedic injury at this time. Also with L middle finger proximal phalynx fracture.  - No acute orthopaedic interventions for chronic healed distal radius fx  - WBAT LUE  - recommend hand surgery consult (Plastics) for proximal phalanx fracture  -Ortho stable for DC Orthopedic Surgery Consult Note    89yFemale presents with s/p mechanical fall. Patient endorses headstrike but no LOC. Denies radiation of pain. Denies numbness, tingling or burning in affected hand/fingers. Patient denies any other injuries. Denies pain to L wrist. Ortho consulted with concern for L distal radius fx on Xray. Per patients sister, patient had suffered previous L wrist fracture which was treated in a cast many years ago.     PAST MEDICAL & SURGICAL HISTORY:  Sacral ulcer: ~ resolved (during rehab)  Gait difficulty: ~ uses walker  History of total left hip replacement    Allergies    No Known Allergies    Intolerances          PE  Gen: NAD  LUE:  Skin intact, no deformity of wrist, + ecchymosis  Full painless ROM of wrist, NO +ttp about wrist, no ttp about elbow  Forearm comparments soft  Motor intact AIN/PIN/U  SILT M/U/R, 2+ rad    Imaging:  XR Left old well healed distal radius fx with volar angulation  L Middle finger proximal phalynx fx      A/P: 89yFemale with old left distal radius fracture. Currently on TTP on L wrist and has full ROM. No new orthopaedic injury at this time. Also with L middle finger proximal phalynx fracture.  - No acute orthopaedic interventions for chronic healed distal radius fx  - WBAT LUE  - recommend hand surgery consult (Plastics) for proximal phalanx fracture  -Ortho stable for DC

## 2019-11-04 NOTE — DISCHARGE NOTE PROVIDER - CARE PROVIDER_API CALL
Efrain Rudolph (DO)  Plastic Surgery  [address] [place] [address]  Phone: [phone]  Fax: [phone]  Follow Up Time: 1 month Efrain Rudolph (DO)  Plastic Surgery  [address] [place] [address]  Phone: [phone]  Fax: [phone]  Follow Up Time: 1 month

## 2019-11-05 LAB
ALBUMIN SERPL ELPH-MCNC: 3.3 G/DL — SIGNIFICANT CHANGE UP (ref 3.3–5)
ALP SERPL-CCNC: 227 U/L — HIGH (ref 40–120)
ALT FLD-CCNC: 14 U/L — SIGNIFICANT CHANGE UP (ref 4–33)
ANION GAP SERPL CALC-SCNC: 11 MMO/L — SIGNIFICANT CHANGE UP (ref 7–14)
APTT BLD: 33.1 SEC — SIGNIFICANT CHANGE UP (ref 27.5–36.3)
AST SERPL-CCNC: 21 U/L — SIGNIFICANT CHANGE UP (ref 4–32)
BACTERIA UR CULT: SIGNIFICANT CHANGE UP
BASOPHILS # BLD AUTO: 0.03 K/UL — SIGNIFICANT CHANGE UP (ref 0–0.2)
BASOPHILS NFR BLD AUTO: 0.6 % — SIGNIFICANT CHANGE UP (ref 0–2)
BILIRUB SERPL-MCNC: 1.7 MG/DL — HIGH (ref 0.2–1.2)
BUN SERPL-MCNC: 22 MG/DL — SIGNIFICANT CHANGE UP (ref 7–23)
CALCIUM SERPL-MCNC: 9.2 MG/DL — SIGNIFICANT CHANGE UP (ref 8.4–10.5)
CHLORIDE SERPL-SCNC: 108 MMOL/L — HIGH (ref 98–107)
CO2 SERPL-SCNC: 24 MMOL/L — SIGNIFICANT CHANGE UP (ref 22–31)
CREAT SERPL-MCNC: 0.54 MG/DL — SIGNIFICANT CHANGE UP (ref 0.5–1.3)
EOSINOPHIL # BLD AUTO: 0.07 K/UL — SIGNIFICANT CHANGE UP (ref 0–0.5)
EOSINOPHIL NFR BLD AUTO: 1.3 % — SIGNIFICANT CHANGE UP (ref 0–6)
FOLATE SERPL-MCNC: 15.6 NG/ML — SIGNIFICANT CHANGE UP (ref 4.7–20)
GLUCOSE SERPL-MCNC: 106 MG/DL — HIGH (ref 70–99)
HCT VFR BLD CALC: 39.5 % — SIGNIFICANT CHANGE UP (ref 34.5–45)
HGB BLD-MCNC: 12.2 G/DL — SIGNIFICANT CHANGE UP (ref 11.5–15.5)
IMM GRANULOCYTES NFR BLD AUTO: 0.2 % — SIGNIFICANT CHANGE UP (ref 0–1.5)
INR BLD: 1.17 — SIGNIFICANT CHANGE UP (ref 0.88–1.17)
LYMPHOCYTES # BLD AUTO: 1.06 K/UL — SIGNIFICANT CHANGE UP (ref 1–3.3)
LYMPHOCYTES # BLD AUTO: 20.3 % — SIGNIFICANT CHANGE UP (ref 13–44)
MAGNESIUM SERPL-MCNC: 2 MG/DL — SIGNIFICANT CHANGE UP (ref 1.6–2.6)
MCHC RBC-ENTMCNC: 30.9 % — LOW (ref 32–36)
MCHC RBC-ENTMCNC: 32.3 PG — SIGNIFICANT CHANGE UP (ref 27–34)
MCV RBC AUTO: 104.5 FL — HIGH (ref 80–100)
MONOCYTES # BLD AUTO: 0.65 K/UL — SIGNIFICANT CHANGE UP (ref 0–0.9)
MONOCYTES NFR BLD AUTO: 12.5 % — SIGNIFICANT CHANGE UP (ref 2–14)
NEUTROPHILS # BLD AUTO: 3.39 K/UL — SIGNIFICANT CHANGE UP (ref 1.8–7.4)
NEUTROPHILS NFR BLD AUTO: 65.1 % — SIGNIFICANT CHANGE UP (ref 43–77)
NRBC # FLD: 0 K/UL — SIGNIFICANT CHANGE UP (ref 0–0)
PHOSPHATE SERPL-MCNC: 2.4 MG/DL — LOW (ref 2.5–4.5)
PLATELET # BLD AUTO: 155 K/UL — SIGNIFICANT CHANGE UP (ref 150–400)
PMV BLD: 8.9 FL — SIGNIFICANT CHANGE UP (ref 7–13)
POTASSIUM SERPL-MCNC: 3.9 MMOL/L — SIGNIFICANT CHANGE UP (ref 3.5–5.3)
POTASSIUM SERPL-SCNC: 3.9 MMOL/L — SIGNIFICANT CHANGE UP (ref 3.5–5.3)
PROT SERPL-MCNC: 5.9 G/DL — LOW (ref 6–8.3)
PROTHROM AB SERPL-ACNC: 13.1 SEC — SIGNIFICANT CHANGE UP (ref 9.8–13.1)
RBC # BLD: 3.78 M/UL — LOW (ref 3.8–5.2)
RBC # FLD: 14.7 % — HIGH (ref 10.3–14.5)
SODIUM SERPL-SCNC: 143 MMOL/L — SIGNIFICANT CHANGE UP (ref 135–145)
SPECIMEN SOURCE: SIGNIFICANT CHANGE UP
VIT B12 SERPL-MCNC: 410 PG/ML — SIGNIFICANT CHANGE UP (ref 200–900)
WBC # BLD: 5.21 K/UL — SIGNIFICANT CHANGE UP (ref 3.8–10.5)
WBC # FLD AUTO: 5.21 K/UL — SIGNIFICANT CHANGE UP (ref 3.8–10.5)

## 2019-11-05 PROCEDURE — 76700 US EXAM ABDOM COMPLETE: CPT | Mod: 26

## 2019-11-05 RX ORDER — POTASSIUM PHOSPHATE, MONOBASIC POTASSIUM PHOSPHATE, DIBASIC 236; 224 MG/ML; MG/ML
15 INJECTION, SOLUTION INTRAVENOUS ONCE
Refills: 0 | Status: COMPLETED | OUTPATIENT
Start: 2019-11-05 | End: 2019-11-05

## 2019-11-05 RX ORDER — LANOLIN ALCOHOL/MO/W.PET/CERES
3 CREAM (GRAM) TOPICAL ONCE
Refills: 0 | Status: COMPLETED | OUTPATIENT
Start: 2019-11-05 | End: 2019-11-07

## 2019-11-05 RX ADMIN — Medication 1 TABLET(S): at 11:00

## 2019-11-05 RX ADMIN — POTASSIUM PHOSPHATE, MONOBASIC POTASSIUM PHOSPHATE, DIBASIC 62.5 MILLIMOLE(S): 236; 224 INJECTION, SOLUTION INTRAVENOUS at 10:54

## 2019-11-05 RX ADMIN — ENOXAPARIN SODIUM 30 MILLIGRAM(S): 100 INJECTION SUBCUTANEOUS at 11:00

## 2019-11-05 NOTE — PHYSICAL THERAPY INITIAL EVALUATION ADULT - MANUAL MUSCLE TESTING RESULTS, REHAB EVAL
cognition; Bilateral LEs grossly 3/5 throughout. Bilateral UEs grossly 3+/5 throughout/grossly assessed due to

## 2019-11-05 NOTE — PROGRESS NOTE ADULT - PROBLEM SELECTOR PLAN 2
US abdominal noted   will get CT chest/abdomen/pelvis   patient is extremely cachectic but states she has always been thin  refusing CT today   states will have it tomorrow

## 2019-11-05 NOTE — PHYSICAL THERAPY INITIAL EVALUATION ADULT - ADDITIONAL COMMENTS
Patient lives in a house with 4 steps to enter. Eight steps within. Patient lived with 2 sisters, whom are both of little help. Patient reports prior to admission, independence with a straight cane for ambulation.     X ray chest: No displaced rib fracture or pneumothorax. X ray left shoulder, hand and pelvis:  Fracture of proximal phalanx middle finger. Deformity of the distal radius, which may represent a chronic fracture deformity. The radiocarpal articulation is preserved. Correlate for point tenderness. No acute fracture involving the left shoulder, pelvis, or right hip.  Patient left semisupine in bed, all lines intact and RN Susana aware.

## 2019-11-05 NOTE — CHART NOTE - NSCHARTNOTEFT_GEN_A_CORE
NUTRITION SERVICES     Upon Nutritional Assessment by the Registered Dietitian your patient was determined to meet criteria/ has evidence of the following diagnosis/diagnoses:  [ ] Mild Protein Calorie Malnutrition   [ ] Moderate Protein Calorie Malnutrition   [X] Severe Protein Calorie Malnutrition   [ ] Unspecified Protein Calorie Malnutrition   [ ] Underweight / BMI <19  [ ] Morbid Obesity / BMI >40    Findings as based on:  •  Comprehensive nutritional assessment and consultation    Please refer to Initial Dietitian Evaluation via documents section of [facility] for further recommendations.    Rasheeda Mclean RD,CD/N,CDE

## 2019-11-05 NOTE — DIETITIAN INITIAL EVALUATION ADULT. - PHYSICAL APPEARANCE
.      Subcutaneous FAT LOSS:  [ ] Orbital fat pads region, [MILD-MODERATE] Buccal fat region, [ ]Triceps region,  [ ]Ribs region  MUSCLE WASTING: [MILD-MODERATE] Temples region, [SEVERE]  Clavicle region, [ ]Shoulder region, [ ]Scapula region, [SEVERE] Interosseous region,  [ ]thigh region, [ ]Calf region. underweight/.      Subcutaneous FAT LOSS:  [ ] Orbital fat pads region, [MILD-MODERATE] Buccal fat region, [ ]Triceps region,  [ ]Ribs region  MUSCLE WASTING: [MILD-MODERATE] Temples region, [SEVERE]  Clavicle region, [ ]Shoulder region, [ ]Scapula region, [SEVERE] Interosseous region,  [ ]thigh region, [ ]Calf region.

## 2019-11-05 NOTE — PHYSICAL THERAPY INITIAL EVALUATION ADULT - PATIENT PROFILE REVIEW, REHAB EVAL
PT orders received: Ambulate with assistance. Consult with RN Susana, patient may participate in PT evaluation./yes

## 2019-11-05 NOTE — DIETITIAN INITIAL EVALUATION ADULT. - ADD RECOMMEND
1. Continue with Regular diet and MVI Supplement for micronutrient coverage.  2. Pt refused Supplement at present.  Encourage PO intakes, assist with meals as needed.  3. Re-consult nutrition as needed.  4. MALNUTRITION ALERT --SEVERE.

## 2019-11-05 NOTE — DIETITIAN INITIAL EVALUATION ADULT. - OTHER INFO
Pt with h/o paranoid schizophrenia (managed off medication), cholelithiasis/choledocholithiasis, left hip arthroplasty, gait instability, presents after fall at home.      Pt appears cachectic, reports to completing 50% of meals in house and prior to admission. Denies chewing/swallowing difficulties. No nausea, vomiting. Pt states she is unsure of weight, therefore unable to state if she has lost any weight. Noted previous admission wt in 2/19--100#. Currently weighs 89#. Likely with weight loss based on inadequate po intakes and appearance of muscle and fat wasting. Discussed importance of adequate po intakes, suggested addition of nutritional supplement which Pt refused.

## 2019-11-05 NOTE — DIETITIAN INITIAL EVALUATION ADULT. - PROBLEM SELECTOR PLAN 7
-improve score 2 (age and limited mobility), dvt ppx: lovenox qd  -diet regular  -dispo: pending abn distension eval and PT eval

## 2019-11-05 NOTE — PHYSICAL THERAPY INITIAL EVALUATION ADULT - GENERAL OBSERVATIONS, REHAB EVAL
Patient received semisupine in bed, +heplock, in no apparent distress. Patient agreeable to participate in physical therapy evaluation.

## 2019-11-05 NOTE — DIETITIAN INITIAL EVALUATION ADULT. - PROBLEM SELECTOR PROBLEM 3
Closed nondisplaced fracture of proximal phalanx of middle finger, unspecified laterality, initial encounter

## 2019-11-05 NOTE — DIETITIAN INITIAL EVALUATION ADULT. - PROBLEM SELECTOR PLAN 2
-in setting of inc alk phos (chronic) and abn distension   -will check RUQ US  -pending RUQ US results or if bili continues to trend up, will c/s GI for eval given h/o choledocholithiasis and stent placement.

## 2019-11-05 NOTE — PHYSICAL THERAPY INITIAL EVALUATION ADULT - PERTINENT HX OF CURRENT PROBLEM, REHAB EVAL
Patient is a 89 year old female presents after fall at home and found to have abn distension in setting of increased alkaline phosphatase and total bilirubin. PMH: paranoid schizophrenia (managed off medication), cholelithiasis/choledocholithiasis, left hip arthroplasty, gait instability

## 2019-11-05 NOTE — CHART NOTE - NSCHARTNOTEFT_GEN_A_CORE
Plastics, Dr. Rudolph, consulted. Per discussion, it is a mildly angulated fracture. Given patient's severe degenerative disease and age, plan for conservative mgmt. Continue with volar splint for four weeks and plan for out patient follow up with Dr. Rudolph in 4 weeks.

## 2019-11-05 NOTE — PROGRESS NOTE ADULT - SUBJECTIVE AND OBJECTIVE BOX
Patient is a 89y old  Female who presents with a chief complaint of fall (2019 15:14)  patient not known to me, assigned this AM to assume care  chart reviewed and events thus far noted  admitted overnight by full time hospitalist service     SUBJECTIVE / OVERNIGHT EVENTS: overnight events noted    ROS:  Resp: No cough no sputum production  CVS: No chest pain no palpitations no orthopnea  GI: no N/V/D  : no dysuria, no hematuria  Neuro: no weakness no paresthesias  Heme: No petechiae no easy bruising  Msk: No joint pain no swelling left middle finger pain  Skin: No rash no itching        MEDICATIONS  (STANDING):  calcium carbonate 1250 mG  + Vitamin D (OsCal 500 + D) 1 Tablet(s) Oral daily  enoxaparin Injectable 30 milliGRAM(s) SubCutaneous daily  multivitamin 1 Tablet(s) Oral daily  sodium chloride 0.9%. 1000 milliLiter(s) (75 mL/Hr) IV Continuous <Continuous>    MEDICATIONS  (PRN):  acetaminophen   Tablet .. 650 milliGRAM(s) Oral every 6 hours PRN Mild Pain (1 - 3)  traMADol 25 milliGRAM(s) Oral every 6 hours PRN moderate to severe pain (4-10)        CAPILLARY BLOOD GLUCOSE        I&O's Summary      Vital Signs Last 24 Hrs  T(C): 36.9 (2019 14:59), Max: 36.9 (2019 20:45)  T(F): 98.4 (2019 14:59), Max: 98.5 (2019 20:45)  HR: 96 (2019 14:59) (81 - 96)  BP: 147/91 (2019 14:59) (114/72 - 155/92)  BP(mean): --  RR: 17 (2019 14:59) (16 - 17)  SpO2: 99% (2019 14:59) (95% - 99%)    PHYSICAL EXAM:  GENERAL: in no apparent distress  HEAD:  Atraumatic, Normocephalic  cachectic  EYES: EOMI, PERRLA, conjunctiva and sclera clear  NECK: Supple, No JVD  CHEST/LUNG: no wheeze, clear to auscultation bilaterally  decreased breath sounds at bases   HEART: S1 S2; soft ejection systolic murmur best heard at left sternal border no rub no gallop   ABDOMEN: Soft, Nontender, Nondistended; Bowel sounds present  EXTREMITIES:  No clubbing or cyanosis, + Peripheral Pulses,  no edema splint left hand middle finger  sarcopenic ++  PSYCH: AO x 3 appropriate affect  NEUROLOGY: non-focal, motor and sensory systems intact  SKIN: No rashes or lesions    LABS:                        12.2   5.21  )-----------( 155      ( 2019 05:15 )             39.5     11-05    143  |  108<H>  |  22  ----------------------------<  106<H>  3.9   |  24  |  0.54    Ca    9.2      2019 05:15  Phos  2.4       Mg     2.0     11    TPro  5.9<L>  /  Alb  3.3  /  TBili  1.7<H>  /  DBili  x   /  AST  21  /  ALT  14  /  AlkPhos  227<H>  11-05    PT/INR - ( 2019 05:15 )   PT: 13.1 SEC;   INR: 1.17          PTT - ( 2019 05:15 )  PTT:33.1 SEC      Urinalysis Basic - ( 2019 01:00 )    Color: YELLOW / Appearance: CLEAR / S.026 / pH: 6.0  Gluc: NEGATIVE / Ketone: NEGATIVE  / Bili: NEGATIVE / Urobili: TRACE   Blood: NEGATIVE / Protein: 30 / Nitrite: NEGATIVE   Leuk Esterase: NEGATIVE / RBC: 0-2 / WBC 0-2   Sq Epi: OCC / Non Sq Epi: x / Bacteria: NEGATIVE          All consultant(s) notes reviewed and care discussed with other providers        Contact Number, Dr Vivas [phone]

## 2019-11-05 NOTE — DIETITIAN INITIAL EVALUATION ADULT. - PERTINENT MEDS FT
MEDICATIONS  (STANDING):  calcium carbonate 1250 mG  + Vitamin D (OsCal 500 + D) 1 Tablet(s) Oral daily  enoxaparin Injectable 30 milliGRAM(s) SubCutaneous daily  multivitamin 1 Tablet(s) Oral daily  potassium phosphate IVPB 15 milliMole(s) IV Intermittent once  sodium chloride 0.9%. 1000 milliLiter(s) (75 mL/Hr) IV Continuous <Continuous> I will SWITCH the dose or number of times a day I take the medications listed below when I get home from the hospital:  None

## 2019-11-05 NOTE — PROGRESS NOTE ADULT - ASSESSMENT
89F h/o paranoid schizophrenia (managed off medication), cholelithiasis/choledocholithiasis, left hip arthroplasty, gait instability, presents after fall at home and found to have abn distension in setting of increased  alk phos and t bili, a/f eval, pain control and pending PT evaluation:

## 2019-11-06 LAB
ALBUMIN SERPL ELPH-MCNC: 3.6 G/DL — SIGNIFICANT CHANGE UP (ref 3.3–5)
ALP SERPL-CCNC: 218 U/L — HIGH (ref 40–120)
ALT FLD-CCNC: 11 U/L — SIGNIFICANT CHANGE UP (ref 4–33)
ANION GAP SERPL CALC-SCNC: 10 MMO/L — SIGNIFICANT CHANGE UP (ref 7–14)
AST SERPL-CCNC: 23 U/L — SIGNIFICANT CHANGE UP (ref 4–32)
BILIRUB SERPL-MCNC: 1.6 MG/DL — HIGH (ref 0.2–1.2)
BUN SERPL-MCNC: 18 MG/DL — SIGNIFICANT CHANGE UP (ref 7–23)
CALCIUM SERPL-MCNC: 9.3 MG/DL — SIGNIFICANT CHANGE UP (ref 8.4–10.5)
CHLORIDE SERPL-SCNC: 106 MMOL/L — SIGNIFICANT CHANGE UP (ref 98–107)
CO2 SERPL-SCNC: 25 MMOL/L — SIGNIFICANT CHANGE UP (ref 22–31)
CREAT SERPL-MCNC: 0.5 MG/DL — SIGNIFICANT CHANGE UP (ref 0.5–1.3)
GLUCOSE SERPL-MCNC: 104 MG/DL — HIGH (ref 70–99)
HCT VFR BLD CALC: 43.7 % — SIGNIFICANT CHANGE UP (ref 34.5–45)
HGB BLD-MCNC: 13.2 G/DL — SIGNIFICANT CHANGE UP (ref 11.5–15.5)
MAGNESIUM SERPL-MCNC: 2 MG/DL — SIGNIFICANT CHANGE UP (ref 1.6–2.6)
MCHC RBC-ENTMCNC: 30.2 % — LOW (ref 32–36)
MCHC RBC-ENTMCNC: 31.8 PG — SIGNIFICANT CHANGE UP (ref 27–34)
MCV RBC AUTO: 105.3 FL — HIGH (ref 80–100)
NRBC # FLD: 0 K/UL — SIGNIFICANT CHANGE UP (ref 0–0)
PHOSPHATE SERPL-MCNC: 2.5 MG/DL — SIGNIFICANT CHANGE UP (ref 2.5–4.5)
PLATELET # BLD AUTO: 177 K/UL — SIGNIFICANT CHANGE UP (ref 150–400)
PMV BLD: 9.2 FL — SIGNIFICANT CHANGE UP (ref 7–13)
POTASSIUM SERPL-MCNC: 4 MMOL/L — SIGNIFICANT CHANGE UP (ref 3.5–5.3)
POTASSIUM SERPL-SCNC: 4 MMOL/L — SIGNIFICANT CHANGE UP (ref 3.5–5.3)
PROT SERPL-MCNC: 6.3 G/DL — SIGNIFICANT CHANGE UP (ref 6–8.3)
RBC # BLD: 4.15 M/UL — SIGNIFICANT CHANGE UP (ref 3.8–5.2)
RBC # FLD: 14.6 % — HIGH (ref 10.3–14.5)
SODIUM SERPL-SCNC: 141 MMOL/L — SIGNIFICANT CHANGE UP (ref 135–145)
WBC # BLD: 5.39 K/UL — SIGNIFICANT CHANGE UP (ref 3.8–10.5)
WBC # FLD AUTO: 5.39 K/UL — SIGNIFICANT CHANGE UP (ref 3.8–10.5)

## 2019-11-06 PROCEDURE — 71260 CT THORAX DX C+: CPT | Mod: 26

## 2019-11-06 PROCEDURE — 74177 CT ABD & PELVIS W/CONTRAST: CPT | Mod: 26

## 2019-11-06 RX ADMIN — Medication 1 TABLET(S): at 12:13

## 2019-11-06 RX ADMIN — ENOXAPARIN SODIUM 30 MILLIGRAM(S): 100 INJECTION SUBCUTANEOUS at 12:13

## 2019-11-06 NOTE — PROGRESS NOTE ADULT - SUBJECTIVE AND OBJECTIVE BOX
Patient is a 89y old  Female who presents with a chief complaint of fall (05 Nov 2019 17:08)      SUBJECTIVE / OVERNIGHT EVENTS: overnight events noted    ROS:  Resp: No cough no sputum production  CVS: No chest pain no palpitations no orthopnea  GI: no N/V/D  : no dysuria, no hematuria  Neuro: no weakness no paresthesias  Heme: No petechiae no easy bruising  Msk: No joint pain no swelling  Skin: No rash no itching        MEDICATIONS  (STANDING):  calcium carbonate 1250 mG  + Vitamin D (OsCal 500 + D) 1 Tablet(s) Oral daily  enoxaparin Injectable 30 milliGRAM(s) SubCutaneous daily  melatonin 3 milliGRAM(s) Oral once  multivitamin 1 Tablet(s) Oral daily  sodium chloride 0.9%. 1000 milliLiter(s) (75 mL/Hr) IV Continuous <Continuous>    MEDICATIONS  (PRN):  acetaminophen   Tablet .. 650 milliGRAM(s) Oral every 6 hours PRN Mild Pain (1 - 3)  traMADol 25 milliGRAM(s) Oral every 6 hours PRN moderate to severe pain (4-10)        CAPILLARY BLOOD GLUCOSE        I&O's Summary      Vital Signs Last 24 Hrs  T(C): 36.8 (06 Nov 2019 05:59), Max: 36.9 (05 Nov 2019 14:59)  T(F): 98.3 (06 Nov 2019 05:59), Max: 98.4 (05 Nov 2019 14:59)  HR: 93 (06 Nov 2019 05:59) (88 - 96)  BP: 152/91 (06 Nov 2019 05:59) (147/91 - 152/91)  BP(mean): --  RR: 18 (06 Nov 2019 05:59) (17 - 18)  SpO2: 95% (06 Nov 2019 05:59) (95% - 99%)    PHYSICAL EXAM:  GENERAL: in no apparent distress  HEAD:  Atraumatic, Normocephalic  cachectic  EYES: EOMI, PERRLA, conjunctiva and sclera clear  NECK: Supple, No JVD  CHEST/LUNG: no wheeze, clear to auscultation bilaterally  decreased breath sounds at bases   HEART: S1 S2; soft ejection systolic murmur best heard at left sternal border no rub no gallop   ABDOMEN: Soft, Nontender, Nondistended; Bowel sounds present  EXTREMITIES:  No clubbing or cyanosis, + Peripheral Pulses,  no edema   splint left hand middle finger, sarcopenia+  PSYCH: AO x 3 appropriate affect  NEUROLOGY: non-focal, motor and sensory systems intact  SKIN: No rashes or lesions    LABS:                        13.2   5.39  )-----------( 177      ( 06 Nov 2019 07:48 )             43.7     11-06    141  |  106  |  18  ----------------------------<  104<H>  4.0   |  25  |  0.50    Ca    9.3      06 Nov 2019 07:48  Phos  2.5     11-06  Mg     2.0     11-06    TPro  6.3  /  Alb  3.6  /  TBili  1.6<H>  /  DBili  x   /  AST  23  /  ALT  11  /  AlkPhos  218<H>  11-06    PT/INR - ( 05 Nov 2019 05:15 )   PT: 13.1 SEC;   INR: 1.17          PTT - ( 05 Nov 2019 05:15 )  PTT:33.1 SEC            All consultant(s) notes reviewed and care discussed with other providers        Contact Number, Dr Vivas [phone]

## 2019-11-06 NOTE — PROGRESS NOTE ADULT - PROBLEM SELECTOR PLAN 2
US abdominal noted   will get CT chest/abdomen/pelvis   patient is extremely cachectic but states she has always been thin  agreeable for CT chest/abdomen/pelvis today

## 2019-11-06 NOTE — CHART NOTE - NSCHARTNOTEFT_GEN_A_CORE
d/w medical attending Dr. Ortega benson to order ct chest /abd and pelvis with contrast .. pt agreeable.  consent in chart. d/w pt sister

## 2019-11-07 DIAGNOSIS — I26.99 OTHER PULMONARY EMBOLISM WITHOUT ACUTE COR PULMONALE: ICD-10-CM

## 2019-11-07 LAB
ANION GAP SERPL CALC-SCNC: 11 MMO/L — SIGNIFICANT CHANGE UP (ref 7–14)
APTT BLD: 50.9 SEC — HIGH (ref 27.5–36.3)
BASOPHILS # BLD AUTO: 0.03 K/UL — SIGNIFICANT CHANGE UP (ref 0–0.2)
BASOPHILS NFR BLD AUTO: 0.6 % — SIGNIFICANT CHANGE UP (ref 0–2)
BUN SERPL-MCNC: 23 MG/DL — SIGNIFICANT CHANGE UP (ref 7–23)
CALCIUM SERPL-MCNC: 9 MG/DL — SIGNIFICANT CHANGE UP (ref 8.4–10.5)
CHLORIDE SERPL-SCNC: 105 MMOL/L — SIGNIFICANT CHANGE UP (ref 98–107)
CO2 SERPL-SCNC: 24 MMOL/L — SIGNIFICANT CHANGE UP (ref 22–31)
CREAT SERPL-MCNC: 0.53 MG/DL — SIGNIFICANT CHANGE UP (ref 0.5–1.3)
EOSINOPHIL # BLD AUTO: 0.11 K/UL — SIGNIFICANT CHANGE UP (ref 0–0.5)
EOSINOPHIL NFR BLD AUTO: 2.2 % — SIGNIFICANT CHANGE UP (ref 0–6)
GLUCOSE SERPL-MCNC: 101 MG/DL — HIGH (ref 70–99)
HCT VFR BLD CALC: 38.4 % — SIGNIFICANT CHANGE UP (ref 34.5–45)
HCT VFR BLD CALC: 38.6 % — SIGNIFICANT CHANGE UP (ref 34.5–45)
HGB BLD-MCNC: 12.1 G/DL — SIGNIFICANT CHANGE UP (ref 11.5–15.5)
HGB BLD-MCNC: 12.4 G/DL — SIGNIFICANT CHANGE UP (ref 11.5–15.5)
IMM GRANULOCYTES NFR BLD AUTO: 0.2 % — SIGNIFICANT CHANGE UP (ref 0–1.5)
LYMPHOCYTES # BLD AUTO: 0.94 K/UL — LOW (ref 1–3.3)
LYMPHOCYTES # BLD AUTO: 19 % — SIGNIFICANT CHANGE UP (ref 13–44)
MAGNESIUM SERPL-MCNC: 2 MG/DL — SIGNIFICANT CHANGE UP (ref 1.6–2.6)
MCHC RBC-ENTMCNC: 31.5 % — LOW (ref 32–36)
MCHC RBC-ENTMCNC: 32.1 % — SIGNIFICANT CHANGE UP (ref 32–36)
MCHC RBC-ENTMCNC: 32.6 PG — SIGNIFICANT CHANGE UP (ref 27–34)
MCHC RBC-ENTMCNC: 33.3 PG — SIGNIFICANT CHANGE UP (ref 27–34)
MCV RBC AUTO: 103.5 FL — HIGH (ref 80–100)
MCV RBC AUTO: 103.8 FL — HIGH (ref 80–100)
MONOCYTES # BLD AUTO: 0.58 K/UL — SIGNIFICANT CHANGE UP (ref 0–0.9)
MONOCYTES NFR BLD AUTO: 11.7 % — SIGNIFICANT CHANGE UP (ref 2–14)
NEUTROPHILS # BLD AUTO: 3.27 K/UL — SIGNIFICANT CHANGE UP (ref 1.8–7.4)
NEUTROPHILS NFR BLD AUTO: 66.3 % — SIGNIFICANT CHANGE UP (ref 43–77)
NRBC # FLD: 0 K/UL — SIGNIFICANT CHANGE UP (ref 0–0)
NRBC # FLD: 0 K/UL — SIGNIFICANT CHANGE UP (ref 0–0)
PHOSPHATE SERPL-MCNC: 2.9 MG/DL — SIGNIFICANT CHANGE UP (ref 2.5–4.5)
PLATELET # BLD AUTO: 172 K/UL — SIGNIFICANT CHANGE UP (ref 150–400)
PLATELET # BLD AUTO: 189 K/UL — SIGNIFICANT CHANGE UP (ref 150–400)
PMV BLD: 8.7 FL — SIGNIFICANT CHANGE UP (ref 7–13)
PMV BLD: 9.1 FL — SIGNIFICANT CHANGE UP (ref 7–13)
POTASSIUM SERPL-MCNC: 3.7 MMOL/L — SIGNIFICANT CHANGE UP (ref 3.5–5.3)
POTASSIUM SERPL-SCNC: 3.7 MMOL/L — SIGNIFICANT CHANGE UP (ref 3.5–5.3)
RBC # BLD: 3.71 M/UL — LOW (ref 3.8–5.2)
RBC # BLD: 3.72 M/UL — LOW (ref 3.8–5.2)
RBC # FLD: 14.3 % — SIGNIFICANT CHANGE UP (ref 10.3–14.5)
RBC # FLD: 14.5 % — SIGNIFICANT CHANGE UP (ref 10.3–14.5)
SODIUM SERPL-SCNC: 140 MMOL/L — SIGNIFICANT CHANGE UP (ref 135–145)
WBC # BLD: 4.24 K/UL — SIGNIFICANT CHANGE UP (ref 3.8–10.5)
WBC # BLD: 4.94 K/UL — SIGNIFICANT CHANGE UP (ref 3.8–10.5)
WBC # FLD AUTO: 4.24 K/UL — SIGNIFICANT CHANGE UP (ref 3.8–10.5)
WBC # FLD AUTO: 4.94 K/UL — SIGNIFICANT CHANGE UP (ref 3.8–10.5)

## 2019-11-07 PROCEDURE — 93970 EXTREMITY STUDY: CPT | Mod: 26

## 2019-11-07 RX ORDER — HEPARIN SODIUM 5000 [USP'U]/ML
3500 INJECTION INTRAVENOUS; SUBCUTANEOUS ONCE
Refills: 0 | Status: COMPLETED | OUTPATIENT
Start: 2019-11-07 | End: 2019-11-07

## 2019-11-07 RX ORDER — TRAMADOL HYDROCHLORIDE 50 MG/1
25 TABLET ORAL EVERY 6 HOURS
Refills: 0 | Status: DISCONTINUED | OUTPATIENT
Start: 2019-11-07 | End: 2019-11-14

## 2019-11-07 RX ORDER — HEPARIN SODIUM 5000 [USP'U]/ML
INJECTION INTRAVENOUS; SUBCUTANEOUS
Qty: 25000 | Refills: 0 | Status: DISCONTINUED | OUTPATIENT
Start: 2019-11-07 | End: 2019-11-13

## 2019-11-07 RX ORDER — HEPARIN SODIUM 5000 [USP'U]/ML
1500 INJECTION INTRAVENOUS; SUBCUTANEOUS EVERY 6 HOURS
Refills: 0 | Status: DISCONTINUED | OUTPATIENT
Start: 2019-11-07 | End: 2019-11-13

## 2019-11-07 RX ORDER — HEPARIN SODIUM 5000 [USP'U]/ML
3500 INJECTION INTRAVENOUS; SUBCUTANEOUS EVERY 6 HOURS
Refills: 0 | Status: DISCONTINUED | OUTPATIENT
Start: 2019-11-07 | End: 2019-11-13

## 2019-11-07 RX ADMIN — HEPARIN SODIUM 3500 UNIT(S): 5000 INJECTION INTRAVENOUS; SUBCUTANEOUS at 10:25

## 2019-11-07 RX ADMIN — Medication 1 TABLET(S): at 12:23

## 2019-11-07 RX ADMIN — HEPARIN SODIUM 800 UNIT(S)/HR: 5000 INJECTION INTRAVENOUS; SUBCUTANEOUS at 10:22

## 2019-11-07 RX ADMIN — HEPARIN SODIUM 1500 UNIT(S): 5000 INJECTION INTRAVENOUS; SUBCUTANEOUS at 18:11

## 2019-11-07 RX ADMIN — Medication 3 MILLIGRAM(S): at 01:41

## 2019-11-07 RX ADMIN — HEPARIN SODIUM 900 UNIT(S)/HR: 5000 INJECTION INTRAVENOUS; SUBCUTANEOUS at 18:11

## 2019-11-07 NOTE — CONSULT NOTE ADULT - ASSESSMENT
Impression:  89F h/o paranoid schizophrenia (managed off medication), cholelithiasis/choledocholithiasis, left hip arthroplasty, gait instability, presents after fall at home and found to have abn distension and mildly elevated TB/ALP.    #Mildly elevated liver tests - Ct with mild dilatation of biliary tree and no pneumobilia, could have stent occlusion, no signs of infection  # Pulmonary embolism on hep gtt, pending TTE  # Finger fracture    Recs:  - followup TTE to assess for heart strain/obtain cardiac clearance for eventual endoscopy for stent removal  - can tentatively plan for ERCP early next week  - antibiotics if patient develops white count or fever  - trend CBC, CMP, INR  - diet as tolerated today  - nausea, pain control
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
## 2019-11-07 NOTE — CHART NOTE - NSCHARTNOTEFT_GEN_A_CORE
Pulmonary Embolism  seen on CT as per medical attending ok to resume heparin  gtt with bolus.  Explained risk of bleeding.   Pt does not any hx of bleeding disorder or GI bleed.    D/w Radha Rouse [phone] pts sister.  she is in agreement with the plan  D/w Medical Attending Dr. LOZANO
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
## 2019-11-07 NOTE — PROGRESS NOTE ADULT - ASSESSMENT
89F h/o paranoid schizophrenia (managed off medication), cholelithiasis/choledocholithiasis, left hip arthroplasty, gait instability, presents after fall at home and found to have abn distension in setting of increased  alk phos and t bili, a/f eval, pain control and pending PT evaluation:     Problem/Plan - 1:  ·  Problem: Pulmonary embolism .  Plan: Heparin started . Ct chest noted. < from: CT Chest w/ IV Cont (11.06.19 @ 19:58) >  IMPRESSION:   Left lower lobe pulmonary embolus. Has IVC filter also.     < end of copied text >  Pulmonary and cardiology consulted.     TTE pending.      Problem/Plan - 2:  ·  Problem: Hyperbilirubinemia.  Plan: US abdominal noted   CT abdomen/pelvis noted. < from: CT Abdomen and Pelvis w/ IV Cont (11.06.19 @ 19:58) >  ABDOMEN AND PELVIS:    LIVER: Within normal limits.   BILE DUCTS: Minimal biliary dilatation. Plastic CBD stent.  GALLBLADDER: Cholelithiasis and choledocholithiasis.  SPLEEN: Within normal limits.   PANCREAS: Within normal limits.  ADRENALS: Within normal limits.   KIDNEYS/URETERS: Cysts and other lesions too small to characterize.     BLADDER: Within normal limits.   REPRODUCTIVE ORGANS: Within normal limits.     BOWEL: No bowel obstruction.  PERITONEUM: No ascites.   VESSELS:  IVC filter.   RETROPERITONEUM/LYMPH NODES: No lymphadenopathy.     ABDOMINAL WALL: Within normal limits.  BONES: Bilateral hip arthroplasty. Chronic rib fractures. Multiple   thoracolumbar compression fractures. Age indeterminate fracture right   pubic bone anteriorly.    < end of copied text >  Will consult [facility].      Problem/Plan - 3:  ·  Problem: Closed nondisplaced fracture of proximal phalanx of middle finger, unspecified laterality, initial encounter.  Plan: s/p splint for left middle finger distal fracture  hand surgery  follow up after discharge.      Problem/Plan - 4:  ·  Problem: Schizophrenia, unspecified type.  Plan: stable off medication.      Problem/Plan - 5:  ·  Problem: Protein calorie malnutrition.  Plan: encourage po  nutrition evaluation  b12 and folate normal.      Problem/Plan - 6:  Problem: CKD (chronic kidney disease), stage II. Plan: -renally dose meds and monitor Cr.     Problem/Plan - 7:  ·  Problem: Fall, initial encounter.  Plan: no evidence of syncope  fall precautions  PT evaluation in progress.
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
## 2019-11-07 NOTE — CONSULT NOTE ADULT - SUBJECTIVE AND OBJECTIVE BOX
Chief Complaint:  Patient is a 89y old  Female who presents with a chief complaint of fall (07 Nov 2019 14:13)      HPI:  89F h/o paranoid schizophrenia (managed off medication), cholelithiasis/choledocholithiasis, left hip arthroplasty, gait instability, presents after fall at home. States she was in the bathroom in which she felt like she was pushed falling face first and landing on her left side. Reports no symptoms prior to fall, no palpitations, dizziness, CP, SOB. Currently endorses left upper back and R hip pain but denies chest pain, abd pain, n/v, syncope, headache, changes in vision, fever. Denies changes in strength/sensation. Denies pain to L wrist. Ortho consulted with concern for L distal radius fx on Xray. Per patients sister, patient had suffered previous L wrist fracture which was treated in a cast many years ago.     Last admission in Feb 2019, treated for sepsis 2/2 cholangitis, s/p ERCP with biliary stent and sent to rehab. Per sister, did not f/u with GI for stent removal. Her PMD retired and patient without PMD currently. (04 Nov 2019 10:16)    GI consulted for retained stent.    Allergies:  No Known Allergies      Home Medications:  calcium-vitamin D 500 mg-200 intl units oral tablet: 1 tab(s) orally once a day (04 Nov 2019 11:06)  Centrum oral tablet: 1 tab(s) orally once a day (04 Nov 2019 11:06)      Hospital Medications:  acetaminophen   Tablet .. 650 milliGRAM(s) Oral every 6 hours PRN  calcium carbonate 1250 mG  + Vitamin D (OsCal 500 + D) 1 Tablet(s) Oral daily  heparin  Infusion.  Unit(s)/Hr IV Continuous <Continuous>  heparin  Injectable 3500 Unit(s) IV Push every 6 hours PRN  heparin  Injectable 1500 Unit(s) IV Push every 6 hours PRN  multivitamin 1 Tablet(s) Oral daily  sodium chloride 0.9%. 1000 milliLiter(s) IV Continuous <Continuous>  traMADol 25 milliGRAM(s) Oral every 6 hours PRN      PMHX/PSHX:  Sacral ulcer  Gait difficulty  No pertinent past medical history  History of total left hip replacement  No significant past surgical history      Family history:  Family history of cerebrovascular accident (CVA)  Family history of diabetes mellitus  No pertinent family history in first degree relatives      Social History: no etoh/drugs/tob    ROS:     General:  No wt loss, fevers, chills, night sweats, fatigue,   Eyes:  Good vision, no reported pain  ENT:  No sore throat, pain, runny nose, dysphagia  CV:  No pain, palpitations, hypo/hypertension  Resp:  No dyspnea, cough, tachypnea, wheezing  GI:  See HPI  :  No pain, bleeding, incontinence, nocturia  Muscle:  No pain, weakness  Neuro:  No weakness, tingling, memory problems  Psych:  No fatigue, insomnia, mood problems, depression  Endocrine:  No polyuria, polydipsia, cold/heat intolerance  Heme:  No petechiae, ecchymosis, easy bruisability  Skin:  No rash, edema      PHYSICAL EXAM:     GENERAL: NAD  HEENT:  NC/AT  CHEST:  Full & symmetric excursion, no increased effort  ABDOMEN:  Soft, non-tender, non-distended, +BS  EXTREMITIES:  no edema  SKIN:  No rash  NEURO:  Alert      Vital Signs:  Vital Signs Last 24 Hrs  T(C): 36.6 (07 Nov 2019 12:02), Max: 37.1 (06 Nov 2019 21:30)  T(F): 97.8 (07 Nov 2019 12:02), Max: 98.7 (06 Nov 2019 21:30)  HR: 60 (07 Nov 2019 12:02) (60 - 91)  BP: 158/65 (07 Nov 2019 12:02) (140/90 - 158/65)  BP(mean): --  RR: 17 (07 Nov 2019 12:02) (17 - 18)  SpO2: 99% (07 Nov 2019 12:02) (97% - 99%)  Daily     Daily     LABS:                        12.4   4.94  )-----------( 172      ( 07 Nov 2019 05:55 )             38.6     11-07    140  |  105  |  23  ----------------------------<  101<H>  3.7   |  24  |  0.53    Ca    9.0      07 Nov 2019 05:55  Phos  2.9     11-07  Mg     2.0     11-07    TPro  6.3  /  Alb  3.6  /  TBili  1.6<H>  /  DBili  x   /  AST  23  /  ALT  11  /  AlkPhos  218<H>  11-06    LIVER FUNCTIONS - ( 06 Nov 2019 07:48 )  Alb: 3.6 g/dL / Pro: 6.3 g/dL / ALK PHOS: 218 u/L / ALT: 11 u/L / AST: 23 u/L / GGT: x                   Imaging:  < from: CT Abdomen and Pelvis w/ IV Cont (11.06.19 @ 19:58) >    EXAM:  CT CHEST IC      EXAM:  CT ABDOMEN AND PELVIS IC        PROCEDURE DATE:  Nov 6 2019         INTERPRETATION:  CLINICAL INFORMATION: Hyperbilirubinemia.    COMPARISON: None.    PROCEDURE:   CT of the Chest, Abdomen and Pelvis was performed with intravenous   contrast.   Intravenous contrast: 90 ml Omnipaque 350. 10 ml discarded.  Oral contrast: None.  Sagittal and coronal reformats were performed.    FINDINGS:    CHEST:     LUNGS AND LARGE AIRWAYS: Patent central airways. Calcified granulomas.   Scattered reticular opacities..   PLEURA: Small pleural effusions.  VESSELS: Ascending aorta is enlarged measuring 4.2 cm. Left lower lobe   segmental pulmonary embolus.  HEART: Heart size is normal.  No pericardial effusion.  MEDIASTINUM AND JENSEN: No lymphadenopathy.   CHEST WALL AND LOWER NECK: Within normal limits.     ABDOMEN AND PELVIS:    LIVER: Within normal limits.   BILE DUCTS: Minimal biliary dilatation. Plastic CBD stent.  GALLBLADDER: Cholelithiasis and choledocholithiasis.  SPLEEN: Within normal limits.   PANCREAS: Within normal limits.  ADRENALS: Within normal limits.   KIDNEYS/URETERS: Cysts and other lesions too small to characterize.     BLADDER: Within normal limits.   REPRODUCTIVE ORGANS: Within normal limits.     BOWEL: No bowel obstruction.  PERITONEUM: No ascites.   VESSELS:  IVC filter.   RETROPERITONEUM/LYMPH NODES: No lymphadenopathy.     ABDOMINAL WALL: Within normal limits.  BONES: Bilateral hip arthroplasty. Chronic rib fractures. Multiple   thoracolumbar compression fractures. Age indeterminate fracture right   pubic bone anteriorly.    IMPRESSION:   Left lower lobe pulmonary embolus.    Cholelithiasis and choledocholithiasis. The descending place with mild   dilatation. No pneumobilia.    Age-indeterminate right pubic bone fracture.    The findings were discussed with JEN PIMENTEL 11/7/2019 9:07 AM   with read back confirmation.      LISSY ISLAS M.D., ATTENDING RADIOLOGIST  This document has been electronically signed.Nov 7 2019  9:13AM                  < end of copied text >

## 2019-11-07 NOTE — PROGRESS NOTE ADULT - SUBJECTIVE AND OBJECTIVE BOX
INTERVAL HPI/OVERNIGHT EVENTS: I have pain everywhere and left finger.  Eating lunch .   Vital Signs Last 24 Hrs  T(C): 36.6 (07 Nov 2019 12:02), Max: 37.1 (06 Nov 2019 21:30)  T(F): 97.8 (07 Nov 2019 12:02), Max: 98.7 (06 Nov 2019 21:30)  HR: 60 (07 Nov 2019 12:02) (60 - 91)  BP: 158/65 (07 Nov 2019 12:02) (140/90 - 158/65)  BP(mean): --  RR: 17 (07 Nov 2019 12:02) (17 - 18)  SpO2: 99% (07 Nov 2019 12:02) (97% - 99%)  I&O's Summary    MEDICATIONS  (STANDING):  calcium carbonate 1250 mG  + Vitamin D (OsCal 500 + D) 1 Tablet(s) Oral daily  heparin  Infusion.  Unit(s)/Hr (8 mL/Hr) IV Continuous <Continuous>  multivitamin 1 Tablet(s) Oral daily  sodium chloride 0.9%. 1000 milliLiter(s) (75 mL/Hr) IV Continuous <Continuous>    MEDICATIONS  (PRN):  acetaminophen   Tablet .. 650 milliGRAM(s) Oral every 6 hours PRN Mild Pain (1 - 3)  heparin  Injectable 3500 Unit(s) IV Push every 6 hours PRN For aPTT less than 40  heparin  Injectable 1500 Unit(s) IV Push every 6 hours PRN For aPTT between 40 - 57  traMADol 25 milliGRAM(s) Oral every 6 hours PRN moderate to severe pain (4-10)    LABS:                        12.4   4.94  )-----------( 172      ( 07 Nov 2019 05:55 )             38.6     11-07    140  |  105  |  23  ----------------------------<  101<H>  3.7   |  24  |  0.53    Ca    9.0      07 Nov 2019 05:55  Phos  2.9     11-07  Mg     2.0     11-07    TPro  6.3  /  Alb  3.6  /  TBili  1.6<H>  /  DBili  x   /  AST  23  /  ALT  11  /  AlkPhos  218<H>  11-06        CAPILLARY BLOOD GLUCOSE                Consultant(s) Notes Reviewed:  [x ] YES  [ ] NO    PHYSICAL EXAM:  GENERAL: NAD, well-groomed, well-developed,not in any distress ,  HEAD:  Atraumatic, Normocephalic  EYES: EOMI, PERRLA, conjunctiva and sclera clear  ENMT: No tonsillar erythema, exudates, or enlargement; Moist mucous membranes, Good dentition, No lesions  NECK: Supple, No JVD, Normal thyroid  NERVOUS SYSTEM:  Alert & Oriented X2 to 3, No focal deficit   CHEST/LUNG: Good air entry bilateral with no  rales, rhonchi, wheezing, or rubs  HEART: Regular rate and rhythm; No murmurs, rubs, or gallops  ABDOMEN: Soft, Nontender, Nondistended; Bowel sounds present  EXTREMITIES:  2+ Peripheral Pulses, No clubbing, cyanosis, or edema    Care Discussed with Consultants/Other Providers [ x] YES  [ ] NO

## 2019-11-07 NOTE — CONSULT NOTE ADULT - ATTENDING COMMENTS
Agree with above  Admitted s/p fall found to have PE  Pt. HD stable  Check TTE to eval RV function  AC per primary team    Shirley Murray MD

## 2019-11-07 NOTE — CONSULT NOTE ADULT - ASSESSMENT
89F h/o paranoid schizophrenia (managed off medication), cholelithiasis/choledocholithiasis, left hip arthroplasty, gait instability, presents after fall at home and found to have abn distension in setting of inc alk phos and t bili, a/f eval, pain control and pending PT eval:

## 2019-11-08 LAB
ALBUMIN SERPL ELPH-MCNC: 3.3 G/DL — SIGNIFICANT CHANGE UP (ref 3.3–5)
ALP SERPL-CCNC: 208 U/L — HIGH (ref 40–120)
ALT FLD-CCNC: 12 U/L — SIGNIFICANT CHANGE UP (ref 4–33)
ANION GAP SERPL CALC-SCNC: 12 MMO/L — SIGNIFICANT CHANGE UP (ref 7–14)
APTT BLD: 100.8 SEC — HIGH (ref 27.5–36.3)
APTT BLD: 55.8 SEC — HIGH (ref 27.5–36.3)
APTT BLD: 58 SEC — HIGH (ref 27.5–36.3)
APTT BLD: 71.1 SEC — HIGH (ref 27.5–36.3)
APTT BLD: 90.4 SEC — HIGH (ref 27.5–36.3)
AST SERPL-CCNC: 16 U/L — SIGNIFICANT CHANGE UP (ref 4–32)
BILIRUB SERPL-MCNC: 1.1 MG/DL — SIGNIFICANT CHANGE UP (ref 0.2–1.2)
BUN SERPL-MCNC: 27 MG/DL — HIGH (ref 7–23)
CALCIUM SERPL-MCNC: 9.1 MG/DL — SIGNIFICANT CHANGE UP (ref 8.4–10.5)
CHLORIDE SERPL-SCNC: 106 MMOL/L — SIGNIFICANT CHANGE UP (ref 98–107)
CO2 SERPL-SCNC: 24 MMOL/L — SIGNIFICANT CHANGE UP (ref 22–31)
CREAT SERPL-MCNC: 0.54 MG/DL — SIGNIFICANT CHANGE UP (ref 0.5–1.3)
GLUCOSE SERPL-MCNC: 105 MG/DL — HIGH (ref 70–99)
HCT VFR BLD CALC: 40.7 % — SIGNIFICANT CHANGE UP (ref 34.5–45)
HGB BLD-MCNC: 12.8 G/DL — SIGNIFICANT CHANGE UP (ref 11.5–15.5)
INR BLD: 1.07 — SIGNIFICANT CHANGE UP (ref 0.88–1.17)
MAGNESIUM SERPL-MCNC: 2.1 MG/DL — SIGNIFICANT CHANGE UP (ref 1.6–2.6)
MCHC RBC-ENTMCNC: 31.4 % — LOW (ref 32–36)
MCHC RBC-ENTMCNC: 32.6 PG — SIGNIFICANT CHANGE UP (ref 27–34)
MCV RBC AUTO: 103.6 FL — HIGH (ref 80–100)
NRBC # FLD: 0 K/UL — SIGNIFICANT CHANGE UP (ref 0–0)
PHOSPHATE SERPL-MCNC: 2.9 MG/DL — SIGNIFICANT CHANGE UP (ref 2.5–4.5)
PLATELET # BLD AUTO: 190 K/UL — SIGNIFICANT CHANGE UP (ref 150–400)
PMV BLD: 9.1 FL — SIGNIFICANT CHANGE UP (ref 7–13)
POTASSIUM SERPL-MCNC: 3.9 MMOL/L — SIGNIFICANT CHANGE UP (ref 3.5–5.3)
POTASSIUM SERPL-SCNC: 3.9 MMOL/L — SIGNIFICANT CHANGE UP (ref 3.5–5.3)
PROT SERPL-MCNC: 6.1 G/DL — SIGNIFICANT CHANGE UP (ref 6–8.3)
PROTHROM AB SERPL-ACNC: 12.2 SEC — SIGNIFICANT CHANGE UP (ref 9.8–13.1)
RBC # BLD: 3.93 M/UL — SIGNIFICANT CHANGE UP (ref 3.8–5.2)
RBC # FLD: 14.3 % — SIGNIFICANT CHANGE UP (ref 10.3–14.5)
SODIUM SERPL-SCNC: 142 MMOL/L — SIGNIFICANT CHANGE UP (ref 135–145)
WBC # BLD: 4.86 K/UL — SIGNIFICANT CHANGE UP (ref 3.8–10.5)
WBC # FLD AUTO: 4.86 K/UL — SIGNIFICANT CHANGE UP (ref 3.8–10.5)

## 2019-11-08 PROCEDURE — 93306 TTE W/DOPPLER COMPLETE: CPT | Mod: 26

## 2019-11-08 RX ADMIN — Medication 1 TABLET(S): at 13:18

## 2019-11-08 RX ADMIN — TRAMADOL HYDROCHLORIDE 25 MILLIGRAM(S): 50 TABLET ORAL at 21:51

## 2019-11-08 RX ADMIN — TRAMADOL HYDROCHLORIDE 25 MILLIGRAM(S): 50 TABLET ORAL at 22:30

## 2019-11-08 RX ADMIN — HEPARIN SODIUM 900 UNIT(S)/HR: 5000 INJECTION INTRAVENOUS; SUBCUTANEOUS at 00:55

## 2019-11-08 RX ADMIN — HEPARIN SODIUM 1000 UNIT(S)/HR: 5000 INJECTION INTRAVENOUS; SUBCUTANEOUS at 08:03

## 2019-11-08 RX ADMIN — HEPARIN SODIUM 900 UNIT(S)/HR: 5000 INJECTION INTRAVENOUS; SUBCUTANEOUS at 21:50

## 2019-11-08 RX ADMIN — HEPARIN SODIUM 1000 UNIT(S)/HR: 5000 INJECTION INTRAVENOUS; SUBCUTANEOUS at 15:04

## 2019-11-08 NOTE — PROGRESS NOTE ADULT - PROBLEM SELECTOR PLAN 1
cont AC: she has IVC filer also from before: so far she has no sign sof bleeding: for ercp next week

## 2019-11-08 NOTE — PROGRESS NOTE ADULT - SUBJECTIVE AND OBJECTIVE BOX
Patient is a 89y old  Female who presents with a chief complaint of fall (08 Nov 2019 12:23)      Any change in ROS: no SOB, alert and awake    MEDICATIONS  (STANDING):  calcium carbonate 1250 mG  + Vitamin D (OsCal 500 + D) 1 Tablet(s) Oral daily  heparin  Infusion.  Unit(s)/Hr (8 mL/Hr) IV Continuous <Continuous>  multivitamin 1 Tablet(s) Oral daily  sodium chloride 0.9%. 1000 milliLiter(s) (75 mL/Hr) IV Continuous <Continuous>    MEDICATIONS  (PRN):  acetaminophen   Tablet .. 650 milliGRAM(s) Oral every 6 hours PRN Mild Pain (1 - 3)  heparin  Injectable 3500 Unit(s) IV Push every 6 hours PRN For aPTT less than 40  heparin  Injectable 1500 Unit(s) IV Push every 6 hours PRN For aPTT between 40 - 57  traMADol 25 milliGRAM(s) Oral every 6 hours PRN moderate to severe pain (4-10)    Vital Signs Last 24 Hrs  T(C): 36.8 (08 Nov 2019 12:31), Max: 36.8 (08 Nov 2019 12:31)  T(F): 98.2 (08 Nov 2019 12:31), Max: 98.2 (08 Nov 2019 12:31)  HR: 60 (08 Nov 2019 12:31) (60 - 103)  BP: 157/82 (08 Nov 2019 12:31) (132/90 - 160/106)  BP(mean): --  RR: 17 (08 Nov 2019 12:31) (17 - 18)  SpO2: 99% (08 Nov 2019 12:31) (98% - 99%)    I&O's Summary        Physical Exam:   GENERAL: NAD, well-groomed, well-developed  HEENT: CO[place]/   Atraumatic, Normocephalic  ENMT: No tonsillar erythema, exudates, or enlargement; Moist mucous membranes, Good dentition, No lesions  NECK: Supple, No JVD, Normal thyroid  CHEST/LUNG: Clear to auscultaion, ; No rales, rhonchi, wheezing, or rubs  CVS: Regular rate and rhythm; No murmurs, rubs, or gallops  GI: : Soft, Nontender, Nondistended; Bowel sounds present  NERVOUS SYSTEM:  Alert & Oriented X3  EXTREMITIES:  2+ Peripheral Pulses, No clubbing, cyanosis, or edema  LYMPH: No lymphadenopathy noted  SKIN: No rashes or lesions  ENDOCRINOLOGY: No Thyromegaly  PSYCH: Appropriate    Labs:                              12.8   4.86  )-----------( 190      ( 08 Nov 2019 06:57 )             40.7                         12.1   4.24  )-----------( 189      ( 07 Nov 2019 16:48 )             38.4                         12.4   4.94  )-----------( 172      ( 07 Nov 2019 05:55 )             38.6                         13.2   5.39  )-----------( 177      ( 06 Nov 2019 07:48 )             43.7                         12.2   5.21  )-----------( 155      ( 05 Nov 2019 05:15 )             39.5     11-08    142  |  106  |  27<H>  ----------------------------<  105<H>  3.9   |  24  |  0.54  11-07    140  |  105  |  23  ----------------------------<  101<H>  3.7   |  24  |  0.53  11-06    141  |  106  |  18  ----------------------------<  104<H>  4.0   |  25  |  0.50  11-05    143  |  108<H>  |  22  ----------------------------<  106<H>  3.9   |  24  |  0.54    Ca    9.1      08 Nov 2019 06:57  Ca    9.0      07 Nov 2019 05:55  Phos  2.9     11-08  Phos  2.9     11-07  Mg     2.1     11-08  Mg     2.0     11-07    TPro  6.1  /  Alb  3.3  /  TBili  1.1  /  DBili  x   /  AST  16  /  ALT  12  /  AlkPhos  208<H>  11-08  TPro  6.3  /  Alb  3.6  /  TBili  1.6<H>  /  DBili  x   /  AST  23  /  ALT  11  /  AlkPhos  218<H>  11-06  TPro  5.9<L>  /  Alb  3.3  /  TBili  1.7<H>  /  DBili  x   /  AST  21  /  ALT  14  /  AlkPhos  227<H>  11-05    CAPILLARY BLOOD GLUCOSE          LIVER FUNCTIONS - ( 08 Nov 2019 06:57 )  Alb: 3.3 g/dL / Pro: 6.1 g/dL / ALK PHOS: 208 u/L / ALT: 12 u/L / AST: 16 u/L / GGT: x           PT/INR - ( 08 Nov 2019 06:57 )   PT: 12.2 SEC;   INR: 1.07          PTT - ( 08 Nov 2019 06:57 )  PTT:58.0 SEC          RECENT CULTURES:  11-04 @ 01:09 URINE MIDSTREAM         < from: US Duplex Venous Lower Ext Complete, Bilateral (11.07.19 @ 17:38) >      < end of copied text >  < from: US Duplex Venous Lower Ext Complete, Bilateral (11.07.19 @ 17:38) >  No calf vein thrombosis is detected.    IMPRESSION:     No evidence of deep venous thrombosis in either lower extremity.    < end of copied text >                 RESPIRATORY CULTURES:          Studies  Chest X-RAY  CT SCAN Chest   Venous Dopplers: LE:   CT Abdomen  Others  < from: CT Chest w/ IV Cont (11.06.19 @ 19:58) >  KIDNEYS/URETERS: Cysts and other lesions too small to characterize.     BLADDER: Within normal limits.   REPRODUCTIVE ORGANS: Within normal limits.     BOWEL: No bowel obstruction.  PERITONEUM: No ascites.   VESSELS:  IVC filter.   RETROPERITONEUM/LYMPH NODES: No lymphadenopathy.     ABDOMINAL WALL: Within normal limits.  BONES: Bilateral hip arthroplasty. Chronic rib fractures. Multiple   thoracolumbar compression fractures. Age indeterminate fracture right   pubic bone anteriorly.    IMPRESSION:   Left lower lobe pulmonary embolus.    Cholelithiasis and choledocholithiasis. The descending place with mild   dilatation. No pneumobilia.    Age-indeterminate right pubic bone fracture.    The findings were discussed with JEN PIMENTEL 11/7/2019 9:07 AM   with read back confirmation.            < end of copied text >

## 2019-11-08 NOTE — PROGRESS NOTE ADULT - SUBJECTIVE AND OBJECTIVE BOX
INTERVAL HPI/OVERNIGHT EVENTS:I feel fine.   Vital Signs Last 24 Hrs  T(C): 36.4 (08 Nov 2019 06:44), Max: 36.6 (07 Nov 2019 12:02)  T(F): 97.5 (08 Nov 2019 06:44), Max: 97.8 (07 Nov 2019 12:02)  HR: 96 (08 Nov 2019 06:44) (60 - 96)  BP: 160/106 (08 Nov 2019 06:44) (132/90 - 160/106)  BP(mean): --  RR: 18 (08 Nov 2019 06:44) (17 - 18)  SpO2: 99% (08 Nov 2019 06:44) (98% - 99%)  I&O's Summary    MEDICATIONS  (STANDING):  calcium carbonate 1250 mG  + Vitamin D (OsCal 500 + D) 1 Tablet(s) Oral daily  heparin  Infusion.  Unit(s)/Hr (8 mL/Hr) IV Continuous <Continuous>  multivitamin 1 Tablet(s) Oral daily  sodium chloride 0.9%. 1000 milliLiter(s) (75 mL/Hr) IV Continuous <Continuous>    MEDICATIONS  (PRN):  acetaminophen   Tablet .. 650 milliGRAM(s) Oral every 6 hours PRN Mild Pain (1 - 3)  heparin  Injectable 3500 Unit(s) IV Push every 6 hours PRN For aPTT less than 40  heparin  Injectable 1500 Unit(s) IV Push every 6 hours PRN For aPTT between 40 - 57  traMADol 25 milliGRAM(s) Oral every 6 hours PRN moderate to severe pain (4-10)    LABS:                        12.8   4.86  )-----------( 190      ( 08 Nov 2019 06:57 )             40.7     11-08    142  |  106  |  27<H>  ----------------------------<  105<H>  3.9   |  24  |  0.54    Ca    9.1      08 Nov 2019 06:57  Phos  2.9     11-08  Mg     2.1     11-08    TPro  6.1  /  Alb  3.3  /  TBili  1.1  /  DBili  x   /  AST  16  /  ALT  12  /  AlkPhos  208<H>  11-08    PT/INR - ( 08 Nov 2019 06:57 )   PT: 12.2 SEC;   INR: 1.07          PTT - ( 08 Nov 2019 06:57 )  PTT:58.0 SEC    CAPILLARY BLOOD GLUCOSE              REVIEW OF SYSTEMS:  CONSTITUTIONAL: No fever, weight loss, or fatigue  EYES: No eye pain, visual disturbances, or discharge  ENMT:  No difficulty hearing, tinnitus, vertigo; No sinus or throat pain  NECK: No pain or stiffness  RESPIRATORY: No cough, wheezing, chills or hemoptysis; No shortness of breath  CARDIOVASCULAR: No chest pain, palpitations, dizziness, or leg swelling  GASTROINTESTINAL: No abdominal or epigastric pain. No nausea, vomiting, or hematemesis; No diarrhea or constipation. No melena or hematochezia.  GENITOURINARY: No dysuria, frequency, hematuria, or incontinence  NEUROLOGICAL: No headaches, memory loss, loss of strength, numbness, or tremors    Consultant(s) Notes Reviewed:  [x ] YES  [ ] NO    PHYSICAL EXAM:  GENERAL: NAD, well-groomed, well-developed,not in any distress ,  HEAD:  Atraumatic, Normocephalic  EYES: EOMI, PERRLA, conjunctiva and sclera clear  ENMT: No tonsillar erythema, exudates, or enlargement; Moist mucous membranes, Good dentition, No lesions  NECK: Supple, No JVD, Normal thyroid  NERVOUS SYSTEM:  Alert & Oriented X3, No focal deficit   CHEST/LUNG: Good air entry bilateral with no  rales, rhonchi, wheezing, or rubs  HEART: Regular rate and rhythm; No murmurs, rubs, or gallops  ABDOMEN: Soft, Nontender, Nondistended; Bowel sounds present  EXTREMITIES:  2+ Peripheral Pulses, No clubbing, cyanosis, or edema    Care Discussed with Consultants/Other Providers [ x] YES  [ ] NO

## 2019-11-08 NOTE — PROGRESS NOTE ADULT - SUBJECTIVE AND OBJECTIVE BOX
Subjective: No chest pain or sob; ROS otherwise negative.   	  MEDICATIONS:  MEDICATIONS  (STANDING):  calcium carbonate 1250 mG  + Vitamin D (OsCal 500 + D) 1 Tablet(s) Oral daily  heparin  Infusion.  Unit(s)/Hr (8 mL/Hr) IV Continuous <Continuous>  multivitamin 1 Tablet(s) Oral daily  sodium chloride 0.9%. 1000 milliLiter(s) (75 mL/Hr) IV Continuous <Continuous>      LABS:	 	    CARDIAC MARKERS:                                12.8   4.86  )-----------( 190      ( 08 Nov 2019 06:57 )             40.7     11-08    142  |  106  |  27<H>  ----------------------------<  105<H>  3.9   |  24  |  0.54    Ca    9.1      08 Nov 2019 06:57  Phos  2.9     11-08  Mg     2.1     11-08    TPro  6.1  /  Alb  3.3  /  TBili  1.1  /  DBili  x   /  AST  16  /  ALT  12  /  AlkPhos  208<H>  11-08    proBNP:   Lipid Profile:   HgA1c:   TSH:       PHYSICAL EXAM:  T(C): 36.4 (11-08-19 @ 06:44), Max: 36.6 (11-07-19 @ 21:23)  HR: 103 (11-08-19 @ 09:21) (87 - 103)  BP: 135/87 (11-08-19 @ 09:21) (132/90 - 160/106)  RR: 18 (11-08-19 @ 09:21) (18 - 18)  SpO2: 99% (11-08-19 @ 09:21) (98% - 99%)  Wt(kg): --  I&O's Summary    	  Lymphatic: No lymphadenopathy , no edema  Cardiovascular: Normal S1 S2, No JVD, No murmurs , Peripheral pulses palpable 2+ bilaterally  Respiratory: Lungs clear to auscultation, normal effort 	  Gastrointestinal:  Soft, Non-tender, + BS	  Skin: No rashes, No ecchymoses, No cyanosis, warm to touch      TELEMETRY: 	    ECG:  	  RADIOLOGY:   DIAGNOSTIC TESTING:  [ ] Echocardiogram: pending   [ ]  Catheterization:  [ ] Stress Test:    OTHER: 	      ASSESSMENT/PLAN: 89F h/o paranoid schizophrenia (managed off medication), cholelithiasis/choledocholithiasis s/p ERCP w/stent placement February 2019, left hip arthroplasty, gait instability, presents after fall at home. Patient is chest pain free. Cardiology consulted for abnormal EKG and further work up.     -Pt. with + PE on CT scan  -On hep gtt per primary team  -Pt. HD stable with no symptoms   -Would check TTE to assess RV function  -Further workup pending above    Shirley Murray MD

## 2019-11-08 NOTE — PROGRESS NOTE ADULT - ASSESSMENT
89F h/o paranoid schizophrenia (managed off medication), cholelithiasis/choledocholithiasis, left hip arthroplasty, gait instability, presents after fall at home and found to have abn distension in setting of increased  alk phos and t bili, a/f eval, pain control and pending PT evaluation:     Problem/Plan - 1:  ·  Problem: Pulmonary embolism .  Plan: Heparin started . Ct chest noted. < from: CT Chest w/ IV Cont (11.06.19 @ 19:58) >  IMPRESSION:   Left lower lobe pulmonary embolus. Has IVC filter also.     < end of copied text >  Pulmonary and cardiology consulted.     TTE pending.      Problem/Plan - 2:  ·  Problem: Hyperbilirubinemia.  Plan: US abdominal noted   CT abdomen/pelvis noted. < from: CT Abdomen and Pelvis w/ IV Cont (11.06.19 @ 19:58) >  ABDOMEN AND PELVIS:    LIVER: Within normal limits.   BILE DUCTS: Minimal biliary dilatation. Plastic CBD stent.  GALLBLADDER: Cholelithiasis and choledocholithiasis.  SPLEEN: Within normal limits.   PANCREAS: Within normal limits.  ADRENALS: Within normal limits.   KIDNEYS/URETERS: Cysts and other lesions too small to characterize.     BLADDER: Within normal limits.   REPRODUCTIVE ORGANS: Within normal limits.     BOWEL: No bowel obstruction.  PERITONEUM: No ascites.   VESSELS:  IVC filter.   RETROPERITONEUM/LYMPH NODES: No lymphadenopathy.     ABDOMINAL WALL: Within normal limits.  BONES: Bilateral hip arthroplasty. Chronic rib fractures. Multiple   thoracolumbar compression fractures. Age indeterminate fracture right   pubic bone anteriorly.    < end of copied text >  GI consult noted . Possible ERCP next week so will hold off starting PO AC.      Problem/Plan - 3:  ·  Problem: Closed nondisplaced fracture of proximal phalanx of middle fingerr and Pelvic fracture .  Plan: s/p splint for left middle finger distal fracture  Ortho helping.      Problem/Plan - 4:  ·  Problem: Schizophrenia, unspecified type.  Plan: stable off medication.      Problem/Plan - 5:  ·  Problem: Protein calorie malnutrition.  Plan: encourage po  nutrition evaluation  b12 and folate normal.      Problem/Plan - 6:  Problem: CKD (chronic kidney disease), stage II. Plan: -renally dose meds and monitor Cr.     Problem/Plan - 7:  ·  Problem: Fall, initial encounter.  Plan: no evidence of syncope  fall precautions  PT evaluation in progress.

## 2019-11-08 NOTE — PROGRESS NOTE ADULT - ASSESSMENT
Impression:  89F h/o paranoid schizophrenia (managed off medication), cholelithiasis/choledocholithiasis, left hip arthroplasty, gait instability, presents after fall at home and found to have abn distension and mildly elevated TB/ALP.    #Mildly elevated liver tests - Ct with mild dilatation of biliary tree and no pneumobilia, could have stent occlusion, no signs of infection  # Pulmonary embolism on hep gtt, pending TTE  # Finger fracture    Recs:  - followup cardiac  recommendations  - can tentatively plan for ERCP early next week for stent removal  - antibiotics if patient develops white count or fever  - trend CBC, CMP, INR  - diet as tolerated today  - nausea, pain control

## 2019-11-08 NOTE — PROGRESS NOTE ADULT - SUBJECTIVE AND OBJECTIVE BOX
Chief Complaint:  Patient is a 89y old  Female who presents with a chief complaint of fall (08 Nov 2019 08:29)      Interval Events: No adverse events overnight.  Patient is feeling well and denies abdominal pain, eating breakfast.  She reports abdominal bloating.     Allergies:  No Known Allergies      Hospital Medications:  acetaminophen   Tablet .. 650 milliGRAM(s) Oral every 6 hours PRN  calcium carbonate 1250 mG  + Vitamin D (OsCal 500 + D) 1 Tablet(s) Oral daily  heparin  Infusion.  Unit(s)/Hr IV Continuous <Continuous>  heparin  Injectable 3500 Unit(s) IV Push every 6 hours PRN  heparin  Injectable 1500 Unit(s) IV Push every 6 hours PRN  multivitamin 1 Tablet(s) Oral daily  sodium chloride 0.9%. 1000 milliLiter(s) IV Continuous <Continuous>  traMADol 25 milliGRAM(s) Oral every 6 hours PRN      PMHX/PSHX:  Sacral ulcer  Gait difficulty  No pertinent past medical history  History of total left hip replacement  No significant past surgical history      Family history:  Family history of cerebrovascular accident (CVA)  Family history of diabetes mellitus  No pertinent family history in first degree relatives      ROS:     General:  No wt loss, fevers, chills, night sweats, fatigue,   Eyes:  Good vision, no reported pain  ENT:  No sore throat, pain, runny nose, dysphagia  CV:  No pain, palpitations, hypo/hypertension  Resp:  No dyspnea, cough, tachypnea, wheezing  GI:  See HPI  :  No pain, bleeding, incontinence, nocturia  Muscle:  No pain, weakness  Neuro:  No weakness, tingling, memory problems  Psych:  No fatigue, insomnia, mood problems, depression  Endocrine:  No polyuria, polydipsia, cold/heat intolerance  Heme:  No petechiae, ecchymosis, easy bruisability  Skin:  No rash, edema      PHYSICAL EXAM:     GENERAL: NAD  HEENT:  NC/AT  CHEST:  Full & symmetric excursion, no increased effort  ABDOMEN:  Soft, non-tender, non-distended, +BS  EXTREMITIES:  no edema  SKIN:  No rash  NEURO:  Alert    Vital Signs:  Vital Signs Last 24 Hrs  T(C): 36.4 (08 Nov 2019 06:44), Max: 36.6 (07 Nov 2019 12:02)  T(F): 97.5 (08 Nov 2019 06:44), Max: 97.8 (07 Nov 2019 12:02)  HR: 96 (08 Nov 2019 06:44) (60 - 96)  BP: 160/106 (08 Nov 2019 06:44) (132/90 - 160/106)  BP(mean): --  RR: 18 (08 Nov 2019 06:44) (17 - 18)  SpO2: 99% (08 Nov 2019 06:44) (98% - 99%)  Daily     Daily     LABS:                        12.8   4.86  )-----------( 190      ( 08 Nov 2019 06:57 )             40.7     11-08    142  |  106  |  27<H>  ----------------------------<  105<H>  3.9   |  24  |  0.54    Ca    9.1      08 Nov 2019 06:57  Phos  2.9     11-08  Mg     2.1     11-08    TPro  6.1  /  Alb  3.3  /  TBili  1.1  /  DBili  x   /  AST  16  /  ALT  12  /  AlkPhos  208<H>  11-08    LIVER FUNCTIONS - ( 08 Nov 2019 06:57 )  Alb: 3.3 g/dL / Pro: 6.1 g/dL / ALK PHOS: 208 u/L / ALT: 12 u/L / AST: 16 u/L / GGT: x           PT/INR - ( 08 Nov 2019 06:57 )   PT: 12.2 SEC;   INR: 1.07          PTT - ( 08 Nov 2019 06:57 )  PTT:58.0 SEC        Imaging:  < from: CT Abdomen and Pelvis w/ IV Cont (11.06.19 @ 19:58) >    EXAM:  CT CHEST IC      EXAM:  CT ABDOMEN AND PELVIS IC        PROCEDURE DATE:  Nov 6 2019         INTERPRETATION:  CLINICAL INFORMATION: Hyperbilirubinemia.    COMPARISON: None.    PROCEDURE:   CT of the Chest, Abdomen and Pelvis was performed with intravenous   contrast.   Intravenous contrast: 90 ml Omnipaque 350. 10 ml discarded.  Oral contrast: None.  Sagittal and coronal reformats were performed.    FINDINGS:    CHEST:     LUNGS AND LARGE AIRWAYS: Patent central airways. Calcified granulomas.   Scattered reticular opacities..   PLEURA: Small pleural effusions.  VESSELS: Ascending aorta is enlarged measuring 4.2 cm. Left lower lobe   segmental pulmonary embolus.  HEART: Heart size is normal.  No pericardial effusion.  MEDIASTINUM AND JENSEN: No lymphadenopathy.   CHEST WALL AND LOWER NECK: Within normal limits.     ABDOMEN AND PELVIS:    LIVER: Within normal limits.   BILE DUCTS: Minimal biliary dilatation. Plastic CBD stent.  GALLBLADDER: Cholelithiasis and choledocholithiasis.  SPLEEN: Within normal limits.   PANCREAS: Within normal limits.  ADRENALS: Within normal limits.   KIDNEYS/URETERS: Cysts and other lesions too small to characterize.     BLADDER: Within normal limits.   REPRODUCTIVE ORGANS: Within normal limits.     BOWEL: No bowel obstruction.  PERITONEUM: No ascites.   VESSELS:  IVC filter.   RETROPERITONEUM/LYMPH NODES: No lymphadenopathy.     ABDOMINAL WALL: Within normal limits.  BONES: Bilateral hip arthroplasty. Chronic rib fractures. Multiple   thoracolumbar compression fractures. Age indeterminate fracture right   pubic bone anteriorly.    IMPRESSION:   Left lower lobe pulmonary embolus.    Cholelithiasis and choledocholithiasis. The descending place with mild   dilatation. No pneumobilia.    Age-indeterminate right pubic bone fracture.    The findings were discussed with JEN PIMENTEL 11/7/2019 9:07 AM   with read back confirmation.                        LISSY ISLAS M.D., ATTENDING RADIOLOGIST  This document has been electronically signed.Nov 7 2019  9:13AM                  < end of copied text >

## 2019-11-09 LAB
ALBUMIN SERPL ELPH-MCNC: 3.1 G/DL — LOW (ref 3.3–5)
ALP SERPL-CCNC: 193 U/L — HIGH (ref 40–120)
ALT FLD-CCNC: 11 U/L — SIGNIFICANT CHANGE UP (ref 4–33)
ANION GAP SERPL CALC-SCNC: 9 MMO/L — SIGNIFICANT CHANGE UP (ref 7–14)
APTT BLD: 83.8 SEC — HIGH (ref 27.5–36.3)
APTT BLD: 89.2 SEC — HIGH (ref 27.5–36.3)
AST SERPL-CCNC: 16 U/L — SIGNIFICANT CHANGE UP (ref 4–32)
BILIRUB SERPL-MCNC: 0.7 MG/DL — SIGNIFICANT CHANGE UP (ref 0.2–1.2)
BUN SERPL-MCNC: 27 MG/DL — HIGH (ref 7–23)
CALCIUM SERPL-MCNC: 8.7 MG/DL — SIGNIFICANT CHANGE UP (ref 8.4–10.5)
CHLORIDE SERPL-SCNC: 108 MMOL/L — HIGH (ref 98–107)
CO2 SERPL-SCNC: 26 MMOL/L — SIGNIFICANT CHANGE UP (ref 22–31)
CREAT SERPL-MCNC: 0.5 MG/DL — SIGNIFICANT CHANGE UP (ref 0.5–1.3)
GLUCOSE SERPL-MCNC: 105 MG/DL — HIGH (ref 70–99)
HCT VFR BLD CALC: 37 % — SIGNIFICANT CHANGE UP (ref 34.5–45)
HGB BLD-MCNC: 11.6 G/DL — SIGNIFICANT CHANGE UP (ref 11.5–15.5)
MAGNESIUM SERPL-MCNC: 2.1 MG/DL — SIGNIFICANT CHANGE UP (ref 1.6–2.6)
MCHC RBC-ENTMCNC: 31.4 % — LOW (ref 32–36)
MCHC RBC-ENTMCNC: 32.8 PG — SIGNIFICANT CHANGE UP (ref 27–34)
MCV RBC AUTO: 104.5 FL — HIGH (ref 80–100)
NRBC # FLD: 0 K/UL — SIGNIFICANT CHANGE UP (ref 0–0)
PHOSPHATE SERPL-MCNC: 2.6 MG/DL — SIGNIFICANT CHANGE UP (ref 2.5–4.5)
PLATELET # BLD AUTO: 168 K/UL — SIGNIFICANT CHANGE UP (ref 150–400)
PMV BLD: 8.7 FL — SIGNIFICANT CHANGE UP (ref 7–13)
POTASSIUM SERPL-MCNC: 3.8 MMOL/L — SIGNIFICANT CHANGE UP (ref 3.5–5.3)
POTASSIUM SERPL-SCNC: 3.8 MMOL/L — SIGNIFICANT CHANGE UP (ref 3.5–5.3)
PROT SERPL-MCNC: 5.7 G/DL — LOW (ref 6–8.3)
RBC # BLD: 3.54 M/UL — LOW (ref 3.8–5.2)
RBC # FLD: 14.2 % — SIGNIFICANT CHANGE UP (ref 10.3–14.5)
SODIUM SERPL-SCNC: 143 MMOL/L — SIGNIFICANT CHANGE UP (ref 135–145)
WBC # BLD: 4.53 K/UL — SIGNIFICANT CHANGE UP (ref 3.8–10.5)
WBC # FLD AUTO: 4.53 K/UL — SIGNIFICANT CHANGE UP (ref 3.8–10.5)

## 2019-11-09 RX ORDER — LANOLIN ALCOHOL/MO/W.PET/CERES
3 CREAM (GRAM) TOPICAL ONCE
Refills: 0 | Status: COMPLETED | OUTPATIENT
Start: 2019-11-09 | End: 2019-11-09

## 2019-11-09 RX ORDER — METOPROLOL TARTRATE 50 MG
25 TABLET ORAL
Refills: 0 | Status: DISCONTINUED | OUTPATIENT
Start: 2019-11-09 | End: 2019-11-09

## 2019-11-09 RX ORDER — METOPROLOL TARTRATE 50 MG
25 TABLET ORAL
Refills: 0 | Status: DISCONTINUED | OUTPATIENT
Start: 2019-11-09 | End: 2019-11-15

## 2019-11-09 RX ADMIN — HEPARIN SODIUM 900 UNIT(S)/HR: 5000 INJECTION INTRAVENOUS; SUBCUTANEOUS at 04:16

## 2019-11-09 RX ADMIN — Medication 25 MILLIGRAM(S): at 18:40

## 2019-11-09 RX ADMIN — Medication 650 MILLIGRAM(S): at 21:30

## 2019-11-09 RX ADMIN — Medication 3 MILLIGRAM(S): at 23:07

## 2019-11-09 RX ADMIN — Medication 650 MILLIGRAM(S): at 20:39

## 2019-11-09 RX ADMIN — Medication 1 TABLET(S): at 11:07

## 2019-11-09 RX ADMIN — HEPARIN SODIUM 900 UNIT(S)/HR: 5000 INJECTION INTRAVENOUS; SUBCUTANEOUS at 11:06

## 2019-11-09 NOTE — PROGRESS NOTE ADULT - PROBLEM SELECTOR PLAN 1
cont AC: she has IVC filer also from before: so far she has no sign sof bleeding: for ercp next week  11/9: cont AC for procedure next week: she does have mild abd distension but no pain: constipated:

## 2019-11-09 NOTE — PROGRESS NOTE ADULT - ASSESSMENT
89F h/o paranoid schizophrenia (managed off medication), cholelithiasis/choledocholithiasis, left hip arthroplasty, gait instability, presents after fall at home and found to have abn distension in setting of increased  alk phos and t bili, a/f eval, pain control and pending PT evaluation:     Problem/Plan - 1:  ·  Problem: Pulmonary embolism .  Plan: Heparin started . Ct chest noted. < from: CT Chest w/ IV Cont (11.06.19 @ 19:58) >  IMPRESSION:   Left lower lobe pulmonary embolus. Has IVC filter also.     < end of copied text >  Pulmonary and cardiology consulted.     TTE pending.      Problem/Plan - 2:  ·  Problem: Hyperbilirubinemia.  Plan: US abdominal noted   CT abdomen/pelvis noted. < from: CT Abdomen and Pelvis w/ IV Cont (11.06.19 @ 19:58) >  ABDOMEN AND PELVIS:    LIVER: Within normal limits.   BILE DUCTS: Minimal biliary dilatation. Plastic CBD stent.  GALLBLADDER: Cholelithiasis and choledocholithiasis.  SPLEEN: Within normal limits.   PANCREAS: Within normal limits.  ADRENALS: Within normal limits.   KIDNEYS/URETERS: Cysts and other lesions too small to characterize.     BLADDER: Within normal limits.   REPRODUCTIVE ORGANS: Within normal limits.     BOWEL: No bowel obstruction.  PERITONEUM: No ascites.   VESSELS:  IVC filter.   RETROPERITONEUM/LYMPH NODES: No lymphadenopathy.     ABDOMINAL WALL: Within normal limits.  BONES: Bilateral hip arthroplasty. Chronic rib fractures. Multiple   thoracolumbar compression fractures. Age indeterminate fracture right   pubic bone anteriorly.    < end of copied text >  GI consult noted . Possible ERCP for stent removal next week so will hold off starting PO AC.      Problem/Plan - 3:  ·  Problem: Closed nondisplaced fracture of proximal phalanx of middle fingerr and Pelvic fracture .  Plan: s/p splint for left middle finger distal fracture  Ortho helping.      Problem/Plan - 4:  ·  Problem: Schizophrenia, unspecified type.  Plan: stable off medication.      Problem/Plan - 5:  ·  Problem: Protein calorie malnutrition.  Plan: encourage po  nutrition evaluation  b12 and folate normal.      Problem/Plan - 6:  Problem: CKD (chronic kidney disease), stage II. Plan: -renally dose meds and monitor Cr.     Problem/Plan - 7:  ·  Problem: Fall, initial encounter.  Plan: no evidence of syncope  fall precautions  PT evaluation in progress.

## 2019-11-09 NOTE — PROGRESS NOTE ADULT - SUBJECTIVE AND OBJECTIVE BOX
INTERVAL HPI/OVERNIGHT EVENTS: I feel okay .   Vital Signs Last 24 Hrs  T(C): 36.7 (09 Nov 2019 13:36), Max: 36.7 (09 Nov 2019 13:36)  T(F): 98 (09 Nov 2019 13:36), Max: 98 (09 Nov 2019 13:36)  HR: 80 (09 Nov 2019 13:36) (80 - 99)  BP: 137/88 (09 Nov 2019 13:36) (137/85 - 152/98)  BP(mean): --  RR: 18 (09 Nov 2019 13:36) (18 - 18)  SpO2: 98% (09 Nov 2019 13:36) (97% - 98%)  I&O's Summary    MEDICATIONS  (STANDING):  calcium carbonate 1250 mG  + Vitamin D (OsCal 500 + D) 1 Tablet(s) Oral daily  heparin  Infusion.  Unit(s)/Hr (8 mL/Hr) IV Continuous <Continuous>  multivitamin 1 Tablet(s) Oral daily  sodium chloride 0.9%. 1000 milliLiter(s) (75 mL/Hr) IV Continuous <Continuous>    MEDICATIONS  (PRN):  acetaminophen   Tablet .. 650 milliGRAM(s) Oral every 6 hours PRN Mild Pain (1 - 3)  heparin  Injectable 3500 Unit(s) IV Push every 6 hours PRN For aPTT less than 40  heparin  Injectable 1500 Unit(s) IV Push every 6 hours PRN For aPTT between 40 - 57  traMADol 25 milliGRAM(s) Oral every 6 hours PRN moderate to severe pain (4-10)    LABS:                        11.6   4.53  )-----------( 168      ( 09 Nov 2019 03:20 )             37.0     11-09    143  |  108<H>  |  27<H>  ----------------------------<  105<H>  3.8   |  26  |  0.50    Ca    8.7      09 Nov 2019 03:30  Phos  2.6     11-09  Mg     2.1     11-09    TPro  5.7<L>  /  Alb  3.1<L>  /  TBili  0.7  /  DBili  x   /  AST  16  /  ALT  11  /  AlkPhos  193<H>  11-09    PT/INR - ( 08 Nov 2019 06:57 )   PT: 12.2 SEC;   INR: 1.07          PTT - ( 09 Nov 2019 10:20 )  PTT:83.8 SEC    CAPILLARY BLOOD GLUCOSE              REVIEW OF SYSTEMS:  CONSTITUTIONAL: No fever, weight loss, or fatigue  EYES: No eye pain, visual disturbances, or discharge  ENMT:  No difficulty hearing, tinnitus, vertigo; No sinus or throat pain  NECK: No pain or stiffness  RESPIRATORY: No cough, wheezing, chills or hemoptysis; No shortness of breath  CARDIOVASCULAR: No chest pain, palpitations, dizziness, or leg swelling  GASTROINTESTINAL: No abdominal or epigastric pain. No nausea, vomiting, or hematemesis; No diarrhea or constipation. No melena or hematochezia.  GENITOURINARY: No dysuria, frequency, hematuria, or incontinence  NEUROLOGICAL: No headaches, memory loss, loss of strength, numbness, or tremors    Consultant(s) Notes Reviewed:  [x ] YES  [ ] NO    PHYSICAL EXAM:  GENERAL: NAD, well-groomed, well-developed, not in any distress ,  HEAD:  Atraumatic, Normocephalic  EYES: EOMI, PERRLA, conjunctiva and sclera clear  ENMT: No tonsillar erythema, exudates, or enlargement; Moist mucous membranes, Good dentition, No lesions  NECK: Supple, No JVD, Normal thyroid  NERVOUS SYSTEM:  Alert & Oriented X2 , No focal deficit   CHEST/LUNG: Good air entry bilateral with no  rales, rhonchi, wheezing, or rubs  HEART: Regular rate and rhythm; No murmurs, rubs, or gallops  ABDOMEN: Soft, Nontender, Nondistended; Bowel sounds present  EXTREMITIES:  2+ Peripheral Pulses, No clubbing, cyanosis, or edema    Care Discussed with Consultants/Other Providers [ x] YES  [ ] NO

## 2019-11-09 NOTE — PROGRESS NOTE ADULT - SUBJECTIVE AND OBJECTIVE BOX
Subjective: No chest pain or sob; ROS otherwise negative.   	    acetaminophen   Tablet .. 650 milliGRAM(s) Oral every 6 hours PRN  calcium carbonate 1250 mG  + Vitamin D (OsCal 500 + D) 1 Tablet(s) Oral daily  heparin  Infusion.  Unit(s)/Hr IV Continuous <Continuous>  heparin  Injectable 3500 Unit(s) IV Push every 6 hours PRN  heparin  Injectable 1500 Unit(s) IV Push every 6 hours PRN  multivitamin 1 Tablet(s) Oral daily  sodium chloride 0.9%. 1000 milliLiter(s) IV Continuous <Continuous>  traMADol 25 milliGRAM(s) Oral every 6 hours PRN                          11.6   4.53  )-----------( 168      ( 09 Nov 2019 03:20 )             37.0       Hemoglobin: 11.6 g/dL (11-09 @ 03:20)  Hemoglobin: 12.8 g/dL (11-08 @ 06:57)  Hemoglobin: 12.1 g/dL (11-07 @ 16:48)  Hemoglobin: 12.4 g/dL (11-07 @ 05:55)  Hemoglobin: 13.2 g/dL (11-06 @ 07:48)      11-09    143  |  108<H>  |  27<H>  ----------------------------<  105<H>  3.8   |  26  |  0.50    Ca    8.7      09 Nov 2019 03:30  Phos  2.6     11-09  Mg     2.1     11-09    TPro  5.7<L>  /  Alb  3.1<L>  /  TBili  0.7  /  DBili  x   /  AST  16  /  ALT  11  /  AlkPhos  193<H>  11-09    Creatinine Trend: 0.50<--, 0.54<--, 0.53<--, 0.50<--, 0.54<--, 0.60<--    COAGS: PTT - ( 09 Nov 2019 03:20 )  PTT:89.2 SEC      PHYSICAL EXAM:  Vital Signs last 24 Hours   T(C): 36.3 (11-09-19 @ 06:10), Max: 36.8 (11-08-19 @ 12:31)  HR: 81 (11-09-19 @ 06:10) (60 - 99)  BP: 147/88 (11-09-19 @ 06:10) (137/85 - 157/82)  RR: 18 (11-09-19 @ 06:10) (17 - 18)  SpO2: 98% (11-09-19 @ 06:10) (97% - 99%)  Wt(kg): --    I&O's Summary      	  Lymphatic: No lymphadenopathy , no edema  Cardiovascular: Normal S1 S2, No JVD, No murmurs , Peripheral pulses palpable 2+ bilaterally  Respiratory: Lungs clear to auscultation, normal effort 	  Gastrointestinal:  Soft, Non-tender, + BS	  Skin: No rashes, No ecchymoses, No cyanosis, warm to touch      TELEMETRY: 	  Not on telemetry     ECG:  	    < from: 12 Lead ECG (11.03.19 @ 22:30) >  Line Sinus tachycardiawith frequent Premature ventricular complexes  Possible Left atrial enlargement  Left axis deviation  Left ventricular hypertrophy  Inferior infarct , age undetermined  Anterolateral infarct , age undetermined  Abnormal ECG    < end of copied text >    RADIOLOGY:     < from: CT Chest w/ IV Cont (11.06.19 @ 19:58) >  IMPRESSION:   Left lower lobe pulmonary embolus.    Cholelithiasis and choledocholithiasis. The descending place with mild   dilatation. No pneumobilia.    Age-indeterminate right pubic bone fracture.    The findings were discussed with JEN PIMENTEL 11/7/2019 9:07 AM   with read back confirmation.    < end of copied text >    DIAGNOSTIC TESTING:    < from: Transthoracic Echocardiogram (11.08.19 @ 15:38) >  CONCLUSIONS:  1. Mitral annular calcification, otherwise normal mitral  valve. Mild mitral regurgitation.  2. Aortic valve not well visualized; appears calcified with  grossly mildly decreased opening.  3. Normal left ventricular internal dimensions and wall  thicknesses.  4. Severe segmental left ventricular systolic dysfunction.  The apex, mid to distal septum and distal anterior walls  are hypokinetic.  5. The right ventricle is not well visualized; grossly  normal right ventricular systolic function.  ------------------------------------------------------------------------  Confirmed on  11/8/2019 - 17:13:16 by Jovani Celestin M.D. RPVI    < end of copied text >        ASSESSMENT/PLAN: 89F h/o paranoid schizophrenia (managed off medication), cholelithiasis/choledocholithiasis s/p ERCP w/stent placement February 2019, left hip arthroplasty, gait instability, presents after fall at home. Patient is chest pain free. Cardiology consulted for abnormal EKG and further work up.     -Pt. with + PE on CT scan  -On hep gtt per primary team  -Pt. HD stable with no symptoms   -echo as noted above   -Further workup pending above and if within GOC Subjective: No chest pain or sob; ROS otherwise negative.   	    acetaminophen   Tablet .. 650 milliGRAM(s) Oral every 6 hours PRN  calcium carbonate 1250 mG  + Vitamin D (OsCal 500 + D) 1 Tablet(s) Oral daily  heparin  Infusion.  Unit(s)/Hr IV Continuous <Continuous>  heparin  Injectable 3500 Unit(s) IV Push every 6 hours PRN  heparin  Injectable 1500 Unit(s) IV Push every 6 hours PRN  multivitamin 1 Tablet(s) Oral daily  sodium chloride 0.9%. 1000 milliLiter(s) IV Continuous <Continuous>  traMADol 25 milliGRAM(s) Oral every 6 hours PRN                          11.6   4.53  )-----------( 168      ( 09 Nov 2019 03:20 )             37.0       Hemoglobin: 11.6 g/dL (11-09 @ 03:20)  Hemoglobin: 12.8 g/dL (11-08 @ 06:57)  Hemoglobin: 12.1 g/dL (11-07 @ 16:48)  Hemoglobin: 12.4 g/dL (11-07 @ 05:55)  Hemoglobin: 13.2 g/dL (11-06 @ 07:48)      11-09    143  |  108<H>  |  27<H>  ----------------------------<  105<H>  3.8   |  26  |  0.50    Ca    8.7      09 Nov 2019 03:30  Phos  2.6     11-09  Mg     2.1     11-09    TPro  5.7<L>  /  Alb  3.1<L>  /  TBili  0.7  /  DBili  x   /  AST  16  /  ALT  11  /  AlkPhos  193<H>  11-09    Creatinine Trend: 0.50<--, 0.54<--, 0.53<--, 0.50<--, 0.54<--, 0.60<--    COAGS: PTT - ( 09 Nov 2019 03:20 )  PTT:89.2 SEC      PHYSICAL EXAM:  Vital Signs last 24 Hours   T(C): 36.3 (11-09-19 @ 06:10), Max: 36.8 (11-08-19 @ 12:31)  HR: 81 (11-09-19 @ 06:10) (60 - 99)  BP: 147/88 (11-09-19 @ 06:10) (137/85 - 157/82)  RR: 18 (11-09-19 @ 06:10) (17 - 18)  SpO2: 98% (11-09-19 @ 06:10) (97% - 99%)  Wt(kg): --    I&O's Summary      	  Lymphatic: No lymphadenopathy , no edema  Cardiovascular: Normal S1 S2, No JVD, No murmurs , Peripheral pulses palpable 2+ bilaterally  Respiratory: Lungs clear to auscultation, normal effort 	  Gastrointestinal:  Soft, Non-tender, + BS	  Skin: No rashes, No ecchymoses, No cyanosis, warm to touch      TELEMETRY: 	  Not on telemetry     ECG:  	    < from: 12 Lead ECG (11.03.19 @ 22:30) >  Line Sinus tachycardiawith frequent Premature ventricular complexes  Possible Left atrial enlargement  Left axis deviation  Left ventricular hypertrophy  Inferior infarct , age undetermined  Anterolateral infarct , age undetermined  Abnormal ECG    < end of copied text >    RADIOLOGY:     < from: CT Chest w/ IV Cont (11.06.19 @ 19:58) >  IMPRESSION:   Left lower lobe pulmonary embolus.    Cholelithiasis and choledocholithiasis. The descending place with mild   dilatation. No pneumobilia.    Age-indeterminate right pubic bone fracture.    The findings were discussed with JEN PIMENTEL 11/7/2019 9:07 AM   with read back confirmation.    < end of copied text >    DIAGNOSTIC TESTING:    < from: Transthoracic Echocardiogram (11.08.19 @ 15:38) >  CONCLUSIONS:  1. Mitral annular calcification, otherwise normal mitral  valve. Mild mitral regurgitation.  2. Aortic valve not well visualized; appears calcified with  grossly mildly decreased opening.  3. Normal left ventricular internal dimensions and wall  thicknesses.  4. Severe segmental left ventricular systolic dysfunction.  The apex, mid to distal septum and distal anterior walls  are hypokinetic.  5. The right ventricle is not well visualized; grossly  normal right ventricular systolic function.  ------------------------------------------------------------------------  Confirmed on  11/8/2019 - 17:13:16 by Jovani Celestin M.D. [facility]    < end of copied text >        ASSESSMENT/PLAN:     89F h/o paranoid schizophrenia (managed off medication), cholelithiasis/choledocholithiasis s/p ERCP w/stent placement February 2019, left hip arthroplasty, gait instability, presents after fall at home. Patient is chest pain free. Cardiology consulted for abnormal EKG and further work up.     -Pt. with + PE on CT scan  -On hep gtt per primary team  -Pt. HD stable with no symptoms   -echo as noted above   -GI f/u appreciated, plan for ERCP next week   -Further workup pending above and if within GOC

## 2019-11-09 NOTE — PROGRESS NOTE ADULT - SUBJECTIVE AND OBJECTIVE BOX
Patient is a 89y old  Female who presents with a chief complaint of fall (09 Nov 2019 09:45)      Any change in ROS: c/o abdominal bloating     MEDICATIONS  (STANDING):  calcium carbonate 1250 mG  + Vitamin D (OsCal 500 + D) 1 Tablet(s) Oral daily  heparin  Infusion.  Unit(s)/Hr (8 mL/Hr) IV Continuous <Continuous>  multivitamin 1 Tablet(s) Oral daily  sodium chloride 0.9%. 1000 milliLiter(s) (75 mL/Hr) IV Continuous <Continuous>    MEDICATIONS  (PRN):  acetaminophen   Tablet .. 650 milliGRAM(s) Oral every 6 hours PRN Mild Pain (1 - 3)  heparin  Injectable 3500 Unit(s) IV Push every 6 hours PRN For aPTT less than 40  heparin  Injectable 1500 Unit(s) IV Push every 6 hours PRN For aPTT between 40 - 57  traMADol 25 milliGRAM(s) Oral every 6 hours PRN moderate to severe pain (4-10)    Vital Signs Last 24 Hrs  T(C): 36.3 (09 Nov 2019 06:10), Max: 36.8 (08 Nov 2019 12:31)  T(F): 97.3 (09 Nov 2019 06:10), Max: 98.2 (08 Nov 2019 12:31)  HR: 81 (09 Nov 2019 06:10) (60 - 99)  BP: 147/88 (09 Nov 2019 06:10) (137/85 - 157/82)  BP(mean): --  RR: 18 (09 Nov 2019 06:10) (17 - 18)  SpO2: 98% (09 Nov 2019 06:10) (97% - 99%)    I&O's Summary        Physical Exam:   GENERAL: NAD, well-groomed, well-developed  HEENT: COLEMAN/   Atraumatic, Normocephalic  ENMT: No tonsillar erythema, exudates, or enlargement; Moist mucous membranes, Good dentition, No lesions  NECK: Supple, No JVD, Normal thyroid  CHEST/LUNG: Clear to ausculation ; No rales, rhonchi, wheezing, or rubs  CVS: Regular rate and rhythm; No murmurs, rubs, or gallops  GI: : mildly distended: and slightly tense: but no tenderness  NERVOUS SYSTEM:  Alert & Oriented X3  EXTREMITIES:  2+ Peripheral Pulses, No clubbing, cyanosis, or edema  LYMPH: No lymphadenopathy noted  SKIN: No rashes or lesions  ENDOCRINOLOGY: No Thyromegaly  PSYCH: Appropriate    Labs:                              11.6   4.53  )-----------( 168      ( 09 Nov 2019 03:20 )             37.0                         12.8   4.86  )-----------( 190      ( 08 Nov 2019 06:57 )             40.7                         12.1   4.24  )-----------( 189      ( 07 Nov 2019 16:48 )             38.4                         12.4   4.94  )-----------( 172      ( 07 Nov 2019 05:55 )             38.6                         13.2   5.39  )-----------( 177      ( 06 Nov 2019 07:48 )             43.7     11-09    143  |  108<H>  |  27<H>  ----------------------------<  105<H>  3.8   |  26  |  0.50  11-08    142  |  106  |  27<H>  ----------------------------<  105<H>  3.9   |  24  |  0.54  11-07    140  |  105  |  23  ----------------------------<  101<H>  3.7   |  24  |  0.53  11-06    141  |  106  |  18  ----------------------------<  104<H>  4.0   |  25  |  0.50    Ca    8.7      09 Nov 2019 03:30  Ca    9.1      08 Nov 2019 06:57  Phos  2.6     11-09  Phos  2.9     11-08  Mg     2.1     11-09  Mg     2.1     11-08    TPro  5.7<L>  /  Alb  3.1<L>  /  TBili  0.7  /  DBili  x   /  AST  16  /  ALT  11  /  AlkPhos  193<H>  11-09  TPro  6.1  /  Alb  3.3  /  TBili  1.1  /  DBili  x   /  AST  16  /  ALT  12  /  AlkPhos  208<H>  11-08  TPro  6.3  /  Alb  3.6  /  TBili  1.6<H>  /  DBili  x   /  AST  23  /  ALT  11  /  AlkPhos  218<H>  11-06    CAPILLARY BLOOD GLUCOSE          LIVER FUNCTIONS - ( 09 Nov 2019 03:30 )  Alb: 3.1 g/dL / Pro: 5.7 g/dL / ALK PHOS: 193 u/L / ALT: 11 u/L / AST: 16 u/L / GGT: x           PT/INR - ( 08 Nov 2019 06:57 )   PT: 12.2 SEC;   INR: 1.07          PTT - ( 09 Nov 2019 10:20 )  PTT:83.8 SEC          RECENT CULTURES:  11-04 @ 01:09 URINE MIDSTREAM                  < from: US Duplex Venous Lower Ext Complete, Bilateral (11.07.19 @ 17:38) >  PROCEDURE DATE:  Nov 7 2019         INTERPRETATION:  CLINICAL INFORMATION: I26.00 Other pulmonary embolism   without acute cor pulmonale    COMPARISON: None available.    TECHNIQUE: Duplex sonography ofthe BILATERAL LOWER extremity veins with   color and spectral Doppler, with and without compression.      FINDINGS:    There is normal compressibility of the bilateral common femoral, femoral   and popliteal veins.     Doppler examination shows normal spontaneous and phasic flow.    No calf vein thrombosis is detected.    IMPRESSION:     No evidence of deep venous thrombosis in either lower extremity.    < from: Transthoracic Echocardiogram (11.08.19 @ 15:38) >  Ejection Fraction (Teicholtz): 29 %  ------------------------------------------------------------------------  OBSERVATIONS:  Mitral Valve: Mitral annular calcification, otherwise  normal mitral valve. Mild mitral regurgitation.  Aortic Root: Normal aortic root.  Aortic Valve: Aortic valve not well visualized; appears  calcified with grossly mildly decreased opening.  Left Atrium: Normal left atrium.  LA volume index = 14  cc/m2.  Left Ventricle: Severe segmental left ventricular systolic  dysfunction. The apex, mid to distal septum and distal  anterior walls are hypokinetic. Normal left ventricular  internal dimensions and wall thicknesses.  Right Heart: Normal right atrium. The right ventricle is  not well visualized; grossly normal right ventricular  systolic function. Normal tricuspid valve. Minimal  tricuspid regurgitation. Normal pulmonic valve.  Pericardium/PleuraNormal pericardium with no pericardial  effusion.  ------------------------------------------------------------------------  CONCLUSIONS:  1. Mitral annular calcification, otherwise normal mitral  valve. Mild mitral regurgitation.  2. Aortic valve not well visualized; appears calcified with  grossly mildly decreased opening.  3. Normal left ventricular internal dimensions and wall  thicknesses.  4. Severe segmental left ventricular systolic dysfunction.  The apex, mid to distal septum and distal anterior walls  are hypokinetic.  5. The right ventricle is not well visualized; grossly  normal right ventricular systolic function.  ------------------------------------------------------------------------  Confirmed on  11/8/2019 - 17:13:16 by Jovani Celestin M.D. RPVI  ------------------------------------------------------------------------    < end of copied text >                      JOSÉ ANTONIO JAY M.D., ATTENDING RADIOLOGIST  This document has been electronically signed. Nov 8 2019  8:56AM        < end of copied text >        RESPIRATORY CULTURES:          Studies  Chest X-RAY  CT SCAN Chest   Venous Dopplers: LE:   CT Abdomen  Others

## 2019-11-10 LAB
ALBUMIN SERPL ELPH-MCNC: 3.3 G/DL — SIGNIFICANT CHANGE UP (ref 3.3–5)
ALP SERPL-CCNC: 211 U/L — HIGH (ref 40–120)
ALT FLD-CCNC: 10 U/L — SIGNIFICANT CHANGE UP (ref 4–33)
ANION GAP SERPL CALC-SCNC: 9 MMO/L — SIGNIFICANT CHANGE UP (ref 7–14)
APTT BLD: 84.3 SEC — HIGH (ref 27.5–36.3)
AST SERPL-CCNC: 19 U/L — SIGNIFICANT CHANGE UP (ref 4–32)
BILIRUB SERPL-MCNC: 0.7 MG/DL — SIGNIFICANT CHANGE UP (ref 0.2–1.2)
BUN SERPL-MCNC: 35 MG/DL — HIGH (ref 7–23)
CALCIUM SERPL-MCNC: 9.4 MG/DL — SIGNIFICANT CHANGE UP (ref 8.4–10.5)
CHLORIDE SERPL-SCNC: 108 MMOL/L — HIGH (ref 98–107)
CO2 SERPL-SCNC: 25 MMOL/L — SIGNIFICANT CHANGE UP (ref 22–31)
CREAT SERPL-MCNC: 0.64 MG/DL — SIGNIFICANT CHANGE UP (ref 0.5–1.3)
GLUCOSE SERPL-MCNC: 102 MG/DL — HIGH (ref 70–99)
HCT VFR BLD CALC: 37.9 % — SIGNIFICANT CHANGE UP (ref 34.5–45)
HGB BLD-MCNC: 11.9 G/DL — SIGNIFICANT CHANGE UP (ref 11.5–15.5)
MAGNESIUM SERPL-MCNC: 2.1 MG/DL — SIGNIFICANT CHANGE UP (ref 1.6–2.6)
MCHC RBC-ENTMCNC: 31.4 % — LOW (ref 32–36)
MCHC RBC-ENTMCNC: 32.9 PG — SIGNIFICANT CHANGE UP (ref 27–34)
MCV RBC AUTO: 104.7 FL — HIGH (ref 80–100)
NRBC # FLD: 0 K/UL — SIGNIFICANT CHANGE UP (ref 0–0)
PHOSPHATE SERPL-MCNC: 3.4 MG/DL — SIGNIFICANT CHANGE UP (ref 2.5–4.5)
PLATELET # BLD AUTO: 192 K/UL — SIGNIFICANT CHANGE UP (ref 150–400)
PMV BLD: 9 FL — SIGNIFICANT CHANGE UP (ref 7–13)
POTASSIUM SERPL-MCNC: 4.2 MMOL/L — SIGNIFICANT CHANGE UP (ref 3.5–5.3)
POTASSIUM SERPL-SCNC: 4.2 MMOL/L — SIGNIFICANT CHANGE UP (ref 3.5–5.3)
PROT SERPL-MCNC: 6 G/DL — SIGNIFICANT CHANGE UP (ref 6–8.3)
RBC # BLD: 3.62 M/UL — LOW (ref 3.8–5.2)
RBC # FLD: 14.4 % — SIGNIFICANT CHANGE UP (ref 10.3–14.5)
SODIUM SERPL-SCNC: 142 MMOL/L — SIGNIFICANT CHANGE UP (ref 135–145)
WBC # BLD: 4.67 K/UL — SIGNIFICANT CHANGE UP (ref 3.8–10.5)
WBC # FLD AUTO: 4.67 K/UL — SIGNIFICANT CHANGE UP (ref 3.8–10.5)

## 2019-11-10 RX ORDER — LANOLIN ALCOHOL/MO/W.PET/CERES
3 CREAM (GRAM) TOPICAL AT BEDTIME
Refills: 0 | Status: DISCONTINUED | OUTPATIENT
Start: 2019-11-10 | End: 2019-11-15

## 2019-11-10 RX ADMIN — Medication 25 MILLIGRAM(S): at 05:02

## 2019-11-10 RX ADMIN — Medication 1 TABLET(S): at 12:08

## 2019-11-10 RX ADMIN — Medication 25 MILLIGRAM(S): at 17:26

## 2019-11-10 RX ADMIN — Medication 3 MILLIGRAM(S): at 22:08

## 2019-11-10 RX ADMIN — HEPARIN SODIUM 900 UNIT(S)/HR: 5000 INJECTION INTRAVENOUS; SUBCUTANEOUS at 08:43

## 2019-11-10 NOTE — PROGRESS NOTE ADULT - SUBJECTIVE AND OBJECTIVE BOX
Subjective: No chest pain or sob; ROS otherwise negative.   	      acetaminophen   Tablet .. 650 milliGRAM(s) Oral every 6 hours PRN  calcium carbonate 1250 mG  + Vitamin D (OsCal 500 + D) 1 Tablet(s) Oral daily  heparin  Infusion.  Unit(s)/Hr IV Continuous <Continuous>  heparin  Injectable 3500 Unit(s) IV Push every 6 hours PRN  heparin  Injectable 1500 Unit(s) IV Push every 6 hours PRN  metoprolol tartrate 25 milliGRAM(s) Oral two times a day  multivitamin 1 Tablet(s) Oral daily  sodium chloride 0.9%. 1000 milliLiter(s) IV Continuous <Continuous>  traMADol 25 milliGRAM(s) Oral every 6 hours PRN                            11.9   4.67  )-----------( 192      ( 10 Nov 2019 07:17 )             37.9       Hemoglobin: 11.9 g/dL (11-10 @ 07:17)  Hemoglobin: 11.6 g/dL (11-09 @ 03:20)  Hemoglobin: 12.8 g/dL (11-08 @ 06:57)  Hemoglobin: 12.1 g/dL (11-07 @ 16:48)  Hemoglobin: 12.4 g/dL (11-07 @ 05:55)      11-10    142  |  108<H>  |  35<H>  ----------------------------<  102<H>  4.2   |  25  |  0.64    Ca    9.4      10 Nov 2019 07:17  Phos  3.4     11-10  Mg     2.1     11-10    TPro  6.0  /  Alb  3.3  /  TBili  0.7  /  DBili  x   /  AST  19  /  ALT  10  /  AlkPhos  211<H>  11-10    Creatinine Trend: 0.64<--, 0.50<--, 0.54<--, 0.53<--, 0.50<--, 0.54<--    COAGS: PTT - ( 10 Nov 2019 07:17 )  PTT:84.3 SEC      PHYSICAL EXAM:  Vital Signs last 24 Hours   T(C): 36.2 (11-10-19 @ 05:01), Max: 36.7 (11-09-19 @ 13:36)  HR: 75 (11-10-19 @ 05:01) (71 - 88)  BP: 142/80 (11-10-19 @ 05:01) (126/85 - 142/80)  RR: 16 (11-10-19 @ 05:01) (16 - 18)  SpO2: 96% (11-10-19 @ 05:01) (96% - 98%)  Wt(kg): --    I&O's Summary    	  Gen: Appears well in NAD  HEENT:  (-)icterus (-)pallor  CV: N S1 S2, RRR, (+)2 Pulses B/l  Resp:  Clear to auscultation B/L, normal effort  GI: (+) BS Soft, NT, distended   Lymph:  (-)Edema, (-)obvious lymphadenopathy  Skin: Warm to touch, Normal turgor  Psych: Appropriate mood and affect      TELEMETRY: 	  Not on telemetry     ECG:  	    < from: 12 Lead ECG (11.03.19 @ 22:30) >  Line Sinus tachycardiawith frequent Premature ventricular complexes  Possible Left atrial enlargement  Left axis deviation  Left ventricular hypertrophy  Inferior infarct , age undetermined  Anterolateral infarct , age undetermined  Abnormal ECG    < end of copied text >    RADIOLOGY:     < from: CT Chest w/ IV Cont (11.06.19 @ 19:58) >  IMPRESSION:   Left lower lobe pulmonary embolus.    Cholelithiasis and choledocholithiasis. The descending place with mild   dilatation. No pneumobilia.    Age-indeterminate right pubic bone fracture.    The findings were discussed with JEN PIMENTEL 11/7/2019 9:07 AM   with read back confirmation.    < end of copied text >    DIAGNOSTIC TESTING:    < from: Transthoracic Echocardiogram (11.08.19 @ 15:38) >  CONCLUSIONS:  1. Mitral annular calcification, otherwise normal mitral  valve. Mild mitral regurgitation.  2. Aortic valve not well visualized; appears calcified with  grossly mildly decreased opening.  3. Normal left ventricular internal dimensions and wall  thicknesses.  4. Severe segmental left ventricular systolic dysfunction.  The apex, mid to distal septum and distal anterior walls  are hypokinetic.  5. The right ventricle is not well visualized; grossly  normal right ventricular systolic function.  ------------------------------------------------------------------------  Confirmed on  11/8/2019 - 17:13:16 by Jovani Celestin M.D. RPVI    < end of copied text >        ASSESSMENT/PLAN:     89F h/o paranoid schizophrenia (managed off medication), cholelithiasis/choledocholithiasis s/p ERCP w/stent placement February 2019, left hip arthroplasty, gait instability, presents after fall at home. Patient is chest pain free. Cardiology consulted for abnormal EKG and further work up.     -Pt. with + PE on CT scan, cont hep gtt   -Pt. HD stable with no symptoms   -echo as noted above; severe segmental LV dysfunction, started on BB   -GI f/u appreciated, plan for ERCP next week   -based on RCRI, patient is considered as moderate cardiac risk for planned procedure. Patient is optimized from CV perspective.

## 2019-11-10 NOTE — PROGRESS NOTE ADULT - ASSESSMENT
89F h/o paranoid schizophrenia (managed off medication), cholelithiasis/choledocholithiasis, left hip arthroplasty, gait instability, presents after fall at home and found to have abn distension in setting of increased  alk phos and t bili, a/f eval, pain control and pending PT evaluation:     Problem/Plan - 1:  ·  Problem: Pulmonary embolism .  Plan: Heparin started . Ct chest noted. < from: CT Chest w/ IV Cont (11.06.19 @ 19:58) >  IMPRESSION:   Left lower lobe pulmonary embolus. Has IVC filter also.     < end of copied text >  Pulmonary and cardiology consulted.     TTE pending.      Problem/Plan - 2:  ·  Problem: Hyperbilirubinemia.  Plan: US abdominal noted   CT abdomen/pelvis noted. < from: CT Abdomen and Pelvis w/ IV Cont (11.06.19 @ 19:58) >  ABDOMEN AND PELVIS:    LIVER: Within normal limits.   BILE DUCTS: Minimal biliary dilatation. Plastic CBD stent.  GALLBLADDER: Cholelithiasis and choledocholithiasis.  SPLEEN: Within normal limits.   PANCREAS: Within normal limits.  ADRENALS: Within normal limits.   KIDNEYS/URETERS: Cysts and other lesions too small to characterize.     BLADDER: Within normal limits.   REPRODUCTIVE ORGANS: Within normal limits.     BOWEL: No bowel obstruction.  PERITONEUM: No ascites.   VESSELS:  IVC filter.   RETROPERITONEUM/LYMPH NODES: No lymphadenopathy.     ABDOMINAL WALL: Within normal limits.  BONES: Bilateral hip arthroplasty. Chronic rib fractures. Multiple   thoracolumbar compression fractures. Age indeterminate fracture right   pubic bone anteriorly.    < end of copied text >  GI consult noted . Possible ERCP for stent removal next week so will hold off starting PO AC.   Refusing ERCP . Will get psychiatry evaluation .      Problem/Plan - 3:  ·  Problem: Closed nondisplaced fracture of proximal phalanx of middle fingerr and Pelvic fracture .  Plan: s/p splint for left middle finger distal fracture  Ortho helping.      Problem/Plan - 4:  ·  Problem: Schizophrenia, unspecified type.  Plan: stable off medication.      Problem/Plan - 5:  ·  Problem: Protein calorie malnutrition.  Plan: encourage po  nutrition evaluation  b12 and folate normal.      Problem/Plan - 6:  Problem: CKD (chronic kidney disease), stage II. Plan: -renally dose meds and monitor Cr.     Problem/Plan - 7:  ·  Problem: Fall, initial encounter.  Plan: no evidence of syncope  fall precautions  PT evaluation in progress.

## 2019-11-10 NOTE — PROGRESS NOTE ADULT - PROBLEM SELECTOR PLAN 1
cont AC: she has IVC filer also from before: so far she has no sign sof bleeding: for ercp next week  11/9: cont AC for procedure next week: she does have mild abd distension but no pain: constipated:  11/10: cont AC: long term AC after procedure tomorrow

## 2019-11-10 NOTE — PROGRESS NOTE ADULT - SUBJECTIVE AND OBJECTIVE BOX
INTERVAL HPI/OVERNIGHT EVENTS: I do not want ERCP tomorrow.   Vital Signs Last 24 Hrs  T(C): 36.2 (10 Nov 2019 05:01), Max: 36.3 (09 Nov 2019 20:42)  T(F): 97.1 (10 Nov 2019 05:01), Max: 97.3 (09 Nov 2019 20:42)  HR: 75 (10 Nov 2019 05:01) (71 - 88)  BP: 142/80 (10 Nov 2019 05:01) (126/85 - 142/80)  BP(mean): --  RR: 16 (10 Nov 2019 05:01) (16 - 16)  SpO2: 96% (10 Nov 2019 05:01) (96% - 96%)  I&O's Summary    MEDICATIONS  (STANDING):  calcium carbonate 1250 mG  + Vitamin D (OsCal 500 + D) 1 Tablet(s) Oral daily  heparin  Infusion.  Unit(s)/Hr (8 mL/Hr) IV Continuous <Continuous>  metoprolol tartrate 25 milliGRAM(s) Oral two times a day  multivitamin 1 Tablet(s) Oral daily  sodium chloride 0.9%. 1000 milliLiter(s) (75 mL/Hr) IV Continuous <Continuous>    MEDICATIONS  (PRN):  acetaminophen   Tablet .. 650 milliGRAM(s) Oral every 6 hours PRN Mild Pain (1 - 3)  heparin  Injectable 3500 Unit(s) IV Push every 6 hours PRN For aPTT less than 40  heparin  Injectable 1500 Unit(s) IV Push every 6 hours PRN For aPTT between 40 - 57  traMADol 25 milliGRAM(s) Oral every 6 hours PRN moderate to severe pain (4-10)    LABS:                        11.9   4.67  )-----------( 192      ( 10 Nov 2019 07:17 )             37.9     11-10    142  |  108<H>  |  35<H>  ----------------------------<  102<H>  4.2   |  25  |  0.64    Ca    9.4      10 Nov 2019 07:17  Phos  3.4     11-10  Mg     2.1     11-10    TPro  6.0  /  Alb  3.3  /  TBili  0.7  /  DBili  x   /  AST  19  /  ALT  10  /  AlkPhos  211<H>  11-10    PTT - ( 10 Nov 2019 07:17 )  PTT:84.3 SEC    CAPILLARY BLOOD GLUCOSE              REVIEW OF SYSTEMS:  CONSTITUTIONAL: No fever, weight loss, or fatigue  EYES: No eye pain, visual disturbances, or discharge  ENMT:  No difficulty hearing, tinnitus, vertigo; No sinus or throat pain  NECK: No pain or stiffness  RESPIRATORY: No cough, wheezing, chills or hemoptysis; No shortness of breath  CARDIOVASCULAR: No chest pain, palpitations, dizziness, or leg swelling  GASTROINTESTINAL: No abdominal or epigastric pain. No nausea, vomiting, or hematemesis; No diarrhea or constipation. No melena or hematochezia.  GENITOURINARY: No dysuria, frequency, hematuria, or incontinence  NEUROLOGICAL: No headaches, memory loss, loss of strength, numbness, or tremors    Consultant(s) Notes Reviewed:  [x ] YES  [ ] NO    PHYSICAL EXAM:  GENERAL: NAD, not in any distress ,  HEAD:  Atraumatic, Normocephalic  EYES: EOMI, PERRLA, conjunctiva and sclera clear  ENMT: No tonsillar erythema, exudates, or enlargement; Moist mucous membranes, Good dentition, No lesions  NECK: Supple, No JVD, Normal thyroid  NERVOUS SYSTEM:  Alert & Oriented X2 to 3, No focal deficit   CHEST/LUNG: Good air entry bilateral with no  rales, rhonchi, wheezing, or rubs  HEART: Regular rate and rhythm; No murmurs, rubs, or gallops  ABDOMEN: Soft, Nontender, Nondistended; Bowel sounds present  EXTREMITIES:  2+ Peripheral Pulses, No clubbing, cyanosis, or edema  SKIN: No rashes or lesions    Care Discussed with Consultants/Other Providers [ x] YES  [ ] NO

## 2019-11-10 NOTE — PROGRESS NOTE ADULT - SUBJECTIVE AND OBJECTIVE BOX
Patient is a 89y old  Female who presents with a chief complaint of fall (10 Nov 2019 08:54)      Any change in ROS: Doingok : no SOB     MEDICATIONS  (STANDING):  calcium carbonate 1250 mG  + Vitamin D (OsCal 500 + D) 1 Tablet(s) Oral daily  heparin  Infusion.  Unit(s)/Hr (8 mL/Hr) IV Continuous <Continuous>  metoprolol tartrate 25 milliGRAM(s) Oral two times a day  multivitamin 1 Tablet(s) Oral daily  sodium chloride 0.9%. 1000 milliLiter(s) (75 mL/Hr) IV Continuous <Continuous>    MEDICATIONS  (PRN):  acetaminophen   Tablet .. 650 milliGRAM(s) Oral every 6 hours PRN Mild Pain (1 - 3)  heparin  Injectable 3500 Unit(s) IV Push every 6 hours PRN For aPTT less than 40  heparin  Injectable 1500 Unit(s) IV Push every 6 hours PRN For aPTT between 40 - 57  traMADol 25 milliGRAM(s) Oral every 6 hours PRN moderate to severe pain (4-10)    Vital Signs Last 24 Hrs  T(C): 36.2 (10 Nov 2019 05:01), Max: 36.7 (09 Nov 2019 13:36)  T(F): 97.1 (10 Nov 2019 05:01), Max: 98 (09 Nov 2019 13:36)  HR: 75 (10 Nov 2019 05:01) (71 - 88)  BP: 142/80 (10 Nov 2019 05:01) (126/85 - 142/80)  BP(mean): --  RR: 16 (10 Nov 2019 05:01) (16 - 18)  SpO2: 96% (10 Nov 2019 05:01) (96% - 98%)    I&O's Summary        Physical Exam:   GENERAL: NAD, well-groomed, well-developed  HEENT: COLEMAN/   Atraumatic, Normocephalic  ENMT: No tonsillar erythema, exudates, or enlargement; Moist mucous membranes, Good dentition, No lesions  NECK: Supple, No JVD, Normal thyroid  CHEST/LUNG: Clear to auscultaion, ; No rales, rhonchi, wheezing, or rubs  CVS: Regular rate and rhythm; No murmurs, rubs, or gallops  GI: : mild distension: BS +  NERVOUS SYSTEM:  Alert & Oriented X3  EXTREMITIES:  2+ Peripheral Pulses, No clubbing, cyanosis, or edema  LYMPH: No lymphadenopathy noted  SKIN: No rashes or lesions  ENDOCRINOLOGY: No Thyromegaly  PSYCH: Appropriate    Labs:                              11.9   4.67  )-----------( 192      ( 10 Nov 2019 07:17 )             37.9                         11.6   4.53  )-----------( 168      ( 09 Nov 2019 03:20 )             37.0                         12.8   4.86  )-----------( 190      ( 08 Nov 2019 06:57 )             40.7                         12.1   4.24  )-----------( 189      ( 07 Nov 2019 16:48 )             38.4                         12.4   4.94  )-----------( 172      ( 07 Nov 2019 05:55 )             38.6     11-10    142  |  108<H>  |  35<H>  ----------------------------<  102<H>  4.2   |  25  |  0.64  11-09    143  |  108<H>  |  27<H>  ----------------------------<  105<H>  3.8   |  26  |  0.50  11-08    142  |  106  |  27<H>  ----------------------------<  105<H>  3.9   |  24  |  0.54  11-07    140  |  105  |  23  ----------------------------<  101<H>  3.7   |  24  |  0.53    Ca    9.4      10 Nov 2019 07:17  Ca    8.7      09 Nov 2019 03:30  Phos  3.4     11-10  Phos  2.6     11-09  Mg     2.1     11-10  Mg     2.1     11-09    TPro  6.0  /  Alb  3.3  /  TBili  0.7  /  DBili  x   /  AST  19  /  ALT  10  /  AlkPhos  211<H>  11-10  TPro  5.7<L>  /  Alb  3.1<L>  /  TBili  0.7  /  DBili  x   /  AST  16  /  ALT  11  /  AlkPhos  193<H>  11-09  TPro  6.1  /  Alb  3.3  /  TBili  1.1  /  DBili  x   /  AST  16  /  ALT  12  /  AlkPhos  208<H>  11-08    CAPILLARY BLOOD GLUCOSE          LIVER FUNCTIONS - ( 10 Nov 2019 07:17 )  Alb: 3.3 g/dL / Pro: 6.0 g/dL / ALK PHOS: 211 u/L / ALT: 10 u/L / AST: 19 u/L / GGT: x           PTT - ( 10 Nov 2019 07:17 )  PTT:84.3 SEC          RECENT CULTURES:  11-04 @ 01:09 URINE MIDSTREAM         < from: US Duplex Venous Lower Ext Complete, Bilateral (11.07.19 @ 17:38) >    EXAM:  US DPLX LWR EXT VEINS COMPL BI        PROCEDURE DATE:  Nov 7 2019         INTERPRETATION:  CLINICAL INFORMATION: I26.00 Other pulmonary embolism   without acute cor pulmonale    COMPARISON: None available.    TECHNIQUE: Duplex sonography ofthe BILATERAL LOWER extremity veins with   color and spectral Doppler, with and without compression.      FINDINGS:    There is normal compressibility of the bilateral common femoral, femoral   and popliteal veins.     Doppler examination shows normal spontaneous and phasic flow.    No calf vein thrombosis is detected.    IMPRESSION:     No evidence of deep venous thrombosis in either lower extremity.                < from: US Duplex Venous Lower Ext Complete, Bilateral (11.07.19 @ 17:38) >  PROCEDURE DATE:  Nov 7 2019         INTERPRETATION:  CLINICAL INFORMATION: I26.00 Other pulmonary embolism   without acute cor pulmonale    COMPARISON: None available.    TECHNIQUE: Duplex sonography ofthe BILATERAL LOWER extremity veins with   color and spectral Doppler, with and without compression.      FINDINGS:    There is normal compressibility of the bilateral common femoral, femoral   and popliteal veins.     Doppler examination shows normal spontaneous and phasic flow.    No calf vein thrombosis is detected.    IMPRESSION:     No evidence of deep venous thrombosis in either lower extremity.                        JOSÉ ANTONIO JAY M.D., ATTENDING RADIOLOGIST  This document has been electronically signed. Nov 8 2019  8:56AM        < end of copied text >          JOSÉ ANTONIO JAY M.D., ATTENDING RADIOLOGIST  This document has been electronically signed. Nov 8 2019  8:56AM        < end of copied text >                 RESPIRATORY CULTURES:          Studies  Chest X-RAY  CT SCAN Chest   Venous Dopplers: LE:   CT Abdomen  Others

## 2019-11-10 NOTE — PROGRESS NOTE ADULT - PROBLEM SELECTOR PLAN 4
for ercp next week  11/9: for ercp next week  11/10: for ercp next week, though to me she is saying no to procedure

## 2019-11-11 LAB
ALBUMIN SERPL ELPH-MCNC: 3.1 G/DL — LOW (ref 3.3–5)
ALP SERPL-CCNC: 214 U/L — HIGH (ref 40–120)
ALT FLD-CCNC: 14 U/L — SIGNIFICANT CHANGE UP (ref 4–33)
ANION GAP SERPL CALC-SCNC: 10 MMO/L — SIGNIFICANT CHANGE UP (ref 7–14)
APTT BLD: 98.6 SEC — HIGH (ref 27.5–36.3)
AST SERPL-CCNC: 19 U/L — SIGNIFICANT CHANGE UP (ref 4–32)
BILIRUB SERPL-MCNC: 0.8 MG/DL — SIGNIFICANT CHANGE UP (ref 0.2–1.2)
BUN SERPL-MCNC: 28 MG/DL — HIGH (ref 7–23)
CALCIUM SERPL-MCNC: 9 MG/DL — SIGNIFICANT CHANGE UP (ref 8.4–10.5)
CHLORIDE SERPL-SCNC: 106 MMOL/L — SIGNIFICANT CHANGE UP (ref 98–107)
CO2 SERPL-SCNC: 26 MMOL/L — SIGNIFICANT CHANGE UP (ref 22–31)
CREAT SERPL-MCNC: 0.48 MG/DL — LOW (ref 0.5–1.3)
GLUCOSE SERPL-MCNC: 96 MG/DL — SIGNIFICANT CHANGE UP (ref 70–99)
HCT VFR BLD CALC: 37.8 % — SIGNIFICANT CHANGE UP (ref 34.5–45)
HGB BLD-MCNC: 11.7 G/DL — SIGNIFICANT CHANGE UP (ref 11.5–15.5)
MAGNESIUM SERPL-MCNC: 2 MG/DL — SIGNIFICANT CHANGE UP (ref 1.6–2.6)
MCHC RBC-ENTMCNC: 31 % — LOW (ref 32–36)
MCHC RBC-ENTMCNC: 33.1 PG — SIGNIFICANT CHANGE UP (ref 27–34)
MCV RBC AUTO: 107.1 FL — HIGH (ref 80–100)
NRBC # FLD: 0 K/UL — SIGNIFICANT CHANGE UP (ref 0–0)
PHOSPHATE SERPL-MCNC: 3 MG/DL — SIGNIFICANT CHANGE UP (ref 2.5–4.5)
PLATELET # BLD AUTO: 201 K/UL — SIGNIFICANT CHANGE UP (ref 150–400)
PMV BLD: 9.3 FL — SIGNIFICANT CHANGE UP (ref 7–13)
POTASSIUM SERPL-MCNC: 3.7 MMOL/L — SIGNIFICANT CHANGE UP (ref 3.5–5.3)
POTASSIUM SERPL-SCNC: 3.7 MMOL/L — SIGNIFICANT CHANGE UP (ref 3.5–5.3)
PROT SERPL-MCNC: 5.7 G/DL — LOW (ref 6–8.3)
RBC # BLD: 3.53 M/UL — LOW (ref 3.8–5.2)
RBC # FLD: 14.6 % — HIGH (ref 10.3–14.5)
SODIUM SERPL-SCNC: 142 MMOL/L — SIGNIFICANT CHANGE UP (ref 135–145)
WBC # BLD: 3.87 K/UL — SIGNIFICANT CHANGE UP (ref 3.8–10.5)
WBC # FLD AUTO: 3.87 K/UL — SIGNIFICANT CHANGE UP (ref 3.8–10.5)

## 2019-11-11 PROCEDURE — 99223 1ST HOSP IP/OBS HIGH 75: CPT

## 2019-11-11 RX ORDER — SIMVASTATIN 20 MG/1
20 TABLET, FILM COATED ORAL AT BEDTIME
Refills: 0 | Status: DISCONTINUED | OUTPATIENT
Start: 2019-11-11 | End: 2019-11-15

## 2019-11-11 RX ADMIN — Medication 25 MILLIGRAM(S): at 05:48

## 2019-11-11 RX ADMIN — Medication 650 MILLIGRAM(S): at 22:00

## 2019-11-11 RX ADMIN — Medication 3 MILLIGRAM(S): at 21:15

## 2019-11-11 RX ADMIN — Medication 25 MILLIGRAM(S): at 17:34

## 2019-11-11 RX ADMIN — SIMVASTATIN 20 MILLIGRAM(S): 20 TABLET, FILM COATED ORAL at 21:15

## 2019-11-11 RX ADMIN — Medication 650 MILLIGRAM(S): at 21:16

## 2019-11-11 NOTE — BEHAVIORAL HEALTH ASSESSMENT NOTE - HPI (INCLUDE ILLNESS QUALITY, SEVERITY, DURATION, TIMING, CONTEXT, MODIFYING FACTORS, ASSOCIATED SIGNS AND SYMPTOMS)
89F h/o paranoid schizophrenia (managed off medication), cholelithiasis/choledocholithiasis, left hip arthroplasty, gait instability, presents after fall at home. admitted s/p mechanical fall.   As per  HPI : last admission in Feb 2019, treated for sepsis 2/2 cholangitis, s/p ERCP with biliary stent and sent to rehab. Per sister, did not f/u with GI for stent removal. Her PMD retired and patient without PMD currently. Psychiatry is being consulted to determine capacity for patient to refuse CBD stent removal.     Pt seen and evaluated, case reviewed. Pt was seen for capacity evaluation 2/20/19 by CL team for refusal of ERCP procedure, at that time pt was deemed not to have capacity to refuse ERCP.     Pt seen and evaluated with Sammie LI, who related to patient that she has a stent in the common bile duct that needed to come out, however it has not been taken out. Pt responded "there is no stent". Pt was not able to admit that she had any surgical procedure done in the past (chart reviewed, pt had ERCP 2/2019), refused to entertain the idea that it may possibly be there, repeatedly stating "it was cancelled, they cancelled it, they never put it in, there is nothing in there". Pt unable to teach back any risks associated with having latent stent in CBD (abdominal pain, occlusion, infection, sepsis) and unable to understand the need for removal, as she does not believe that she has anything in her CBD.    Pt is otherwise AAx3, reports that she worked as a  and states that she plans on going back to work "I am always at work".    Based on chart review pt has remote history of schizophrenia - last hospitalization was 1950's - no psychotropics since.     collateral from Sister Radha [phone] who reports that this is patients baseline, she doesn't believe in medications, and wants everything "natural". Sister states that she made decision for ERCP for pt last hospitalization.

## 2019-11-11 NOTE — PROGRESS NOTE ADULT - SUBJECTIVE AND OBJECTIVE BOX
Patient is a 89y old  Female who presents with a chief complaint of fall (10 Nov 2019 17:09)      Any change in ROS: SISTER AT [facility]EDSDIE: REFUSING FOR ERCP    MEDICATIONS  (STANDING):  calcium carbonate 1250 mG  + Vitamin D (OsCal 500 + D) 1 Tablet(s) Oral daily  heparin  Infusion.  Unit(s)/Hr (8 mL/Hr) IV Continuous <Continuous>  metoprolol tartrate 25 milliGRAM(s) Oral two times a day  multivitamin 1 Tablet(s) Oral daily  sodium chloride 0.9%. 1000 milliLiter(s) (75 mL/Hr) IV Continuous <Continuous>    MEDICATIONS  (PRN):  acetaminophen   Tablet .. 650 milliGRAM(s) Oral every 6 hours PRN Mild Pain (1 - 3)  heparin  Injectable 3500 Unit(s) IV Push every 6 hours PRN For aPTT less than 40  heparin  Injectable 1500 Unit(s) IV Push every 6 hours PRN For aPTT between 40 - 57  melatonin 3 milliGRAM(s) Oral at bedtime PRN Insomnia  traMADol 25 milliGRAM(s) Oral every 6 hours PRN moderate to severe pain (4-10)    Vital Signs Last 24 Hrs  T(C): 36.4 (11 Nov 2019 05:45), Max: 36.6 (10 Nov 2019 13:27)  T(F): 97.6 (11 Nov 2019 05:45), Max: 97.8 (10 Nov 2019 13:27)  HR: 72 (11 Nov 2019 05:45) (72 - 76)  BP: 148/74 (11 Nov 2019 05:45) (136/70 - 148/74)  BP(mean): --  RR: 17 (11 Nov 2019 05:45) (17 - 18)  SpO2: 96% (11 Nov 2019 05:45) (96% - 99%)    I&O's Summary        Physical Exam:   GENERAL: NAD, well-groomed, well-developed  HEENT: COLEMAN/   Atraumatic, Normocephalic  ENMT: No tonsillar erythema, exudates, or enlargement; Moist mucous membranes, Good dentition, No lesions  NECK: Supple, No JVD, Normal thyroid  CHEST/LUNG: Clear to auscultaion  CVS: Regular rate and rhythm; No murmurs, rubs, or gallops  GI: : Soft, Nontender, Nondistended; Bowel sounds present  NERVOUS SYSTEM:  Alert & Oriented X3  EXTREMITIES:  2+ Peripheral Pulses, No clubbing, cyanosis, or edema  LYMPH: No lymphadenopathy noted  SKIN: No rashes or lesions  ENDOCRINOLOGY: No Thyromegaly  PSYCH: Appropriate    Labs:                              11.7   3.87  )-----------( 201      ( 11 Nov 2019 06:40 )             37.8                         11.9   4.67  )-----------( 192      ( 10 Nov 2019 07:17 )             37.9                         11.6   4.53  )-----------( 168      ( 09 Nov 2019 03:20 )             37.0                         12.8   4.86  )-----------( 190      ( 08 Nov 2019 06:57 )             40.7                         12.1   4.24  )-----------( 189      ( 07 Nov 2019 16:48 )             38.4     11-11    142  |  106  |  28<H>  ----------------------------<  96  3.7   |  26  |  0.48<L>  11-10    142  |  108<H>  |  35<H>  ----------------------------<  102<H>  4.2   |  25  |  0.64  11-09    143  |  108<H>  |  27<H>  ----------------------------<  105<H>  3.8   |  26  |  0.50  11-08    142  |  106  |  27<H>  ----------------------------<  105<H>  3.9   |  24  |  0.54    Ca    9.0      11 Nov 2019 06:40  Ca    9.4      10 Nov 2019 07:17  Phos  3.0     11-11  Phos  3.4     11-10  Mg     2.0     11-11  Mg     2.1     11-10    TPro  5.7<L>  /  Alb  3.1<L>  /  TBili  0.8  /  DBili  x   /  AST  19  /  ALT  14  /  AlkPhos  214<H>  11-11  TPro  6.0  /  Alb  3.3  /  TBili  0.7  /  DBili  x   /  AST  19  /  ALT  10  /  AlkPhos  211<H>  11-10  TPro  5.7<L>  /  Alb  3.1<L>  /  TBili  0.7  /  DBili  x   /  AST  16  /  ALT  11  /  AlkPhos  193<H>  11-09  TPro  6.1  /  Alb  3.3  /  TBili  1.1  /  DBili  x   /  AST  16  /  ALT  12  /  AlkPhos  208<H>  11-08    CAPILLARY BLOOD GLUCOSE          LIVER FUNCTIONS - ( 11 Nov 2019 06:40 )  Alb: 3.1 g/dL / Pro: 5.7 g/dL / ALK PHOS: 214 u/L / ALT: 14 u/L / AST: 19 u/L / GGT: x           PTT - ( 11 Nov 2019 06:40 )  PTT:98.6 SEC      < from: US Duplex Venous Lower Ext Complete, Bilateral (11.07.19 @ 17:38) >      INTERPRETATION:  CLINICAL INFORMATION: I26.00 Other pulmonary embolism   without acute cor pulmonale    COMPARISON: None available.    TECHNIQUE: Duplex sonography ofthe BILATERAL LOWER extremity veins with   color and spectral Doppler, with and without compression.      FINDINGS:    There is normal compressibility of the bilateral common femoral, femoral   and popliteal veins.     Doppler examination shows normal spontaneous and phasic flow.    No calf vein thrombosis is detected.    IMPRESSION:     No evidence of deep venous thrombosis in either lower extremity.      < from: CT Chest w/ IV Cont (11.06.19 @ 19:58) >  EYS/URETERS: Cysts and other lesions too small to characterize.     BLADDER: Within normal limits.   REPRODUCTIVE ORGANS: Within normal limits.     BOWEL: No bowel obstruction.  PERITONEUM: No ascites.   VESSELS:  IVC filter.   RETROPERITONEUM/LYMPH NODES: No lymphadenopathy.     ABDOMINAL WALL: Within normal limits.  BONES: Bilateral hip arthroplasty. Chronic rib fractures. Multiple   thoracolumbar compression fractures. Age indeterminate fracture right   pubic bone anteriorly.    IMPRESSION:   Left lower lobe pulmonary embolus.    Cholelithiasis and choledocholithiasis. The descending place with mild   dilatation. No pneumobilia.    Age-indeterminate right pubic bone fracture.    The findings were discussed with JEN PIMENTEL 11/7/2019 9:07 AM   with read back confirmation.    < end of copied text >    < end of copied text >      RECENT CULTURES:        RESPIRATORY CULTURES:          Studies  Chest X-RAY  CT SCAN Chest   Venous Dopplers: LE:   CT Abdomen  Others

## 2019-11-11 NOTE — PROGRESS NOTE ADULT - PROBLEM SELECTOR PLAN 1
cont AC: she has IVC filer also from before: so far she has no sign sof bleeding: for ercp next week  11/9: cont AC for procedure next week: she does have mild abd distension but no pain: constipated:  11/10: cont AC: long term AC after procedure tomorrow  11/11: refusing for ercp: for psych to see: cont AC

## 2019-11-11 NOTE — BEHAVIORAL HEALTH ASSESSMENT NOTE - SUMMARY
89F h/o paranoid schizophrenia (managed off medication), cholelithiasis/choledocholithiasis, left hip arthroplasty, gait instability, presents after fall at home. admitted s/p mechanical fall.   As per  HPI : last admission in Feb 2019, treated for sepsis 2/2 cholangitis, s/p ERCP with biliary stent and sent to rehab. Per sister, did not f/u with GI for stent removal. Her PMD retired and patient without PMD currently. Psychiatry is being consulted to determine capacity for patient to refuse CBD stent removal.     Impression     At present time, despite pt being AAOx3, this patient lacks capacity to made decision removal of CBD stent. The patient is not able to communicate a consistent choice, nor is she able to understand the relevant information which is being presented.  She does not appreciate her medical situation, and is not able to manipulate the information in a rational manner. The patient is not able to verbalize the relevant risks/benefits about the procedure or not having the procedure.     Would defer to family for decisions, may use ethics and legal team as primary team sees fit.

## 2019-11-11 NOTE — PROGRESS NOTE ADULT - PROBLEM SELECTOR PLAN 4
for ercp next week  11/9: for ercp next week  11/10: for ercp next week, though to me she is saying no to procedure  11/11: refusing the procedure: for psych to see;

## 2019-11-11 NOTE — PROGRESS NOTE ADULT - SUBJECTIVE AND OBJECTIVE BOX
INTERVAL HPI/OVERNIGHT EVENTS: i want to eat .   Vital Signs Last 24 Hrs  T(C): 36.8 (11 Nov 2019 12:46), Max: 36.8 (11 Nov 2019 12:46)  T(F): 98.2 (11 Nov 2019 12:46), Max: 98.2 (11 Nov 2019 12:46)  HR: 70 (11 Nov 2019 12:46) (70 - 74)  BP: 133/70 (11 Nov 2019 12:46) (133/70 - 148/74)  BP(mean): --  RR: 18 (11 Nov 2019 12:46) (17 - 18)  SpO2: 99% (11 Nov 2019 12:46) (96% - 99%)  I&O's Summary    MEDICATIONS  (STANDING):  calcium carbonate 1250 mG  + Vitamin D (OsCal 500 + D) 1 Tablet(s) Oral daily  heparin  Infusion.  Unit(s)/Hr (8 mL/Hr) IV Continuous <Continuous>  metoprolol tartrate 25 milliGRAM(s) Oral two times a day  multivitamin 1 Tablet(s) Oral daily  sodium chloride 0.9%. 1000 milliLiter(s) (75 mL/Hr) IV Continuous <Continuous>    MEDICATIONS  (PRN):  acetaminophen   Tablet .. 650 milliGRAM(s) Oral every 6 hours PRN Mild Pain (1 - 3)  heparin  Injectable 3500 Unit(s) IV Push every 6 hours PRN For aPTT less than 40  heparin  Injectable 1500 Unit(s) IV Push every 6 hours PRN For aPTT between 40 - 57  melatonin 3 milliGRAM(s) Oral at bedtime PRN Insomnia  traMADol 25 milliGRAM(s) Oral every 6 hours PRN moderate to severe pain (4-10)    LABS:                        11.7   3.87  )-----------( 201      ( 11 Nov 2019 06:40 )             37.8     11-11    142  |  106  |  28<H>  ----------------------------<  96  3.7   |  26  |  0.48<L>    Ca    9.0      11 Nov 2019 06:40  Phos  3.0     11-11  Mg     2.0     11-11    TPro  5.7<L>  /  Alb  3.1<L>  /  TBili  0.8  /  DBili  x   /  AST  19  /  ALT  14  /  AlkPhos  214<H>  11-11    PTT - ( 11 Nov 2019 06:40 )  PTT:98.6 SEC    CAPILLARY BLOOD GLUCOSE              REVIEW OF SYSTEMS:  CONSTITUTIONAL: No fever, weight loss, or fatigue  EYES: No eye pain, visual disturbances, or discharge  ENMT:  No difficulty hearing, tinnitus, vertigo; No sinus or throat pain  NECK: No pain or stiffness  RESPIRATORY: No cough, wheezing, chills or hemoptysis; No shortness of breath  CARDIOVASCULAR: No chest pain, palpitations, dizziness, or leg swelling  GASTROINTESTINAL: No abdominal or epigastric pain. No nausea, vomiting, or hematemesis; No diarrhea or constipation. No melena or hematochezia.  GENITOURINARY: No dysuria, frequency, hematuria, or incontinence  NEUROLOGICAL: No headaches, memory loss, loss of strength, numbness, or tremors    Consultant(s) Notes Reviewed:  [x ] YES  [ ] NO    PHYSICAL EXAM:  GENERAL: NAD, well-groomed, well-developed,not in any distress ,  HEAD:  Atraumatic, Normocephalic  EYES: EOMI, PERRLA, conjunctiva and sclera clear  ENMT: No tonsillar erythema, exudates, or enlargement; Moist mucous membranes, Good dentition, No lesions  NECK: Supple, No JVD, Normal thyroid  NERVOUS SYSTEM:  Alert & Oriented X3, No focal deficit   CHEST/LUNG: Good air entry bilateral with no  rales, rhonchi, wheezing, or rubs  HEART: Regular rate and rhythm; No murmurs, rubs, or gallops  ABDOMEN: Soft, Nontender, Nondistended; Bowel sounds present  EXTREMITIES:  2+ Peripheral Pulses, No clubbing, cyanosis, or edema    Care Discussed with Consultants/Other Providers [ x] YES  [ ] NO

## 2019-11-11 NOTE — BEHAVIORAL HEALTH ASSESSMENT NOTE - NSBHADMITCOUNSEL_PSY_A_CORE
prognosis/Discussion of risks/benefits of proposed CBD stent removal./client/family/caregiver education/risks and benefits of treatment options/risk factor reduction

## 2019-11-11 NOTE — PROGRESS NOTE ADULT - ASSESSMENT
89F h/o paranoid schizophrenia (managed off medication), cholelithiasis/choledocholithiasis, left hip arthroplasty, gait instability, presents after fall at home and found to have abn distension in setting of increased  alk phos and t bili, a/f eval, pain control and pending PT evaluation:     Problem/Plan - 1:  ·  Problem: Pulmonary embolism .  Plan: Heparin started . Ct chest noted. < from: CT Chest w/ IV Cont (11.06.19 @ 19:58) >  IMPRESSION:   Left lower lobe pulmonary embolus. Has IVC filter also.     < end of copied text >  Pulmonary and cardiology consulted.     TTE pending.      Problem/Plan - 2:  ·  Problem: Hyperbilirubinemia.  Plan: US abdominal noted   CT abdomen/pelvis noted. < from: CT Abdomen and Pelvis w/ IV Cont (11.06.19 @ 19:58) >  ABDOMEN AND PELVIS:    LIVER: Within normal limits.   BILE DUCTS: Minimal biliary dilatation. Plastic CBD stent.  GALLBLADDER: Cholelithiasis and choledocholithiasis.  SPLEEN: Within normal limits.   PANCREAS: Within normal limits.  ADRENALS: Within normal limits.   KIDNEYS/URETERS: Cysts and other lesions too small to characterize.     BLADDER: Within normal limits.   REPRODUCTIVE ORGANS: Within normal limits.     BOWEL: No bowel obstruction.  PERITONEUM: No ascites.   VESSELS:  IVC filter.   RETROPERITONEUM/LYMPH NODES: No lymphadenopathy.     ABDOMINAL WALL: Within normal limits.  BONES: Bilateral hip arthroplasty. Chronic rib fractures. Multiple   thoracolumbar compression fractures. Age indeterminate fracture right   pubic bone anteriorly.    < end of copied text >  GI consult noted . Possible ERCP for stent removal next week so will hold off starting PO AC.   Refusing ERCP . Psychiatry evaluation done and has no capacity to refuse ERCP.      Problem/Plan - 3:  ·  Problem: Closed nondisplaced fracture of proximal phalanx of middle fingerr and Pelvic fracture .  Plan: s/p splint for left middle finger distal fracture  Ortho helping.      Problem/Plan - 4:  ·  Problem: Schizophrenia, unspecified type.  Plan: stable off medication.      Problem/Plan - 5:  ·  Problem: Protein calorie malnutrition.  Plan: encourage po  nutrition evaluation  b12 and folate normal.      Problem/Plan - 6:  Problem: CKD (chronic kidney disease), stage II. Plan: -renally dose meds and monitor Cr.     Problem/Plan - 7:  ·  Problem: Fall, initial encounter.  Plan: no evidence of syncope  fall precautions  PT evaluation in progress.

## 2019-11-11 NOTE — BEHAVIORAL HEALTH ASSESSMENT NOTE - CASE SUMMARY
Patient seen/evaluated with fellow, agree with above. 89 yo F h/o schizophrenia, h/o cholelithiasis, s/p ERCP with biliary stent placement in 2/19, now CBD stent requires removal. At present time pt lacks capacity to refuse or agree with this procedure, as she denies even having any stent in place, and refuses to engage in any meaningful discussion of the proposed procedure, saying "this is unpleasant... it was cancelled, I never had the stent." A&Ox3. No SI/HI, no psychotic sx. Impression: chronic schizophrenia. Plan: as above- would defer decision about procedure to HCP or next of kin. Pt currently not on psychotropic medications, no indication to begin any now. Will sign off- please call back with further issues/questions.

## 2019-11-11 NOTE — PROGRESS NOTE ADULT - SUBJECTIVE AND OBJECTIVE BOX
Subjective: No chest pain or sob; ROS otherwise negative.   	  MEDICATIONS  (STANDING):  calcium carbonate 1250 mG  + Vitamin D (OsCal 500 + D) 1 Tablet(s) Oral daily  heparin  Infusion.  Unit(s)/Hr (8 mL/Hr) IV Continuous <Continuous>  metoprolol tartrate 25 milliGRAM(s) Oral two times a day  multivitamin 1 Tablet(s) Oral daily  sodium chloride 0.9%. 1000 milliLiter(s) (75 mL/Hr) IV Continuous <Continuous>    LABS:                        11.7   3.87  )-----------( 201      ( 11 Nov 2019 06:40 )             37.8    142  |  106  |  28<H>  ----------------------------<  96  3.7   |  26  |  0.48<L>    Ca    9.0      11 Nov 2019 06:40  Phos  3.0     11-11  Mg     2.0     11-11    TPro  5.7<L>  /  Alb  3.1<L>  /  TBili  0.8  /  DBili  x   /  AST  19  /  ALT  14  /  AlkPhos  214<H>  11-11    Creatinine Trend: 0.48<--, 0.64<--, 0.50<--, 0.54<--, 0.53<--, 0.50<--   PTT - ( 11 Nov 2019 06:40 )  PTT:98.6 SEC    PHYSICAL EXAM  Vital Signs Last 24 Hrs  T(C): 36.8 (11 Nov 2019 12:46), Max: 36.8 (11 Nov 2019 12:46)  T(F): 98.2 (11 Nov 2019 12:46), Max: 98.2 (11 Nov 2019 12:46)  HR: 70 (11 Nov 2019 12:46) (70 - 74)  BP: 133/70 (11 Nov 2019 12:46) (133/70 - 148/74)  RR: 18 (11 Nov 2019 12:46) (17 - 18)  SpO2: 99% (11 Nov 2019 12:46) (96% - 99%)    Gen: Appears well in NAD  HEENT:  (-)icterus (-)pallor  CV: N S1 S2, RRR, (+)2 Pulses B/l  Resp:  Clear to auscultation B/L, normal effort  GI: (+) BS Soft, NT, distended   Lymph:  (-)Edema, (-)obvious lymphadenopathy  Skin: Warm to touch, Normal turgor  Psych: Appropriate mood and affect      TELEMETRY: 	  Not on telemetry     ECG:  	    < from: 12 Lead ECG (11.03.19 @ 22:30) >  Line Sinus tachycardiawith frequent Premature ventricular complexes  Possible Left atrial enlargement  Left axis deviation  Left ventricular hypertrophy  Inferior infarct , age undetermined  Anterolateral infarct , age undetermined  Abnormal ECG    < end of copied text >    RADIOLOGY:     < from: CT Chest w/ IV Cont (11.06.19 @ 19:58) >  IMPRESSION:   Left lower lobe pulmonary embolus.    Cholelithiasis and choledocholithiasis. The descending place with mild   dilatation. No pneumobilia.    Age-indeterminate right pubic bone fracture.    The findings were discussed with JEN PIMENTEL 11/7/2019 9:07 AM   with read back confirmation.    < end of copied text >    DIAGNOSTIC TESTING:    < from: Transthoracic Echocardiogram (11.08.19 @ 15:38) >  CONCLUSIONS:  1. Mitral annular calcification, otherwise normal mitral  valve. Mild mitral regurgitation.  2. Aortic valve not well visualized; appears calcified with  grossly mildly decreased opening.  3. Normal left ventricular internal dimensions and wall  thicknesses.  4. Severe segmental left ventricular systolic dysfunction.  The apex, mid to distal septum and distal anterior walls  are hypokinetic.  5. The right ventricle is not well visualized; grossly  normal right ventricular systolic function.  ------------------------------------------------------------------------  Confirmed on  11/8/2019 - 17:13:16 by Jovani Celestin M.D. RPV[facility]    < end of copied text >        ASSESSMENT/PLAN:     89F h/o paranoid schizophrenia (managed off medication), cholelithiasis/choledocholithiasis s/p ERCP w/stent placement February 2019, left hip arthroplasty, gait instability, presents after fall at home. Patient is chest pain free. Cardiology consulted for abnormal EKG and further work up.     -Pt. with + PE on CT scan, cont hep gtt   -Pt. HD stable with no symptoms   -echo as noted above; severe segmental LV dysfunction, started on BB  -unable to d/w patient regarding her GOC  -will medically manage her new LV dysfxn   -GI f/u appreciated, plan for ERCP next week   -based on RCRI, patient is considered as moderate cardiac risk for planned procedure. Patient is optimized from CV perspective.

## 2019-11-12 LAB
ALBUMIN SERPL ELPH-MCNC: 3.1 G/DL — LOW (ref 3.3–5)
ALP SERPL-CCNC: 235 U/L — HIGH (ref 40–120)
ALT FLD-CCNC: 15 U/L — SIGNIFICANT CHANGE UP (ref 4–33)
ANION GAP SERPL CALC-SCNC: 10 MMO/L — SIGNIFICANT CHANGE UP (ref 7–14)
APTT BLD: 31 SEC — SIGNIFICANT CHANGE UP (ref 27.5–36.3)
AST SERPL-CCNC: 20 U/L — SIGNIFICANT CHANGE UP (ref 4–32)
BILIRUB SERPL-MCNC: 0.9 MG/DL — SIGNIFICANT CHANGE UP (ref 0.2–1.2)
BUN SERPL-MCNC: 31 MG/DL — HIGH (ref 7–23)
CALCIUM SERPL-MCNC: 9.1 MG/DL — SIGNIFICANT CHANGE UP (ref 8.4–10.5)
CHLORIDE SERPL-SCNC: 108 MMOL/L — HIGH (ref 98–107)
CO2 SERPL-SCNC: 25 MMOL/L — SIGNIFICANT CHANGE UP (ref 22–31)
CREAT SERPL-MCNC: 0.53 MG/DL — SIGNIFICANT CHANGE UP (ref 0.5–1.3)
GLUCOSE SERPL-MCNC: 87 MG/DL — SIGNIFICANT CHANGE UP (ref 70–99)
HCT VFR BLD CALC: 37.7 % — SIGNIFICANT CHANGE UP (ref 34.5–45)
HCT VFR BLD CALC: 38.8 % — SIGNIFICANT CHANGE UP (ref 34.5–45)
HGB BLD-MCNC: 11.7 G/DL — SIGNIFICANT CHANGE UP (ref 11.5–15.5)
HGB BLD-MCNC: 11.8 G/DL — SIGNIFICANT CHANGE UP (ref 11.5–15.5)
MAGNESIUM SERPL-MCNC: 2.1 MG/DL — SIGNIFICANT CHANGE UP (ref 1.6–2.6)
MCHC RBC-ENTMCNC: 30.4 % — LOW (ref 32–36)
MCHC RBC-ENTMCNC: 31 % — LOW (ref 32–36)
MCHC RBC-ENTMCNC: 32.2 PG — SIGNIFICANT CHANGE UP (ref 27–34)
MCHC RBC-ENTMCNC: 33.1 PG — SIGNIFICANT CHANGE UP (ref 27–34)
MCV RBC AUTO: 105.7 FL — HIGH (ref 80–100)
MCV RBC AUTO: 106.8 FL — HIGH (ref 80–100)
NRBC # FLD: 0 K/UL — SIGNIFICANT CHANGE UP (ref 0–0)
NRBC # FLD: 0 K/UL — SIGNIFICANT CHANGE UP (ref 0–0)
PHOSPHATE SERPL-MCNC: 3.4 MG/DL — SIGNIFICANT CHANGE UP (ref 2.5–4.5)
PLATELET # BLD AUTO: 208 K/UL — SIGNIFICANT CHANGE UP (ref 150–400)
PLATELET # BLD AUTO: 227 K/UL — SIGNIFICANT CHANGE UP (ref 150–400)
PMV BLD: 8.8 FL — SIGNIFICANT CHANGE UP (ref 7–13)
PMV BLD: 8.9 FL — SIGNIFICANT CHANGE UP (ref 7–13)
POTASSIUM SERPL-MCNC: 3.8 MMOL/L — SIGNIFICANT CHANGE UP (ref 3.5–5.3)
POTASSIUM SERPL-SCNC: 3.8 MMOL/L — SIGNIFICANT CHANGE UP (ref 3.5–5.3)
PROT SERPL-MCNC: 5.8 G/DL — LOW (ref 6–8.3)
RBC # BLD: 3.53 M/UL — LOW (ref 3.8–5.2)
RBC # BLD: 3.67 M/UL — LOW (ref 3.8–5.2)
RBC # FLD: 14.4 % — SIGNIFICANT CHANGE UP (ref 10.3–14.5)
RBC # FLD: 14.6 % — HIGH (ref 10.3–14.5)
SODIUM SERPL-SCNC: 143 MMOL/L — SIGNIFICANT CHANGE UP (ref 135–145)
WBC # BLD: 3.52 K/UL — LOW (ref 3.8–10.5)
WBC # BLD: 4.54 K/UL — SIGNIFICANT CHANGE UP (ref 3.8–10.5)
WBC # FLD AUTO: 3.52 K/UL — LOW (ref 3.8–10.5)
WBC # FLD AUTO: 4.54 K/UL — SIGNIFICANT CHANGE UP (ref 3.8–10.5)

## 2019-11-12 PROCEDURE — 43275 ERCP REMOVE FORGN BODY DUCT: CPT | Mod: GC

## 2019-11-12 PROCEDURE — 43264 ERCP REMOVE DUCT CALCULI: CPT | Mod: GC

## 2019-11-12 PROCEDURE — 74328 X-RAY BILE DUCT ENDOSCOPY: CPT | Mod: 26,GC

## 2019-11-12 RX ORDER — INDOMETHACIN 50 MG
100 CAPSULE ORAL ONCE
Refills: 0 | Status: COMPLETED | OUTPATIENT
Start: 2019-11-12 | End: 2019-11-12

## 2019-11-12 RX ADMIN — Medication 1 TABLET(S): at 12:50

## 2019-11-12 RX ADMIN — Medication 100 MILLIGRAM(S): at 18:30

## 2019-11-12 RX ADMIN — SIMVASTATIN 20 MILLIGRAM(S): 20 TABLET, FILM COATED ORAL at 23:33

## 2019-11-12 RX ADMIN — Medication 650 MILLIGRAM(S): at 05:30

## 2019-11-12 RX ADMIN — Medication 25 MILLIGRAM(S): at 23:33

## 2019-11-12 RX ADMIN — Medication 650 MILLIGRAM(S): at 05:00

## 2019-11-12 RX ADMIN — Medication 650 MILLIGRAM(S): at 13:20

## 2019-11-12 RX ADMIN — Medication 25 MILLIGRAM(S): at 05:00

## 2019-11-12 RX ADMIN — Medication 650 MILLIGRAM(S): at 12:50

## 2019-11-12 NOTE — PROGRESS NOTE ADULT - PROBLEM SELECTOR PLAN 1
cont AC: she has IVC filer also from before: so far she has no sign sof bleeding: for ercp next week  11/9: cont AC for procedure next week: she does have mild abd distension but no pain: constipated:  11/10: cont AC: long term AC after procedure tomorrow  11/11: refusing for ercp: for psych to see: cont AC  11/12: cont AC: pt cant refuse as she is not competent:

## 2019-11-12 NOTE — PROGRESS NOTE ADULT - SUBJECTIVE AND OBJECTIVE BOX
INTERVAL HPI/OVERNIGHT EVENTS: No new concerns.   T(C): 36.4 (12 Nov 2019 05:00), Max: 36.5 (11 Nov 2019 21:14)  T(F): 97.5 (12 Nov 2019 05:00), Max: 97.7 (11 Nov 2019 21:14)  HR: 78 (12 Nov 2019 05:00) (65 - 93)  BP: 125/80 (12 Nov 2019 05:00) (125/80 - 148/94)  BP(mean): --  RR: 16 (12 Nov 2019 05:00) (16 - 18)  SpO2: 95% (12 Nov 2019 05:00) (95% - 97%)  I&O's Summary    MEDICATIONS  (STANDING):  calcium carbonate 1250 mG  + Vitamin D (OsCal 500 + D) 1 Tablet(s) Oral daily  heparin  Infusion.  Unit(s)/Hr (8 mL/Hr) IV Continuous <Continuous>  metoprolol tartrate 25 milliGRAM(s) Oral two times a day  multivitamin 1 Tablet(s) Oral daily  simvastatin 20 milliGRAM(s) Oral at bedtime  sodium chloride 0.9%. 1000 milliLiter(s) (75 mL/Hr) IV Continuous <Continuous>    MEDICATIONS  (PRN):  acetaminophen   Tablet .. 650 milliGRAM(s) Oral every 6 hours PRN Mild Pain (1 - 3)  heparin  Injectable 3500 Unit(s) IV Push every 6 hours PRN For aPTT less than 40  heparin  Injectable 1500 Unit(s) IV Push every 6 hours PRN For aPTT between 40 - 57  melatonin 3 milliGRAM(s) Oral at bedtime PRN Insomnia  traMADol 25 milliGRAM(s) Oral every 6 hours PRN moderate to severe pain (4-10)    LABS:                        11.8   3.52  )-----------( 227      ( 12 Nov 2019 07:47 )             38.8     11-12    143  |  108<H>  |  31<H>  ----------------------------<  87  3.8   |  25  |  0.53    Ca    9.1      12 Nov 2019 07:47  Phos  3.4     11-12  Mg     2.1     11-12    TPro  5.8<L>  /  Alb  3.1<L>  /  TBili  0.9  /  DBili  x   /  AST  20  /  ALT  15  /  AlkPhos  235<H>  11-12    PTT - ( 12 Nov 2019 07:47 )  PTT:31.0 SEC    CAPILLARY BLOOD GLUCOSE              REVIEW OF SYSTEMS:  CONSTITUTIONAL: No fever, weight loss, or fatigue  EYES: No eye pain, visual disturbances, or discharge  ENMT:  No difficulty hearing, tinnitus, vertigo; No sinus or throat pain  NECK: No pain or stiffness  RESPIRATORY: No cough, wheezing, chills or hemoptysis; No shortness of breath  CARDIOVASCULAR: No chest pain, palpitations, dizziness, or leg swelling  GASTROINTESTINAL: No abdominal or epigastric pain. No nausea, vomiting, or hematemesis; No diarrhea or constipation. No melena or hematochezia.  GENITOURINARY: No dysuria, frequency, hematuria, or incontinence  NEUROLOGICAL: No headaches, memory loss, loss of strength, numbness, or tremors    Consultant(s) Notes Reviewed:  [x ] YES  [ ] NO    PHYSICAL EXAM:  GENERAL: NAD, well-groomed, well-developed,not in any distress ,  HEAD:  Atraumatic, Normocephalic  EYES: EOMI, PERRLA, conjunctiva and sclera clear  ENMT: No tonsillar erythema, exudates, or enlargement; Moist mucous membranes, Good dentition, No lesions  NECK: Supple, No JVD, Normal thyroid  NERVOUS SYSTEM:  Alert & Oriented X3, No focal deficit   CHEST/LUNG: Good air entry bilateral with no  rales, rhonchi, wheezing, or rubs  HEART: Regular rate and rhythm; No murmurs, rubs, or gallops  ABDOMEN: Soft, Nontender, Nondistended; Bowel sounds present  EXTREMITIES:  2+ Peripheral Pulses, No clubbing, cyanosis, or edema  SKIN: No rashes or lesions    Care Discussed with Consultants/Other Providers [ x] YES  [ ] NO

## 2019-11-12 NOTE — PROGRESS NOTE ADULT - PROBLEM SELECTOR PLAN 4
for ercp next week  11/9: for ercp next week  11/10: for ercp next week, though to me she is saying no to procedure  11/11: refusing the procedure: for psych to see;  11/12: for ercp per primary team

## 2019-11-12 NOTE — CHART NOTE - NSCHARTNOTEFT_GEN_A_CORE
Patient is s/p ERCP. Heparin drip was on hold for ERCP and stent removal. Discussed with GI fellow regarding when to resume heparin drip . Per GI fellow  , hold heparin drip as ERCP not yet  resulted. Ok to resume clear liquid diet if patient’s vitals and H/H stable. Stat CBC ordered.

## 2019-11-12 NOTE — PROGRESS NOTE ADULT - SUBJECTIVE AND OBJECTIVE BOX
Patient is a 89y old  Female who presents with a chief complaint of fall (12 Nov 2019 09:48)      Any change in ROS: Doingok : no SOB     MEDICATIONS  (STANDING):  calcium carbonate 1250 mG  + Vitamin D (OsCal 500 + D) 1 Tablet(s) Oral daily  heparin  Infusion.  Unit(s)/Hr (8 mL/Hr) IV Continuous <Continuous>  metoprolol tartrate 25 milliGRAM(s) Oral two times a day  multivitamin 1 Tablet(s) Oral daily  simvastatin 20 milliGRAM(s) Oral at bedtime  sodium chloride 0.9%. 1000 milliLiter(s) (75 mL/Hr) IV Continuous <Continuous>    MEDICATIONS  (PRN):  acetaminophen   Tablet .. 650 milliGRAM(s) Oral every 6 hours PRN Mild Pain (1 - 3)  heparin  Injectable 3500 Unit(s) IV Push every 6 hours PRN For aPTT less than 40  heparin  Injectable 1500 Unit(s) IV Push every 6 hours PRN For aPTT between 40 - 57  melatonin 3 milliGRAM(s) Oral at bedtime PRN Insomnia  traMADol 25 milliGRAM(s) Oral every 6 hours PRN moderate to severe pain (4-10)    Vital Signs Last 24 Hrs  T(C): 36.4 (12 Nov 2019 05:00), Max: 36.8 (11 Nov 2019 12:46)  T(F): 97.5 (12 Nov 2019 05:00), Max: 98.2 (11 Nov 2019 12:46)  HR: 78 (12 Nov 2019 05:00) (65 - 93)  BP: 125/80 (12 Nov 2019 05:00) (125/80 - 148/94)  BP(mean): --  RR: 16 (12 Nov 2019 05:00) (16 - 18)  SpO2: 95% (12 Nov 2019 05:00) (95% - 99%)    I&O's Summary        Physical Exam:   GENERAL: NAD, well-groomed, well-developed  HEENT: COLEMAN/   Atraumatic, Normocephalic  ENMT: No tonsillar erythema, exudates, or enlargement; Moist mucous membranes, Good dentition, No lesions  NECK: Supple, No JVD, Normal thyroid  CHEST/LUNG: Clear to auscultaion, ; No rales, rhonchi, wheezing, or rubs  CVS: Regular rate and rhythm; No murmurs, rubs, or gallops  GI: : Soft, Nontender, Nondistended; Bowel sounds present  NERVOUS SYSTEM:  Alert & Oriented X3  EXTREMITIES:  2+ Peripheral Pulses, No clubbing, cyanosis, or edema  LYMPH: No lymphadenopathy noted  SKIN: No rashes or lesions  ENDOCRINOLOGY: No Thyromegaly  PSYCH: Appropriate    Labs:                              11.8   3.52  )-----------( 227      ( 12 Nov 2019 07:47 )             38.8                         11.7   3.87  )-----------( 201      ( 11 Nov 2019 06:40 )             37.8                         11.9   4.67  )-----------( 192      ( 10 Nov 2019 07:17 )             37.9                         11.6   4.53  )-----------( 168      ( 09 Nov 2019 03:20 )             37.0     11-12    143  |  108<H>  |  31<H>  ----------------------------<  87  3.8   |  25  |  0.53  11-11    142  |  106  |  28<H>  ----------------------------<  96  3.7   |  26  |  0.48<L>  11-10    142  |  108<H>  |  35<H>  ----------------------------<  102<H>  4.2   |  25  |  0.64  11-09    143  |  108<H>  |  27<H>  ----------------------------<  105<H>  3.8   |  26  |  0.50    Ca    9.1      12 Nov 2019 07:47  Ca    9.0      11 Nov 2019 06:40  Phos  3.4     11-12  Phos  3.0     11-11  Mg     2.1     11-12  Mg     2.0     11-11    TPro  5.8<L>  /  Alb  3.1<L>  /  TBili  0.9  /  DBili  x   /  AST  20  /  ALT  15  /  AlkPhos  235<H>  11-12  TPro  5.7<L>  /  Alb  3.1<L>  /  TBili  0.8  /  DBili  x   /  AST  19  /  ALT  14  /  AlkPhos  214<H>  11-11  TPro  6.0  /  Alb  3.3  /  TBili  0.7  /  DBili  x   /  AST  19  /  ALT  10  /  AlkPhos  211<H>  11-10  TPro  5.7<L>  /  Alb  3.1<L>  /  TBili  0.7  /  DBili  x   /  AST  16  /  ALT  11  /  AlkPhos  193<H>  11-09    CAPILLARY BLOOD GLUCOSE          LIVER FUNCTIONS - ( 12 Nov 2019 07:47 )  Alb: 3.1 g/dL / Pro: 5.8 g/dL / ALK PHOS: 235 u/L / ALT: 15 u/L / AST: 20 u/L / GGT: x           PTT - ( 12 Nov 2019 07:47 )  PTT:31.0 SEC          RECENT CULTURES:        RESPIRATORY CULTURES:    < from: US Duplex Venous Lower Ext Complete, Bilateral (11.07.19 @ 17:38) >  PROCEDURE DATE:  Nov 7 2019         INTERPRETATION:  CLINICAL INFORMATION: I26.00 Other pulmonary embolism   without acute cor pulmonale    COMPARISON: None available.    TECHNIQUE: Duplex sonography ofthe BILATERAL LOWER extremity veins with   color and spectral Doppler, with and without compression.      FINDINGS:    There is normal compressibility of the bilateral common femoral, femoral   and popliteal veins.     Doppler examination shows normal spontaneous and phasic flow.    No calf vein thrombosis is detected.    IMPRESSION:     No evidence of deep venous thrombosis in either lower extremity.    < end of copied text >        Studies  Chest X-RAY  CT SCAN Chest   Venous Dopplers: LE:   CT Abdomen  Others  < from: CT Chest w/ IV Cont (11.06.19 @ 19:58) >  thoracolumbar compression fractures. Age indeterminate fracture right   pubic bone anteriorly.    IMPRESSION:   Left lower lobe pulmonary embolus.    Cholelithiasis and choledocholithiasis. The descending place with mild   dilatation. No pneumobilia.    Age-indeterminate right pubic bone fracture.    The findings were discussed with JEN PIMENTEL 11/7/2019 9:07 AM   with read back confirmation.      < end of copied text >

## 2019-11-13 LAB
ALBUMIN SERPL ELPH-MCNC: 3.3 G/DL — SIGNIFICANT CHANGE UP (ref 3.3–5)
ALP SERPL-CCNC: 262 U/L — HIGH (ref 40–120)
ALT FLD-CCNC: 14 U/L — SIGNIFICANT CHANGE UP (ref 4–33)
ANION GAP SERPL CALC-SCNC: 14 MMO/L — SIGNIFICANT CHANGE UP (ref 7–14)
APTT BLD: 29.3 SEC — SIGNIFICANT CHANGE UP (ref 27.5–36.3)
AST SERPL-CCNC: 22 U/L — SIGNIFICANT CHANGE UP (ref 4–32)
BILIRUB SERPL-MCNC: 1.2 MG/DL — SIGNIFICANT CHANGE UP (ref 0.2–1.2)
BUN SERPL-MCNC: 32 MG/DL — HIGH (ref 7–23)
CALCIUM SERPL-MCNC: 9.3 MG/DL — SIGNIFICANT CHANGE UP (ref 8.4–10.5)
CHLORIDE SERPL-SCNC: 108 MMOL/L — HIGH (ref 98–107)
CO2 SERPL-SCNC: 23 MMOL/L — SIGNIFICANT CHANGE UP (ref 22–31)
CREAT SERPL-MCNC: 0.57 MG/DL — SIGNIFICANT CHANGE UP (ref 0.5–1.3)
GLUCOSE SERPL-MCNC: 88 MG/DL — SIGNIFICANT CHANGE UP (ref 70–99)
HCT VFR BLD CALC: 37.6 % — SIGNIFICANT CHANGE UP (ref 34.5–45)
HGB BLD-MCNC: 12 G/DL — SIGNIFICANT CHANGE UP (ref 11.5–15.5)
MAGNESIUM SERPL-MCNC: 2.2 MG/DL — SIGNIFICANT CHANGE UP (ref 1.6–2.6)
MCHC RBC-ENTMCNC: 31.9 % — LOW (ref 32–36)
MCHC RBC-ENTMCNC: 33.7 PG — SIGNIFICANT CHANGE UP (ref 27–34)
MCV RBC AUTO: 105.6 FL — HIGH (ref 80–100)
NRBC # FLD: 0 K/UL — SIGNIFICANT CHANGE UP (ref 0–0)
PHOSPHATE SERPL-MCNC: 3.9 MG/DL — SIGNIFICANT CHANGE UP (ref 2.5–4.5)
PLATELET # BLD AUTO: 210 K/UL — SIGNIFICANT CHANGE UP (ref 150–400)
PMV BLD: 8.6 FL — SIGNIFICANT CHANGE UP (ref 7–13)
POTASSIUM SERPL-MCNC: 4.1 MMOL/L — SIGNIFICANT CHANGE UP (ref 3.5–5.3)
POTASSIUM SERPL-SCNC: 4.1 MMOL/L — SIGNIFICANT CHANGE UP (ref 3.5–5.3)
PROT SERPL-MCNC: 5.7 G/DL — LOW (ref 6–8.3)
RBC # BLD: 3.56 M/UL — LOW (ref 3.8–5.2)
RBC # FLD: 14.4 % — SIGNIFICANT CHANGE UP (ref 10.3–14.5)
SODIUM SERPL-SCNC: 145 MMOL/L — SIGNIFICANT CHANGE UP (ref 135–145)
WBC # BLD: 5.25 K/UL — SIGNIFICANT CHANGE UP (ref 3.8–10.5)
WBC # FLD AUTO: 5.25 K/UL — SIGNIFICANT CHANGE UP (ref 3.8–10.5)

## 2019-11-13 PROCEDURE — 99232 SBSQ HOSP IP/OBS MODERATE 35: CPT | Mod: GC

## 2019-11-13 RX ORDER — APIXABAN 2.5 MG/1
10 TABLET, FILM COATED ORAL EVERY 12 HOURS
Refills: 0 | Status: DISCONTINUED | OUTPATIENT
Start: 2019-11-13 | End: 2019-11-15

## 2019-11-13 RX ORDER — APIXABAN 2.5 MG/1
2 TABLET, FILM COATED ORAL
Qty: 0 | Refills: 0 | DISCHARGE
Start: 2019-11-13

## 2019-11-13 RX ADMIN — Medication 25 MILLIGRAM(S): at 05:24

## 2019-11-13 RX ADMIN — APIXABAN 10 MILLIGRAM(S): 2.5 TABLET, FILM COATED ORAL at 17:10

## 2019-11-13 RX ADMIN — Medication 650 MILLIGRAM(S): at 12:19

## 2019-11-13 RX ADMIN — Medication 3 MILLIGRAM(S): at 22:04

## 2019-11-13 RX ADMIN — Medication 25 MILLIGRAM(S): at 17:07

## 2019-11-13 RX ADMIN — HEPARIN SODIUM 3500 UNIT(S): 5000 INJECTION INTRAVENOUS; SUBCUTANEOUS at 12:23

## 2019-11-13 RX ADMIN — SIMVASTATIN 20 MILLIGRAM(S): 20 TABLET, FILM COATED ORAL at 22:04

## 2019-11-13 RX ADMIN — Medication 1 TABLET(S): at 11:44

## 2019-11-13 RX ADMIN — HEPARIN SODIUM 1100 UNIT(S)/HR: 5000 INJECTION INTRAVENOUS; SUBCUTANEOUS at 12:19

## 2019-11-13 RX ADMIN — Medication 650 MILLIGRAM(S): at 11:46

## 2019-11-13 NOTE — PROGRESS NOTE ADULT - SUBJECTIVE AND OBJECTIVE BOX
Subjective: No chest pain or sob; ROS otherwise negative.       MEDICATIONS  (STANDING):  calcium carbonate 1250 mG  + Vitamin D (OsCal 500 + D) 1 Tablet(s) Oral daily  heparin  Infusion.  Unit(s)/Hr (8 mL/Hr) IV Continuous <Continuous>  metoprolol tartrate 25 milliGRAM(s) Oral two times a day  multivitamin 1 Tablet(s) Oral daily  simvastatin 20 milliGRAM(s) Oral at bedtime  sodium chloride 0.9%. 1000 milliLiter(s) (75 mL/Hr) IV Continuous <Continuous>    MEDICATIONS  (PRN):  acetaminophen   Tablet .. 650 milliGRAM(s) Oral every 6 hours PRN Mild Pain (1 - 3)  heparin  Injectable 3500 Unit(s) IV Push every 6 hours PRN For aPTT less than 40  heparin  Injectable 1500 Unit(s) IV Push every 6 hours PRN For aPTT between 40 - 57  melatonin 3 milliGRAM(s) Oral at bedtime PRN Insomnia  traMADol 25 milliGRAM(s) Oral every 6 hours PRN moderate to severe pain (4-10)      LABS:                            12.0   5.25  )-----------( 210      ( 13 Nov 2019 05:20 )             37.6     145  |  108<H>  |  32<H>  ----------------------------<  88  4.1   |  23  |  0.57    Ca    9.3      13 Nov 2019 05:20  Phos  3.9     11-13  Mg     2.2     11-13    TPro  5.7<L>  /  Alb  3.3  /  TBili  1.2  /  DBili  x   /  AST  22  /  ALT  14  /  AlkPhos  262<H>  11-13    Creatinine Trend: 0.57<--, 0.53<--, 0.48<--, 0.64<--, 0.50<--, 0.54<--   PTT - ( 13 Nov 2019 05:20 )  PTT:29.3 SEC      PHYSICAL EXAM  Vital Signs Last 24 Hrs  T(C): 36.2 (13 Nov 2019 08:30), Max: 37.1 (13 Nov 2019 05:23)  T(F): 97.2 (13 Nov 2019 08:30), Max: 98.7 (13 Nov 2019 05:23)  HR: 67 (13 Nov 2019 08:30) (61 - 100)  BP: 153/95 (13 Nov 2019 08:30) (118/86 - 164/98)  BP(mean): 124 (12 Nov 2019 20:30) (107 - 124)  RR: 18 (13 Nov 2019 08:30) (17 - 24)  SpO2: 95% (13 Nov 2019 08:30) (93% - 97%)      Gen: Appears well in NAD  HEENT:  (-)icterus (-)pallor  CV: N S1 S2, RRR, (+)2 Pulses B/l  Resp:  Clear to auscultation B/L, normal effort  GI: (+) BS Soft, NT, distended   Lymph:  (-)Edema, (-)obvious lymphadenopathy  Skin: Warm to touch, Normal turgor  Psych: unable to assess      TELEMETRY: 	  Not on telemetry     ECG:  	    < from: 12 Lead ECG (11.03.19 @ 22:30) >  Line Sinus tachycardiawith frequent Premature ventricular complexes  Possible Left atrial enlargement  Left axis deviation  Left ventricular hypertrophy  Inferior infarct , age undetermined  Anterolateral infarct , age undetermined  Abnormal ECG    < end of copied text >    RADIOLOGY:     < from: CT Chest w/ IV Cont (11.06.19 @ 19:58) >  IMPRESSION:   Left lower lobe pulmonary embolus.    Cholelithiasis and choledocholithiasis. The descending place with mild   dilatation. No pneumobilia.    Age-indeterminate right pubic bone fracture.    The findings were discussed with JEN PIMENTEL 11/7/2019 9:07 AM   with read back confirmation.    < end of copied text >    DIAGNOSTIC TESTING:    < from: Transthoracic Echocardiogram (11.08.19 @ 15:38) >  CONCLUSIONS:  1. Mitral annular calcification, otherwise normal mitral  valve. Mild mitral regurgitation.  2. Aortic valve not well visualized; appears calcified with  grossly mildly decreased opening.  3. Normal left ventricular internal dimensions and wall  thicknesses.  4. Severe segmental left ventricular systolic dysfunction.  The apex, mid to distal septum and distal anterior walls  are hypokinetic.  5. The right ventricle is not well visualized; grossly  normal right ventricular systolic function.  ------------------------------------------------------------------------  Confirmed on  11/8/2019 - 17:13:16 by Jovani Celestin M.D. RPVI    < end of copied text >        ASSESSMENT/PLAN:     89F h/o paranoid schizophrenia (managed off medication), cholelithiasis/choledocholithiasis s/p ERCP w/stent placement February 2019, left hip arthroplasty, gait instability, presents after fall at home. Found with LLL PE    -tolerated procedure well from CV perspectife  -echo as noted above; severe segmental LV dysfunction, ?New, medical management recommended at this time.  Cont Metoprolol  -AC for PE per primary team  -Not in clinical CHF

## 2019-11-13 NOTE — PROGRESS NOTE ADULT - PROBLEM SELECTOR PLAN 4
for ercp next week  11/9: for ercp next week  11/10: for ercp next week, though to me she is saying no to procedure  11/11: refusing the procedure: for psych to see;  11/12: for ercp per primary team  11/13: s/p ercp

## 2019-11-13 NOTE — PROGRESS NOTE ADULT - PROBLEM SELECTOR PLAN 1
cont AC: she has IVC filer also from before: so far she has no sign sof bleeding: for ercp next week  11/9: cont AC for procedure next week: she does have mild abd distension but no pain: constipated:  11/10: cont AC: long term AC after procedure tomorrow  11/11: refusing for ercp: for psych to see: cont AC  11/12: cont AC: pt cant refuse as she is not competent:  11/13: off AC:: still DW NP: when to restart

## 2019-11-13 NOTE — PROGRESS NOTE ADULT - ASSESSMENT
Impression:  89F h/o paranoid schizophrenia (managed off medication), cholelithiasis/choledocholithiasis, left hip arthroplasty, gait instability, presents after fall at home and found to have abn distension and mildly elevated TB/ALP.    #Choledocolithaisis s/p ERCP with stent and stone removal yesterday  # Pulmonary embolism on hep gtt, pending TTE  # Finger fracture    Recs:  - patient doing well this morning  - advance diet as tolerated  - ok to resume heparin gtt for PE  - monitor CBC, CMP, INR

## 2019-11-13 NOTE — PROGRESS NOTE ADULT - SUBJECTIVE AND OBJECTIVE BOX
Chief Complaint:  Patient is a 89y old  Female who presents with a chief complaint of fall (13 Nov 2019 11:23)      Interval Events: Patient tolerated ERCP without issues.  DOing ok this morning.     Allergies:  No Known Allergies      Hospital Medications:  acetaminophen   Tablet .. 650 milliGRAM(s) Oral every 6 hours PRN  calcium carbonate 1250 mG  + Vitamin D (OsCal 500 + D) 1 Tablet(s) Oral daily  heparin  Infusion.  Unit(s)/Hr IV Continuous <Continuous>  heparin  Injectable 3500 Unit(s) IV Push every 6 hours PRN  heparin  Injectable 1500 Unit(s) IV Push every 6 hours PRN  melatonin 3 milliGRAM(s) Oral at bedtime PRN  metoprolol tartrate 25 milliGRAM(s) Oral two times a day  multivitamin 1 Tablet(s) Oral daily  simvastatin 20 milliGRAM(s) Oral at bedtime  sodium chloride 0.9%. 1000 milliLiter(s) IV Continuous <Continuous>  traMADol 25 milliGRAM(s) Oral every 6 hours PRN      PMHX/PSHX:  Sacral ulcer  Gait difficulty  No pertinent past medical history  History of total left hip replacement  No significant past surgical history      Family history:  Family history of cerebrovascular accident (CVA)  Family history of diabetes mellitus  No pertinent family history in first degree relatives      ROS:     General:  No wt loss, fevers, chills, night sweats, fatigue,   Eyes:  Good vision, no reported pain  ENT:  No sore throat, pain, runny nose, dysphagia  CV:  No pain, palpitations, hypo/hypertension  Resp:  No dyspnea, cough, tachypnea, wheezing  GI:  See HPI  :  No pain, bleeding, incontinence, nocturia  Muscle:  No pain, weakness  Neuro:  No weakness, tingling, memory problems  Psych:  No fatigue, insomnia, mood problems, depression  Endocrine:  No polyuria, polydipsia, cold/heat intolerance  Heme:  No petechiae, ecchymosis, easy bruisability  Skin:  No rash, edema      PHYSICAL EXAM:     GENERAL: NAD  HEENT:  NC/AT  CHEST:  Full & symmetric excursion, no increased effort  ABDOMEN:  Soft, non-tender, non-distended, +BS  EXTREMITIES:  no edema  SKIN:  No rash  NEURO:  Alert    Vital Signs:  Vital Signs Last 24 Hrs  T(C): 36.2 (13 Nov 2019 08:30), Max: 37.1 (13 Nov 2019 05:23)  T(F): 97.2 (13 Nov 2019 08:30), Max: 98.7 (13 Nov 2019 05:23)  HR: 67 (13 Nov 2019 08:30) (61 - 100)  BP: 153/95 (13 Nov 2019 08:30) (118/86 - 164/98)  BP(mean): 124 (12 Nov 2019 20:30) (107 - 124)  RR: 18 (13 Nov 2019 08:30) (17 - 24)  SpO2: 95% (13 Nov 2019 08:30) (93% - 97%)  Daily     Daily     LABS:                        12.0   5.25  )-----------( 210      ( 13 Nov 2019 05:20 )             37.6     11-13    145  |  108<H>  |  32<H>  ----------------------------<  88  4.1   |  23  |  0.57    Ca    9.3      13 Nov 2019 05:20  Phos  3.9     11-13  Mg     2.2     11-13    TPro  5.7<L>  /  Alb  3.3  /  TBili  1.2  /  DBili  x   /  AST  22  /  ALT  14  /  AlkPhos  262<H>  11-13    LIVER FUNCTIONS - ( 13 Nov 2019 05:20 )  Alb: 3.3 g/dL / Pro: 5.7 g/dL / ALK PHOS: 262 u/L / ALT: 14 u/L / AST: 22 u/L / GGT: x           PTT - ( 13 Nov 2019 05:20 )  PTT:29.3 SEC        Imaging:  < from: ERCP (11.12.19 @ 18:12) >    [facility]  _______________________________________________________________________________  Patient Name: Candy Rouse        Procedure Date: 11/12/2019 6:12 PM  MRN: [id number]                     Account Number: [id number]  YOB: 1928              Admit Type: Inpatient  Room: ENDO 01                         Gender: Female  Attending MD: CLAUDIA DIXON MD      _______________________________________________________________________________     Procedure:           ERCP  Indications:         Follow-up of bile duct stone(s)  Providers:           CLAUDIA DIXON MD, WILFRED RAMÍREZ (Fellow)  Medicines:           General Anesthesia, Cipro 200mg IV, Indocin 100mg MN,                        Flouro 3.5 min/9.4mGy/1.34mA  Complications:       No immediate complications.  Procedure:           Pre-Anesthesia Assessment:                       - Prior to the procedure, a History and Physical was                        performed, and patient medications andallergies were                        reviewed. The patient is competent. The risks and                        benefits of the procedure and the sedation options and                        risks were discussed with the patient. All questions                 were answered and informed consent was obtained. Patient                        identification and proposed procedure were verified by                        the physician, the nurse and the anesthetist in the                        endoscopy suite. Prophylactic Antibiotics: The patient                        requires prophylactic antibiotics for the planned                        performance of ERCP in obstructed bile duct. Prior                        Anticoagulants: The patient has taken no previous                        anticoagulant or antiplatelet agents. ASA Grade                        Assessment: III - A patient with severe systemic                        disease. After reviewing the risks and benefits, the              patient was deemed in satisfactory condition to undergo                        the procedure. The anesthesia plan was to use general                        anesthesia. Immediately prior to administration of                        medications, the patient was re-assessed for adequacy to                        receive sedatives. The heart rate, respiratory rate,                        oxygen saturations, blood pressure, adequacy of                        pulmonary ventilation, and response to care were                        monitored throughout the procedure. The physical status                        of the patient was re-assessed after the procedure.                       After obtaining informed consent, the scope was passed                     under direct vision. Throughout the procedure, the                        patient's blood pressure, pulse, and oxygen saturations                        were monitored continuously. The ERCP was introduced                        through the mouth, and advanced to the duodenum and used                        to cannulate the bile duct. The ERCP was accomplished                        without difficulty. The patient tolerated the procedure                        well.                                                                       Findings:       EGD:       - Normal esophagus.       - Normal gastric body.       - Small lesion in the antrum, likely a pancreatic rest.       - Normal examined duodenum.       ERCP:       - A previously placed biliary stent was seen on  film and direct        duodenoscopy. The ampulla had a previously sphincterotomy, but appeared        otherwise normal.       - The stent was removed with a snare.       - The bile duct was deeply cannulated with the Hydratome.       - Contrast was injected. I personally interpreted the images. 3 filling        defects were seen in the upper portion of the bile duct.       - The bile duct was swept with an 11.5 then 15mm balloon starting at the        bifurcation. 3 stones were removed.       - Occlusion cholangiogram was performed. No stones remained.                                                                                   Impression:          - Previously placed biliary stent. Removed.                       - Choledocolithiasis. Removed.  Recommendation:      - Return patient to hospital goins for ongoing care.                       - NPO tonight                       - IVF                       - advance diet tomorrow                       - followup LFTs                                                                                   Attending Participation:       I was present and participated during the entire procedure, including        non-key portions.                                                                                  ___________________  CLAUDIA DIXON MD  11/13/2019 11:19:28 AM  This report has been signed electronically.  Number of Addenda: 0    Note Initiated On: 11/12/2019 6:12 PM    < end of copied text >

## 2019-11-13 NOTE — PROGRESS NOTE ADULT - SUBJECTIVE AND OBJECTIVE BOX
Patient is a 89y old  Female who presents with a chief complaint of fall (13 Nov 2019 11:35)      Any change in ROS:     MEDICATIONS  (STANDING):  calcium carbonate 1250 mG  + Vitamin D (OsCal 500 + D) 1 Tablet(s) Oral daily  heparin  Infusion.  Unit(s)/Hr (8 mL/Hr) IV Continuous <Continuous>  metoprolol tartrate 25 milliGRAM(s) Oral two times a day  multivitamin 1 Tablet(s) Oral daily  simvastatin 20 milliGRAM(s) Oral at bedtime  sodium chloride 0.9%. 1000 milliLiter(s) (75 mL/Hr) IV Continuous <Continuous>    MEDICATIONS  (PRN):  acetaminophen   Tablet .. 650 milliGRAM(s) Oral every 6 hours PRN Mild Pain (1 - 3)  heparin  Injectable 3500 Unit(s) IV Push every 6 hours PRN For aPTT less than 40  heparin  Injectable 1500 Unit(s) IV Push every 6 hours PRN For aPTT between 40 - 57  melatonin 3 milliGRAM(s) Oral at bedtime PRN Insomnia  traMADol 25 milliGRAM(s) Oral every 6 hours PRN moderate to severe pain (4-10)    Vital Signs Last 24 Hrs  T(C): 36.2 (13 Nov 2019 08:30), Max: 37.1 (13 Nov 2019 05:23)  T(F): 97.2 (13 Nov 2019 08:30), Max: 98.7 (13 Nov 2019 05:23)  HR: 67 (13 Nov 2019 08:30) (61 - 100)  BP: 153/95 (13 Nov 2019 08:30) (118/86 - 164/98)  BP(mean): 124 (12 Nov 2019 20:30) (107 - 124)  RR: 18 (13 Nov 2019 08:30) (17 - 24)  SpO2: 95% (13 Nov 2019 08:30) (93% - 97%)    I&O's Summary        Physical Exam:   GENERAL: NAD, well-groomed, well-developed  HEENT: COLE[place]/   Atraumatic, Normocephalic  ENMT: No tonsillar erythema, exudates, or enlargement; Moist mucous membranes, Good dentition, No lesions  NECK: Supple, No JVD, Normal thyroid  CHEST/LUNG: Clear to auscultaion, ; No rales, rhonchi, wheezing, or rubs  CVS: Regular rate and rhythm; No murmurs, rubs, or gallops  GI: : Soft, Nontender, Nondistended; Bowel sounds present  NERVOUS SYSTEM:  Alert & Oriented X3, Good concentration; Motor Strength 5/5 B/L upper and lower extremities; DTRs 2+ intact and symmetric  EXTREMITIES:  2+ Peripheral Pulses, No clubbing, cyanosis, or edema  LYMPH: No lymphadenopathy noted  SKIN: No rashes or lesions  ENDOCRINOLOGY: No Thyromegaly  PSYCH: Appropriate    Labs:                              12.0   5.25  )-----------( 210      ( 13 Nov 2019 05:20 )             37.6                         11.7   4.54  )-----------( 208      ( 12 Nov 2019 23:00 )             37.7                         11.8   3.52  )-----------( 227      ( 12 Nov 2019 07:47 )             38.8                         11.7   3.87  )-----------( 201      ( 11 Nov 2019 06:40 )             37.8                         11.9   4.67  )-----------( 192      ( 10 Nov 2019 07:17 )             37.9     11-13    145  |  108<H>  |  32<H>  ----------------------------<  88  4.1   |  23  |  0.57  11-12    143  |  108<H>  |  31<H>  ----------------------------<  87  3.8   |  25  |  0.53  11-11    142  |  106  |  28<H>  ----------------------------<  96  3.7   |  26  |  0.48<L>  11-10    142  |  108<H>  |  35<H>  ----------------------------<  102<H>  4.2   |  25  |  0.64    Ca    9.3      13 Nov 2019 05:20  Ca    9.1      12 Nov 2019 07:47  Phos  3.9     11-13  Phos  3.4     11-12  Mg     2.2     11-13  Mg     2.1     11-12    TPro  5.7<L>  /  Alb  3.3  /  TBili  1.2  /  DBili  x   /  AST  22  /  ALT  14  /  AlkPhos  262<H>  11-13  TPro  5.8<L>  /  Alb  3.1<L>  /  TBili  0.9  /  DBili  x   /  AST  20  /  ALT  15  /  AlkPhos  235<H>  11-12  TPro  5.7<L>  /  Alb  3.1<L>  /  TBili  0.8  /  DBili  x   /  AST  19  /  ALT  14  /  AlkPhos  214<H>  11-11  TPro  6.0  /  Alb  3.3  /  TBili  0.7  /  DBili  x   /  AST  19  /  ALT  10  /  AlkPhos  211<H>  11-10    CAPILLARY BLOOD GLUCOSE          LIVER FUNCTIONS - ( 13 Nov 2019 05:20 )  Alb: 3.3 g/dL / Pro: 5.7 g/dL / ALK PHOS: 262 u/L / ALT: 14 u/L / AST: 22 u/L / GGT: x           PTT - ( 13 Nov 2019 05:20 )  PTT:29.3 SEC          RECENT CULTURES:        RESPIRATORY CULTURES:      < from: US Duplex Venous Lower Ext Complete, Bilateral (11.07.19 @ 17:38) >  INTERPRETATION:  CLINICAL INFORMATION: I26.00 Other pulmonary embolism   without acute cor pulmonale    COMPARISON: None available.    TECHNIQUE: Duplex sonography ofthe BILATERAL LOWER extremity veins with   color and spectral Doppler, with and without compression.      FINDINGS:    There is normal compressibility of the bilateral common femoral, femoral   and popliteal veins.     Doppler examination shows normal spontaneous and phasic flow.    No calf vein thrombosis is detected.    IMPRESSION:     No evidence of deep venous thrombosis in either lower extremity.                < from: CT Chest w/ IV Cont (11.06.19 @ 19:58) >  BLADDER: Within normal limits.   REPRODUCTIVE ORGANS: Within normal limits.     BOWEL: No bowel obstruction.  PERITONEUM: No ascites.   VESSELS:  IVC filter.   RETROPERITONEUM/LYMPH NODES: No lymphadenopathy.     ABDOMINAL WALL: Within normal limits.  BONES: Bilateral hip arthroplasty. Chronic rib fractures. Multiple   thoracolumbar compression fractures. Age indeterminate fracture right   pubic bone anteriorly.    IMPRESSION:   Left lower lobe pulmonary embolus.    Cholelithiasis and choledocholithiasis. The descending place with mild   dilatation. No pneumobilia.    Age-indeterminate right pubic bone fracture.    The findings were discussed with JEN PIMENTEL 11/7/2019 9:07 AM   with read back confirmation.                    < end of copied text >          JOSÉ ANTONIO JAY M.D., ATTENDING RADIOLOGIST  This document has been electronically signed. Nov 8 2019  8:56AM              < end of copied text >      Studies  Chest X-RAY  CT SCAN Chest   Venous Dopplers: LE:   CT Abdomen  Others

## 2019-11-13 NOTE — PROGRESS NOTE ADULT - ASSESSMENT
89F h/o paranoid schizophrenia (managed off medication), cholelithiasis/choledocholithiasis, left hip arthroplasty, gait instability, presents after fall at home and found to have abn distension in setting of increased  alk phos and t bili, a/f eval, pain control and pending PT evaluation:     Problem/Plan - 1:  ·  Problem: Pulmonary embolism .  Plan: Started on NOAC. . Ct chest noted. < from: CT Chest w/ IV Cont (11.06.19 @ 19:58) >  IMPRESSION:   Left lower lobe pulmonary embolus. Has IVC filter also.     < end of copied text >  Pulmonary and cardiology consulted.     TTE pending.      Problem/Plan - 2:  ·  Problem: Hyperbilirubinemia.  Plan: US abdominal noted   CT abdomen/pelvis noted. < from: CT Abdomen and Pelvis w/ IV Cont (11.06.19 @ 19:58) >  ABDOMEN AND PELVIS:    LIVER: Within normal limits.   BILE DUCTS: Minimal biliary dilatation. Plastic CBD stent.  GALLBLADDER: Cholelithiasis and choledocholithiasis.  SPLEEN: Within normal limits.   PANCREAS: Within normal limits.  ADRENALS: Within normal limits.   KIDNEYS/URETERS: Cysts and other lesions too small to characterize.     BLADDER: Within normal limits.   REPRODUCTIVE ORGANS: Within normal limits.     BOWEL: No bowel obstruction.  PERITONEUM: No ascites.   VESSELS:  IVC filter.   RETROPERITONEUM/LYMPH NODES: No lymphadenopathy.     ABDOMINAL WALL: Within normal limits.  BONES: Bilateral hip arthroplasty. Chronic rib fractures. Multiple   thoracolumbar compression fractures. Age indeterminate fracture right   pubic bone anteriorly.    < end of copied text >  GI consult noted . Possible ERCP for stent removal next week so will hold off starting PO AC.   Awaiting ERCP with stent removal.      Problem/Plan - 3:  ·  Problem: Closed nondisplaced fracture of proximal phalanx of middle fingerr and Pelvic fracture .  Plan: s/p splint for left middle finger distal fracture  Ortho helping.      Problem/Plan - 4:  ·  Problem: Schizophrenia, unspecified type.  Plan: stable off medication.      Problem/Plan - 5:  ·  Problem: Protein calorie malnutrition.  Plan: encourage po  nutrition evaluation  b12 and folate normal.      Problem/Plan - 6:  Problem: CKD (chronic kidney disease), stage II. Plan: -renally dose meds and monitor Cr.     Problem/Plan - 7:  ·  Problem: Fall, initial encounter.  Plan: no evidence of syncope  fall precautions  PT evaluation in progress.     Disposition : DC planning .

## 2019-11-13 NOTE — CHART NOTE - NSCHARTNOTEFT_GEN_A_CORE
Hepatology paged to discuss ERCP results and when/if safe to resume heparin drip. Awaiting call back

## 2019-11-14 LAB
ALBUMIN SERPL ELPH-MCNC: 3.3 G/DL — SIGNIFICANT CHANGE UP (ref 3.3–5)
ALP SERPL-CCNC: 283 U/L — HIGH (ref 40–120)
ALT FLD-CCNC: 16 U/L — SIGNIFICANT CHANGE UP (ref 4–33)
ANION GAP SERPL CALC-SCNC: 10 MMO/L — SIGNIFICANT CHANGE UP (ref 7–14)
APTT BLD: 33.8 SEC — SIGNIFICANT CHANGE UP (ref 27.5–36.3)
AST SERPL-CCNC: 21 U/L — SIGNIFICANT CHANGE UP (ref 4–32)
BILIRUB SERPL-MCNC: 0.9 MG/DL — SIGNIFICANT CHANGE UP (ref 0.2–1.2)
BUN SERPL-MCNC: 31 MG/DL — HIGH (ref 7–23)
CALCIUM SERPL-MCNC: 9 MG/DL — SIGNIFICANT CHANGE UP (ref 8.4–10.5)
CHLORIDE SERPL-SCNC: 104 MMOL/L — SIGNIFICANT CHANGE UP (ref 98–107)
CO2 SERPL-SCNC: 26 MMOL/L — SIGNIFICANT CHANGE UP (ref 22–31)
CREAT SERPL-MCNC: 0.63 MG/DL — SIGNIFICANT CHANGE UP (ref 0.5–1.3)
GLUCOSE SERPL-MCNC: 94 MG/DL — SIGNIFICANT CHANGE UP (ref 70–99)
HCT VFR BLD CALC: 37.5 % — SIGNIFICANT CHANGE UP (ref 34.5–45)
HGB BLD-MCNC: 11.5 G/DL — SIGNIFICANT CHANGE UP (ref 11.5–15.5)
INR BLD: 1.41 — HIGH (ref 0.88–1.17)
MAGNESIUM SERPL-MCNC: 2.1 MG/DL — SIGNIFICANT CHANGE UP (ref 1.6–2.6)
MCHC RBC-ENTMCNC: 30.7 % — LOW (ref 32–36)
MCHC RBC-ENTMCNC: 32.3 PG — SIGNIFICANT CHANGE UP (ref 27–34)
MCV RBC AUTO: 105.3 FL — HIGH (ref 80–100)
NRBC # FLD: 0 K/UL — SIGNIFICANT CHANGE UP (ref 0–0)
PHOSPHATE SERPL-MCNC: 3 MG/DL — SIGNIFICANT CHANGE UP (ref 2.5–4.5)
PLATELET # BLD AUTO: 236 K/UL — SIGNIFICANT CHANGE UP (ref 150–400)
PMV BLD: 9.1 FL — SIGNIFICANT CHANGE UP (ref 7–13)
POTASSIUM SERPL-MCNC: 3.6 MMOL/L — SIGNIFICANT CHANGE UP (ref 3.5–5.3)
POTASSIUM SERPL-SCNC: 3.6 MMOL/L — SIGNIFICANT CHANGE UP (ref 3.5–5.3)
PROT SERPL-MCNC: 5.7 G/DL — LOW (ref 6–8.3)
PROTHROM AB SERPL-ACNC: 16.2 SEC — HIGH (ref 9.8–13.1)
RBC # BLD: 3.56 M/UL — LOW (ref 3.8–5.2)
RBC # FLD: 14.3 % — SIGNIFICANT CHANGE UP (ref 10.3–14.5)
SODIUM SERPL-SCNC: 140 MMOL/L — SIGNIFICANT CHANGE UP (ref 135–145)
WBC # BLD: 4.84 K/UL — SIGNIFICANT CHANGE UP (ref 3.8–10.5)
WBC # FLD AUTO: 4.84 K/UL — SIGNIFICANT CHANGE UP (ref 3.8–10.5)

## 2019-11-14 RX ORDER — TRAMADOL HYDROCHLORIDE 50 MG/1
25 TABLET ORAL EVERY 6 HOURS
Refills: 0 | Status: DISCONTINUED | OUTPATIENT
Start: 2019-11-14 | End: 2019-11-15

## 2019-11-14 RX ADMIN — Medication 650 MILLIGRAM(S): at 06:42

## 2019-11-14 RX ADMIN — Medication 1 TABLET(S): at 11:33

## 2019-11-14 RX ADMIN — APIXABAN 10 MILLIGRAM(S): 2.5 TABLET, FILM COATED ORAL at 17:16

## 2019-11-14 RX ADMIN — SIMVASTATIN 20 MILLIGRAM(S): 20 TABLET, FILM COATED ORAL at 22:06

## 2019-11-14 RX ADMIN — Medication 650 MILLIGRAM(S): at 05:34

## 2019-11-14 RX ADMIN — APIXABAN 10 MILLIGRAM(S): 2.5 TABLET, FILM COATED ORAL at 05:33

## 2019-11-14 RX ADMIN — Medication 3 MILLIGRAM(S): at 22:06

## 2019-11-14 RX ADMIN — Medication 25 MILLIGRAM(S): at 17:16

## 2019-11-14 RX ADMIN — Medication 25 MILLIGRAM(S): at 05:34

## 2019-11-14 NOTE — PROGRESS NOTE ADULT - PROBLEM SELECTOR PLAN 4
for ercp next week  11/9: for ercp next week  11/10: for ercp next week, though to me she is saying no to procedure  11/11: refusing the procedure: for psych to see;  11/12: for ercp per primary team  11/13: s/p ercp: bili is normal

## 2019-11-14 NOTE — PROGRESS NOTE ADULT - SUBJECTIVE AND OBJECTIVE BOX
Subjective: No chest pain or sob; ROS otherwise negative.         acetaminophen   Tablet .. 650 milliGRAM(s) Oral every 6 hours PRN  apixaban 10 milliGRAM(s) Oral every 12 hours  calcium carbonate 1250 mG  + Vitamin D (OsCal 500 + D) 1 Tablet(s) Oral daily  melatonin 3 milliGRAM(s) Oral at bedtime PRN  metoprolol tartrate 25 milliGRAM(s) Oral two times a day  multivitamin 1 Tablet(s) Oral daily  simvastatin 20 milliGRAM(s) Oral at bedtime  sodium chloride 0.9%. 1000 milliLiter(s) IV Continuous <Continuous>  traMADol 25 milliGRAM(s) Oral every 6 hours PRN                          11.5   4.84  )-----------( 236      ( 14 Nov 2019 05:40 )             37.5       Hemoglobin: 11.5 g/dL (11-14 @ 05:40)  Hemoglobin: 12.0 g/dL (11-13 @ 05:20)  Hemoglobin: 11.7 g/dL (11-12 @ 23:00)  Hemoglobin: 11.8 g/dL (11-12 @ 07:47)  Hemoglobin: 11.7 g/dL (11-11 @ 06:40)      11-14    140  |  104  |  31<H>  ----------------------------<  94  3.6   |  26  |  0.63    Ca    9.0      14 Nov 2019 05:40  Phos  3.0     11-14  Mg     2.1     11-14    TPro  5.7<L>  /  Alb  3.3  /  TBili  0.9  /  DBili  x   /  AST  21  /  ALT  16  /  AlkPhos  283<H>  11-14    Creatinine Trend: 0.63<--, 0.57<--, 0.53<--, 0.48<--, 0.64<--, 0.50<--    COAGS: PT/INR - ( 14 Nov 2019 05:40 )   PT: 16.2 SEC;   INR: 1.41          PTT - ( 14 Nov 2019 05:40 )  PTT:33.8 SEC        PHYSICAL EXAM:  Vital Signs last 24 Hours   T(C): 36.4 (11-14-19 @ 05:31), Max: 36.7 (11-13-19 @ 22:01)  HR: 82 (11-14-19 @ 05:31) (68 - 82)  BP: 133/85 (11-14-19 @ 05:31) (112/69 - 133/85)  RR: 16 (11-14-19 @ 05:31) (16 - 18)  SpO2: 100% (11-14-19 @ 05:31) (96% - 100%)  Wt(kg): --    I&O's Summary      Gen: Appears well in NAD  HEENT:  (-)icterus (-)pallor  CV: N S1 S2, RRR, (+)2 Pulses B/l  Resp:  Clear to auscultation B/L, normal effort  GI: (+) BS Soft, NT, distended   Lymph:  (-)Edema, (-)obvious lymphadenopathy  Skin: Warm to touch, Normal turgor  Psych: unable to assess      TELEMETRY: 	  Not on telemetry     ECG:  	    < from: 12 Lead ECG (11.03.19 @ 22:30) >  Line Sinus tachycardiawith frequent Premature ventricular complexes  Possible Left atrial enlargement  Left axis deviation  Left ventricular hypertrophy  Inferior infarct , age undetermined  Anterolateral infarct , age undetermined  Abnormal ECG    < end of copied text >    RADIOLOGY:     < from: CT Chest w/ IV Cont (11.06.19 @ 19:58) >  IMPRESSION:   Left lower lobe pulmonary embolus.    Cholelithiasis and choledocholithiasis. The descending place with mild   dilatation. No pneumobilia.    Age-indeterminate right pubic bone fracture.    The findings were discussed with JEN PIMENTEL 11/7/2019 9:07 AM   with read back confirmation.    < end of copied text >    DIAGNOSTIC TESTING:    < from: Transthoracic Echocardiogram (11.08.19 @ 15:38) >  CONCLUSIONS:  1. Mitral annular calcification, otherwise normal mitral  valve. Mild mitral regurgitation.  2. Aortic valve not well visualized; appears calcified with  grossly mildly decreased opening.  3. Normal left ventricular internal dimensions and wall  thicknesses.  4. Severe segmental left ventricular systolic dysfunction.  The apex, mid to distal septum and distal anterior walls  are hypokinetic.  5. The right ventricle is not well visualized; grossly  normal right ventricular systolic function.  ------------------------------------------------------------------------  Confirmed on  11/8/2019 - 17:13:16 by Jovani Celestin M.D. RPVI    < end of copied text >        ASSESSMENT/PLAN:     89F h/o paranoid schizophrenia (managed off medication), cholelithiasis/choledocholithiasis s/p ERCP w/stent placement February 2019, left hip arthroplasty, gait instability, presents after fall at home. Found with LLL PE    -tolerated procedure well from CV perspective   -Not in clinical CHF, no anginal symptoms   -echo as noted above; severe segmental LV dysfunction   -cont Metoprolol for management of CM   -AC for PE per primary team Subjective: No chest pain or sob; ROS otherwise negative.         acetaminophen   Tablet .. 650 milliGRAM(s) Oral every 6 hours PRN  apixaban 10 milliGRAM(s) Oral every 12 hours  calcium carbonate 1250 mG  + Vitamin D (OsCal 500 + D) 1 Tablet(s) Oral daily  melatonin 3 milliGRAM(s) Oral at bedtime PRN  metoprolol tartrate 25 milliGRAM(s) Oral two times a day  multivitamin 1 Tablet(s) Oral daily  simvastatin 20 milliGRAM(s) Oral at bedtime  sodium chloride 0.9%. 1000 milliLiter(s) IV Continuous <Continuous>  traMADol 25 milliGRAM(s) Oral every 6 hours PRN                          11.5   4.84  )-----------( 236      ( 14 Nov 2019 05:40 )             37.5       Hemoglobin: 11.5 g/dL (11-14 @ 05:40)  Hemoglobin: 12.0 g/dL (11-13 @ 05:20)  Hemoglobin: 11.7 g/dL (11-12 @ 23:00)  Hemoglobin: 11.8 g/dL (11-12 @ 07:47)  Hemoglobin: 11.7 g/dL (11-11 @ 06:40)      11-14    140  |  104  |  31<H>  ----------------------------<  94  3.6   |  26  |  0.63    Ca    9.0      14 Nov 2019 05:40  Phos  3.0     11-14  Mg     2.1     11-14    TPro  5.7<L>  /  Alb  3.3  /  TBili  0.9  /  DBili  x   /  AST  21  /  ALT  16  /  AlkPhos  283<H>  11-14    Creatinine Trend: 0.63<--, 0.57<--, 0.53<--, 0.48<--, 0.64<--, 0.50<--    COAGS: PT/INR - ( 14 Nov 2019 05:40 )   PT: 16.2 SEC;   INR: 1.41          PTT - ( 14 Nov 2019 05:40 )  PTT:33.8 SEC        PHYSICAL EXAM:  Vital Signs last 24 Hours   T(C): 36.4 (11-14-19 @ 05:31), Max: 36.7 (11-13-19 @ 22:01)  HR: 82 (11-14-19 @ 05:31) (68 - 82)  BP: 133/85 (11-14-19 @ 05:31) (112/69 - 133/85)  RR: 16 (11-14-19 @ 05:31) (16 - 18)  SpO2: 100% (11-14-19 @ 05:31) (96% - 100%)  Wt(kg): --    I&O's Summary      Gen: Appears well in NAD  HEENT:  (-)icterus (-)pallor  CV: N S1 S2, RRR, (+)2 Pulses B/l  Resp:  Clear to auscultation B/L, normal effort  GI: (+) BS Soft, NT, distended   Lymph:  (-)Edema, (-)obvious lymphadenopathy  Skin: Warm to touch, Normal turgor  Psych: unable to assess      TELEMETRY: 	  Not on telemetry     ECG:  	    < from: 12 Lead ECG (11.03.19 @ 22:30) >  Line Sinus tachycardiawith frequent Premature ventricular complexes  Possible Left atrial enlargement  Left axis deviation  Left ventricular hypertrophy  Inferior infarct , age undetermined  Anterolateral infarct , age undetermined  Abnormal ECG    < end of copied text >    RADIOLOGY:     < from: CT Chest w/ IV Cont (11.06.19 @ 19:58) >  IMPRESSION:   Left lower lobe pulmonary embolus.    Cholelithiasis and choledocholithiasis. The descending place with mild   dilatation. No pneumobilia.    Age-indeterminate right pubic bone fracture.    The findings were discussed with JEN PIMENTEL 11/7/2019 9:07 AM   with read back confirmation.    < end of copied text >    DIAGNOSTIC TESTING:    < from: Transthoracic Echocardiogram (11.08.19 @ 15:38) >  CONCLUSIONS:  1. Mitral annular calcification, otherwise normal mitral  valve. Mild mitral regurgitation.  2. Aortic valve not well visualized; appears calcified with  grossly mildly decreased opening.  3. Normal left ventricular internal dimensions and wall  thicknesses.  4. Severe segmental left ventricular systolic dysfunction.  The apex, mid to distal septum and distal anterior walls  are hypokinetic.  5. The right ventricle is not well visualized; grossly  normal right ventricular systolic function.  ------------------------------------------------------------------------  Confirmed on  11/8/2019 - 17:13:16 by Jovani Celestin M.D. RPVI    < end of copied text >        ASSESSMENT/PLAN:     89F h/o paranoid schizophrenia (managed off medication), cholelithiasis/choledocholithiasis s/p ERCP w/stent placement February 2019, left hip arthroplasty, gait instability, presents after fall at home. Found with LLL PE    -tolerated procedure well from CV perspective   -Not in clinical CHF, no anginal symptoms   -echo as noted above; severe segmental LV dysfunction   -cont Metoprolol for management of CM   -started on Eliquis for PE, per primary team

## 2019-11-14 NOTE — PROGRESS NOTE ADULT - SUBJECTIVE AND OBJECTIVE BOX
INTERVAL HPI/OVERNIGHT EVENTS: I feel fine.   Vital Signs Last 24 Hrs  T(C): 36.4 (14 Nov 2019 05:31), Max: 36.7 (13 Nov 2019 22:01)  T(F): 97.6 (14 Nov 2019 05:31), Max: 98.1 (13 Nov 2019 22:01)  HR: 82 (14 Nov 2019 05:31) (68 - 82)  BP: 133/85 (14 Nov 2019 05:31) (112/69 - 133/85)  BP(mean): --  RR: 16 (14 Nov 2019 05:31) (16 - 18)  SpO2: 100% (14 Nov 2019 05:31) (96% - 100%)  I&O's Summary    MEDICATIONS  (STANDING):  apixaban 10 milliGRAM(s) Oral every 12 hours  calcium carbonate 1250 mG  + Vitamin D (OsCal 500 + D) 1 Tablet(s) Oral daily  metoprolol tartrate 25 milliGRAM(s) Oral two times a day  multivitamin 1 Tablet(s) Oral daily  simvastatin 20 milliGRAM(s) Oral at bedtime  sodium chloride 0.9%. 1000 milliLiter(s) (75 mL/Hr) IV Continuous <Continuous>    MEDICATIONS  (PRN):  acetaminophen   Tablet .. 650 milliGRAM(s) Oral every 6 hours PRN Mild Pain (1 - 3)  melatonin 3 milliGRAM(s) Oral at bedtime PRN Insomnia  traMADol 25 milliGRAM(s) Oral every 6 hours PRN moderate to severe pain (4-10)    LABS:                        11.5   4.84  )-----------( 236      ( 14 Nov 2019 05:40 )             37.5     11-14    140  |  104  |  31<H>  ----------------------------<  94  3.6   |  26  |  0.63    Ca    9.0      14 Nov 2019 05:40  Phos  3.0     11-14  Mg     2.1     11-14    TPro  5.7<L>  /  Alb  3.3  /  TBili  0.9  /  DBili  x   /  AST  21  /  ALT  16  /  AlkPhos  283<H>  11-14    PT/INR - ( 14 Nov 2019 05:40 )   PT: 16.2 SEC;   INR: 1.41          PTT - ( 14 Nov 2019 05:40 )  PTT:33.8 SEC    CAPILLARY BLOOD GLUCOSE              REVIEW OF SYSTEMS:  CONSTITUTIONAL: No fever, weight loss, or fatigue  EYES: No eye pain, visual disturbances, or discharge  ENMT:  No difficulty hearing, tinnitus, vertigo; No sinus or throat pain  NECK: No pain or stiffness  RESPIRATORY: No cough, wheezing, chills or hemoptysis; No shortness of breath  CARDIOVASCULAR: No chest pain, palpitations, dizziness, or leg swelling  GASTROINTESTINAL: No abdominal or epigastric pain. No nausea, vomiting, or hematemesis; No diarrhea or constipation. No melena or hematochezia.  GENITOURINARY: No dysuria, frequency, hematuria, or incontinence  NEUROLOGICAL: No headaches, memory loss, loss of strength, numbness, or tremors    Consultant(s) Notes Reviewed:  [x ] YES  [ ] NO    PHYSICAL EXAM:  GENERAL: NAD, well-groomed, well-developed,not in any distress ,  HEAD:  Atraumatic, Normocephalic  EYES: EOMI, PERRLA, conjunctiva and sclera clear  ENMT: No tonsillar erythema, exudates, or enlargement; Moist mucous membranes, Good dentition, No lesions  NECK: Supple, No JVD, Normal thyroid  NERVOUS SYSTEM:  Alert & Oriented X3, No focal deficit   CHEST/LUNG: Good air entry bilateral with no  rales, rhonchi, wheezing, or rubs  HEART: Regular rate and rhythm; No murmurs, rubs, or gallops  ABDOMEN: Soft, Nontender, Nondistended; Bowel sounds present  EXTREMITIES:  2+ Peripheral Pulses, No clubbing, cyanosis, or edema    Care Discussed with Consultants/Other Providers [ x] YES  [ ] NO

## 2019-11-14 NOTE — PROGRESS NOTE ADULT - SUBJECTIVE AND OBJECTIVE BOX
Patient is a 89y old  Female who presents with a chief complaint of fall (14 Nov 2019 10:21)      Any change in ROS: Doing ok : no SOB     MEDICATIONS  (STANDING):  apixaban 10 milliGRAM(s) Oral every 12 hours  calcium carbonate 1250 mG  + Vitamin D (OsCal 500 + D) 1 Tablet(s) Oral daily  metoprolol tartrate 25 milliGRAM(s) Oral two times a day  multivitamin 1 Tablet(s) Oral daily  simvastatin 20 milliGRAM(s) Oral at bedtime  sodium chloride 0.9%. 1000 milliLiter(s) (75 mL/Hr) IV Continuous <Continuous>    MEDICATIONS  (PRN):  acetaminophen   Tablet .. 650 milliGRAM(s) Oral every 6 hours PRN Mild Pain (1 - 3)  melatonin 3 milliGRAM(s) Oral at bedtime PRN Insomnia  traMADol 25 milliGRAM(s) Oral every 6 hours PRN moderate to severe pain (4-10)    Vital Signs Last 24 Hrs  T(C): 36.4 (14 Nov 2019 05:31), Max: 36.7 (13 Nov 2019 22:01)  T(F): 97.6 (14 Nov 2019 05:31), Max: 98.1 (13 Nov 2019 22:01)  HR: 82 (14 Nov 2019 05:31) (68 - 82)  BP: 133/85 (14 Nov 2019 05:31) (112/69 - 133/85)  BP(mean): --  RR: 16 (14 Nov 2019 05:31) (16 - 18)  SpO2: 100% (14 Nov 2019 05:31) (96% - 100%)    I&O's Summary        Physical Exam:   GENERAL: NAD, well-groomed, well-developed  HEENT: COLEMAN/   Atraumatic, Normocephalic  ENMT: No tonsillar erythema, exudates, or enlargement; Moist mucous membranes, Good dentition, No lesions  NECK: Supple, No JVD, Normal thyroid  CHEST/LUNG: Clear to auscultaion, ; No rales, rhonchi, wheezing, or rubs  CVS: Regular rate and rhythm; No murmurs, rubs, or gallops  GI: : Soft, Nontender, Nondistended; Bowel sounds present  NERVOUS SYSTEM:  Alert & Oriented X3  EXTREMITIES:  2+ Peripheral Pulses, No clubbing, cyanosis, or edema  LYMPH: No lymphadenopathy noted  SKIN: No rashes or lesions  ENDOCRINOLOGY: No Thyromegaly  PSYCH: Appropriate    Labs:                              11.5   4.84  )-----------( 236      ( 14 Nov 2019 05:40 )             37.5                         12.0   5.25  )-----------( 210      ( 13 Nov 2019 05:20 )             37.6                         11.7   4.54  )-----------( 208      ( 12 Nov 2019 23:00 )             37.7                         11.8   3.52  )-----------( 227      ( 12 Nov 2019 07:47 )             38.8                         11.7   3.87  )-----------( 201      ( 11 Nov 2019 06:40 )             37.8     11-14    140  |  104  |  31<H>  ----------------------------<  94  3.6   |  26  |  0.63  11-13    145  |  108<H>  |  32<H>  ----------------------------<  88  4.1   |  23  |  0.57  11-12    143  |  108<H>  |  31<H>  ----------------------------<  87  3.8   |  25  |  0.53  11-11    142  |  106  |  28<H>  ----------------------------<  96  3.7   |  26  |  0.48<L>    Ca    9.0      14 Nov 2019 05:40  Ca    9.3      13 Nov 2019 05:20  Phos  3.0     11-14  Phos  3.9     11-13  Mg     2.1     11-14  Mg     2.2     11-13    TPro  5.7<L>  /  Alb  3.3  /  TBili  0.9  /  DBili  x   /  AST  21  /  ALT  16  /  AlkPhos  283<H>  11-14  TPro  5.7<L>  /  Alb  3.3  /  TBili  1.2  /  DBili  x   /  AST  22  /  ALT  14  /  AlkPhos  262<H>  11-13  TPro  5.8<L>  /  Alb  3.1<L>  /  TBili  0.9  /  DBili  x   /  AST  20  /  ALT  15  /  AlkPhos  235<H>  11-12  TPro  5.7<L>  /  Alb  3.1<L>  /  TBili  0.8  /  DBili  x   /  AST  19  /  ALT  14  /  AlkPhos  214<H>  11-11    CAPILLARY BLOOD GLUCOSE          LIVER FUNCTIONS - ( 14 Nov 2019 05:40 )  Alb: 3.3 g/dL / Pro: 5.7 g/dL / ALK PHOS: 283 u/L / ALT: 16 u/L / AST: 21 u/L / GGT: x           PT/INR - ( 14 Nov 2019 05:40 )   PT: 16.2 SEC;   INR: 1.41          PTT - ( 14 Nov 2019 05:40 )  PTT:33.8 SEC          RECENT CULTURES:        RESPIRATORY CULTURES:      < from: CT Chest w/ IV Cont (11.06.19 @ 19:58) >  IMPRESSION:   Left lower lobe pulmonary embolus.    Cholelithiasis and choledocholithiasis. The descending place with mild   dilatation. No pneumobilia.    Age-indeterminate right pubic bone fracture.    The findings were discussed with JEN PIMENTEL 11/7/2019 9:07 AM   with read back confirmation.      < end of copied text >      Studies  Chest X-RAY  CT SCAN Chest   Venous Dopplers: LE:   CT Abdomen  Others      < from: CT Chest w/ IV Cont (11.06.19 @ 19:58) >    IMPRESSION:   Left lower lobe pulmonary embolus.    Cholelithiasis and choledocholithiasis. The descending place with mild   dilatation. No pneumobilia.    Age-indeterminate right pubic bone fracture.    The findings were discussed with JEN PIMENTEL 11/7/2019 9:07 AM   with read back confirmation.    < end of copied text >

## 2019-11-14 NOTE — PROGRESS NOTE ADULT - PROBLEM SELECTOR PLAN 1
cont AC: she has IVC filer also from before: so far she has no sign sof bleeding: for ercp next week  11/9: cont AC for procedure next week: she does have mild abd distension but no pain: constipated:  11/10: cont AC: long term AC after procedure tomorrow  11/11: refusing for ercp: for psych to see: cont AC  11/12: cont AC: pt cant refuse as she is not competent:  11/13: off AC:: still DW NP: when to restart  11/14: doing ok : on eliquis

## 2019-11-15 ENCOUNTER — TRANSCRIPTION ENCOUNTER (OUTPATIENT)
Age: 84
End: 2019-11-15

## 2019-11-15 VITALS
OXYGEN SATURATION: 96 % | RESPIRATION RATE: 16 BRPM | DIASTOLIC BLOOD PRESSURE: 85 MMHG | SYSTOLIC BLOOD PRESSURE: 144 MMHG | HEART RATE: 108 BPM

## 2019-11-15 LAB
ALBUMIN SERPL ELPH-MCNC: 3.3 G/DL — SIGNIFICANT CHANGE UP (ref 3.3–5)
ALP SERPL-CCNC: 304 U/L — HIGH (ref 40–120)
ALT FLD-CCNC: 13 U/L — SIGNIFICANT CHANGE UP (ref 4–33)
ANION GAP SERPL CALC-SCNC: 10 MMO/L — SIGNIFICANT CHANGE UP (ref 7–14)
APTT BLD: 40.1 SEC — HIGH (ref 27.5–36.3)
AST SERPL-CCNC: 19 U/L — SIGNIFICANT CHANGE UP (ref 4–32)
BILIRUB SERPL-MCNC: 0.9 MG/DL — SIGNIFICANT CHANGE UP (ref 0.2–1.2)
BUN SERPL-MCNC: 23 MG/DL — SIGNIFICANT CHANGE UP (ref 7–23)
CALCIUM SERPL-MCNC: 9.3 MG/DL — SIGNIFICANT CHANGE UP (ref 8.4–10.5)
CHLORIDE SERPL-SCNC: 108 MMOL/L — HIGH (ref 98–107)
CO2 SERPL-SCNC: 26 MMOL/L — SIGNIFICANT CHANGE UP (ref 22–31)
CREAT SERPL-MCNC: 0.5 MG/DL — SIGNIFICANT CHANGE UP (ref 0.5–1.3)
GLUCOSE SERPL-MCNC: 95 MG/DL — SIGNIFICANT CHANGE UP (ref 70–99)
HCT VFR BLD CALC: 38.2 % — SIGNIFICANT CHANGE UP (ref 34.5–45)
HGB BLD-MCNC: 11.9 G/DL — SIGNIFICANT CHANGE UP (ref 11.5–15.5)
INR BLD: 2.57 — HIGH (ref 0.88–1.17)
MCHC RBC-ENTMCNC: 31.2 % — LOW (ref 32–36)
MCHC RBC-ENTMCNC: 33.1 PG — SIGNIFICANT CHANGE UP (ref 27–34)
MCV RBC AUTO: 106.1 FL — HIGH (ref 80–100)
NRBC # FLD: 0 K/UL — SIGNIFICANT CHANGE UP (ref 0–0)
PLATELET # BLD AUTO: 250 K/UL — SIGNIFICANT CHANGE UP (ref 150–400)
PMV BLD: 9 FL — SIGNIFICANT CHANGE UP (ref 7–13)
POTASSIUM SERPL-MCNC: 3.8 MMOL/L — SIGNIFICANT CHANGE UP (ref 3.5–5.3)
POTASSIUM SERPL-SCNC: 3.8 MMOL/L — SIGNIFICANT CHANGE UP (ref 3.5–5.3)
PROT SERPL-MCNC: 5.7 G/DL — LOW (ref 6–8.3)
PROTHROM AB SERPL-ACNC: 29.4 SEC — HIGH (ref 9.8–13.1)
RBC # BLD: 3.6 M/UL — LOW (ref 3.8–5.2)
RBC # FLD: 14.4 % — SIGNIFICANT CHANGE UP (ref 10.3–14.5)
SODIUM SERPL-SCNC: 144 MMOL/L — SIGNIFICANT CHANGE UP (ref 135–145)
WBC # BLD: 4.64 K/UL — SIGNIFICANT CHANGE UP (ref 3.8–10.5)
WBC # FLD AUTO: 4.64 K/UL — SIGNIFICANT CHANGE UP (ref 3.8–10.5)

## 2019-11-15 RX ORDER — METOPROLOL TARTRATE 50 MG
1 TABLET ORAL
Qty: 0 | Refills: 0 | DISCHARGE
Start: 2019-11-15

## 2019-11-15 RX ORDER — SIMVASTATIN 20 MG/1
1 TABLET, FILM COATED ORAL
Qty: 0 | Refills: 0 | DISCHARGE
Start: 2019-11-15

## 2019-11-15 RX ORDER — TRAMADOL HYDROCHLORIDE 50 MG/1
0.5 TABLET ORAL
Qty: 0 | Refills: 0 | DISCHARGE
Start: 2019-11-15

## 2019-11-15 RX ORDER — LANOLIN ALCOHOL/MO/W.PET/CERES
1 CREAM (GRAM) TOPICAL
Qty: 0 | Refills: 0 | DISCHARGE
Start: 2019-11-15

## 2019-11-15 RX ORDER — APIXABAN 2.5 MG/1
2 TABLET, FILM COATED ORAL
Qty: 0 | Refills: 0 | DISCHARGE
Start: 2019-11-15

## 2019-11-15 RX ORDER — ACETAMINOPHEN 500 MG
2 TABLET ORAL
Qty: 0 | Refills: 0 | DISCHARGE
Start: 2019-11-15

## 2019-11-15 RX ADMIN — APIXABAN 10 MILLIGRAM(S): 2.5 TABLET, FILM COATED ORAL at 05:36

## 2019-11-15 RX ADMIN — APIXABAN 10 MILLIGRAM(S): 2.5 TABLET, FILM COATED ORAL at 17:05

## 2019-11-15 RX ADMIN — Medication 1 TABLET(S): at 12:52

## 2019-11-15 RX ADMIN — Medication 25 MILLIGRAM(S): at 17:05

## 2019-11-15 RX ADMIN — Medication 25 MILLIGRAM(S): at 05:36

## 2019-11-15 NOTE — PROGRESS NOTE ADULT - SUBJECTIVE AND OBJECTIVE BOX
Patient is a 89y old  Female who presents with a chief complaint of fall (15 Nov 2019 12:25)      Any change in ROS: doingok : no bledding    MEDICATIONS  (STANDING):  apixaban 10 milliGRAM(s) Oral every 12 hours  calcium carbonate 1250 mG  + Vitamin D (OsCal 500 + D) 1 Tablet(s) Oral daily  metoprolol tartrate 25 milliGRAM(s) Oral two times a day  multivitamin 1 Tablet(s) Oral daily  simvastatin 20 milliGRAM(s) Oral at bedtime  sodium chloride 0.9%. 1000 milliLiter(s) (75 mL/Hr) IV Continuous <Continuous>    MEDICATIONS  (PRN):  acetaminophen   Tablet .. 650 milliGRAM(s) Oral every 6 hours PRN Mild Pain (1 - 3)  melatonin 3 milliGRAM(s) Oral at bedtime PRN Insomnia  traMADol 25 milliGRAM(s) Oral every 6 hours PRN moderate to severe pain (4-10)    Vital Signs Last 24 Hrs  T(C): 36.8 (15 Nov 2019 05:33), Max: 36.8 (15 Nov 2019 05:33)  T(F): 98.2 (15 Nov 2019 05:33), Max: 98.2 (15 Nov 2019 05:33)  HR: 73 (15 Nov 2019 05:33) (70 - 98)  BP: 127/79 (15 Nov 2019 05:33) (127/79 - 136/73)  BP(mean): --  RR: 16 (15 Nov 2019 05:33) (16 - 17)  SpO2: 97% (15 Nov 2019 05:33) (97% - 97%)    I&O's Summary        Physical Exam:   GENERAL: NAD, well-groomed, well-developed  HEENT: COLEMAN/   Atraumatic, Normocephalic  ENMT: No tonsillar erythema, exudates, or enlargement; Moist mucous membranes, Good dentition, No lesions  NECK: Supple, No JVD, Normal thyroid  CHEST/LUNG: Clear to auscultaion, ; No rales, rhonchi, wheezing, or rubs  CVS: Regular rate and rhythm; No murmurs, rubs, or gallops  GI: : Soft, Nontender, Nondistended; Bowel sounds present  NERVOUS SYSTEM:  Alert & Oriented X3  LYMPH: No lymphadenopathy noted  SKIN: No rashes or lesions  ENDOCRINOLOGY: No Thyromegaly  PSYCH: Appropriate    Labs:                              11.9   4.64  )-----------( 250      ( 15 Nov 2019 06:58 )             38.2                         11.5   4.84  )-----------( 236      ( 14 Nov 2019 05:40 )             37.5                         12.0   5.25  )-----------( 210      ( 13 Nov 2019 05:20 )             37.6                         11.7   4.54  )-----------( 208      ( 12 Nov 2019 23:00 )             37.7                         11.8   3.52  )-----------( 227      ( 12 Nov 2019 07:47 )             38.8     11-15    144  |  108<H>  |  23  ----------------------------<  95  3.8   |  26  |  0.50  11-14    140  |  104  |  31<H>  ----------------------------<  94  3.6   |  26  |  0.63  11-13    145  |  108<H>  |  32<H>  ----------------------------<  88  4.1   |  23  |  0.57  11-12    143  |  108<H>  |  31<H>  ----------------------------<  87  3.8   |  25  |  0.53    Ca    9.3      15 Nov 2019 06:58  Ca    9.0      14 Nov 2019 05:40  Phos  3.0     11-14  Mg     2.1     11-14    TPro  5.7<L>  /  Alb  3.3  /  TBili  0.9  /  DBili  x   /  AST  19  /  ALT  13  /  AlkPhos  304<H>  11-15  TPro  5.7<L>  /  Alb  3.3  /  TBili  0.9  /  DBili  x   /  AST  21  /  ALT  16  /  AlkPhos  283<H>  11-14  TPro  5.7<L>  /  Alb  3.3  /  TBili  1.2  /  DBili  x   /  AST  22  /  ALT  14  /  AlkPhos  262<H>  11-13  TPro  5.8<L>  /  Alb  3.1<L>  /  TBili  0.9  /  DBili  x   /  AST  20  /  ALT  15  /  AlkPhos  235<H>  11-12    CAPILLARY BLOOD GLUCOSE          LIVER FUNCTIONS - ( 15 Nov 2019 06:58 )  Alb: 3.3 g/dL / Pro: 5.7 g/dL / ALK PHOS: 304 u/L / ALT: 13 u/L / AST: 19 u/L / GGT: x           PT/INR - ( 15 Nov 2019 06:58 )   PT: 29.4 SEC;   INR: 2.57          PTT - ( 15 Nov 2019 06:58 )  PTT:40.1 SEC          RECENT CULTURES:        RESPIRATORY CULTURES:    < from: US Duplex Venous Lower Ext Complete, Bilateral (11.07.19 @ 17:38) >    PROCEDURE DATE:  Nov 7 2019         INTERPRETATION:  CLINICAL INFORMATION: I26.00 Other pulmonary embolism   without acute cor pulmonale    COMPARISON: None available.    TECHNIQUE: Duplex sonography ofthe BILATERAL LOWER extremity veins with   color and spectral Doppler, with and without compression.      FINDINGS:    There is normal compressibility of the bilateral common femoral, femoral   and popliteal veins.     Doppler examination shows normal spontaneous and phasic flow.    No calf vein thrombosis is detected.    IMPRESSION:     No evidence of deep venous thrombosis in either lower extremity.                < from: CT Chest w/ IV Cont (11.06.19 @ 19:58) >  tyler.  HEART: Heart size is normal.  No pericardial effusion.  MEDIASTINUM AND JENSEN: No lymphadenopathy.   CHEST WALL AND LOWER NECK: Within normal limits.     ABDOMEN AND PELVIS:    LIVER: Within normal limits.   BILE DUCTS: Minimal biliary dilatation. Plastic CBD stent.  GALLBLADDER: Cholelithiasis and choledocholithiasis.  SPLEEN: Within normal limits.   PANCREAS: Within normal limits.  ADRENALS: Within normal limits.   KIDNEYS/URETERS: Cysts and other lesions too small to characterize.     BLADDER: Within normal limits.   REPRODUCTIVE ORGANS: Within normal limits.     BOWEL: No bowel obstruction.  PERITONEUM: No ascites.   VESSELS:  IVC filter.   RETROPERITONEUM/LYMPH NODES: No lymphadenopathy.     ABDOMINAL WALL: Within normal limits.  BONES: Bilateral hip arthroplasty. Chronic rib fractures. Multiple   thoracolumbar compression fractures. Age indeterminate fracture right   pubic bone anteriorly.    IMPRESSION:   Left lower lobe pulmonary embolus.    Cholelithiasis and choledocholithiasis. The descending place with mild   dilatation. No pneumobilia.    Age-indeterminate right pubic bone fracture.    The findings were discussed with JEN PIMENTEL 11/7/2019 9:07 AM   with read back confirmation.        < end of copied text >          JOSÉ ANTONIO JAY M.D., ATTENDING RADIOLOGIST  This document has been electronically signed. Nov 8 2019  8:56AM              < end of copied text >        Studies  Chest X-RAY  CT SCAN Chest   Venous Dopplers: LE:   CT Abdomen  Others

## 2019-11-15 NOTE — DISCHARGE NOTE NURSING/CASE MANAGEMENT/SOCIAL WORK - PATIENT PORTAL LINK FT
You can access the FollowMyHealth Patient Portal offered by [facility] by registering at the following website: [email or web address]. By joining [employer]’s FollowMyHealth portal, you will also be able to view your health information using other applications (apps) compatible with our system.

## 2019-11-15 NOTE — PROGRESS NOTE ADULT - PROBLEM SELECTOR PLAN 1
cont AC: she has IVC filer also from before: so far she has no sign sof bleeding: for ercp next week  11/9: cont AC for procedure next week: she does have mild abd distension but no pain: constipated:  11/10: cont AC: long term AC after procedure tomorrow  11/11: refusing for ercp: for psych to see: cont AC  11/12: cont AC: pt cant refuse as she is not competent:  11/13: off AC:: still DW NP: when to restart  11/14: doing ok : on eliquis  11/15: tolerating eliquis

## 2019-11-15 NOTE — PROGRESS NOTE ADULT - SUBJECTIVE AND OBJECTIVE BOX
Subjective: No chest pain or sob; ROS otherwise negative.       acetaminophen   Tablet .. 650 milliGRAM(s) Oral every 6 hours PRN  apixaban 10 milliGRAM(s) Oral every 12 hours  calcium carbonate 1250 mG  + Vitamin D (OsCal 500 + D) 1 Tablet(s) Oral daily  melatonin 3 milliGRAM(s) Oral at bedtime PRN  metoprolol tartrate 25 milliGRAM(s) Oral two times a day  multivitamin 1 Tablet(s) Oral daily  simvastatin 20 milliGRAM(s) Oral at bedtime  sodium chloride 0.9%. 1000 milliLiter(s) IV Continuous <Continuous>  traMADol 25 milliGRAM(s) Oral every 6 hours PRN                        11.9   4.64  )-----------( 250      ( 15 Nov 2019 06:58 )             38.2       Hemoglobin: 11.9 g/dL (11-15 @ 06:58)  Hemoglobin: 11.5 g/dL (11-14 @ 05:40)  Hemoglobin: 12.0 g/dL (11-13 @ 05:20)  Hemoglobin: 11.7 g/dL (11-12 @ 23:00)  Hemoglobin: 11.8 g/dL (11-12 @ 07:47)    11-15    144  |  108<H>  |  23  ----------------------------<  95  3.8   |  26  |  0.50    Ca    9.3      15 Nov 2019 06:58  Phos  3.0     11-14  Mg     2.1     11-14    TPro  5.7<L>  /  Alb  3.3  /  TBili  0.9  /  DBili  x   /  AST  19  /  ALT  13  /  AlkPhos  304<H>  11-15    Creatinine Trend: 0.50<--, 0.63<--, 0.57<--, 0.53<--, 0.48<--, 0.64<--    COAGS: PT/INR - ( 15 Nov 2019 06:58 )   PT: 29.4 SEC;   INR: 2.57          PTT - ( 15 Nov 2019 06:58 )  PTT:40.1 SEC      PHYSICAL EXAM:  Vital Signs last 24 Hours   T(C): 36.8 (11-15-19 @ 05:33), Max: 36.8 (11-15-19 @ 05:33)  HR: 73 (11-15-19 @ 05:33) (70 - 98)  BP: 127/79 (11-15-19 @ 05:33) (127/79 - 136/73)  RR: 16 (11-15-19 @ 05:33) (16 - 17)  SpO2: 97% (11-15-19 @ 05:33) (97% - 97%)  Wt(kg): --    I&O's Summary      Gen: Appears well in NAD  HEENT:  (-)icterus (-)pallor  CV: N S1 S2, RRR, (+)2 Pulses B/l  Resp:  Clear to auscultation B/L, normal effort  GI: (+) BS Soft, NT, distended   Lymph:  (-)Edema, (-)obvious lymphadenopathy  Skin: Warm to touch, Normal turgor  Psych: unable to assess      TELEMETRY: 	  Not on telemetry     ECG:  	    < from: 12 Lead ECG (11.03.19 @ 22:30) >  Line Sinus tachycardiawith frequent Premature ventricular complexes  Possible Left atrial enlargement  Left axis deviation  Left ventricular hypertrophy  Inferior infarct , age undetermined  Anterolateral infarct , age undetermined  Abnormal ECG    < end of copied text >    RADIOLOGY:     < from: CT Chest w/ IV Cont (11.06.19 @ 19:58) >  IMPRESSION:   Left lower lobe pulmonary embolus.    Cholelithiasis and choledocholithiasis. The descending place with mild   dilatation. No pneumobilia.    Age-indeterminate right pubic bone fracture.    The findings were discussed with JEN PIMENTEL 11/7/2019 9:07 AM   with read back confirmation.    < end of copied text >    DIAGNOSTIC TESTING:    < from: Transthoracic Echocardiogram (11.08.19 @ 15:38) >  CONCLUSIONS:  1. Mitral annular calcification, otherwise normal mitral  valve. Mild mitral regurgitation.  2. Aortic valve not well visualized; appears calcified with  grossly mildly decreased opening.  3. Normal left ventricular internal dimensions and wall  thicknesses.  4. Severe segmental left ventricular systolic dysfunction.  The apex, mid to distal septum and distal anterior walls  are hypokinetic.  5. The right ventricle is not well visualized; grossly  normal right ventricular systolic function.  ------------------------------------------------------------------------  Confirmed on  11/8/2019 - 17:13:16 by Jovani Celestin M.D. RPVI    < end of copied text >        ASSESSMENT/PLAN:     89F h/o paranoid schizophrenia (managed off medication), cholelithiasis/choledocholithiasis s/p ERCP w/stent placement February 2019, left hip arthroplasty, gait instability, presents after fall at home. Found with LLL PE.       -Not in clinical CHF, no anginal symptoms   -echo as noted above; severe segmental LV dysfunction   -cont Metoprolol for management of CM   -started on Eliquis for PE, per primary team  -s/p ERCp w/ stent removal, tolerated procedure well from CV perspective   -will follow with you

## 2019-11-15 NOTE — DISCHARGE NOTE NURSING/CASE MANAGEMENT/SOCIAL WORK - NSDCPEELIQUIS_GEN_ALL_CORE
Apixaban/Eliquis - Compliance/Apixaban/Eliquis - Potential for adverse drug reactions and interactions/Apixaban/Eliquis - Dietary Advice/Apixaban/Eliquis - Follow up monitoring

## 2019-11-15 NOTE — PROGRESS NOTE ADULT - ATTENDING COMMENTS
Agree with above assessment and plan as outlined above.    - Cont BB  - Start Statin  - Eventual Asa when ok with team    Sterling Arreaga MD, FACC  BEEPER [phone]
Patient seen and examined.  Agree with above.  Medical management of cardiomyopathy recommended at this time given acute PE  Management of PE per primary team  No further inpatient cardiac workup needed at this time.     Shirley Murray MD
Agree with above assessment and plan as outlined above.    - tolerated procedure    Sterling Arreaga MD, [facility]  BEEPER [phone]
Patient seen and examined, agree with above assessment and plan as transcribed above.    - Echo noted, severe segmental LV dysfunction  - Start Lopressor 25 mg PO BID  - No anginal sxs or signs of active CHF  - Moderate cardiac risk for proposed ERCP    Sterling Arreaga MD, [facility]  BEEPER [phone]
Cont heparin: echo noted: severe LV dysfunction: but right side is OK:
Cont heparin: echo noted: severe LV dysfunction: but right side is OK:  11/10"cont heparin
Cont heparin: echo noted: severe LV dysfunction: but right side is OK:  11/10"cont heparin  11/11: stable: psych evaluation  11/12: for ercp"jimmie PE and on AC
Cont heparin: echo noted: severe LV dysfunction: but right side is OK:  11/10"cont heparin  11/11: stable: psych evaluation  11/12: for ercp"jimmie PE and on AC  11/13:? restart heaprin
Cont heparin: echo noted: severe LV dysfunction: but right side is OK:  11/10"cont heparin  11/11: stable: psych evaluation  11/12: for ercp"jimmie PE and on AC  11/13:? restart heaprin  11/14: on Eliquis now:
Cont heparin: echo noted: severe LV dysfunction: but right side is OK:  11/10"cont heparin  11/11: stable: psych evaluation  11/12: for ercp"jimmie PE and on AC  11/13:? restart heaprin  11/14: on Eliquis now:  11/15: PE stable on eliquis
Cont heparin: echo noted: severe LV dysfunction: but right side is OK:  11/10"cont heparin  11/11: stable: psych evalution
discussed with patient in detail, expresses understanding of treatment plans.  Dr Moya will resume care tomorrow
discussed with patient in detail, expresses understanding of treatment plans.  discussed with sister at bedside

## 2019-11-15 NOTE — DISCHARGE NOTE NURSING/CASE MANAGEMENT/SOCIAL WORK - NSDCCRNAME_GEN_ALL_CORE_FT
[facility] [address] [facility] [place] transport for 330pm.

## 2019-11-15 NOTE — PROGRESS NOTE ADULT - PROBLEM SELECTOR PLAN 4
for ercp next week  11/9: for ercp next week  11/10: for ercp next week, though to me she is saying no to procedure  11/11: refusing the procedure: for psych to see;  11/12: for ercp per primary team  11/13: s/p ercp: bili is normal  11/15: normalized

## 2019-11-15 NOTE — PROGRESS NOTE ADULT - SUBJECTIVE AND OBJECTIVE BOX
INTERVAL HPI/OVERNIGHT EVENTS: No new concerns.   Vital Signs Last 24 Hrs  T(C): 36.8 (15 Nov 2019 05:33), Max: 36.8 (15 Nov 2019 05:33)  T(F): 98.2 (15 Nov 2019 05:33), Max: 98.2 (15 Nov 2019 05:33)  HR: 73 (15 Nov 2019 05:33) (70 - 98)  BP: 127/79 (15 Nov 2019 05:33) (127/79 - 136/73)  BP(mean): --  RR: 16 (15 Nov 2019 05:33) (16 - 17)  SpO2: 97% (15 Nov 2019 05:33) (97% - 97%)  I&O's Summary    MEDICATIONS  (STANDING):  apixaban 10 milliGRAM(s) Oral every 12 hours  calcium carbonate 1250 mG  + Vitamin D (OsCal 500 + D) 1 Tablet(s) Oral daily  metoprolol tartrate 25 milliGRAM(s) Oral two times a day  multivitamin 1 Tablet(s) Oral daily  simvastatin 20 milliGRAM(s) Oral at bedtime  sodium chloride 0.9%. 1000 milliLiter(s) (75 mL/Hr) IV Continuous <Continuous>    MEDICATIONS  (PRN):  acetaminophen   Tablet .. 650 milliGRAM(s) Oral every 6 hours PRN Mild Pain (1 - 3)  melatonin 3 milliGRAM(s) Oral at bedtime PRN Insomnia  traMADol 25 milliGRAM(s) Oral every 6 hours PRN moderate to severe pain (4-10)    LABS:                        11.9   4.64  )-----------( 250      ( 15 Nov 2019 06:58 )             38.2     11-15    144  |  108<H>  |  23  ----------------------------<  95  3.8   |  26  |  0.50    Ca    9.3      15 Nov 2019 06:58  Phos  3.0     11-14  Mg     2.1     11-14    TPro  5.7<L>  /  Alb  3.3  /  TBili  0.9  /  DBili  x   /  AST  19  /  ALT  13  /  AlkPhos  304<H>  11-15    PT/INR - ( 15 Nov 2019 06:58 )   PT: 29.4 SEC;   INR: 2.57          PTT - ( 15 Nov 2019 06:58 )  PTT:40.1 SEC    CAPILLARY BLOOD GLUCOSE              REVIEW OF SYSTEMS:  CONSTITUTIONAL: No fever, weight loss, or fatigue  EYES: No eye pain, visual disturbances, or discharge  ENMT:  No difficulty hearing, tinnitus, vertigo; No sinus or throat pain  NECK: No pain or stiffness  RESPIRATORY: No cough, wheezing, chills or hemoptysis; No shortness of breath  CARDIOVASCULAR: No chest pain, palpitations, dizziness, or leg swelling  GASTROINTESTINAL: No abdominal or epigastric pain. No nausea, vomiting, or hematemesis; No diarrhea or constipation. No melena or hematochezia.  GENITOURINARY: No dysuria, frequency, hematuria, or incontinence  NEUROLOGICAL: No headaches, memory loss, loss of strength, numbness, or tremors    Consultant(s) Notes Reviewed:  [x ] YES  [ ] NO    PHYSICAL EXAM:  GENERAL: NAD,   HEAD:  Atraumatic, Normocephalic  EYES: EOMI, PERRLA, conjunctiva and sclera clear  ENMT: No tonsillar erythema, exudates, or enlargement; Moist mucous membranes, Good dentition, No lesions  NECK: Supple, No JVD, Normal thyroid  NERVOUS SYSTEM:  Alert & Oriented X3, No focal deficit   CHEST/LUNG: Good air entry bilateral with no  rales, rhonchi, wheezing, or rubs  HEART: Regular rate and rhythm; No murmurs, rubs, or gallops  ABDOMEN: Soft, Nontender, Nondistended; Bowel sounds present  EXTREMITIES:  2+ Peripheral Pulses, No clubbing, cyanosis, or edema    Care Discussed with Consultants/Other Providers [ x] YES  [ ] NO

## 2019-11-15 NOTE — PROGRESS NOTE ADULT - REASON FOR ADMISSION
fall

## 2019-11-15 NOTE — PROGRESS NOTE ADULT - PROBLEM SELECTOR PLAN 6
-renally dose meds and monitor Cr
-renally dose meds and monitor Cr
monitor
N/A

## 2019-11-15 NOTE — PROGRESS NOTE ADULT - ASSESSMENT
89F h/o paranoid schizophrenia (managed off medication), cholelithiasis/choledocholithiasis, left hip arthroplasty, gait instability, presents after fall at home and found to have abn distension in setting of increased  alk phos and t bili, a/f eval, pain control and pending PT evaluation:     Problem/Plan - 1:  ·  Problem: Pulmonary embolism .  Plan: Started on NOAC and tolerating well . . Ct chest noted. < from: CT Chest w/ IV Cont (11.06.19 @ 19:58) >  IMPRESSION:   Left lower lobe pulmonary embolus. Has IVC filter also.     < end of copied text >  Pulmonary and cardiology consulted.     TTE pending.      Problem/Plan - 2:  ·  Problem: Hyperbilirubinemia.  Plan: US abdominal noted   CT abdomen/pelvis noted. < from: CT Abdomen and Pelvis w/ IV Cont (11.06.19 @ 19:58) >  ABDOMEN AND PELVIS:    LIVER: Within normal limits.   BILE DUCTS: Minimal biliary dilatation. Plastic CBD stent.  GALLBLADDER: Cholelithiasis and choledocholithiasis.  SPLEEN: Within normal limits.   PANCREAS: Within normal limits.  ADRENALS: Within normal limits.   KIDNEYS/URETERS: Cysts and other lesions too small to characterize.     BLADDER: Within normal limits.   REPRODUCTIVE ORGANS: Within normal limits.     BOWEL: No bowel obstruction.  PERITONEUM: No ascites.   VESSELS:  IVC filter.   RETROPERITONEUM/LYMPH NODES: No lymphadenopathy.     ABDOMINAL WALL: Within normal limits.  BONES: Bilateral hip arthroplasty. Chronic rib fractures. Multiple   thoracolumbar compression fractures. Age indeterminate fracture right   pubic bone anteriorly.    < end of copied text >  GI consult noted . Possible ERCP for stent removal next week so will hold off starting PO AC.   Awaiting ERCP with stent removal.      Problem/Plan - 3:  ·  Problem: Closed nondisplaced fracture of proximal phalanx of middle fingerr and Pelvic fracture .  Plan: s/p splint for left middle finger distal fracture  Ortho helping.      Problem/Plan - 4:  ·  Problem: Schizophrenia, unspecified type.  Plan: stable off medication.      Problem/Plan - 5:  ·  Problem: Protein calorie malnutrition.  Plan: encourage po  nutrition evaluation  b12 and folate normal.      Problem/Plan - 6:  Problem: CKD (chronic kidney disease), stage II. Plan: -renally dose meds and monitor Cr.     Problem/Plan - 7:  ·  Problem: Fall, initial encounter.  Plan: no evidence of syncope  fall precautions  PT evaluation in progress.     Disposition : DC planning to [facility] today .

## 2021-02-11 NOTE — PATIENT PROFILE ADULT - HCP AGENT'S NAME
Detail Level: Detailed Radha Rouse Render Note In Bullet Format When Appropriate: No Post-Care Instructions: I reviewed with the patient in detail post-care instructions. Patient is to wear sunprotection, and avoid picking at any of the treated lesions. Pt may apply Vaseline to crusted or scabbing areas. Consent: The patient's consent was obtained including but not limited to risks of crusting, scabbing, blistering, scarring, darker or lighter pigmentary change, recurrence, incomplete removal and infection. Number Of Freeze-Thaw Cycles: 1 freeze-thaw cycle Duration Of Freeze Thaw-Cycle (Seconds): 2

## 2021-08-01 NOTE — CHART NOTE - NSCHARTNOTESELECT_GEN_ALL_CORE
Event Note
MALNUTRITION ALERT/Nutrition Services
stated

## 2021-08-11 NOTE — PROGRESS NOTE ADULT - SUBJECTIVE AND OBJECTIVE BOX
Anti-reflective Subjective: No chest pain or sob; ROS otherwise negative.     	    acetaminophen   Tablet .. 650 milliGRAM(s) Oral every 6 hours PRN  calcium carbonate 1250 mG  + Vitamin D (OsCal 500 + D) 1 Tablet(s) Oral daily  heparin  Infusion.  Unit(s)/Hr IV Continuous <Continuous>  heparin  Injectable 3500 Unit(s) IV Push every 6 hours PRN  heparin  Injectable 1500 Unit(s) IV Push every 6 hours PRN  melatonin 3 milliGRAM(s) Oral at bedtime PRN  metoprolol tartrate 25 milliGRAM(s) Oral two times a day  multivitamin 1 Tablet(s) Oral daily  simvastatin 20 milliGRAM(s) Oral at bedtime  sodium chloride 0.9%. 1000 milliLiter(s) IV Continuous <Continuous>  traMADol 25 milliGRAM(s) Oral every 6 hours PRN                          11.8   3.52  )-----------( 227      ( 12 Nov 2019 07:47 )             38.8       Hemoglobin: 11.8 g/dL (11-12 @ 07:47)  Hemoglobin: 11.7 g/dL (11-11 @ 06:40)  Hemoglobin: 11.9 g/dL (11-10 @ 07:17)  Hemoglobin: 11.6 g/dL (11-09 @ 03:20)  Hemoglobin: 12.8 g/dL (11-08 @ 06:57)      11-12    143  |  108<H>  |  31<H>  ----------------------------<  87  3.8   |  25  |  0.53    Ca    9.1      12 Nov 2019 07:47  Phos  3.4     11-12  Mg     2.1     11-12    TPro  5.8<L>  /  Alb  3.1<L>  /  TBili  0.9  /  DBili  x   /  AST  20  /  ALT  15  /  AlkPhos  235<H>  11-12    Creatinine Trend: 0.53<--, 0.48<--, 0.64<--, 0.50<--, 0.54<--, 0.53<--    COAGS: PTT - ( 12 Nov 2019 07:47 )  PTT:31.0 SEC      PHYSICAL EXAM:  Vital Signs last 24 Hours   T(C): 36.4 (11-12-19 @ 05:00), Max: 36.8 (11-11-19 @ 12:46)  HR: 78 (11-12-19 @ 05:00) (65 - 93)  BP: 125/80 (11-12-19 @ 05:00) (125/80 - 148/94)  RR: 16 (11-12-19 @ 05:00) (16 - 18)  SpO2: 95% (11-12-19 @ 05:00) (95% - 99%)  Wt(kg): --    I&O's Summary      Gen: Appears well in NAD  HEENT:  (-)icterus (-)pallor  CV: N S1 S2, RRR, (+)2 Pulses B/l  Resp:  Clear to auscultation B/L, normal effort  GI: (+) BS Soft, NT, distended   Lymph:  (-)Edema, (-)obvious lymphadenopathy  Skin: Warm to touch, Normal turgor  Psych: Appropriate mood and affect      TELEMETRY: 	  Not on telemetry     ECG:  	    < from: 12 Lead ECG (11.03.19 @ 22:30) >  Line Sinus tachycardiawith frequent Premature ventricular complexes  Possible Left atrial enlargement  Left axis deviation  Left ventricular hypertrophy  Inferior infarct , age undetermined  Anterolateral infarct , age undetermined  Abnormal ECG    < end of copied text >    RADIOLOGY:     < from: CT Chest w/ IV Cont (11.06.19 @ 19:58) >  IMPRESSION:   Left lower lobe pulmonary embolus.    Cholelithiasis and choledocholithiasis. The descending place with mild   dilatation. No pneumobilia.    Age-indeterminate right pubic bone fracture.    The findings were discussed with JEN PIMENTEL 11/7/2019 9:07 AM   with read back confirmation.    < end of copied text >    DIAGNOSTIC TESTING:    < from: Transthoracic Echocardiogram (11.08.19 @ 15:38) >  CONCLUSIONS:  1. Mitral annular calcification, otherwise normal mitral  valve. Mild mitral regurgitation.  2. Aortic valve not well visualized; appears calcified with  grossly mildly decreased opening.  3. Normal left ventricular internal dimensions and wall  thicknesses.  4. Severe segmental left ventricular systolic dysfunction.  The apex, mid to distal septum and distal anterior walls  are hypokinetic.  5. The right ventricle is not well visualized; grossly  normal right ventricular systolic function.  ------------------------------------------------------------------------  Confirmed on  11/8/2019 - 17:13:16 by Jovani Celestin M.D. RPVI    < end of copied text >        ASSESSMENT/PLAN:     89F h/o paranoid schizophrenia (managed off medication), cholelithiasis/choledocholithiasis s/p ERCP w/stent placement February 2019, left hip arthroplasty, gait instability, presents after fall at home. Patient is chest pain free. Cardiology consulted for abnormal EKG and further work up.     -Pt. with + LLL PE on CT scan, on hep gtt   -echo as noted above; severe segmental LV dysfunction, medical management recommended   -plan for ERCP today, heparin on hold pending procedure, family consent   -cont BB, statin, and ASA if no contraindications   -if no plan for OR today, please restart heparin gtt for LLL PE   -based on RCRI, patient is considered as moderate cardiac risk for planned procedure. Patient is optimized from CV perspective.

## 2022-04-28 NOTE — BEHAVIORAL HEALTH ASSESSMENT NOTE - NSBHCONSULTDISCUSS_PSY_A_CORE
no

## 2022-06-02 NOTE — PROGRESS NOTE ADULT - PROBLEM SELECTOR PLAN 3
"Advance Care Planning     6/2/2022    Ongoing dialogue with nephew, Kraig, as he communicates with other family members. He voices family concerns of "starving" patient. Writer reiterates harms of forced feeding and lack of viable options with patient catastrophic brain injury. Family continues to have deirdre that he will improve.   Kraig has agreed to hospice visits but then does not show as he balances requests from family members not to "give up on him".   Writer has separate conversation with rep Erum for [facility]. He communicates with writer that [facility] is willing to take patient on hospice and NG tube for trial period. This message is communicated to Kraig who is given Cathy's number for follow up. Kraig is very pleased to hear this news and accepting of hospice at [facility] under these conditions.      " s/p splint for left middle finger distal fracture  hand surgery  follow up after discharge

## 2022-07-20 NOTE — DIETITIAN INITIAL EVALUATION ADULT. - PATIENT PROFILE REVIEWED
Missed pt's return call and got VM when I called back. Left my direct number.  Will try again.    yes

## 2022-09-15 NOTE — CONSULT NOTE ADULT - PROBLEM SELECTOR PROBLEM 5
Attemtped to call. Message left with call back info - pt is connected to outpt care Schizophrenia, unspecified type

## 2022-10-20 ENCOUNTER — INPATIENT (INPATIENT)
Facility: HOSPITAL | Age: 87
LOS: 3 days | Discharge: NURSING, REHAB OR LONG-TERM CARE | End: 2022-10-24
Attending: INTERNAL MEDICINE | Admitting: INTERNAL MEDICINE

## 2022-10-20 VITALS
HEART RATE: 95 BPM | OXYGEN SATURATION: 96 % | TEMPERATURE: 98 F | RESPIRATION RATE: 18 BRPM | DIASTOLIC BLOOD PRESSURE: 105 MMHG | SYSTOLIC BLOOD PRESSURE: 167 MMHG | HEIGHT: 65 IN

## 2022-10-20 DIAGNOSIS — F20.9 SCHIZOPHRENIA, UNSPECIFIED: ICD-10-CM

## 2022-10-20 DIAGNOSIS — S22.000A WEDGE COMPRESSION FRACTURE OF UNSPECIFIED THORACIC VERTEBRA, INITIAL ENCOUNTER FOR CLOSED FRACTURE: ICD-10-CM

## 2022-10-20 DIAGNOSIS — I10 ESSENTIAL (PRIMARY) HYPERTENSION: ICD-10-CM

## 2022-10-20 DIAGNOSIS — Z96.642 PRESENCE OF LEFT ARTIFICIAL HIP JOINT: Chronic | ICD-10-CM

## 2022-10-20 DIAGNOSIS — M50.30 OTHER CERVICAL DISC DEGENERATION, UNSPECIFIED CERVICAL REGION: ICD-10-CM

## 2022-10-20 DIAGNOSIS — R91.8 OTHER NONSPECIFIC ABNORMAL FINDING OF LUNG FIELD: ICD-10-CM

## 2022-10-20 DIAGNOSIS — E78.5 HYPERLIPIDEMIA, UNSPECIFIED: ICD-10-CM

## 2022-10-20 DIAGNOSIS — I71.21 ANEURYSM OF THE ASCENDING AORTA, WITHOUT RUPTURE: ICD-10-CM

## 2022-10-20 DIAGNOSIS — R74.01 ELEVATION OF LEVELS OF LIVER TRANSAMINASE LEVELS: ICD-10-CM

## 2022-10-20 DIAGNOSIS — R53.1 WEAKNESS: ICD-10-CM

## 2022-10-20 DIAGNOSIS — Z29.9 ENCOUNTER FOR PROPHYLACTIC MEASURES, UNSPECIFIED: ICD-10-CM

## 2022-10-20 DIAGNOSIS — D75.89 OTHER SPECIFIED DISEASES OF BLOOD AND BLOOD-FORMING ORGANS: ICD-10-CM

## 2022-10-20 DIAGNOSIS — R60.0 LOCALIZED EDEMA: ICD-10-CM

## 2022-10-20 LAB
ALBUMIN SERPL ELPH-MCNC: 4 G/DL — SIGNIFICANT CHANGE UP (ref 3.3–5)
ALP SERPL-CCNC: 152 U/L — HIGH (ref 40–120)
ALT FLD-CCNC: 21 U/L — SIGNIFICANT CHANGE UP (ref 4–33)
ANION GAP SERPL CALC-SCNC: 11 MMOL/L — SIGNIFICANT CHANGE UP (ref 7–14)
APPEARANCE UR: CLEAR — SIGNIFICANT CHANGE UP
AST SERPL-CCNC: 41 U/L — HIGH (ref 4–32)
BACTERIA # UR AUTO: NEGATIVE — SIGNIFICANT CHANGE UP
BASOPHILS # BLD AUTO: 0.02 K/UL — SIGNIFICANT CHANGE UP (ref 0–0.2)
BASOPHILS NFR BLD AUTO: 0.4 % — SIGNIFICANT CHANGE UP (ref 0–2)
BILIRUB SERPL-MCNC: 1.3 MG/DL — HIGH (ref 0.2–1.2)
BILIRUB UR-MCNC: NEGATIVE — SIGNIFICANT CHANGE UP
BUN SERPL-MCNC: 20 MG/DL — SIGNIFICANT CHANGE UP (ref 7–23)
CALCIUM SERPL-MCNC: 9.5 MG/DL — SIGNIFICANT CHANGE UP (ref 8.4–10.5)
CHLORIDE SERPL-SCNC: 100 MMOL/L — SIGNIFICANT CHANGE UP (ref 98–107)
CO2 SERPL-SCNC: 26 MMOL/L — SIGNIFICANT CHANGE UP (ref 22–31)
COLOR SPEC: SIGNIFICANT CHANGE UP
CREAT SERPL-MCNC: 0.5 MG/DL — SIGNIFICANT CHANGE UP (ref 0.5–1.3)
DIFF PNL FLD: ABNORMAL
EGFR: 87 ML/MIN/1.73M2 — SIGNIFICANT CHANGE UP
EOSINOPHIL # BLD AUTO: 0.03 K/UL — SIGNIFICANT CHANGE UP (ref 0–0.5)
EOSINOPHIL NFR BLD AUTO: 0.6 % — SIGNIFICANT CHANGE UP (ref 0–6)
FLUAV AG NPH QL: SIGNIFICANT CHANGE UP
FLUBV AG NPH QL: SIGNIFICANT CHANGE UP
GLUCOSE SERPL-MCNC: 102 MG/DL — HIGH (ref 70–99)
GLUCOSE UR QL: NEGATIVE — SIGNIFICANT CHANGE UP
HCT VFR BLD CALC: 39.1 % — SIGNIFICANT CHANGE UP (ref 34.5–45)
HGB BLD-MCNC: 12.4 G/DL — SIGNIFICANT CHANGE UP (ref 11.5–15.5)
IANC: 3.98 K/UL — SIGNIFICANT CHANGE UP (ref 1.8–7.4)
IMM GRANULOCYTES NFR BLD AUTO: 0.6 % — SIGNIFICANT CHANGE UP (ref 0–0.9)
KETONES UR-MCNC: NEGATIVE — SIGNIFICANT CHANGE UP
LEUKOCYTE ESTERASE UR-ACNC: NEGATIVE — SIGNIFICANT CHANGE UP
LYMPHOCYTES # BLD AUTO: 0.71 K/UL — LOW (ref 1–3.3)
LYMPHOCYTES # BLD AUTO: 13.2 % — SIGNIFICANT CHANGE UP (ref 13–44)
MCHC RBC-ENTMCNC: 31.7 GM/DL — LOW (ref 32–36)
MCHC RBC-ENTMCNC: 32.8 PG — SIGNIFICANT CHANGE UP (ref 27–34)
MCV RBC AUTO: 103.4 FL — HIGH (ref 80–100)
MONOCYTES # BLD AUTO: 0.6 K/UL — SIGNIFICANT CHANGE UP (ref 0–0.9)
MONOCYTES NFR BLD AUTO: 11.2 % — SIGNIFICANT CHANGE UP (ref 2–14)
NEUTROPHILS # BLD AUTO: 3.98 K/UL — SIGNIFICANT CHANGE UP (ref 1.8–7.4)
NEUTROPHILS NFR BLD AUTO: 74 % — SIGNIFICANT CHANGE UP (ref 43–77)
NITRITE UR-MCNC: NEGATIVE — SIGNIFICANT CHANGE UP
NRBC # BLD: 0 /100 WBCS — SIGNIFICANT CHANGE UP (ref 0–0)
NRBC # FLD: 0 K/UL — SIGNIFICANT CHANGE UP (ref 0–0)
PH UR: 8 — SIGNIFICANT CHANGE UP (ref 5–8)
PLATELET # BLD AUTO: 156 K/UL — SIGNIFICANT CHANGE UP (ref 150–400)
POTASSIUM SERPL-MCNC: 4.3 MMOL/L — SIGNIFICANT CHANGE UP (ref 3.5–5.3)
POTASSIUM SERPL-SCNC: 4.3 MMOL/L — SIGNIFICANT CHANGE UP (ref 3.5–5.3)
PROT SERPL-MCNC: 6.3 G/DL — SIGNIFICANT CHANGE UP (ref 6–8.3)
PROT UR-MCNC: NEGATIVE — SIGNIFICANT CHANGE UP
RBC # BLD: 3.78 M/UL — LOW (ref 3.8–5.2)
RBC # FLD: 14.6 % — HIGH (ref 10.3–14.5)
RBC CASTS # UR COMP ASSIST: SIGNIFICANT CHANGE UP /HPF (ref 0–4)
RSV RNA NPH QL NAA+NON-PROBE: SIGNIFICANT CHANGE UP
SARS-COV-2 RNA SPEC QL NAA+PROBE: SIGNIFICANT CHANGE UP
SODIUM SERPL-SCNC: 137 MMOL/L — SIGNIFICANT CHANGE UP (ref 135–145)
SP GR SPEC: 1.01 — SIGNIFICANT CHANGE UP (ref 1.01–1.05)
UROBILINOGEN FLD QL: SIGNIFICANT CHANGE UP
WBC # BLD: 5.37 K/UL — SIGNIFICANT CHANGE UP (ref 3.8–10.5)
WBC # FLD AUTO: 5.37 K/UL — SIGNIFICANT CHANGE UP (ref 3.8–10.5)
WBC UR QL: SIGNIFICANT CHANGE UP /HPF (ref 0–5)

## 2022-10-20 PROCEDURE — 71250 CT THORAX DX C-: CPT | Mod: 26,MG

## 2022-10-20 PROCEDURE — 12013 RPR F/E/E/N/L/M 2.6-5.0 CM: CPT

## 2022-10-20 PROCEDURE — 99223 1ST HOSP IP/OBS HIGH 75: CPT

## 2022-10-20 PROCEDURE — 72125 CT NECK SPINE W/O DYE: CPT | Mod: 26,MA

## 2022-10-20 PROCEDURE — 99285 EMERGENCY DEPT VISIT HI MDM: CPT | Mod: 25

## 2022-10-20 PROCEDURE — 71045 X-RAY EXAM CHEST 1 VIEW: CPT | Mod: 26

## 2022-10-20 PROCEDURE — G1004: CPT

## 2022-10-20 PROCEDURE — 72170 X-RAY EXAM OF PELVIS: CPT | Mod: 26

## 2022-10-20 PROCEDURE — 70450 CT HEAD/BRAIN W/O DYE: CPT | Mod: 26,MA

## 2022-10-20 RX ORDER — ACETAMINOPHEN 500 MG
650 TABLET ORAL EVERY 6 HOURS
Refills: 0 | Status: DISCONTINUED | OUTPATIENT
Start: 2022-10-20 | End: 2022-10-24

## 2022-10-20 RX ORDER — TETANUS TOXOID, REDUCED DIPHTHERIA TOXOID AND ACELLULAR PERTUSSIS VACCINE, ADSORBED 5; 2.5; 8; 8; 2.5 [IU]/.5ML; [IU]/.5ML; UG/.5ML; UG/.5ML; UG/.5ML
0.5 SUSPENSION INTRAMUSCULAR ONCE
Refills: 0 | Status: COMPLETED | OUTPATIENT
Start: 2022-10-20 | End: 2022-10-20

## 2022-10-20 RX ADMIN — TETANUS TOXOID, REDUCED DIPHTHERIA TOXOID AND ACELLULAR PERTUSSIS VACCINE, ADSORBED 0.5 MILLILITER(S): 5; 2.5; 8; 8; 2.5 SUSPENSION INTRAMUSCULAR at 17:46

## 2022-10-20 NOTE — ED PROVIDER NOTE - CLINICAL SUMMARY MEDICAL DECISION MAKING FREE TEXT BOX
Attending/MD Humble. 89 yo F, not on AC, ASA, p/f lac repair, sp fall, mechanical fall, but unknown gaze stability, will get lab, ct, xrays, lac repair, likely admit but will communicate with Stephen

## 2022-10-20 NOTE — ED PROCEDURE NOTE - ATTENDING CONTRIBUTION TO CARE
I have personally seen and examined this patient.  I have fully participated in the care of this patient. I performed a substantive portion of the visit including all aspects of the medical decision making. I have reviewed all pertinent clinical information, including history, physical exam, plan and the Resident’s note and agree except as noted. - MD Humble.

## 2022-10-20 NOTE — ED PROVIDER NOTE - PHYSICAL EXAMINATION
Attending/MD Humble.   VITALS: reviewed  GEN: No apparent distress, A & O x 4  HEAD/EYES: + 5 cm laceration, non active bleeding on the right frontal, 2mm overs over the eye blow, PERRL, EOMI, anicteric sclerae, no conjunctival pallor  ENT: mucus membranes moist, trachea midline, no JVD, neck is supple  RESP: lungs CTA with equal breath sounds bilaterally, chest wall nontender and atraumatic  CV: heart with reg rhythm S1, S2, no murmur; distal pulses intact and symmetric bilaterally  ABDOMEN: normoactive bowel sounds, soft, nondistended, nontender  MSK: extremities atraumatic and nontender, no edema, no asymmetry.   SKIN: warm, dry, no rash, no bruising, no cyanosis.  NEURO: alert, mentating appropriately, no facial asymmetry.  PSYCH: Affect appropriate

## 2022-10-20 NOTE — ED PROVIDER NOTE - OBJECTIVE STATEMENT
Attending/MD Humble. 89 yo F, PMH of paranoidal psychiatric, no med, HTN, HLD, metoprolrol, simbastatin, was on Eliquos but not these days, s/p fall, when got up after meal, fell to the floor, denies LOC, or vomits, and cut the head, bleeding, in front of sister, who applied gaze, and called Dr. Mitchell, was told send to ER, pt was seen by PCP, 1-2mo ago, using walker at her baseline, home has 8 steps.

## 2022-10-20 NOTE — ED ADULT TRIAGE NOTE - CHIEF COMPLAINT QUOTE
Patient brought to ER by EMS from home after she got up from her chair to go to bathroom and hit her head on wooden object. No LOC. No Bloodthinners. Pt has a large gash to her right forehead and is now c/o blurry vision.

## 2022-10-20 NOTE — H&P ADULT - NSHPREVIEWOFSYSTEMS_GEN_ALL_CORE
REVIEW OF SYSTEMS:    CONSTITUTIONAL: No weakness, fevers or chills  EYES/ENT: (+) "hazy" vision; No dysphagia; No sore throat; No rhinorrhea; No sinus pain/pressure  NECK: No pain or stiffness  RESPIRATORY: No cough, wheezing, hemoptysis; No shortness of breath  CARDIOVASCULAR: No chest pain or palpitations; No lower extremity edema  GASTROINTESTINAL: No abdominal or epigastric pain. No nausea, vomiting, or hematemesis; No diarrhea or constipation. No melena or hematochezia.  GENITOURINARY: No dysuria, frequency or hematuria  NEUROLOGICAL: No numbness, paresthesias, or weakness; No HA; No LH/dizziness  MSK: ambulates with walker, fall as above  SKIN: No itching, burning, rashes, or lesions   All other review of systems is negative unless indicated above.

## 2022-10-20 NOTE — ED PROVIDER NOTE - PROGRESS NOTE DETAILS
José Miguel Grey, DO PGY-3: Laceration repaired sutures to be removed in 3-5 days José Miguel Grey, DO PGY-3: No gross ICH on CT, will admit for inability to ambulate safely, spoke w/ Dr Bee who agrees w/ plan,

## 2022-10-20 NOTE — ED PROCEDURE NOTE - CPROC ED LACERATION CLEANSED1
Problem: Patient Care Overview (Adult)  Goal: Plan of Care Review  Outcome: Ongoing (interventions implemented as appropriate)   02/17/18 0803   Coping/Psychosocial Response Interventions   Plan Of Care Reviewed With patient;family   Patient Care Overview   Progress no change   Outcome Evaluation   Outcome Summary/Follow up Plan Pt admitted from the ER at 0645 after a fall at home. Pr diagnosed with a closed displaced oblique fracture of the rt femur. Pt rt leg is shortened and externally fixated. Pt has complaints of discomfort not pain. Pt family at bedside. Vitals WNL. Awaiting ortho consult to determine game plan. Will continue to monitor.     Goal: Adult Individualization and Mutuality  Outcome: Ongoing (interventions implemented as appropriate)    Goal: Discharge Needs Assessment  Outcome: Ongoing (interventions implemented as appropriate)      Problem: Fall Risk (Adult)  Goal: Identify Related Risk Factors and Signs and Symptoms  Outcome: Ongoing (interventions implemented as appropriate)    Goal: Absence of Falls  Outcome: Ongoing (interventions implemented as appropriate)      Problem: Orthopaedic Fracture (Adult)  Goal: Signs and Symptoms of Listed Potential Problems Will be Absent or Manageable (Orthopaedic Fracture)  Outcome: Ongoing (interventions implemented as appropriate)      Problem: Pain, Acute (Adult)  Goal: Identify Related Risk Factors and Signs and Symptoms  Outcome: Ongoing (interventions implemented as appropriate)    Goal: Acceptable Pain Control/Comfort Level  Outcome: Ongoing (interventions implemented as appropriate)         cleansed

## 2022-10-20 NOTE — H&P ADULT - NSHPLABSRESULTS_GEN_ALL_CORE
12.4   5.37  )-----------( 156      ( 20 Oct 2022 10:30 )             39.1     10-    137  |  100  |  20  ----------------------------<  102<H>  4.3   |  26  |  0.50    Ca    9.5      20 Oct 2022 10:30    TPro  6.3  /  Alb  4.0  /  TBili  1.3<H>  /  DBili  x   /  AST  41<H>  /  ALT  21  /  AlkPhos  152<H>  1020    Urinalysis Basic - ( 20 Oct 2022 20:33 )  Color: Light Yellow / Appearance: Clear / S.010 / pH: x  Gluc: x / Ketone: Negative  / Bili: Negative / Urobili: <2 mg/dL   Blood: x / Protein: Negative / Nitrite: Negative   Leuk Esterase: Negative / RBC: 3-5 /HPF / WBC 0-2 /HPF   Sq Epi: x / Non Sq Epi: x / Bacteria: Negative    < from: CT Head No Cont (10.20.22 @ 19:01) >/< from: CT Cervical Spine No Cont (10.20.22 @ 19:01) >  IMPRESSION:  1.  CT HEAD:    Axial splenic calcifications are noted at each carotid bulb.   Ischemic white matter disease lower range typical for age. Diffuse brain volume loss typical for age. Intracranial atherosclerosis   2.  CT CERVICAL:   No cervical vertebral fracture. Slight retrolisthesis C3 on C4 and anterolisthesis C4 and C5 are likely degenerative. Mild to moderate widespread cervical degenerative disc disease   3. Patient motion limited scan  4. Right apical pulmonary nodule, unchanged from 2019, likely hamartoma  < end of copied text >    < from: CT Chest No Cont (10.20.22 @ 19:01) >  LYMPH NODES/MEDIASTINUM: No lymphadenopathy. Diffuse esophageal wall thickening.  HEART/VASCULATURE: Heart size is normal. No pericardial effusion. Mitral annular calcifications. Dilatation of the ascending aorta measuring up to 4.7 cm. Aortic and coronary artery calcifications.  AIRWAYS/LUNGS/PLEURA: Patent central airways. Stable calcified/partially calcified lung nodules, largest 1.3 cm in the right apex. Few bilateral sub-6 mm noncalcified lung nodules, some in a region obscured by atelectasis in the left lower lobe on prior, but otherwise stable. No pleural effusion.  UPPER ABDOMEN: Not well evaluated on this exam due to streak artifact from adjacent arms and lack of intravenous contrast.  BONES/SOFT TISSUES: Several chronic appearing bilateral rib fractures. Several thoracolumbar compression deformities, some unchanged and others new or increased since 2019. Unchanged old sternal fracture.  IMPRESSION: No evidence for acute rib fracture. Multiple chronic bilateral rib fractures. Compression fractures of nearly all thoracic and partially included lumbar vertebral bodies, some unchanged and others increased since 2019.  < end of copied text >    < from: Xray Pelvis AP only (10.20.22 @ 17:32) >  ******PRELIMINARY REPORT****** INTERPRETATION:  bilateral hip replacement with hardware intact. No periprosthetic fracture or lucency. no acute displaced fracture or dislocation.  < end of copied text >    EKG ordered/pending

## 2022-10-20 NOTE — H&P ADULT - PROBLEM SELECTOR PLAN 2
[FreeTextEntry1] : Dear Dr. Alondra Wilburn,\[facility] \[facility] I had the pleasure of seeing your patient ARNOLDO LARA in the office today.  My office note is attached. PLEASE READ THE "ASSESSMENT" SECTION OF THE NOTE TO SEE MY IMPRESSION AND PLAN.\par \[facility] Thank you very much for allowing me to participate in the care of your patient.\[facility] \[facility] Sincerely,\[facility] \[facility] Cristiano Quesada M.D., FAC, [facility] Director, Celiac Program at [facility]  of Medicine\[facility] [employer] [facility] at [facility] Practice Director,\[facility] - Gastroenterology/Internal Medicine at [address] - [address]\[place], [place] [address] Tel: [phone]\[facility] Email: [email or web address] \[facility] \[facility] The attached note has been created using a voice recognition system (Dragon).  There may be some misspellings and typos.  Please call my office if you have any issues or questions.  appears to be asymptomatic, f/u with NSx

## 2022-10-20 NOTE — H&P ADULT - NSHPPHYSICALEXAM_GEN_ALL_CORE
Vital Signs Last 24 Hrs  T(C): 36.1 (20 Oct 2022 17:55), Max: 36.6 (20 Oct 2022 14:59)  T(F): 97 (20 Oct 2022 17:55), Max: 97.9 (20 Oct 2022 14:59)  HR: 99 (20 Oct 2022 19:17) (89 - 99)  BP: 154/89 (20 Oct 2022 19:17) (148/111 - 167/105)  RR: 18 (20 Oct 2022 19:17) (18 - 18)  SpO2: 100% (20 Oct 2022 19:17) (96% - 100%)    Parameters below as of 20 Oct 2022 19:17  Patient On (Oxygen Delivery Method): room air    PHYSICAL EXAM:  GENERAL: NAD, well-developed, thin  HEAD:  Atraumatic, mild temporal wasting   EYES: EOMI, PERRL, conjunctiva and sclera clear  NECK: Supple, No JVD  CHEST/LUNG: Clear to auscultation bilaterally; No wheezes, rales or rhonchi; normal work of breathing, speaking in full sentences  HEART: Regular rate and rhythm, occasional extra beat auscultated; No murmurs, rubs, or gallops, (+)S1, S2  ABDOMEN: Soft, Nontender, Nondistended; Normal Bowel sounds   BACK: (+)kyphosis of thoracic spine, no bony tenderness to palpation  EXTREMITIES:  2+ Peripheral Pulses, No clubbing, cyanosis. Edema LLE>RLE  PSYCH: normal mood and affect, A&Ox3, denies AH or VH  NEUROLOGY: no focal neuro deficits, CN IIII-XII intact, reports able to see waving hands in front of eyes however cannot see number of fingers raised or read letters, strength 4+/5 x4 extremities, FTN intact b/l  SKIN: No rashes, xerosis b/l shins

## 2022-10-20 NOTE — ED PROVIDER NOTE - ATTENDING CONTRIBUTION TO CARE
I have personally seen and examined this patient.  I have fully participated in the care of this patient. I performed a substantive portion of the visit including all aspects of the medical decision making. I have reviewed all pertinent clinical information, including history, physical exam, plan and the Resident’s note and agree except as noted. - MD Humble.    see my notes

## 2022-10-20 NOTE — H&P ADULT - PROBLEM SELECTOR PLAN 10
#HSQ for DVT ppx in AM    #ascending aorta dilated <5.5cm on CT, outpt f/u with PMD    #hazy vision   -per sister, this is chronic, going on for at least 6 months  -history of eye surgery in past, reportedly patient doesn't want to use corrective lenses, uses a magnifying glass to read    #esophageal wall thickening   -dysphagia screen pending, aspiration precautions, dysphagia diet pending dysphagia screen, S&S eval  -reportedly eats regular diet at home  -outpatient f/u with PMD

## 2022-10-20 NOTE — H&P ADULT - ASSESSMENT
89-year-old female with HTN, HLD, schizophrenia per EMR (patient denies medical history), presenting from home after a fall.

## 2022-10-20 NOTE — ED ADULT NURSE NOTE - OBJECTIVE STATEMENT
Break RN note- Patient arrives to the ED after a fall at home earlier today. Patient reports she started to get up to go the bathroom and hit her head on the night stand. No LOC per triage note. Patient denies any dizziness, SOB, or chest pain. Patient complaining of neck pain and headache. Patient moving her head freely. Patient reports she normally has very clear sharp vision and now it is slightly blurry in the right eye and decreased in the left eye. Patient is hard of hearing. No other s/sx of injury visible. Patient has some nonblanchable redness to her sacral area. Lac to above right eye measuring approximately 6cm by 2cm. Area to the right of lac appearing ecchymotic. Gauze applied. 20g IV placed to left arm by jb RN. Labs sent as ordered. Safety maintained. Patient stable upon exiting the room.

## 2022-10-20 NOTE — H&P ADULT - HISTORY OF PRESENT ILLNESS
89-year-old female with HTN, HLD, schizophrenia per EMR (patient denies medical history), presenting from home after a fall. Patient reports walking back from the bathroom, hitting head on bureau, denies LOC. I spoke with patient's sister, Radha Rouse via phone who stated she heard the fall and went to evaluate her sister.  PMD was called and told patient to go to ED.  Patient is without complaint however reports vision has been "hazy" since arrival to ED. In regards to fall, patient denies LH/dizziness, chest pain, shortness of breath.    In the ED VS: 97.9  89-99  148-167/  18  %RA, received Tdap vaccination

## 2022-10-20 NOTE — H&P ADULT - PROBLEM SELECTOR PLAN 1
no TTP of spine  would consult NSx in AM for brace given extent of compression fractures    given fall, EKG ordered/pending

## 2022-10-20 NOTE — ED ADULT NURSE NOTE - NSIMPLEMENTINTERV_GEN_ALL_ED
Implemented All Fall with Harm Risk Interventions:  Rule to call system. Call bell, personal items and telephone within reach. Instruct patient to call for assistance. Room bathroom lighting operational. Non-slip footwear when patient is off stretcher. Physically safe environment: no spills, clutter or unnecessary equipment. Stretcher in lowest position, wheels locked, appropriate side rails in place. Provide visual cue, wrist band, yellow gown, etc. Monitor gait and stability. Monitor for mental status changes and reorient to person, place, and time. Review medications for side effects contributing to fall risk. Reinforce activity limits and safety measures with patient and family. Provide visual clues: red socks.

## 2022-10-21 LAB
ALBUMIN SERPL ELPH-MCNC: 4 G/DL — SIGNIFICANT CHANGE UP (ref 3.3–5)
ALP SERPL-CCNC: 149 U/L — HIGH (ref 40–120)
ALT FLD-CCNC: 22 U/L — SIGNIFICANT CHANGE UP (ref 4–33)
ANION GAP SERPL CALC-SCNC: 11 MMOL/L — SIGNIFICANT CHANGE UP (ref 7–14)
AST SERPL-CCNC: 40 U/L — HIGH (ref 4–32)
BILIRUB SERPL-MCNC: 1.9 MG/DL — HIGH (ref 0.2–1.2)
BUN SERPL-MCNC: 16 MG/DL — SIGNIFICANT CHANGE UP (ref 7–23)
CALCIUM SERPL-MCNC: 9.4 MG/DL — SIGNIFICANT CHANGE UP (ref 8.4–10.5)
CHLORIDE SERPL-SCNC: 99 MMOL/L — SIGNIFICANT CHANGE UP (ref 98–107)
CO2 SERPL-SCNC: 26 MMOL/L — SIGNIFICANT CHANGE UP (ref 22–31)
CREAT SERPL-MCNC: 0.5 MG/DL — SIGNIFICANT CHANGE UP (ref 0.5–1.3)
EGFR: 87 ML/MIN/1.73M2 — SIGNIFICANT CHANGE UP
FOLATE SERPL-MCNC: 20 NG/ML — HIGH (ref 3.1–17.5)
GLUCOSE SERPL-MCNC: 99 MG/DL — SIGNIFICANT CHANGE UP (ref 70–99)
HCT VFR BLD CALC: 40.8 % — SIGNIFICANT CHANGE UP (ref 34.5–45)
HGB BLD-MCNC: 12.6 G/DL — SIGNIFICANT CHANGE UP (ref 11.5–15.5)
MAGNESIUM SERPL-MCNC: 2.1 MG/DL — SIGNIFICANT CHANGE UP (ref 1.6–2.6)
MCHC RBC-ENTMCNC: 30.9 GM/DL — LOW (ref 32–36)
MCHC RBC-ENTMCNC: 32.4 PG — SIGNIFICANT CHANGE UP (ref 27–34)
MCV RBC AUTO: 104.9 FL — HIGH (ref 80–100)
NRBC # BLD: 0 /100 WBCS — SIGNIFICANT CHANGE UP (ref 0–0)
NRBC # FLD: 0 K/UL — SIGNIFICANT CHANGE UP (ref 0–0)
PHOSPHATE SERPL-MCNC: 3.2 MG/DL — SIGNIFICANT CHANGE UP (ref 2.5–4.5)
PLATELET # BLD AUTO: 150 K/UL — SIGNIFICANT CHANGE UP (ref 150–400)
POTASSIUM SERPL-MCNC: 4 MMOL/L — SIGNIFICANT CHANGE UP (ref 3.5–5.3)
POTASSIUM SERPL-SCNC: 4 MMOL/L — SIGNIFICANT CHANGE UP (ref 3.5–5.3)
PROT SERPL-MCNC: 6.5 G/DL — SIGNIFICANT CHANGE UP (ref 6–8.3)
RBC # BLD: 3.89 M/UL — SIGNIFICANT CHANGE UP (ref 3.8–5.2)
RBC # FLD: 14.6 % — HIGH (ref 10.3–14.5)
SODIUM SERPL-SCNC: 136 MMOL/L — SIGNIFICANT CHANGE UP (ref 135–145)
TSH SERPL-MCNC: 0.94 UIU/ML — SIGNIFICANT CHANGE UP (ref 0.27–4.2)
VIT B12 SERPL-MCNC: 845 PG/ML — SIGNIFICANT CHANGE UP (ref 200–900)
WBC # BLD: 3.85 K/UL — SIGNIFICANT CHANGE UP (ref 3.8–10.5)
WBC # FLD AUTO: 3.85 K/UL — SIGNIFICANT CHANGE UP (ref 3.8–10.5)

## 2022-10-21 PROCEDURE — 93970 EXTREMITY STUDY: CPT | Mod: 26

## 2022-10-21 RX ADMIN — Medication 1 TABLET(S): at 17:44

## 2022-10-21 NOTE — PHYSICAL THERAPY INITIAL EVALUATION ADULT - PRECAUTIONS/LIMITATIONS, REHAB EVAL
Awaiting consult to determine if TLSO brace will be necessary for compression fractures/cardiac precautions/fall precautions

## 2022-10-22 LAB
CULTURE RESULTS: SIGNIFICANT CHANGE UP
SPECIMEN SOURCE: SIGNIFICANT CHANGE UP

## 2022-10-22 RX ADMIN — Medication 1 TABLET(S): at 11:19

## 2022-10-22 NOTE — DIETITIAN INITIAL EVALUATION ADULT - OTHER INFO
Pt 89 yo female with PMHx of HTN, HLD, schizophrenia presented from home after a fall - per chart review.     At time of visit, Pt awake, alert but somewhat disoriented. Pt forgetful reported. Pt with fair PO intake/appetite reported as well. Of note Pt passed Swallow Bedside Assessment Adult, SLP rec: Continue Easy to chew with thin liquids (10/22/22). No report of nausea, vomiting or diarrhea @ this time.     Per Pt her height: 65" & her weight: 120#; visually Pt appears shorter & her weight <120#. Nutrition focused physical exam conducted, Pt found with signs of severe subcutaneous fat loss: [x] Orbital fat pads region, [x] Buccal fat region, [x] Triceps region, [x] Ribs region & severe muscle wasting: [x] Temples region, [x] Clavicle region [x] Shoulder region, Calf [x]. Food preferences discussed. RDN offered variety of PO supplements, but Pt declined. Will send Magic cup - 2x daily. No other food related concerns voiced. Case discussed with nurse. RDN remains available, nurse made aware.

## 2022-10-22 NOTE — SWALLOW BEDSIDE ASSESSMENT ADULT - SWALLOW EVAL: DIAGNOSIS
1. Functional oral stage for puree and thin liquids marked by adequate oral acceptance, collection and transfer. 2. Mild oral dysphagia for regular solids marked by slow/delayed chewing and transfer with mild lingual residue post primary swallow with patient exhibiting lingual sweep to clear residue. 2. Functional pharyngeal phase for puree, regular solids and thin liquids marked by a present pharyngeal swallow trigger with hyolaryngeal elevation upon palpation without evidence of airway penetration/aspiration.

## 2022-10-22 NOTE — SWALLOW BEDSIDE ASSESSMENT ADULT - ASR SWALLOW RECOMMEND DIAG
Objective testing NOT warranted give no overt signs of penetration/aspiration and CT chest does not indicate pneumonia.

## 2022-10-22 NOTE — DIETITIAN INITIAL EVALUATION ADULT - ADD RECOMMEND
1. Encourage & assist Pt with meals; Monitor PO diet tolerance;   2. Honor food preferences; Add appetite stimulant to boost Pt's PO intake/appetite;    3. Will send Magic Cup daily (290kcal, 9gm pro) 2x daily;  4. Add Multivitamins 1 tab daily for micronutrient coverage;   5. Monitor labs, hydration status;

## 2022-10-22 NOTE — CHART NOTE - NSCHARTNOTEFT_GEN_A_CORE
CT chest with no evidence for acute rib fracture. Multiple chronic bilateral rib fractures. Compression fractures of nearly all thoracic and partially included lumbar vertebral bodies, some unchanged and others increased since 2019. Pt without back pain, no tenderness of exam. Spoke with Orthopedics, no acute intervention recommended - outpatient follow up with Dr. Hassan. May consider back brace if pt in pain.     ACP  [id number]

## 2022-10-22 NOTE — DIETITIAN INITIAL EVALUATION ADULT - PERTINENT LABORATORY DATA
10-21    136  |  99  |  16  ----------------------------<  99  4.0   |  26  |  0.50    Ca    9.4      21 Oct 2022 05:10  Phos  3.2     10-21  Mg     2.10     10-21    TPro  6.5  /  Alb  4.0  /  TBili  1.9<H>  /  DBili  x   /  AST  40<H>  /  ALT  22  /  AlkPhos  149<H>  10-21

## 2022-10-22 NOTE — SWALLOW BEDSIDE ASSESSMENT ADULT - COMMENTS
Internal Medicine 10/21 "89-year-old female with HTN, HLD, schizophrenia per EMR (patient denies medical history), presenting from home after a fall. Patient reports walking back from the bathroom, hitting head on bureau, denies LOC. I spoke with patient's sister, Radha Rouse via phone who stated she heard the fall and went to evaluate her sister.   I  was called and told patient to go to ED.  Patient is without complaint however reports vision has been "hazy" since arrival to ED. In regards to fall, patient denies LH/dizziness, chest pain, shortness of breath."    CT Chest 10/20 "Few bilateral sub-6 mm noncalcified lung nodules, some in a region obscured by atelectasis in the left lower lobe on prior, but otherwise stable. No pleural effusion."    Patient seen at bedside this AM for an initial assessment of the swallow function, at which time patient was alert. Patient is Hard of Hearing and required repetition of directives at times. Patient is able to follow simple directives and verbalizes wants/needs. Patient denies pain or difficulty with swallow.

## 2022-10-22 NOTE — PATIENT PROFILE ADULT - FALL HARM RISK - RISK INTERVENTIONS
Assistance OOB with selected safe patient handling equipment/Assistance with ambulation/Communicate Fall Risk and Risk Factors to all staff, patient, and family/Monitor for mental status changes/Monitor gait and stability/Reinforce activity limits and safety measures with patient and family/Visual Cue: Yellow wristband/Bed in lowest position, wheels locked, appropriate side rails in place/Call bell, personal items and telephone in reach/Instruct patient to call for assistance before getting out of bed or chair/Non-slip footwear when patient is out of bed/[place] to call system/Physically safe environment - no spills, clutter or unnecessary equipment/Purposeful Proactive Rounding/Room/bathroom lighting operational, light cord in reach

## 2022-10-22 NOTE — DIETITIAN INITIAL EVALUATION ADULT - PERTINENT MEDS FT
MEDICATIONS  (STANDING):  calcium carbonate 1250 mG  + Vitamin D (OsCal 500 + D) 1 Tablet(s) Oral daily    MEDICATIONS  (PRN):  acetaminophen     Tablet .. 650 milliGRAM(s) Oral every 6 hours PRN Mild Pain (1 - 3), Moderate Pain (4 - 6)

## 2022-10-22 NOTE — SWALLOW BEDSIDE ASSESSMENT ADULT - ADDITIONAL RECOMMENDATIONS
1. This department to follow up as schedule permits to assess for diet tolerance and/or need for objective testing. 2. Medical team advised to reconsult this department if change in medical status and/or patients tolerance to recommended PO diet.

## 2022-10-23 RX ADMIN — Medication 1 TABLET(S): at 11:49

## 2022-10-23 NOTE — PROGRESS NOTE ADULT - SUBJECTIVE AND OBJECTIVE BOX
CHIEF COMPLAINT: Patient awake and verbal seems to be less confused     SUBJECTIVE:     REVIEW OF SYSTEMS:    CONSTITUTIONAL: ( x )  weakness,  (  ) fevers or chills  EYES/ENT: (x  )visual changes;     NECK: (  ) pain or stiffness  RESPIRATORY:   (  )cough, wheezing, hemoptysis;  (  ) shortness of breath  CARDIOVASCULAR:  (  )chest pain or palpitations  GASTROINTESTINAL:   (  )abdominal or epigastric pain.  (  ) nausea, vomiting, or hematemesis;   (   ) diarrhea or constipation.   GENITOURINARY:   (    ) dysuria, frequency or hematuria  NEUROLOGICAL:  (   ) numbness or weakness   All other review of systems is negative unless indicated above    Vital Signs Last 24 Hrs  T(C): 36.7 (23 Oct 2022 05:40), Max: 37 (22 Oct 2022 18:11)  T(F): 98.1 (23 Oct 2022 05:40), Max: 98.6 (22 Oct 2022 18:11)  HR: 95 (23 Oct 2022 05:40) (81 - 100)  BP: 136/86 (23 Oct 2022 05:40) (115/70 - 136/86)  BP(mean): --  RR: 16 (23 Oct 2022 05:40) (16 - 18)  SpO2: 95% (23 Oct 2022 05:40) (95% - 98%)    Parameters below as of 23 Oct 2022 05:40  Patient On (Oxygen Delivery Method): room air        I&O's Summary      CAPILLARY BLOOD GLUCOSE          PHYSICAL EXAM:    Constitutional:  (  x ) NAD,   (   )awake and alert  HEENT: ecchymotic area on the right eye globe which is decreasing  Neck: Soft and supple, No LAD, No JVD  Respiratory:  (   x Breath sounds are clear bilaterally,    (   ) wheezing, rales or rhonchi  Cardiovascular:     (  x )S1 and S2, regular rate    Gastrointestinal:  (x   )Bowel Sounds present, soft,   (  )nontender, nondistended,    Extremities:    (  ) peripheral edema  Vascular: 2+ peripheral pulses  Neurological:    (   x )A/O x  1,   (  ) focal deficits  Musculoskeletal:    ( x  )  normal strength b/l upper  (     ) normal  lower extremities  Skin: No rashes    MEDICATIONS:  MEDICATIONS  (STANDING):  calcium carbonate 1250 mG  + Vitamin D (OsCal 500 + D) 1 Tablet(s) Oral daily      LABS: All Labs Reviewed:                Blood Culture: 10-20 @ 20:25  Organism --  Gram Stain Blood -- Gram Stain --  Specimen Source Clean Catch Clean Catch (Midstream)  Culture-Blood --      Urine Culture      RADIOLOGY/EKG:  CT chest with no evidence for acute rib fracture. Multiple chronic bilateral rib fractures. Compression fractures of nearly all thoracic and partially included lumbar vertebral bodies, some unchanged and others increased since 2019.      ASSESSMENT AND PLAN:  89-year-old female with HTN, HLD, schizophrenia per EMR (patient denies medical history), presenting from home after a fall.    Problem/Plan - 1:  ·  Problem: Compression fracture of thoracic vertebra. /laceration right facial area  ·  Plan: no TTP of spine  would consult NSx in AM for brace given extent of compression fractures  -10/22/2022  Pt without back pain, no tenderness of exam. Spoke with Orthopedics, no acute intervention recommended - outpatient follow up with Dr. Hassan. May consider back brace if pt in pain.          Problem/Plan - 2:  ·  Problem: Degenerative disc disease, cervical.   ·  Plan: appears to be asymptomatic, f/u with NSx.    Problem/Plan - 3:  ·  Problem: Macrocytosis without anemia.   ·  Plan: check TSH, B12, folate with AM labs.    Problem/Plan - 4:  ·  Problem: Pulmonary nodules.   ·  Plan: reportedly stable  outpt f/u with PMD.    Problem/Plan - 5:  ·  Problem: Essential hypertension.   ·  Plan: c/w metoprolol pending orthostatic BP.    Problem/Plan - 6:  ·  Problem: Hyperlipidemia.   ·  Plan: statin on hold for now given transaminitis   only takes statin on Mondays and Friday.    Problem/Plan - 7:  ·  Problem: Schizophrenia.   ·  Plan: per history   per sister, diagnosed with emotional problem in the past.    Problem/Plan - 8:  ·  Problem: Lower extremity edema.   ·  Plan: check LE dopplers to r/o DVT given reportedly history of being on a/c.  -10/21/2022 bilateral lower acuity Doppler was done and results pending    Problem/Plan - 9:  ·  Problem: Transaminitis.   ·  Plan: monitor/trend   unclear chronicity,      Problem/Plan - 10:  ·  Problem: Need for prophylactic measure.   ·  Plan; #HSQ for DVT ppx in AM    #ascending aorta dilated <5.5cm on CT, outpt f/u with PMD    #hazy vision   -per sister, this is chronic, going on for at least 6 months  -history of eye surgery in past, reportedly patient doesn't want to use corrective lenses, uses a magnifying glass to read    #esophageal wall thickening   -dysphagia screen pending, aspiration precautions, dysphagia diet pending dysphagia screen, S&S eval  -reportedly eats regular diet at home   .    DVT PPX:    ADVANCED DIRECTIVE:    DISPOSITION:
CHIEF COMPLAINT: patient known to me from house call she is awake but confused at present time  89-year-old female with HTN, HLD, schizophrenia per EMR (patient denies medical history), presenting from home after a fall. Patient reports walking back from the bathroom, hitting head on bureau, denies LOC. I spoke with patient's sister, Radha Rouse via phone who stated she heard the fall and went to evaluate her sister.   I  was called and told patient to go to ED.  Patient is without complaint however reports vision has been "hazy" since arrival to ED. In regards to fall, patient denies LH/dizziness, chest pain, shortness of breath.    In the ED VS: 97.9  89-99  148-167/  18  %RA, received Tdap vaccination      SUBJECTIVE:     REVIEW OF SYSTEMS:  EYES/ENT: (+) "hazy" vision;low    CONSTITUTIONAL: (  )  weakness,  (  ) fevers or chills  EYES/ENT: (  )visual changes;     NECK: (  ) pain or stiffness  RESPIRATORY:   (  )cough, wheezing, hemoptysis;  (  ) shortness of breath  CARDIOVASCULAR:  (  )chest pain or palpitations  GASTROINTESTINAL:   (  )abdominal or epigastric pain.  (  ) nausea, vomiting, or hematemesis;   (   ) diarrhea or constipation.   GENITOURINARY:   (    ) dysuria, frequency or hematuria  NEUROLOGICAL:  (   ) numbness or weakness   All other review of systems is negative unless indicated above    Vital Signs Last 24 Hrs  T(C): 36.6 (21 Oct 2022 00:36), Max: 36.6 (20 Oct 2022 14:59)  T(F): 97.8 (21 Oct 2022 00:36), Max: 97.9 (20 Oct 2022 14:59)  HR: 85 (21 Oct 2022 00:36) (84 - 99)  BP: 148/87 (21 Oct 2022 00:36) (142/94 - 167/105)  BP(mean): --  RR: 16 (21 Oct 2022 00:36) (16 - 18)  SpO2: 97% (21 Oct 2022 00:36) (95% - 100%)    Parameters below as of 21 Oct 2022 00:36  Patient On (Oxygen Delivery Method): room air        I&O's Summary      CAPILLARY BLOOD GLUCOSE          PHYSICAL EXAM:    Constitutional:  ( x  ) NAD,   (  x )awake   HEENT: right-sided facial laceration in the forehead with sutures ecchymotic area of the right eye globe area    Neck: Soft and supple, No LAD, No JVD  Respiratory:  (   x Breath sounds are clear bilaterally,    (   ) wheezing, rales or rhonchi  Cardiovascular:     ( x  )S1 and S2, regular rate and rhythm, no Murmurs, gallops or rubs  Gastrointestinal:  (x   )Bowel Sounds present, soft,   (  )nontender, nondistended,    Extremities:    (  ) peripheral edema  Vascular: 2+ peripheral pulses  Neurological:    (  x  )A/O x  ?,   (  ) focal deficits  Musculoskeletal:    (   )  normal strength b/l upper  (     ) normal  lower extremities  Skin: No rashes    MEDICATIONS:  MEDICATIONS  (STANDING):  calcium carbonate 1250 mG  + Vitamin D (OsCal 500 + D) 1 Tablet(s) Oral daily      LABS: All Labs Reviewed:                        12.6   3.85  )-----------( 150      ( 21 Oct 2022 05:10 )             40.8     10-21    136  |  99  |  16  ----------------------------<  99  4.0   |  26  |  0.50    Ca    9.4      21 Oct 2022 05:10  Phos  3.2     10-21  Mg     2.10     10-21    TPro  6.5  /  Alb  4.0  /  TBili  1.9<H>  /  DBili  x   /  AST  40<H>  /  ALT  22  /  AlkPhos  149<H>  10-21          Blood Culture:   Urine Culture      RADIOLOGY/EKG:    ASSESSMENT AND PLAN:  89-year-old female with HTN, HLD, schizophrenia per EMR (patient denies medical history), presenting from home after a fall.    Problem/Plan - 1:  ·  Problem: Compression fracture of thoracic vertebra. /laceration right facial area  ·  Plan: no TTP of spine  would consult NSx in AM for brace given extent of compression fractures    given fall, EKG ordered/pending.    Problem/Plan - 2:  ·  Problem: Degenerative disc disease, cervical.   ·  Plan: appears to be asymptomatic, f/u with NSx.    Problem/Plan - 3:  ·  Problem: Macrocytosis without anemia.   ·  Plan: check TSH, B12, folate with AM labs.    Problem/Plan - 4:  ·  Problem: Pulmonary nodules.   ·  Plan: reportedly stable  outpt f/u with PMD.    Problem/Plan - 5:  ·  Problem: Essential hypertension.   ·  Plan: c/w metoprolol pending orthostatic BP.    Problem/Plan - 6:  ·  Problem: Hyperlipidemia.   ·  Plan: statin on hold for now given transaminitis   only takes statin on Mondays and Friday.    Problem/Plan - 7:  ·  Problem: Schizophrenia.   ·  Plan: per history   per sister, diagnosed with emotional problem in the past.    Problem/Plan - 8:  ·  Problem: Lower extremity edema.   ·  Plan: check LE dopplers to r/o DVT given reportedly history of being on a/c.  -10/21/2022 bilateral lower acuity Doppler was done and results pending    Problem/Plan - 9:  ·  Problem: Transaminitis.   ·  Plan: monitor/trend   unclear chronicity,      Problem/Plan - 10:  ·  Problem: Need for prophylactic measure.   ·  Plan; #HSQ for DVT ppx in AM    #ascending aorta dilated <5.5cm on CT, outpt f/u with PMD    #hazy vision   -per sister, this is chronic, going on for at least 6 months  -history of eye surgery in past, reportedly patient doesn't want to use corrective lenses, uses a magnifying glass to read    #esophageal wall thickening   -dysphagia screen pending, aspiration precautions, dysphagia diet pending dysphagia screen, S&S eval  -reportedly eats regular diet at home   .      DVT PPX:    ADVANCED DIRECTIVE:    DISPOSITION:
CHIEF COMPLAINT:    SUBJECTIVE:     REVIEW OF SYSTEMS:    CONSTITUTIONAL: (  )  weakness,  (  ) fevers or chills  EYES/ENT: (  )visual changes;     NECK: (  ) pain or stiffness  RESPIRATORY:   (  )cough, wheezing, hemoptysis;  (  ) shortness of breath  CARDIOVASCULAR:  (  )chest pain or palpitations  GASTROINTESTINAL:   (  )abdominal or epigastric pain.  (  ) nausea, vomiting, or hematemesis;   (   ) diarrhea or constipation.   GENITOURINARY:   (    ) dysuria, frequency or hematuria  NEUROLOGICAL:  (   ) numbness or weakness   All other review of systems is negative unless indicated above    Vital Signs Last 24 Hrs  T(C): 36.6 (21 Oct 2022 00:36), Max: 36.6 (20 Oct 2022 14:59)  T(F): 97.8 (21 Oct 2022 00:36), Max: 97.9 (20 Oct 2022 14:59)  HR: 85 (21 Oct 2022 00:36) (84 - 99)  BP: 148/87 (21 Oct 2022 00:36) (142/94 - 167/105)  BP(mean): --  RR: 16 (21 Oct 2022 00:36) (16 - 18)  SpO2: 97% (21 Oct 2022 00:36) (95% - 100%)    Parameters below as of 21 Oct 2022 00:36  Patient On (Oxygen Delivery Method): room air        I&O's Summary      CAPILLARY BLOOD GLUCOSE          PHYSICAL EXAM:    Constitutional:  (   ) NAD,   (   )awake and alert  HEENT: PERR, EOMI,    Neck: Soft and supple, No LAD, No JVD  Respiratory:  (    Breath sounds are clear bilaterally,    (   ) wheezing, rales or rhonchi  Cardiovascular:     (   )S1 and S2, regular rate and rhythm, no Murmurs, gallops or rubs  Gastrointestinal:  (   )Bowel Sounds present, soft,   (  )nontender, nondistended,    Extremities:    (  ) peripheral edema  Vascular: 2+ peripheral pulses  Neurological:    (    )A/O x 3,   (  ) focal deficits  Musculoskeletal:    (   )  normal strength b/l upper  (     ) normal  lower extremities  Skin: No rashes    MEDICATIONS:  MEDICATIONS  (STANDING):  calcium carbonate 1250 mG  + Vitamin D (OsCal 500 + D) 1 Tablet(s) Oral daily      LABS: All Labs Reviewed:                        12.6   3.85  )-----------( 150      ( 21 Oct 2022 05:10 )             40.8     10-21    136  |  99  |  16  ----------------------------<  99  4.0   |  26  |  0.50    Ca    9.4      21 Oct 2022 05:10  Phos  3.2     10-21  Mg     2.10     10-21    TPro  6.5  /  Alb  4.0  /  TBili  1.9<H>  /  DBili  x   /  AST  40<H>  /  ALT  22  /  AlkPhos  149<H>  10-21          Blood Culture:   Urine Culture      RADIOLOGY/EKG:    ASSESSMENT AND PLAN:    DVT PPX:    ADVANCED DIRECTIVE:    DISPOSITION:
CHIEF COMPLAINT: patient laying in the bed awake looking for her bell  and does not like the bed and etc.     89-year-old female with HTN, HLD, schizophrenia per EMR (patient denies medical history), presenting from home after a fall. Patient reports walking back from the bathroom, hitting head on bureau, denies LOC. I spoke with patient's sister, Radha Rouse via phone who stated she heard the fall and went to evaluate her sister.   I  was called and told patient to go to ED.  Patient is without complaint however reports vision has been "hazy" since arrival to ED. In regards to fall, patient denies LH/dizziness, chest pain, shortness of breath.    In the ED VS: 97.9  89-99  148-167/  18  %RA, received Tdap vaccination    SUBJECTIVE:     REVIEW OF SYSTEMS: awake and verbal    CONSTITUTIONAL: (  )  weakness,  (  ) fevers or chills  EYES/ENT: (  )visual changes;     NECK: (  ) pain or stiffness  RESPIRATORY:   (  )cough, wheezing, hemoptysis;  (  ) shortness of breath  CARDIOVASCULAR:  (  )chest pain or palpitations  GASTROINTESTINAL:   (  )abdominal or epigastric pain.  (  ) nausea, vomiting, or hematemesis;   (   ) diarrhea or constipation.   GENITOURINARY:   (    ) dysuria, frequency or hematuria  NEUROLOGICAL:  (   ) numbness or weakness   All other review of systems is negative unless indicated above    Vital Signs Last 24 Hrs  T(C): 37 (22 Oct 2022 18:11), Max: 37.1 (22 Oct 2022 11:56)  T(F): 98.6 (22 Oct 2022 18:11), Max: 98.8 (22 Oct 2022 11:56)  HR: 81 (22 Oct 2022 18:11) (81 - 95)  BP: 115/70 (22 Oct 2022 18:11) (115/70 - 158/86)  BP(mean): --  RR: 18 (22 Oct 2022 18:11) (16 - 99)  SpO2: 98% (22 Oct 2022 18:11) (98% - 100%)    Parameters below as of 22 Oct 2022 18:11  Patient On (Oxygen Delivery Method): room air        I&O's Summary      CAPILLARY BLOOD GLUCOSE          PHYSICAL EXAM:    Constitutional:  ( x  ) NAD,   ( x  )awake  confused at the baseline  HEENT: echyemotic area to the right eye globe with sutures    Neck: Soft and supple, No LAD, No JVD  Respiratory:  (   x Breath sounds are clear bilaterally,    (   ) wheezing, rales or rhonchi  Cardiovascular:     ( x  s1  and S2, regular rate and rhythm, no Murmurs, gallops or rubs  Gastrointestinal:  ( x  )Bowel Sounds present, soft,   (  )nontender, nondistended,    Extremities:    (  ) peripheral edema  Vascular: 2+ peripheral pulses  Neurological:    (  x  )A/O x 1   (  ) focal deficits  Musculoskeletal:    ( x  )  normal strength b/l upper  (     ) normal  lower extremities  Skin: No rashes    MEDICATIONS:  MEDICATIONS  (STANDING):  calcium carbonate 1250 mG  + Vitamin D (OsCal 500 + D) 1 Tablet(s) Oral daily      LABS: All Labs Reviewed:                        12.6   3.85  )-----------( 150      ( 21 Oct 2022 05:10 )             40.8     10-21    136  |  99  |  16  ----------------------------<  99  4.0   |  26  |  0.50    Ca    9.4      21 Oct 2022 05:10  Phos  3.2     10-21  Mg     2.10     10-21    TPro  6.5  /  Alb  4.0  /  TBili  1.9<H>  /  DBili  x   /  AST  40<H>  /  ALT  22  /  AlkPhos  149<H>  10-21          Blood Culture: 10-20 @ 20:25  Organism --  Gram Stain Blood -- Gram Stain --  Specimen Source Clean Catch Clean Catch (Midstream)  Culture-Blood --      Urine Culture      RADIOLOGY/EKG:    ASSESSMENT AND PLAN:  89-year-old female with HTN, HLD, schizophrenia per EMR (patient denies medical history), presenting from home after a fall.    Problem/Plan - 1:  ·  Problem: Compression fracture of thoracic vertebra. /laceration right facial area  ·  Plan: no TTP of spine  would consult NSx in AM for brace given extent of compression fractures    given fall, EKG ordered/pending.    Problem/Plan - 2:  ·  Problem: Degenerative disc disease, cervical.   ·  Plan: appears to be asymptomatic, f/u with NSx.    Problem/Plan - 3:  ·  Problem: Macrocytosis without anemia.   ·  Plan: check TSH, B12, folate with AM labs.    Problem/Plan - 4:  ·  Problem: Pulmonary nodules.   ·  Plan: reportedly stable  outpt f/u with PMD.    Problem/Plan - 5:  ·  Problem: Essential hypertension.   ·  Plan: c/w metoprolol pending orthostatic BP.    Problem/Plan - 6:  ·  Problem: Hyperlipidemia.   ·  Plan: statin on hold for now given transaminitis   only takes statin on Mondays and Friday.    Problem/Plan - 7:  ·  Problem: Schizophrenia.   ·  Plan: per history   per sister, diagnosed with emotional problem in the past.    Problem/Plan - 8:  ·  Problem: Lower extremity edema.   ·  Plan: check LE dopplers to r/o DVT given reportedly history of being on a/c.  -10/21/2022 bilateral lower acuity Doppler was done and results pending    Problem/Plan - 9:  ·  Problem: Transaminitis.   ·  Plan: monitor/trend   unclear chronicity,      Problem/Plan - 10:  ·  Problem: Need for prophylactic measure.   ·  Plan; #HSQ for DVT ppx in AM    #ascending aorta dilated <5.5cm on CT, outpt f/u with PMD    #hazy vision   -per sister, this is chronic, going on for at least 6 months  -history of eye surgery in past, reportedly patient doesn't want to use corrective lenses, uses a magnifying glass to read    #esophageal wall thickening   -dysphagia screen pending, aspiration precautions, dysphagia diet pending dysphagia screen, S&S eval  -reportedly eats regular diet at home   .      DVT PPX:    ADVANCED DIRECTIVE:    DISPOSITION:

## 2022-10-23 NOTE — PROGRESS NOTE ADULT - NUTRITIONAL ASSESSMENT
This patient has been assessed with a concern for Malnutrition and has been determined to have a diagnosis/diagnoses of Severe protein-calorie malnutrition.    This patient is being managed with:   Diet Easy to Chew-  Entered: Oct 21 2022  9:04AM    
This patient has been assessed with a concern for Malnutrition and has been determined to have a diagnosis/diagnoses of Severe protein-calorie malnutrition.    This patient is being managed with:   Diet Easy to Chew-  Entered: Oct 21 2022  9:04AM

## 2022-10-24 ENCOUNTER — TRANSCRIPTION ENCOUNTER (OUTPATIENT)
Age: 87
End: 2022-10-24

## 2022-10-24 VITALS
TEMPERATURE: 98 F | DIASTOLIC BLOOD PRESSURE: 82 MMHG | SYSTOLIC BLOOD PRESSURE: 124 MMHG | RESPIRATION RATE: 18 BRPM | HEART RATE: 96 BPM | OXYGEN SATURATION: 98 %

## 2022-10-24 LAB — SARS-COV-2 RNA SPEC QL NAA+PROBE: SIGNIFICANT CHANGE UP

## 2022-10-24 RX ADMIN — Medication 1 TABLET(S): at 10:58

## 2022-10-24 NOTE — DISCHARGE NOTE NURSING/CASE MANAGEMENT/SOCIAL WORK - NSDCVIVACCINE_GEN_ALL_CORE_FT
Tdap; 04-Nov-2019 00:15; Rigoberto Brennan (RN); [employer]; [id number] (Exp. Date: 22-Nov-2021); IntraMuscular; Deltoid Left.; 0.5 milliLiter(s); VIS (VIS Published: 09-May-2013, VIS Presented: 04-Nov-2019);   Tdap; 20-Oct-2022 17:46; Jhoana Vizcarra (EDDIE); [employer]; [id number] (Exp. Date: 08-Dec-2024); IntraMuscular; Deltoid Right.; 0.5 milliLiter(s); VIS (VIS Published: 09-May-2013, VIS Presented: 20-Oct-2022);

## 2022-10-24 NOTE — DISCHARGE NOTE PROVIDER - NSDCCPCAREPLAN_GEN_ALL_CORE_FT
PRINCIPAL DISCHARGE DIAGNOSIS  Diagnosis: Weakness  Assessment and Plan of Treatment: You were admitted for fall. You had a laceration to right facial area, which was sutured in emergency department. Sutures to be removed in 3-5 days. No tenderness to palpation to spine. CT head was negative. CT chest showed chronic compression fractures to ribs and thoracic vertebrae, unchanged from 2019. Orthopedics were curbsided, who recommended no acute intervention and follow up with Dr. Hassan outpatient. No brace needed as pt not in any pain. Please follow up with your primary care physician after discharge for continued monitoring and management.

## 2022-10-24 NOTE — DISCHARGE NOTE PROVIDER - DETAILS OF MALNUTRITION DIAGNOSIS/DIAGNOSES
This patient has been assessed with a concern for Malnutrition and was treated during this hospitalization for the following Nutrition diagnosis/diagnoses:     -  10/22/2022: Severe protein-calorie malnutrition

## 2022-10-24 NOTE — DISCHARGE NOTE PROVIDER - CARE PROVIDER_API CALL
Josh Hassan)  Orthopaedic Surgery  [address], [address]  [place] [address]  Phone: [phone]  Fax: [phone]  Follow Up Time:     Jolanta Mitchell)  [facility]  [address] [place] [address]  Phone: [phone]  Fax: [phone]  Follow Up Time:

## 2022-10-24 NOTE — DISCHARGE NOTE PROVIDER - HOSPITAL COURSE
89-year-old female with HTN, HLD, schizophrenia per EMR (patient denies medical history), presenting from home after a fall. Patient reports walking back from the bathroom, hitting head on bureau, denies LOC. I spoke with patient's sister, Radha Rouse via phone who stated she heard the fall and went to evaluate her sister.  PMD was called and told patient to go to ED.  Patient is without complaint however reports vision has been "hazy" since arrival to ED. In regards to fall, patient denies LH/dizziness, chest pain, shortness of breath. In the ED VS: 97.9  89-99  148-167/  18  %RA, received Tdap vaccination.    Patient was admitted for fall. Patient had laceration to right facial area, which was sutured in ED. Sutures to be removed in 3-5 days. No TTP to spine. CT head was negative. CT chest showed chronic compression fractures to ribs and thoracic vertebrae, unchanged from 2019. Orthopedics were curbsided, who recommended no acute intervention and follow up with Dr. Hassan outpatient. No brace needed as pt not in any pain. Patient was recommended for rehab by PT.     On 10/24/22 this case was reviewed with Dr. Mitchell, the patient is medically stable and optimized for discharge. All medications were reviewed and prescriptions were sent to mutually agreed upon pharmacy.            .   89-year-old female with HTN, HLD, schizophrenia per EMR (patient denies medical history), presenting from home after a fall. Patient reports walking back from the bathroom, hitting head on bureau, denies LOC. I spoke with patient's sister, Radha Rouse via phone who stated she heard the fall and went to evaluate her sister.  PMD was called and told patient to go to ED.  Patient is without complaint however reports vision has been "hazy" since arrival to ED. In regards to fall, patient denies LH/dizziness, chest pain, shortness of breath. In the ED VS: 97.9  89-99  148-167/  18  %RA, received Tdap vaccination.    Patient was admitted for fall. Patient had laceration to right facial area, which was sutured in ED. Sutures to be removed in 3-5 days. No TTP to spine. CT head was negative. CT chest showed chronic compression fractures to ribs and thoracic vertebrae, unchanged from 2019. Orthopedics were curbsided, who recommended no acute intervention and follow up with Dr. Hassan outpatient. No brace needed as pt not in any pain. Patient was recommended for rehab by PT.     On 10/24/22 this case was reviewed with Dr. Mitchell, the patient is medically stable and optimized for discharge. All medications were reviewed and prescriptions were sent to mutually agreed upon pharmacy.   medical attending  As above patient was seen this morning still with constipation her medical case discussed with her sister at home at [phone] that patient needs rehab family agree discuss with case management to be transferred for short-term rehab due to unsteady gait and risk of fall           .

## 2022-10-24 NOTE — DISCHARGE NOTE NURSING/CASE MANAGEMENT/SOCIAL WORK - PATIENT PORTAL LINK FT
You can access the FollowMyHealth Patient Portal offered by [facility] by registering at the following website: [email or web address]. By joining [employer]’s FollowMyHealth portal, you will also be able to view your health information using other applications (apps) compatible with our system.

## 2022-10-24 NOTE — DISCHARGE NOTE PROVIDER - NSDCMRMEDTOKEN_GEN_ALL_CORE_FT
acetaminophen 325 mg oral tablet: 2 tab(s) orally every 6 hours, As needed, Mild Pain (1 - 3)  calcium-vitamin D 500 mg-200 intl units oral tablet: 1 tab(s) orally once a day  Centrum oral tablet: 1 tab(s) orally once a day  metoprolol tartrate 25 mg oral tablet: 1 tab(s) orally 2 times a day  simvastatin 20 mg oral tablet: 1 tab(s) orally once a day (at bedtime) on Monday and Friday

## 2022-10-31 ENCOUNTER — APPOINTMENT (OUTPATIENT)
Dept: ORTHOPEDIC SURGERY | Facility: CLINIC | Age: 87
End: 2022-10-31

## 2022-10-31 VITALS
DIASTOLIC BLOOD PRESSURE: 78 MMHG | OXYGEN SATURATION: 79 % | BODY MASS INDEX: 20.83 KG/M2 | HEIGHT: 55 IN | HEART RATE: 59 BPM | WEIGHT: 90 LBS | SYSTOLIC BLOOD PRESSURE: 119 MMHG

## 2022-10-31 DIAGNOSIS — M47.812 SPONDYLOSIS W/OUT MYELOPATHY OR RADICULOPATHY, CERVICAL REGION: ICD-10-CM

## 2022-10-31 PROCEDURE — 99203 OFFICE O/P NEW LOW 30 MIN: CPT

## 2022-10-31 NOTE — DISCUSSION/SUMMARY
[de-identified] : We reviewed her imaging and discussed further treatment options.  This time she wished to continue with conservative measures.  She will let us know of any changes or worsening of her symptoms.

## 2022-10-31 NOTE — REASON FOR VISIT
[Initial Visit] : an initial visit for [Back Pain] : back pain [Formal Caregiver] : formal caregiver

## 2022-10-31 NOTE — PHYSICAL EXAM
[Wheelchair] : uses a wheelchair [de-identified] : Examination of the cervical spine reveals no midline or paraspinal tenderness to palpation. No cervical lymphadenopathy. Decreased range of motion with respect to flexion, extension, rotation, and lateral bending. Negative Spurlings. Negative Lhermitte's. Full range of motion bilateral shoulders without evidence of impingement. No instability of bilateral upper extremities.  Cranial nerves II through XII grossly intact. Intact sensation bilateral upper extremities. 5/5 deltoids biceps triceps wrist extensors wrist flexors finger flexors and hand intrinsics. 1+ biceps triceps and brachioradialis reflexes. Negative Londono's. 2+ radial pulse. Negative Tinel's over the cubital and carpal tunnel. No skin lesions on the right and left upper extremities. [de-identified] : ACC: [id number] EXAM: CT CERVICAL SPINE\par ACC: [id number] EXAM: CT BRAIN\par \par PROCEDURE DATE: 10/20/2022\par \par INTERPRETATION: Two examinations were performed in this patient:\par 1. CT head without IV contrast\par 2. CT cervical spine without IV contrast\par \par CLINICAL INFORMATION: FALL INTRACRANIAL HEMORRHAGE. INJURY. TRAUMA. VERTEBRAL FRACTURE\par \par ENCOUNTER / stage of care for trauma patient: Initial\par \par TECHNIQUE:\par Head: Contiguous axial 3 mm thick images were acquired. This data set was reconstructed in the sagittal and coronal planes. Contrast was not administered for this examination. Note: Artifact from gross patient motion causes image blurring and limits evaluation. Images were repeated because of patient motion.\par Cervical spine: Contiguous axial 1 mm sections were obtained through the cervical spine using a single helical acquisition. This data set was reconstructed in the sagittal and coronal planes.\par \par DOSE INFORMATION: The CT scans were performed using automatic exposure control (radiation dose reduction software) to obtain a diagnostic image quality scan with patient dose as low as reasonably achievable. Total DLP for this evaluation in aggregate is estimated at mGy*cm.\par \par COMPARISON: CT head and chest 2019 available for review.\par \par FINDINGS:\par \par 1. CT head without IV contrast\par \par BRAIN: The brain demonstrates mild largely symmetric indistinct abnormal diminished attenuation within the deep cerebral hemispheric white matter. This finding is most evident in the periatrial and periventricular white matter. No cerebral cortical lesion is recognized. Cerebral cortical gray-white matter differentiation is maintained. No acute cerebral cortical infarct is seen. No intracranial hemorrhage is found. No mass effect is found in the brain.\par \par CSF SPACES: The ventricles, sulci and basal cisterns appear moderately dilated reflecting diffuse brain volume loss. No differential cerebral cortical atrophy is recognized. Asymmetric large right forebrain Virchow-Baldev space is noted.\par \par VESSELS: Intracranial vasculature is also significant for atherosclerotic calcifications within the clinoid segments of the internal carotid arteries. There is also mild involvement of each vertebral artery. The left vertebral artery is dominant caliber. The vertebrobasilar circulation is tortuous.\par \par HEAD AND NECK STRUCTURES: The orbits are unremarkable. The included paranasal sinuses are clear. The nasal septum and a shallow deviation left to right. The nasopharynx is symmetric. The central skull base is intact. The temporal bones demonstrate patent petrous air cells. The calvarium appears unremarkable.\par \par 2. CT cervical spine without IV contrast\par \par Cervical vertebral alignment demonstrates slight retrolisthesis C3 on C4 and slight anterolisthesis C4 on C5. Facet joints appear aligned. Cervical vertebral body heights are maintained. No vertebral fracture is seen. No destructive bone lesion is found. There are osteoarthritic changes at the articulation of the anterior ring of C1 and the odontoid process of C2. This arthropathy includes marginal osteophytes, subchondral sclerosis and joint space narrowing.\par \par Cervical intervertebral disc spaces demonstrate a generalized pattern of advanced disc degeneration characterized by disc height loss subchondral sclerosis marginal osteophytes and endplate irregularity. Central canal compromise resulting from this disc pathology includes C3-C4 left central disc protrusion and osteophytes are likely contacted and may deform the ventral cord surface. This somewhat lesser involvement at C5-C6 and C6-C7. Throughout the cervical spine no high-grade central canal compromise is recognized by the CT technique. Neural foramina appear narrowed by disc material and uncovertebral joint osteophytes. This is widespread and advanced throughout the mid and lower cervical intervertebral disc levels..\par \par The skull base appears intact. No neck mass is recognized. Paraspinal soft tissues appear intact. Visualized lymph nodes appear to be within physiologic size limits. The lung apices demonstrate a 1.4 cm spiculated pulmonary nodule thickenings both coarse calcifications and what appears to be fat density.\par \par IMPRESSION:\par \par 1. CT HEAD: Axial splenic calcifications are noted at each carotid bulb. Ischemic white matter disease lower range typical for age. Diffuse brain volume loss typical for age. Intracranial atherosclerosis\par \par 2. CT CERVICAL: No cervical vertebral fracture. Slight retrolisthesis C3 on C4 and anterolisthesis C4 and C5 are likely degenerative. Mild to moderate widespread cervical degenerative disc disease\par \par 3. Patient motion limited scan\par \par 4. Right apical pulmonary nodule, unchanged from 2019, likely hamartoma\par \par  TECHNIQUE: Axial noncontrast CT images of the head and cervical spine were obtained. Sagittal and coronal reformatted images were provided. Bone and soft tissue windows were evaluated.\par \par DARRELL HUERTA MD; Resident Radiologist\par This document has been electronically signed.\par PRAMOD LUTHER MD; Attending Radiologist\par This document has been electronically signed. Oct 20 2022 8:30PM

## 2022-10-31 NOTE — HISTORY OF PRESENT ILLNESS
[de-identified] : Ms. ROBBIE PEÑA is a 89 year female who presents to office complaining of neck pain s/p fall at home on 10/24/22.  She had a CT scan performed in the hospital.  Denies any associated activities that worsen her pain. Denies numbness, tingling, or weakness. She has been at at nursing home since her fall and is only being given Tylenol for the pain, which hasn't seemed to help.\par \par All review of systems, family history, social history, surgical history, past medical history, medications, and allergies not previously stated as positive are negative. They were reviewed by me today with the patient and documented accordingly.

## 2022-11-04 NOTE — PATIENT PROFILE ADULT - PRIMARY ROLES/RESPONSIBILITIES
-Palliative consult received. Patient well known to Palliative. See all ACP notes  -Chart reviewed  -At time of consult patient in bed sitting up after recently changing oxygen mask out to [facility] because he doesn't like it. He is able to maintain sats so far 90 - 92 which is good for COPD. He denies feeling SOB.  -He does seem a forgetful and delayed in his responses but likely due to hypoxia.    Patient states wants to be out of hospital by his birthday in 3 days to go fishing.   -we discussed his continued tx with chemo and his last being on 10/25. He reports he just had more trouble breathing and probably a combination of his COPD and cancer. He does want to continue tx options as long as he is able no matter the burden and feels he tolerates them well although palliative   -he wants to remain a full code. He continue to be independent in most activities he states   -hospice had been discussed with him in the past as an option but this is not in line with his GOC as long as he is tolerating chemo  -addressed all questions  -emotional support provided   none

## 2023-03-18 NOTE — BEHAVIORAL HEALTH ASSESSMENT NOTE - NS ED [facility] MED ROS HEMATOLOGIC LYMPHATIC
Pt presents to ED ambulatory following recent discharge c/o \"not feeling good\".  Pt eating sandwich in triage with discharge papers in hand.  Pt in NAD.   No complaints

## 2023-04-26 NOTE — PATIENT PROFILE ADULT - INTERNATIONAL TRAVEL
Problem: Improved mood stability; decrease of cycling  Goal: Patient will speak openly about his thoughts and feelings  Description: Interventions:  - Assess and re-assess patient's level of risk   - Engage patient in 1:1 interactions, daily, for a minimum of 15 minutes   - Encourage patient to express feelings, fears, frustrations, hopes   4/26/2023 1846 by Olamide Mcknight RN  Outcome: Progressing  4/26/2023 1845 by Olamide Mcknight RN  Outcome: Progressing  4/26/2023 1838 by Olamide Mcknight RN  Outcome: Progressing No

## 2023-10-24 NOTE — PHYSICAL THERAPY INITIAL EVALUATION ADULT - PATIENT/FAMILY AGREES WITH PLAN
yes Mirvaso Counseling: Mirvaso is a topical medication which can decrease superficial blood flow where applied. Side effects are uncommon and include stinging, redness and allergic reactions.

## 2023-11-19 NOTE — ED ADULT NURSE NOTE - NS ED NURSE RECORD ANOTHER HT AND WT
HPI  20yFemale L hand dominant c/o L long finger distal phalanx pain after bed-frame fell on her hand. Denies headstrike or LOC. Denies numbness/tingling in the L hand. Denies any other trauma/injuries at this time.    ROS  Negative unless otherwise specified in HPI.    PAST MEDICAL & SURGICAL Hx  PAST MEDICAL & SURGICAL HISTORY:  No pertinent past medical history          MEDICATIONS  Home Medications:      ALLERGIES  No Known Allergies      FAMILY Hx  FAMILY HISTORY:      SOCIAL Hx  Social History:      VITALS  Vital Signs Last 24 Hrs  T(C): 36.8 (19 Nov 2023 15:29), Max: 37.1 (19 Nov 2023 11:10)  T(F): 98.2 (19 Nov 2023 15:29), Max: 98.8 (19 Nov 2023 11:10)  HR: 61 (19 Nov 2023 15:29) (61 - 67)  BP: 115/78 (19 Nov 2023 15:29) (115/78 - 126/80)  BP(mean): --  RR: 18 (19 Nov 2023 15:29) (17 - 18)  SpO2: 99% (19 Nov 2023 15:29) (99% - 99%)    Parameters below as of 19 Nov 2023 15:29  Patient On (Oxygen Delivery Method): room air        PHYSICAL EXAM  Gen: Lying in bed, NAD  Resp: No increased WOB  L hand:  Skin intact, <1cm laceration along radial volar pulp, no scissoring/malrotation of digits  Nail intact, well fixed  +TTP over L long finger distal phalanx, no TTP along remainder of extremity; compartments soft  Motor: Full finger flexion/extension at PIPJ/MCPJ intact (pain limited at DIPJ)  Sensory: SILT throughout L long finger  +Rad pulse, WWP    Secondary survey:  No TTP along spine or other extremities, pelvis grossly stable, SILT and soft compartments throughout    IMAGING  XRs: L long finger distal phalanx pierre fx     PROCEDURE  Area was prepped and draped in the usual sterile fashion. Local anesthesia was achieved using 5cc of lidocaine. Wound was copiously irrigated. 2 5-0 nylons simple sutures were placed. A dressing was applied to the area and anticipatory guidance, as well as standard post-procedure care was explained. Patient tolerated the procedure well without complications    ASSESSMENT & PLAN  20yFemale w/ L long finger distal phalanx pierre fx s/p lac repair and immobilization     -LOYDA LUGO in soft dressing, alumiform splint  -pain control  -Keflex, tetanus up to nabil  -ice/cold compress, elevation  -ROM of uninjured/non-splinted fingers encouraged to prevent stiffness  -no acute ortho surgery at this time  -f/u outpt with Dr. De Guzman in 1 week No

## 2024-03-21 NOTE — ED ADULT TRIAGE NOTE - HISTORY OF COVID-19 VACCINATION
Colorectal Surgery Followup Note    ID:  Gena Harper;   : 1966  DATE OF VISIT: 3/21/2024    Chief Complaint  Abscess (Follow up I & D perirectal abscess 2024/)       Subjective    Gena was recently admitted to the hospital for perianal abscess. I did incision and drainage with copious purulent discharge. She currently reports no fever of chills. No discharge. No pain with BM. She is taking metamucil supplements.   Exam  General:  No acute distress  Head: Normocephalic, atraumatic  Neuro: Alert and oriented    Abdomen:  Soft, non-tender, non-distended, no hernias, no hepatomegaly, no splenomegaly. No abnormal, audible bowel sounds.    External anorectal exam: incision site well healed, no drainage, extensive external hemorrhoid in all sides.   Digital rectal exam: no tenderness with exam, no palpable masses, tone is normal     Procedure Note  Procedure: Anoscopy  Indication: anorectal fistula   Description: After digital examination was completed the scope was inserted into the anal canal. The anal canal was visualized to 4-5 cm. See Findings below. The scope was then removed. Patient tolerated procedure well.  Findings: Internal hemorrhoids appear  enlarged in all quadrants, no other mucosal lesions noted. No internal opening     Assessment  - perianal abscess   - Enlarged internal and external hemorrhoids    Plan / Recommendations  -Discussed management of perianal abscess and fistula in ano. There is no findings to suggest active fistula in ano at this time, however, this might develop in the future.   - continue with supplements fiber   - if hemorrhoids become symptomatic, we can address with excision   - follow up JONAH Will MD  Colon and Rectal Surgery   Marlene Jackson       
Yes

## 2024-05-03 ENCOUNTER — RESULT REVIEW (OUTPATIENT)
Age: 89
End: 2024-05-03

## 2024-05-03 ENCOUNTER — INPATIENT (INPATIENT)
Facility: HOSPITAL | Age: 89
LOS: 10 days | Discharge: NURSING, REHAB OR LONG-TERM CARE | End: 2024-05-14
Attending: INTERNAL MEDICINE | Admitting: INTERNAL MEDICINE
Payer: MEDICARE

## 2024-05-03 VITALS — RESPIRATION RATE: 24 BRPM | HEART RATE: 90 BPM | OXYGEN SATURATION: 100 %

## 2024-05-03 DIAGNOSIS — Z96.642 PRESENCE OF LEFT ARTIFICIAL HIP JOINT: Chronic | ICD-10-CM

## 2024-05-03 DIAGNOSIS — R09.02 HYPOXEMIA: ICD-10-CM

## 2024-05-03 LAB
A1C WITH ESTIMATED AVERAGE GLUCOSE RESULT: 5.4 % — SIGNIFICANT CHANGE UP (ref 4–5.6)
ADD ON TEST-SPECIMEN IN LAB: SIGNIFICANT CHANGE UP
ADD ON TEST-SPECIMEN IN LAB: SIGNIFICANT CHANGE UP
ALBUMIN SERPL ELPH-MCNC: 3.7 G/DL — SIGNIFICANT CHANGE UP (ref 3.3–5)
ALBUMIN SERPL ELPH-MCNC: 3.8 G/DL — SIGNIFICANT CHANGE UP (ref 3.3–5)
ALP SERPL-CCNC: 114 U/L — SIGNIFICANT CHANGE UP (ref 40–120)
ALP SERPL-CCNC: 123 U/L — HIGH (ref 40–120)
ALT FLD-CCNC: 57 U/L — HIGH (ref 4–33)
ALT FLD-CCNC: 61 U/L — HIGH (ref 4–33)
ANION GAP SERPL CALC-SCNC: 12 MMOL/L — SIGNIFICANT CHANGE UP (ref 7–14)
ANION GAP SERPL CALC-SCNC: 9 MMOL/L — SIGNIFICANT CHANGE UP (ref 7–14)
ANISOCYTOSIS BLD QL: SLIGHT — SIGNIFICANT CHANGE UP
APPEARANCE UR: CLEAR — SIGNIFICANT CHANGE UP
AST SERPL-CCNC: 47 U/L — HIGH (ref 4–32)
AST SERPL-CCNC: 50 U/L — HIGH (ref 4–32)
BACTERIA # UR AUTO: NEGATIVE /HPF — SIGNIFICANT CHANGE UP
BASE EXCESS BLDV CALC-SCNC: 12.1 MMOL/L — HIGH (ref -2–3)
BASE EXCESS BLDV CALC-SCNC: 8.3 MMOL/L — HIGH (ref -2–3)
BASOPHILS # BLD AUTO: 0 K/UL — SIGNIFICANT CHANGE UP (ref 0–0.2)
BASOPHILS NFR BLD AUTO: 0 % — SIGNIFICANT CHANGE UP (ref 0–2)
BILIRUB SERPL-MCNC: 0.6 MG/DL — SIGNIFICANT CHANGE UP (ref 0.2–1.2)
BILIRUB SERPL-MCNC: 0.7 MG/DL — SIGNIFICANT CHANGE UP (ref 0.2–1.2)
BILIRUB UR-MCNC: ABNORMAL
BLD GP AB SCN SERPL QL: NEGATIVE — SIGNIFICANT CHANGE UP
BLOOD GAS VENOUS COMPREHENSIVE RESULT: SIGNIFICANT CHANGE UP
BUN SERPL-MCNC: 39 MG/DL — HIGH (ref 7–23)
BUN SERPL-MCNC: 41 MG/DL — HIGH (ref 7–23)
CALCIUM SERPL-MCNC: 8.6 MG/DL — SIGNIFICANT CHANGE UP (ref 8.4–10.5)
CALCIUM SERPL-MCNC: 8.9 MG/DL — SIGNIFICANT CHANGE UP (ref 8.4–10.5)
CAST: 2 /LPF — SIGNIFICANT CHANGE UP (ref 0–4)
CHLORIDE BLDV-SCNC: 102 MMOL/L — SIGNIFICANT CHANGE UP (ref 96–108)
CHLORIDE BLDV-SCNC: 105 MMOL/L — SIGNIFICANT CHANGE UP (ref 96–108)
CHLORIDE SERPL-SCNC: 104 MMOL/L — SIGNIFICANT CHANGE UP (ref 98–107)
CHLORIDE SERPL-SCNC: 107 MMOL/L — SIGNIFICANT CHANGE UP (ref 98–107)
CK SERPL-CCNC: 28 U/L — SIGNIFICANT CHANGE UP (ref 25–170)
CO2 BLDV-SCNC: 40.4 MMOL/L — HIGH (ref 22–26)
CO2 BLDV-SCNC: 43.5 MMOL/L — HIGH (ref 22–26)
CO2 SERPL-SCNC: 31 MMOL/L — SIGNIFICANT CHANGE UP (ref 22–31)
CO2 SERPL-SCNC: 31 MMOL/L — SIGNIFICANT CHANGE UP (ref 22–31)
COLOR SPEC: SIGNIFICANT CHANGE UP
CREAT SERPL-MCNC: 0.56 MG/DL — SIGNIFICANT CHANGE UP (ref 0.5–1.3)
CREAT SERPL-MCNC: 0.62 MG/DL — SIGNIFICANT CHANGE UP (ref 0.5–1.3)
DIFF PNL FLD: NEGATIVE — SIGNIFICANT CHANGE UP
EGFR: 81 ML/MIN/1.73M2 — SIGNIFICANT CHANGE UP
EGFR: 83 ML/MIN/1.73M2 — SIGNIFICANT CHANGE UP
EOSINOPHIL # BLD AUTO: 0 K/UL — SIGNIFICANT CHANGE UP (ref 0–0.5)
EOSINOPHIL NFR BLD AUTO: 0 % — SIGNIFICANT CHANGE UP (ref 0–6)
ESTIMATED AVERAGE GLUCOSE: 108 — SIGNIFICANT CHANGE UP
FLUAV AG NPH QL: SIGNIFICANT CHANGE UP
FLUBV AG NPH QL: SIGNIFICANT CHANGE UP
GAS PNL BLDV: 140 MMOL/L — SIGNIFICANT CHANGE UP (ref 136–145)
GAS PNL BLDV: 141 MMOL/L — SIGNIFICANT CHANGE UP (ref 136–145)
GLUCOSE BLDV-MCNC: 155 MG/DL — HIGH (ref 70–99)
GLUCOSE BLDV-MCNC: 157 MG/DL — HIGH (ref 70–99)
GLUCOSE SERPL-MCNC: 126 MG/DL — HIGH (ref 70–99)
GLUCOSE SERPL-MCNC: 152 MG/DL — HIGH (ref 70–99)
GLUCOSE UR QL: NEGATIVE MG/DL — SIGNIFICANT CHANGE UP
HCO3 BLDV-SCNC: 39 MMOL/L — HIGH (ref 22–29)
HCO3 BLDV-SCNC: 40 MMOL/L — HIGH (ref 22–29)
HCT VFR BLD CALC: 45.1 % — HIGH (ref 34.5–45)
HCT VFR BLD CALC: 46.4 % — HIGH (ref 34.5–45)
HCT VFR BLDA CALC: 40 % — SIGNIFICANT CHANGE UP (ref 34.5–46.5)
HCT VFR BLDA CALC: 42 % — SIGNIFICANT CHANGE UP (ref 34.5–46.5)
HGB BLD CALC-MCNC: 13.3 G/DL — SIGNIFICANT CHANGE UP (ref 11.7–16.1)
HGB BLD CALC-MCNC: 14.1 G/DL — SIGNIFICANT CHANGE UP (ref 11.7–16.1)
HGB BLD-MCNC: 13.2 G/DL — SIGNIFICANT CHANGE UP (ref 11.5–15.5)
HGB BLD-MCNC: 13.4 G/DL — SIGNIFICANT CHANGE UP (ref 11.5–15.5)
IANC: 3.34 K/UL — SIGNIFICANT CHANGE UP (ref 1.8–7.4)
INR BLD: 1.06 RATIO — SIGNIFICANT CHANGE UP (ref 0.85–1.18)
KETONES UR-MCNC: NEGATIVE MG/DL — SIGNIFICANT CHANGE UP
LACTATE BLDV-MCNC: 1.4 MMOL/L — SIGNIFICANT CHANGE UP (ref 0.5–2)
LACTATE BLDV-MCNC: 2.8 MMOL/L — HIGH (ref 0.5–2)
LACTATE SERPL-SCNC: 2.3 MMOL/L — HIGH (ref 0.5–2)
LEUKOCYTE ESTERASE UR-ACNC: NEGATIVE — SIGNIFICANT CHANGE UP
LYMPHOCYTES # BLD AUTO: 0.55 K/UL — LOW (ref 1–3.3)
LYMPHOCYTES # BLD AUTO: 11.3 % — LOW (ref 13–44)
MACROCYTES BLD QL: SIGNIFICANT CHANGE UP
MAGNESIUM SERPL-MCNC: 2.5 MG/DL — SIGNIFICANT CHANGE UP (ref 1.6–2.6)
MANUAL SMEAR VERIFICATION: SIGNIFICANT CHANGE UP
MCHC RBC-ENTMCNC: 28.9 GM/DL — LOW (ref 32–36)
MCHC RBC-ENTMCNC: 29.3 GM/DL — LOW (ref 32–36)
MCHC RBC-ENTMCNC: 32.9 PG — SIGNIFICANT CHANGE UP (ref 27–34)
MCHC RBC-ENTMCNC: 33.2 PG — SIGNIFICANT CHANGE UP (ref 27–34)
MCV RBC AUTO: 113.3 FL — HIGH (ref 80–100)
MCV RBC AUTO: 114 FL — HIGH (ref 80–100)
METAMYELOCYTES # FLD: 0.9 % — SIGNIFICANT CHANGE UP (ref 0–1)
MONOCYTES # BLD AUTO: 0.34 K/UL — SIGNIFICANT CHANGE UP (ref 0–0.9)
MONOCYTES NFR BLD AUTO: 7 % — SIGNIFICANT CHANGE UP (ref 2–14)
MRSA PCR RESULT.: SIGNIFICANT CHANGE UP
MYELOCYTES NFR BLD: 1.7 % — HIGH (ref 0–0)
NEUTROPHILS # BLD AUTO: 3.75 K/UL — SIGNIFICANT CHANGE UP (ref 1.8–7.4)
NEUTROPHILS NFR BLD AUTO: 77.4 % — HIGH (ref 43–77)
NITRITE UR-MCNC: NEGATIVE — SIGNIFICANT CHANGE UP
NRBC # BLD: 0 /100 WBCS — SIGNIFICANT CHANGE UP (ref 0–0)
NRBC # FLD: 0.04 K/UL — HIGH (ref 0–0)
NT-PROBNP SERPL-SCNC: HIGH PG/ML
NT-PROBNP SERPL-SCNC: HIGH PG/ML
PCO2 BLDV: 103 MMHG — HIGH (ref 39–52)
PCO2 BLDV: 57 MMHG — HIGH (ref 39–52)
PH BLDV: 7.2 — LOW (ref 7.32–7.43)
PH BLDV: 7.44 — HIGH (ref 7.32–7.43)
PH UR: 6 — SIGNIFICANT CHANGE UP (ref 5–8)
PHOSPHATE SERPL-MCNC: 4.4 MG/DL — SIGNIFICANT CHANGE UP (ref 2.5–4.5)
PLAT MORPH BLD: NORMAL — SIGNIFICANT CHANGE UP
PLATELET # BLD AUTO: 190 K/UL — SIGNIFICANT CHANGE UP (ref 150–400)
PLATELET # BLD AUTO: 222 K/UL — SIGNIFICANT CHANGE UP (ref 150–400)
PLATELET COUNT - ESTIMATE: NORMAL — SIGNIFICANT CHANGE UP
PO2 BLDV: 29 MMHG — SIGNIFICANT CHANGE UP (ref 25–45)
PO2 BLDV: 55 MMHG — HIGH (ref 25–45)
POLYCHROMASIA BLD QL SMEAR: SLIGHT — SIGNIFICANT CHANGE UP
POTASSIUM BLDV-SCNC: 4.3 MMOL/L — SIGNIFICANT CHANGE UP (ref 3.5–5.1)
POTASSIUM BLDV-SCNC: 4.7 MMOL/L — SIGNIFICANT CHANGE UP (ref 3.5–5.1)
POTASSIUM SERPL-MCNC: 5 MMOL/L — SIGNIFICANT CHANGE UP (ref 3.5–5.3)
POTASSIUM SERPL-MCNC: 5 MMOL/L — SIGNIFICANT CHANGE UP (ref 3.5–5.3)
POTASSIUM SERPL-SCNC: 5 MMOL/L — SIGNIFICANT CHANGE UP (ref 3.5–5.3)
POTASSIUM SERPL-SCNC: 5 MMOL/L — SIGNIFICANT CHANGE UP (ref 3.5–5.3)
PROT SERPL-MCNC: 6.5 G/DL — SIGNIFICANT CHANGE UP (ref 6–8.3)
PROT SERPL-MCNC: 6.6 G/DL — SIGNIFICANT CHANGE UP (ref 6–8.3)
PROT UR-MCNC: 100 MG/DL
PROTHROM AB SERPL-ACNC: 11.8 SEC — SIGNIFICANT CHANGE UP (ref 9.5–13)
RBC # BLD: 3.98 M/UL — SIGNIFICANT CHANGE UP (ref 3.8–5.2)
RBC # BLD: 4.07 M/UL — SIGNIFICANT CHANGE UP (ref 3.8–5.2)
RBC # FLD: 15 % — HIGH (ref 10.3–14.5)
RBC # FLD: 15.1 % — HIGH (ref 10.3–14.5)
RBC BLD AUTO: ABNORMAL
RBC CASTS # UR COMP ASSIST: 1 /HPF — SIGNIFICANT CHANGE UP (ref 0–4)
RH IG SCN BLD-IMP: POSITIVE — SIGNIFICANT CHANGE UP
RSV RNA NPH QL NAA+NON-PROBE: SIGNIFICANT CHANGE UP
S AUREUS DNA NOSE QL NAA+PROBE: SIGNIFICANT CHANGE UP
SAO2 % BLDV: 42.9 % — LOW (ref 67–88)
SAO2 % BLDV: 79.2 % — SIGNIFICANT CHANGE UP (ref 67–88)
SARS-COV-2 RNA SPEC QL NAA+PROBE: SIGNIFICANT CHANGE UP
SODIUM SERPL-SCNC: 147 MMOL/L — HIGH (ref 135–145)
SODIUM SERPL-SCNC: 147 MMOL/L — HIGH (ref 135–145)
SP GR SPEC: 1.03 — SIGNIFICANT CHANGE UP (ref 1–1.03)
SQUAMOUS # UR AUTO: 1 /HPF — SIGNIFICANT CHANGE UP (ref 0–5)
TROPONIN T, HIGH SENSITIVITY RESULT: 47 NG/L — SIGNIFICANT CHANGE UP
TSH SERPL-MCNC: 0.82 UIU/ML — SIGNIFICANT CHANGE UP (ref 0.27–4.2)
UROBILINOGEN FLD QL: 1 MG/DL — SIGNIFICANT CHANGE UP (ref 0.2–1)
VANCOMYCIN FLD-MCNC: 7.5 UG/ML — SIGNIFICANT CHANGE UP
VARIANT LYMPHS # BLD: 1.7 % — SIGNIFICANT CHANGE UP (ref 0–6)
WBC # BLD: 4.85 K/UL — SIGNIFICANT CHANGE UP (ref 3.8–10.5)
WBC # BLD: 5.4 K/UL — SIGNIFICANT CHANGE UP (ref 3.8–10.5)
WBC # FLD AUTO: 4.85 K/UL — SIGNIFICANT CHANGE UP (ref 3.8–10.5)
WBC # FLD AUTO: 5.4 K/UL — SIGNIFICANT CHANGE UP (ref 3.8–10.5)
WBC UR QL: 1 /HPF — SIGNIFICANT CHANGE UP (ref 0–5)

## 2024-05-03 PROCEDURE — 71045 X-RAY EXAM CHEST 1 VIEW: CPT | Mod: 26,77

## 2024-05-03 PROCEDURE — 99291 CRITICAL CARE FIRST HOUR: CPT | Mod: FS,25

## 2024-05-03 PROCEDURE — 71045 X-RAY EXAM CHEST 1 VIEW: CPT | Mod: 26,76

## 2024-05-03 PROCEDURE — 76604 US EXAM CHEST: CPT | Mod: 26,GC

## 2024-05-03 PROCEDURE — 70450 CT HEAD/BRAIN W/O DYE: CPT | Mod: 26

## 2024-05-03 PROCEDURE — 71260 CT THORAX DX C+: CPT | Mod: 26

## 2024-05-03 PROCEDURE — 99291 CRITICAL CARE FIRST HOUR: CPT | Mod: FT,25

## 2024-05-03 PROCEDURE — 93306 TTE W/DOPPLER COMPLETE: CPT | Mod: 26

## 2024-05-03 PROCEDURE — 31500 INSERT EMERGENCY AIRWAY: CPT

## 2024-05-03 RX ORDER — PROPOFOL 10 MG/ML
30 INJECTION, EMULSION INTRAVENOUS
Qty: 1000 | Refills: 0 | Status: DISCONTINUED | OUTPATIENT
Start: 2024-05-03 | End: 2024-05-03

## 2024-05-03 RX ORDER — DEXMEDETOMIDINE HYDROCHLORIDE IN 0.9% SODIUM CHLORIDE 4 UG/ML
0.5 INJECTION INTRAVENOUS
Qty: 400 | Refills: 0 | Status: DISCONTINUED | OUTPATIENT
Start: 2024-05-03 | End: 2024-05-07

## 2024-05-03 RX ORDER — PIPERACILLIN AND TAZOBACTAM 4; .5 G/20ML; G/20ML
3.38 INJECTION, POWDER, LYOPHILIZED, FOR SOLUTION INTRAVENOUS EVERY 8 HOURS
Refills: 0 | Status: DISCONTINUED | OUTPATIENT
Start: 2024-05-03 | End: 2024-05-06

## 2024-05-03 RX ORDER — CHLORHEXIDINE GLUCONATE 213 G/1000ML
1 SOLUTION TOPICAL DAILY
Refills: 0 | Status: DISCONTINUED | OUTPATIENT
Start: 2024-05-03 | End: 2024-05-10

## 2024-05-03 RX ORDER — NOREPINEPHRINE BITARTRATE/D5W 8 MG/250ML
0.05 PLASTIC BAG, INJECTION (ML) INTRAVENOUS
Qty: 8 | Refills: 0 | Status: DISCONTINUED | OUTPATIENT
Start: 2024-05-03 | End: 2024-05-05

## 2024-05-03 RX ORDER — PROPOFOL 10 MG/ML
50 INJECTION, EMULSION INTRAVENOUS
Qty: 1000 | Refills: 0 | Status: DISCONTINUED | OUTPATIENT
Start: 2024-05-03 | End: 2024-05-03

## 2024-05-03 RX ORDER — PROPOFOL 10 MG/ML
30 INJECTION, EMULSION INTRAVENOUS ONCE
Refills: 0 | Status: COMPLETED | OUTPATIENT
Start: 2024-05-03 | End: 2024-05-03

## 2024-05-03 RX ORDER — VANCOMYCIN HCL 1 G
750 VIAL (EA) INTRAVENOUS ONCE
Refills: 0 | Status: COMPLETED | OUTPATIENT
Start: 2024-05-03 | End: 2024-05-03

## 2024-05-03 RX ORDER — ENOXAPARIN SODIUM 100 MG/ML
30 INJECTION SUBCUTANEOUS EVERY 24 HOURS
Refills: 0 | Status: DISCONTINUED | OUTPATIENT
Start: 2024-05-03 | End: 2024-05-10

## 2024-05-03 RX ORDER — SODIUM CHLORIDE 9 MG/ML
500 INJECTION, SOLUTION INTRAVENOUS ONCE
Refills: 0 | Status: COMPLETED | OUTPATIENT
Start: 2024-05-03 | End: 2024-05-03

## 2024-05-03 RX ORDER — ROCURONIUM BROMIDE 10 MG/ML
50 VIAL (ML) INTRAVENOUS ONCE
Refills: 0 | Status: COMPLETED | OUTPATIENT
Start: 2024-05-03 | End: 2024-05-03

## 2024-05-03 RX ORDER — CHLORHEXIDINE GLUCONATE 213 G/1000ML
15 SOLUTION TOPICAL EVERY 12 HOURS
Refills: 0 | Status: DISCONTINUED | OUTPATIENT
Start: 2024-05-03 | End: 2024-05-03

## 2024-05-03 RX ORDER — ACETAMINOPHEN 500 MG
600 TABLET ORAL ONCE
Refills: 0 | Status: COMPLETED | OUTPATIENT
Start: 2024-05-03 | End: 2024-05-03

## 2024-05-03 RX ORDER — CHLORHEXIDINE GLUCONATE 213 G/1000ML
15 SOLUTION TOPICAL EVERY 12 HOURS
Refills: 0 | Status: DISCONTINUED | OUTPATIENT
Start: 2024-05-03 | End: 2024-05-04

## 2024-05-03 RX ORDER — ETOMIDATE 2 MG/ML
20 INJECTION INTRAVENOUS ONCE
Refills: 0 | Status: COMPLETED | OUTPATIENT
Start: 2024-05-03 | End: 2024-05-03

## 2024-05-03 RX ADMIN — Medication 4.22 MICROGRAM(S)/KG/MIN: at 19:03

## 2024-05-03 RX ADMIN — PROPOFOL 13.5 MICROGRAM(S)/KG/MIN: 10 INJECTION, EMULSION INTRAVENOUS at 04:28

## 2024-05-03 RX ADMIN — CHLORHEXIDINE GLUCONATE 1 APPLICATION(S): 213 SOLUTION TOPICAL at 16:54

## 2024-05-03 RX ADMIN — Medication 600 MILLIGRAM(S): at 23:00

## 2024-05-03 RX ADMIN — PROPOFOL 13.5 MICROGRAM(S)/KG/MIN: 10 INJECTION, EMULSION INTRAVENOUS at 07:43

## 2024-05-03 RX ADMIN — Medication 4.22 MICROGRAM(S)/KG/MIN: at 07:43

## 2024-05-03 RX ADMIN — PIPERACILLIN AND TAZOBACTAM 25 GRAM(S): 4; .5 INJECTION, POWDER, LYOPHILIZED, FOR SOLUTION INTRAVENOUS at 06:34

## 2024-05-03 RX ADMIN — ENOXAPARIN SODIUM 30 MILLIGRAM(S): 100 INJECTION SUBCUTANEOUS at 06:34

## 2024-05-03 RX ADMIN — ETOMIDATE 20 MILLIGRAM(S): 2 INJECTION INTRAVENOUS at 03:45

## 2024-05-03 RX ADMIN — PROPOFOL 13.5 MICROGRAM(S)/KG/MIN: 10 INJECTION, EMULSION INTRAVENOUS at 06:35

## 2024-05-03 RX ADMIN — PIPERACILLIN AND TAZOBACTAM 25 GRAM(S): 4; .5 INJECTION, POWDER, LYOPHILIZED, FOR SOLUTION INTRAVENOUS at 21:11

## 2024-05-03 RX ADMIN — PIPERACILLIN AND TAZOBACTAM 25 GRAM(S): 4; .5 INJECTION, POWDER, LYOPHILIZED, FOR SOLUTION INTRAVENOUS at 13:34

## 2024-05-03 RX ADMIN — Medication 4.22 MICROGRAM(S)/KG/MIN: at 03:53

## 2024-05-03 RX ADMIN — CHLORHEXIDINE GLUCONATE 15 MILLILITER(S): 213 SOLUTION TOPICAL at 19:03

## 2024-05-03 RX ADMIN — Medication 240 MILLIGRAM(S): at 22:51

## 2024-05-03 RX ADMIN — PROPOFOL 8.1 MICROGRAM(S)/KG/MIN: 10 INJECTION, EMULSION INTRAVENOUS at 03:52

## 2024-05-03 RX ADMIN — Medication 250 MILLIGRAM(S): at 14:49

## 2024-05-03 RX ADMIN — DEXMEDETOMIDINE HYDROCHLORIDE IN 0.9% SODIUM CHLORIDE 4.93 MICROGRAM(S)/KG/HR: 4 INJECTION INTRAVENOUS at 14:50

## 2024-05-03 RX ADMIN — DEXMEDETOMIDINE HYDROCHLORIDE IN 0.9% SODIUM CHLORIDE 4.93 MICROGRAM(S)/KG/HR: 4 INJECTION INTRAVENOUS at 19:03

## 2024-05-03 RX ADMIN — SODIUM CHLORIDE 500 MILLILITER(S): 9 INJECTION, SOLUTION INTRAVENOUS at 16:54

## 2024-05-03 RX ADMIN — PROPOFOL 30 MILLIGRAM(S): 10 INJECTION, EMULSION INTRAVENOUS at 04:05

## 2024-05-03 NOTE — ED ADULT NURSE REASSESSMENT NOTE - REASSESS COMMUNICATION
Received pt lying in room 17. Pt is restless and is to be intubated. Sedated and intubated with 6.5 ET tube 22 at the lip. , peep 5, Fio2 100, at a rate of 25 is started. Pt is started on Levophed and propofol drip. Will continue to monitor. Received pt lying in room 17. Pt is restless and is to be intubated. Sedated and intubated with 7.0 ET tube 22 at the lip. , peep 5, Fio2 100, at a rate of 25 is started. Pt is started on Levophed and propofol drip. Will continue to monitor.

## 2024-05-03 NOTE — H&P ADULT - ASSESSMENT
89 yo F with pmhx of HTN, HLD, schizophrenia, dementia comes to the ED from nursing facility where she was found to be hypoxic. On admission, patient found to have pCO2>100 and was intubated in the ED. She is now admitted for further management.       NEURO:  Baseline:    #Encephalopathy   - patient with encephalopathy on admission  - likely due to elevated pCO2     PLAN  - f/u CT head  - f/u TSH, ammonia, B12    #Sedation        CARDIOVASCULAR:  -Keep Mg>2, K>4    #Left ventricular dysfunction  - seen on TTE 2019  - repeat TTE this admission      #HTN  - hold anti-hypertensives in setting of acute illness      HEMODYNAMICS   - started on levophed         RESPIRATORY:  #Acute hypoxemic respiratory failure  #Acute hypercarbic respiratory failure    - may be due to pneumonia vs ADHF    PLAN  - cw infectious workup and plan as below  - TTE     Vent Settings: Tv/Rate/peep/fio2 sat peak/plat abg cxr Pa02/fio2 , rr/tv (L), sat/sbt  Secretions:    GI/NUTRITION:    Diet: Tube feeds    RENAL:   MARTINEZ      ID:   #Sepsis  - pt meets sepsis criteria due to tachypnea and tachycardia  - source may be PNA, but infectious workup still pending    PLAN  - f/u MRSA swab, RVP, blood cx, UA, UCx,   - cw antibiotics     HEME:  - Maintain active type and screen    ENDOCRINE:  - Monitor finger sticks     DVT PPX:  - Heparin 5000 subq     89 yo F with pmhx of HTN, HLD, schizophrenia, dementia comes to the ED from nursing facility where she was found to be hypoxic. On admission, patient found to have pCO2>100 and was intubated in the ED. She is now admitted for further management.       NEURO:  Baseline:    #Encephalopathy   - patient with encephalopathy on admission  - likely due to elevated pCO2     PLAN  - f/u CT head  - f/u TSH, ammonia, B12      CARDIOVASCULAR:  -Keep Mg>2, K>4, P>3    #Left ventricular dysfunction  - seen on TTE 2019  - repeat TTE this admission  - BNP elevated on admission  - can consider ADHF as etiology of acute illness       #HTN  - hold anti-hypertensives in setting of acute illness      HEMODYNAMICS   - started on levophed         RESPIRATORY:  #Acute hypoxemic respiratory failure  #Acute hypercarbic respiratory failure    - may be due to pneumonia vs ADHF    PLAN  - cw infectious workup and plan as below  - TTE     Vent Settings:    GI/NUTRITION:    Diet: Tube feeds    RENAL:   MARTINEZ      ID:   #Sepsis  - pt meets sepsis criteria due to tachypnea and tachycardia  - source may be PNA, but infectious workup still pending    PLAN  - f/u MRSA swab, RVP, blood cx, UA (wnl), UCx,   - cw vanc/zosyn   - monitor vanc trough     HEME:  - Maintain active type and screen    ENDOCRINE:  - Monitor finger sticks     DVT PPX:  - lovenox      89 yo F with pmhx of HTN, HLD, schizophrenia, dementia comes to the ED from nursing facility where she was found to be hypoxic. On admission, patient found to have pCO2>100 and was intubated in the ED. She is now admitted for further management.       NEURO:  Baseline:    #Encephalopathy   - patient with encephalopathy on admission  - likely due to elevated pCO2     PLAN  - f/u CT head  - f/u TSH, ammonia, B12      CARDIOVASCULAR:  -Keep Mg>2, K>4, P>3    #Left ventricular dysfunction  - seen on TTE 2019  - repeat TTE this admission  - BNP elevated on admission  - can consider ADHF as etiology of acute illness       #HTN  - hold anti-hypertensives in setting of acute illness      HEMODYNAMICS   - started on levophed         RESPIRATORY:  #Acute hypoxemic respiratory failure  #Acute hypercarbic respiratory failure    - may be due to pneumonia vs ADHF    PLAN  - cw infectious workup and plan as below  - TTE     Vent Settings:    GI/NUTRITION:    #Transaminitis  - LE elevate on admission  - may be due to congestive hepatopathy  - consider RUQ u/s  - ctm    Diet: Tube feeds    RENAL:   MARTINEZ      ID:   #Sepsis  - pt meets sepsis criteria due to tachypnea and tachycardia  - source may be PNA, but infectious workup still pending    PLAN  - f/u MRSA swab, RVP, blood cx, UA (wnl), UCx,   - cw vanc by level ( will vanc level as pt received level before MICU admission)  - cw zosyn     HEME:  - Maintain active type and screen    ENDOCRINE:  - Monitor finger sticks     DVT PPX:  - lovenox

## 2024-05-03 NOTE — PATIENT PROFILE ADULT - MONEY FOR FOOD
Called and LVM for patient to schedule surgery with Dr. Go. Provided direct call back number [phone].      Belia Restrepo on 09/26/23 at 1:05 PM.  Senior Perioperative Coordinator   Ph: [phone]    
Scheduled surgery for 10/16 in [place] with Dr. Go.    H&P with PCP within 30 days of the surgery date. Patient verbalized understanding H&P is needed.    Post-op scheduled for 11/3 with Dr. Go in [place].    Patient provided surgery packet in clinic.    NM injection scheduled to be delivered to OR.     No further questions/concerns at this time.     Belia Restrepo on 09/27/23 at 10:32 AM  Senior Perioperative Coordinator   Ph: [phone]    
no

## 2024-05-03 NOTE — H&P ADULT - NSHPPHYSICALEXAM_GEN_ALL_CORE
LOS:     VITALS:   T(C): 36.3 (05-03-24 @ 02:52), Max: 36.3 (05-03-24 @ 02:52)  HR: 81 (05-03-24 @ 04:28) (77 - 95)  BP: 118/98 (05-03-24 @ 04:28) (81/60 - 168/114)  RR: 25 (05-03-24 @ 04:28) (23 - 25)  SpO2: 100% (05-03-24 @ 04:28) (97% - 100%)    GENERAL: intubated and sedated  CHEST/LUNG: Crackles appreciated  HEART: Regular rate and rhythm; No murmurs, rubs, or gallops  ABDOMEN: BSx4; Soft, nontender, nondistended  EXTREMITIES:  2+ Peripheral Pulses, brisk capillary refill. No clubbing, cyanosis, or edema 1) PCP Contacted on Admission: (Y/N) --> Name & Phone #: Sukh Sapp [phone]  2) Date of Contact with PCP: Will call on 3/9   3) PCP Contacted at Discharge: (Y/N, N/A)  4) Summary of Handoff Given to PCP:   5) Post-Discharge Appointment Date and Location:

## 2024-05-03 NOTE — PATIENT PROFILE ADULT - FALL HARM RISK - HARM RISK INTERVENTIONS

## 2024-05-03 NOTE — ED PROVIDER NOTE - PROGRESS NOTE DETAILS
Patient continued to be hypoxic on O2 NC.  Patient started on HFNC.  Patient VBG concerning for acidemia, CO2 retention even with HFNC support.  Patient started on BiPAP.  Repeat VBG ordered.  Patient's mental status continued to improved with BiPAP.  5 mg Haldol given for agitation as patient was not redirectable and pulling at facemask. Repeat VBG concerning for pH 7.15, CO2 118.  Patient intubated for acute respiratory failure, CO2 retention and hypoxia.  Rocuronium for sedation, patient then started on propofol and levophed gtt.  MICU consulted Attending MD Nick : Patient signed out to me by Dr Diaz. Worsening hypercarbic/hypoxic respiratory failure requiring intubation. Treated for PNA.

## 2024-05-03 NOTE — ED PROVIDER NOTE - NSICDXPASTMEDICALHX_GEN_ALL_CORE_FT
PAST MEDICAL HISTORY:  Gait difficulty ~ uses walker    Hypertension     Sacral ulcer ~ resolved (during rehab)    Schizophrenia

## 2024-05-03 NOTE — ED PROVIDER NOTE - PHYSICAL EXAMINATION
GENERAL: NAD  HEAD: normocephalic, atraumatic  HEENT: normal conjunctiva, oral mucosa moist, uvula midline, neck supple  CARDIAC: tachycardic rate and rhythm, normal S1S2, no appreciable murmurs  PULM: Coarse and distant breath sound bilaterally  GI: abdomen nondistended, soft, nontender  : no suprapubic tenderness  NEURO: arousable to voice, not following commands  MSK: no peripheral edema, no calf tenderness b/l  SKIN: extremities warm

## 2024-05-03 NOTE — ED ADULT NURSE REASSESSMENT NOTE - NS ED NURSE REASSESS COMMENT FT1
Break RN: Pt received in stretcher in room 17. Pt lethargic, arousable to physical stimuli. pt tolerating bipap, 93-96% on bipap. PCA remains at bedside for 1:1 constant observation. NSR on monitor.

## 2024-05-03 NOTE — ED PROVIDER NOTE - OBJECTIVE STATEMENT
89-year-old female with history of HTN, HLD, schizophrenia, dementia presenting with shortness of breath from her nursing home.  Per chart review patient was hypoxic into the 80s and low 90s on room air at her nursing home and she was sent to the ED for further evaluation.  No prior history of COPD or breathing disorders.  Unable to complete ROS

## 2024-05-03 NOTE — ED ADULT NURSE NOTE - CHIEF COMPLAINT QUOTE
Downtime Recovery: (2220) Pt from [facility], presents to ED with respiratory distress, per EMS pt satting 68% on 3L NC. Arrived on 10L Nonrebreather with improvement to 98%. Hx HTN, HLD, rib fractures. Charge RN made aware, pt directed to rm 17.

## 2024-05-03 NOTE — PROGRESS NOTE ADULT - ASSESSMENT
89 yo F with pmhx of HTN, HLD, schizophrenia, dementia comes to the ED from nursing facility where she was found to be hypoxic. On admission, patient found to have pCO2>100 and was intubated in the ED. She is now admitted for further management.       NEURO:  Baseline:    #Encephalopathy   - patient with encephalopathy on admission  - likely due to elevated pCO2     PLAN  - f/u CT head  - f/u TSH, ammonia, B12      CARDIOVASCULAR:  -Keep Mg>2, K>4, P>3    #Left ventricular dysfunction  - seen on TTE 2019  - repeat TTE this admission  - BNP elevated on admission  - can consider ADHF as etiology of acute illness       #HTN  - hold anti-hypertensives in setting of acute illness      HEMODYNAMICS   - started on levophed         RESPIRATORY:  #Acute hypoxemic respiratory failure  #Acute hypercarbic respiratory failure    - may be due to pneumonia vs ADHF    PLAN  - cw infectious workup and plan as below  - TTE     Vent Settings:    GI/NUTRITION:    #Transaminitis  - LE elevate on admission  - may be due to congestive hepatopathy  - consider RUQ u/s  - ctm    Diet: Tube feeds    RENAL:   MARTINEZ      ID:   #Sepsis  - pt meets sepsis criteria due to tachypnea and tachycardia  - source may be PNA, but infectious workup still pending    PLAN  - f/u MRSA swab, RVP, blood cx, UA (wnl), UCx,   - cw vanc by level ( will vanc level as pt received level before MICU admission)  - cw zosyn     HEME:  - Maintain active type and screen    ENDOCRINE:  - Monitor finger sticks     DVT PPX:  - lovenox      89 yo F with pmhx of HTN, HLD, schizophrenia, dementia comes to the ED from nursing facility where she was found to be hypoxic. On admission, patient found to have pCO2>100 and was intubated in the ED for respiratory failure, likely ISO pneumonia.       NEURO:  Baseline: Unclear    #Encephalopathy   - patient with encephalopathy on admission  - likely due to elevated pCO2   - CT head with no acute pathology. Mild cerebral volume loss and chronic microvascular ischemic disease.       CARDIOVASCULAR:  -Keep Mg>2, K>4, P>3    #Left ventricular dysfunction  - Segmental dysfunction on TTE 2019  - repeat TTE this admission  - BNP elevated on admission  - can consider ADHF as etiology of acute illness      #HTN  - hold anti-hypertensives in setting of acute illness      HEMODYNAMICS   - On/off levophed ISO sedation      RESPIRATORY:  #Acute hypoxemic hypercapnic respiratory failure  - Intubated 5/3  - Likely due to pneumonia vs ADHF  - cw infectious workup and plan as below  - TTE  - F/u CT chest    Vent Settings: 380/14/50/5    GI/NUTRITION:    #Transaminitis  - LE elevate on admission  - may be due to congestive hepatopathy  - ctm    Diet: Tube feeds    RENAL:   MARTINEZ      ID:   #Sepsis  - Pt meets sepsis criteria due to tachypnea and tachycardia  - Source may be PNA, but infectious workup still pending  - RVP negative  - UA without infection  - f/u MRSA swab, blood cx, UCx,   - cw vanc by level (will vanc level as pt received level before MICU admission)  - cw zosyn    HEME:  - Maintain active type and screen    ENDOCRINE:  - Monitor finger sticks     DVT PPX:  - lovenox      89 yo F with pmhx of HTN, HLD, schizophrenia, dementia comes to the ED from nursing facility where she was found to be hypoxic. On admission, patient found to have pCO2>100 and was intubated in the ED for respiratory failure, likely ISO pneumonia.       NEURO:  Baseline: Unclear    #Encephalopathy   - patient with encephalopathy on admission  - likely due to elevated pCO2   - CT head with no acute pathology. Mild cerebral volume loss and chronic microvascular ischemic disease.       CARDIOVASCULAR:  -Keep Mg>2, K>4, P>3    #Left ventricular dysfunction  - Segmental dysfunction on TTE 2019  - Repeat TTE with poor study  but decreased LV function  - BNP elevated on admission  - Can consider ADHF as etiology of acute illness      #HTN  - hold anti-hypertensives in setting of acute illness      HEMODYNAMICS   - On/off levophed ISO sedation      RESPIRATORY:  #Acute hypoxemic hypercapnic respiratory failure  - Intubated 5/3  - Likely due to pneumonia vs ADHF  - cw infectious workup and plan as below    Vent Settings: 380/14/50/5    GI/NUTRITION:    #Transaminitis  - LE elevate on admission  - may be due to congestive hepatopathy, CTM  - ctm    Diet: Tube feeds    RENAL:   - Monitor urine output, may need IVF boluses if drops      ID:   #Sepsis  - Pt meets sepsis criteria due to tachypnea and tachycardia  - Source may be PNA, but infectious workup still pending  - RVP negative  - UA without infection  - f/u MRSA swab, blood cx, UCx,   - cw vanc by level (will vanc level as pt received level before MICU admission) (5/3 - )  - cw zosyn (5/3 - )    HEME:  - Maintain active type and screen    ENDOCRINE:  - Monitor finger sticks     DVT PPX:  - lovenox     ETHICS  - Pt is a goins of the state? Social work following

## 2024-05-03 NOTE — ED PROVIDER NOTE - ATTENDING CONTRIBUTION TO CARE
The patient is a 89y Female who has a past medical and surgery history of Dementia schizophrenia HLD HTN macrocytic anemia transaminitis DJD and compression fractures of thoracic spine Lt THR Gait difficulty Sacral ulcer Pulmonary nodules PTED from [facility] [place] with shortness of breath hypoxia into the 80s and low 90s on room air at her nursing. Pox noted to be 68% by EMS on NRFM and received to room 17 at time of arrival       Vital Signs Last 24 Hrs   130/ 86 mm Hg BP 84 /min RR 24 /min Temp (oral) 97.4 Degrees F SPO2 98 %NRFM   PE: as described; my additions and exceptions are noted in the chart      DATA:   EKG: pending at time of evaluation   LAB: Pending at time of evaluation     IMPRESSION/RISK:  Dx= Sepsis    Consideration include: Most likely pulmonary but hypoxia could also reflect hypoperfusion    Plan   Judicious fluids  Antibiotics  Blood, urine cultures, CXR RVP   lactate initial and followup VBG for ventilatory status and acid base determination  Keep Pox> 90, HR in 90's, SBP >90 (MAP>60) and 1/2 cc/kg/hr urine output  Patient currently full code   Admit  Reassess CRITICAL CARE TIME WAS NECESSARY TO TREAT OR PREVENT LIFE THREATENING DETERIORATION FROM THE FOLLOWING CONDITIONS:  [  ] Heart Failure and/or Circulatory Collapse and or MI/NSTEMI  [X  ] Sepsis [ X ] Respiratory failure  [  ]CNS Failure or Compromise    [ X ]Dehydration [  ]Renal Failure [  ]Hepatic Failure Sepsis [  ]Endocrine Crisis  [ X ]Metabolic Crisis  [  ]Toxidrome   [  ]Trauma    CRITICAL CARE TIME WAS SPENT BY ME IN THE FOLLOWING ACTIVITIES:  [X  ] Performance of the History and Physical Examination [X  ] Review of Old Charts [  X ] IV Access and or Obtaining Necessary Diagnostic Tests   [X  ] Ordering and Performing Treatments and Interventions:   Specifically:  [ X ] Ventilator Management [  ] Vascular Access Procedures [  ] NGT Placement  [  ] Transcutaneous Pacing  [ X ] Establishment of Goals of Care with the Patient or Their Designated Representative  [X  ] Developing and Evaluating Treatment Plan with Consultants and/or the Primary Provider  [ X ] Serial Reevaluation of the Patients Condition and Response to Therapeutic Measures   Specifically: [ X ] Interpretation of Cardiac and Respiratory Parameters  [X  ]Ordering and Interpretating  Diagnostic Studies    Comments:  The patient is a 89y Female who has a past medical and surgery history of Dementia schizophrenia HLD HTN macrocytic anemia transaminitis DJD and compression fractures of thoracic spine Lt THR Gait difficulty Sacral ulcer Pulmonary nodules PTED from [facility] with shortness of breath hypoxia into the 80s and low 90s on room air at her nursing. Pox noted to be 68% by EMS on NRFM and received to room 17 at time of arrival       Vital Signs Last 24 Hrs   130/ 86 mm Hg BP 84 /min RR 24 /min Temp (oral) 97.4 Degrees F SPO2 98 %NRFM   PE: as described; my additions and exceptions are noted in the chart      DATA:   EKG: pending at time of evaluation   LAB: Pending at time of evaluation     IMPRESSION/RISK:  Dx= Sepsis    Consideration include: Most likely pulmonary but hypoxia could also reflect hypoperfusion    Plan   Judicious fluids  Antibiotics  Blood, urine cultures, CXR RVP   lactate initial and followup VBG for ventilatory status and acid base determination  Keep Pox> 90, HR in 90's, SBP >90 (MAP>60) and 1/2 cc/kg/hr urine output  Patient currently full code   Admit  Reassess

## 2024-05-03 NOTE — ED PROCEDURE NOTE - ATTENDING CONTRIBUTION TO CARE
Pre-operative risk assessment. Patient is intermediate cardiac risk based on RCRI score of 1 (CHF). Patient's risk should not preclude him from proceeding with surgery. Suggest continuation of B-blocker and statin in the lisy-operative period. Aspirin may be held periprocedurally. I supervised the performance of the procedure throughout its course. Attending MD Nick: Risks, benefit and alternatives of procedure explained to patient and patient demonstrated verbal understanding and consent.  I was present during the key portions of the procedure. Patient tolerated procedure well without complications

## 2024-05-03 NOTE — PROGRESS NOTE ADULT - SUBJECTIVE AND OBJECTIVE BOX
CHIEF COMPLAINT:    SUBJECTIVE:     REVIEW OF SYSTEMS:    CONSTITUTIONAL: (  )  weakness,  (  ) fevers or chills  EYES/ENT: (  )visual changes;     NECK: (  ) pain or stiffness  RESPIRATORY:   (  )cough, wheezing, hemoptysis;  (  ) shortness of breath  CARDIOVASCULAR:  (  )chest pain or palpitations  GASTROINTESTINAL:   (  )abdominal or epigastric pain.  (  ) nausea, vomiting, or hematemesis;   (   ) diarrhea or constipation.   GENITOURINARY:   (    ) dysuria, frequency or hematuria  NEUROLOGICAL:  (   ) numbness or weakness   All other review of systems is negative unless indicated above    Vital Signs Last 24 Hrs  T(C): 35.8 (03 May 2024 08:00), Max: 36.5 (03 May 2024 05:48)  T(F): 96.4 (03 May 2024 08:00), Max: 97.7 (03 May 2024 05:48)  HR: 104 (03 May 2024 11:17) (77 - 105)  BP: 89/66 (03 May 2024 11:00) (81/60 - 168/114)  BP(mean): 74 (03 May 2024 11:00) (69 - 134)  RR: 14 (03 May 2024 11:00) (14 - 25)  SpO2: 100% (03 May 2024 11:17) (97% - 100%)    Parameters below as of 03 May 2024 08:00  Patient On (Oxygen Delivery Method): ventilator, AC14  P5    O2 Concentration (%): 16    I&O's Summary    02 May 2024 07:01  -  03 May 2024 07:00  --------------------------------------------------------  IN: 111.8 mL / OUT: 150 mL / NET: -38.2 mL    03 May 2024 07:01  -  03 May 2024 12:23  --------------------------------------------------------  IN: 22.4 mL / OUT: 145 mL / NET: -122.6 mL        CAPILLARY BLOOD GLUCOSE          PHYSICAL EXAM:    Constitutional:  (   ) NAD,   (   )awake and alert  HEENT: PERR, EOMI,    Neck: Soft and supple, No LAD, No JVD  Respiratory:  (    Breath sounds are clear bilaterally,    (   ) wheezing, rales or rhonchi  Cardiovascular:     (   )S1 and S2, regular rate and rhythm, no Murmurs, gallops or rubs  Gastrointestinal:  (   )Bowel Sounds present, soft,   (  )nontender, nondistended,    Extremities:    (  ) peripheral edema  Vascular: 2+ peripheral pulses  Neurological:    (    )A/O x 3,   (  ) focal deficits  Musculoskeletal:    (   )  normal strength b/l upper  (     ) normal  lower extremities  Skin: No rashes    MEDICATIONS:  MEDICATIONS  (STANDING):  chlorhexidine 0.12% Liquid 15 milliLiter(s) Oral Mucosa every 12 hours  enoxaparin Injectable 30 milliGRAM(s) SubCutaneous every 24 hours  norepinephrine Infusion 0.05 MICROgram(s)/kG/Min (4.22 mL/Hr) IV Continuous <Continuous>  piperacillin/tazobactam IVPB.. 3.375 Gram(s) IV Intermittent every 8 hours  propofol Infusion 50 MICROgram(s)/kG/Min (13.5 mL/Hr) IV Continuous <Continuous>      LABS: All Labs Reviewed:                        13.4   5.40  )-----------( 222      ( 03 May 2024 05:11 )             46.4     05-03    147<H>  |  104  |  41<H>  ----------------------------<  126<H>  5.0   |  31  |  0.62    Ca    8.9      03 May 2024 05:11  Phos  4.4     05-03  Mg     2.50     05-03    TPro  6.6  /  Alb  3.8  /  TBili  0.7  /  DBili  x   /  AST  50<H>  /  ALT  61<H>  /  AlkPhos  123<H>  05-03    PT/INR - ( 03 May 2024 05:11 )   PT: 11.8 sec;   INR: 1.06 ratio           CARDIAC MARKERS ( 03 May 2024 05:11 )  x     / x     / 28 U/L / x     / x          Blood Culture:   Urine Culture      RADIOLOGY/EKG:    ASSESSMENT AND PLAN:    DVT PPX:    ADVANCED DIRECTIVE:    DISPOSITION: CHIEF COMPLAINT:  89 yo F with pmhx of HTN, HLD, schizophrenia, dementia comes to the ED from nursing facility where she was found to be hypoxic.       In the ED, her vitals were significant for tachypnea and hypoxia with her labs showing acidosis and pCO2>100 s/p broad spectrum antibiotics. She was then intubated and is now admitted to the MICU for further management.   SUBJECTIVE:     REVIEW OF SYSTEMS:    CONSTITUTIONAL: (  )  weakness,  (  ) fevers or chills  EYES/ENT: (  )visual changes;     NECK: (  ) pain or stiffness  RESPIRATORY:   (  )cough, wheezing, hemoptysis;  (  ) shortness of breath  CARDIOVASCULAR:  (  )chest pain or palpitations  GASTROINTESTINAL:   (  )abdominal or epigastric pain.  (  ) nausea, vomiting, or hematemesis;   (   ) diarrhea or constipation.   GENITOURINARY:   (    ) dysuria, frequency or hematuria  NEUROLOGICAL:  (   ) numbness or weakness   All other review of systems is negative unless indicated above    Vital Signs Last 24 Hrs  T(C): 35.8 (03 May 2024 08:00), Max: 36.5 (03 May 2024 05:48)  T(F): 96.4 (03 May 2024 08:00), Max: 97.7 (03 May 2024 05:48)  HR: 104 (03 May 2024 11:17) (77 - 105)  BP: 89/66 (03 May 2024 11:00) (81/60 - 168/114)  BP(mean): 74 (03 May 2024 11:00) (69 - 134)  RR: 14 (03 May 2024 11:00) (14 - 25)  SpO2: 100% (03 May 2024 11:17) (97% - 100%)    Parameters below as of 03 May 2024 08:00  Patient On (Oxygen Delivery Method): ventilator, AC14  P5    O2 Concentration (%): 16    I&O's Summary    02 May 2024 07:01  -  03 May 2024 07:00  --------------------------------------------------------  IN: 111.8 mL / OUT: 150 mL / NET: -38.2 mL    03 May 2024 07:01  -  03 May 2024 12:23  --------------------------------------------------------  IN: 22.4 mL / OUT: 145 mL / NET: -122.6 mL        CAPILLARY BLOOD GLUCOSE          PHYSICAL EXAM:        VITALS:   T(C): 36.3 (05-03-24 @ 02:52), Max: 36.3 (05-03-24 @ 02:52)  HR: 81 (05-03-24 @ 04:28) (77 - 95)  BP: 118/98 (05-03-24 @ 04:28) (81/60 - 168/114)  RR: 25 (05-03-24 @ 04:28) (23 - 25)  SpO2: 100% (05-03-24 @ 04:28) (97% - 100%)    GENERAL: intubated and sedated  CHEST/LUNG: Crackles appreciated  HEART: Regular rate and rhythm; No murmurs, rubs, or gallops  ABDOMEN: BSx4; Soft, nontender, nondistended  EXTREMITIES:  2+ Peripheral Pulses, brisk capillary refill. No clubbing, cyanosis, or edema    Constitutional:  (   ) NAD,   (   )awake and alert  HEENT: PERR, EOMI,    Neck: Soft and supple, No LAD, No JVD  Respiratory:  (    Breath sounds are clear bilaterally,    (   ) wheezing, rales or rhonchi  Cardiovascular:     (   )S1 and S2, regular rate and rhythm, no Murmurs, gallops or rubs  Gastrointestinal:  (   )Bowel Sounds present, soft,   (  )nontender, nondistended,    Extremities:    (  ) peripheral edema  Vascular: 2+ peripheral pulses  Neurological:    (    )A/O x 3,   (  ) focal deficits  Musculoskeletal:    (   )  normal strength b/l upper  (     ) normal  lower extremities  Skin: No rashes    MEDICATIONS:  MEDICATIONS  (STANDING):  chlorhexidine 0.12% Liquid 15 milliLiter(s) Oral Mucosa every 12 hours  enoxaparin Injectable 30 milliGRAM(s) SubCutaneous every 24 hours  norepinephrine Infusion 0.05 MICROgram(s)/kG/Min (4.22 mL/Hr) IV Continuous <Continuous>  piperacillin/tazobactam IVPB.. 3.375 Gram(s) IV Intermittent every 8 hours  propofol Infusion 50 MICROgram(s)/kG/Min (13.5 mL/Hr) IV Continuous <Continuous>      LABS: All Labs Reviewed:                        13.4   5.40  )-----------( 222      ( 03 May 2024 05:11 )             46.4     05-03    147<H>  |  104  |  41<H>  ----------------------------<  126<H>  5.0   |  31  |  0.62    Ca    8.9      03 May 2024 05:11  Phos  4.4     05-03  Mg     2.50     05-03    TPro  6.6  /  Alb  3.8  /  TBili  0.7  /  DBili  x   /  AST  50<H>  /  ALT  61<H>  /  AlkPhos  123<H>  05-03    PT/INR - ( 03 May 2024 05:11 )   PT: 11.8 sec;   INR: 1.06 ratio           CARDIAC MARKERS ( 03 May 2024 05:11 )  x     / x     / 28 U/L / x     / x          Blood Culture:   Urine Culture      RADIOLOGY/EKG:    ASSESSMENT AND PLAN:  89 yo F with pmhx of HTN, HLD, schizophrenia, dementia comes to the ED from nursing facility where she was found to be hypoxic. On admission, patient found to have pCO2>100 and was intubated in the ED. She is now admitted for further management.    #1 respiratory failure/ #Encephalopathy   - patient with encephalopathy on admission  - likely due to elevated pCO2     PLAN  - f/u CT head  - f/u TSH, ammonia, B12    #2 CARDIOVASCULAR: #Left ventricular dysfunction  -Keep Mg>2, K>4, P>3  - seen on TTE 2019  - repeat TTE this admission  - BNP elevated on admission  - can consider ADHF as etiology of acute illness     #3 #HTN  - hold anti-hypertensives in setting of acute illness     #4 GI/NUTRITION: Diet: Tube feeds      #5 #Transaminitis  - LE elevate on admission  - may be due to congestive hepatopathy    - #6 ID: #Sepsis  - pt meets sepsis criteria due to tachypnea and tachycardia  - source may be PNA, but infectious workup still pending    PLAN  - f/u MRSA swab, RVP, blood cx, UA (wnl), UCx,   - cw vanc by level ( will vanc level as pt received level before MICU admission)  - cw zosyn       DVT PPX:  - lovenox     5/3/2024 discuss with the team regarding patient that she was living with her sister at home with visiting her she is well known to me from the [facility] continue ICU management waiting to discuss with her guardian regarding her medical management continue IV antibiotic and sedation      DVT PPX:    ADVANCED DIRECTIVE:    DISPOSITION:

## 2024-05-03 NOTE — PATIENT PROFILE ADULT - FALL HARM RISK - FALL HARM RISK
Problem: Pain  Goal: #Acceptable pain level achieved/maintained at rest using NRS/Faces  This goal is used for patients who can self-report.  Acceptable means the level is at or below the identified comfort/function goal.  Outcome: Outcome Met, Continue evaluating goal progress toward completion   02/07/19 0042   Pain Evaluation at Rest   Patient's Comfort/Function (Pain) GOAL At Rest 2   Comfort/Function (Pain) SCORE at Rest 2   Pain Evaluation at Rest Pain level acceptable - continue plan of care       Problem: At Risk for Falls  Goal: # Takes action to control fall-related risks  Outcome: Outcome Met, Continue evaluating goal progress toward completion  Call light within reach, up in chair, up with assist of 2. Continue hourly rounding.    02/07/19 0043   Jones Fall Scale   Participates in Fall Prevention Activities Yes          Age

## 2024-05-03 NOTE — H&P ADULT - ATTENDING COMMENTS
89 yo F with pmhx of HTN, HLD, schizophrenia, dementia comes to the ED from nursing facility where she was found to be hypoxic. Patient intubated in the ED for hypoxic/hypercapnic respiratory failure, likely 2/2 PNA.   vent support, titrate FiO2 to maintain sat >92  sedated with propofol  septic shock on levophed, titrate to MAP>65  pancx, Abx, MRSA swab, legionella an, procalcitonin, RVP   TTE, trend CE  celeste strict I and O   DVT ppx lovenox  Full code   overall guarded prognosis 89 yo F with pmhx of HTN, HLD, schizophrenia, dementia comes to the ED from nursing facility where she was found to be hypoxic. Patient intubated in the ED for hypoxic/hypercapnic respiratory failure, likely 2/2 PNA.   vent support, titrate FiO2 to maintain sat >92  sedated with propofol  septic shock on levophed, titrate to MAP>65  pancx, Abx, MRSA swab, legionella an, procalcitonin, RVP   CT head, Ct chest   TTE, trend CE  celeste strict I and O   DVT ppx lovenox  Full code   overall guarded prognosis Chest pain

## 2024-05-03 NOTE — PROGRESS NOTE ADULT - SUBJECTIVE AND OBJECTIVE BOX
INTERVAL HPI/OVERNIGHT EVENTS:    SUBJECTIVE: Patient seen and examined at bedside.       VITAL SIGNS:  ICU Vital Signs Last 24 Hrs  T(C): 36.5 (03 May 2024 05:48), Max: 36.5 (03 May 2024 05:48)  T(F): 97.7 (03 May 2024 05:48), Max: 97.7 (03 May 2024 05:48)  HR: 105 (03 May 2024 07:20) (77 - 105)  BP: 106/76 (03 May 2024 07:00) (81/60 - 168/114)  BP(mean): 87 (03 May 2024 07:00) (69 - 134)  ABP: --  ABP(mean): --  RR: 18 (03 May 2024 07:00) (18 - 25)  SpO2: 100% (03 May 2024 07:20) (97% - 100%)    O2 Parameters below as of 03 May 2024 05:48  Patient On (Oxygen Delivery Method): ventilator    O2 Concentration (%): 50      Mode: AC/ CMV (Assist Control/ Continuous Mandatory Ventilation), RR (machine): 20, TV (machine): 380, FiO2: 50, PEEP: 5, ITime: 0.84, MAP: 12, PIP: 28  Plateau pressure:   P/F ratio:     -02 @ 07:01  -   @ 07:00  --------------------------------------------------------  IN: 111.8 mL / OUT: 150 mL / NET: -38.2 mL      CAPILLARY BLOOD GLUCOSE          PHYSICAL EXAM:     VITALS:   T(C): 36.5 (24 @ 05:48), Max: 36.5 (24 @ 05:48)  HR: 105 (24 @ 07:20) (77 - 105)  BP: 106/76 (24 @ 07:00) (81/60 - 168/114)  RR: 18 (24 @ 07:00) (18 - 25)  SpO2: 100% (24 @ 07:20) (97% - 100%)    GENERAL: NAD, lying in bed comfortably  HEAD:  Atraumatic, Normocephalic  EYES: EOMI, PERRLA, conjunctiva and sclera clear  ENT: Moist mucous membranes  NECK: Supple, No JVD  CHEST/LUNG: CTABL; No rales, rhonchi, wheezing, or rubs. Unlabored respirations  HEART: RRR. No M/R/G  ABDOMEN: Soft, nontender, non-distended, normoactive BS. No hepatomegaly  EXTREMITIES:  2+ Peripheral Pulses, brisk capillary refill. No clubbing, cyanosis, or edema  NERVOUS SYSTEM:  Alert & Oriented X3, speech clear. No deficits, CN II-XII intact. Normal sensation   MSK: FROM all 4 extremities, full and equal strength  PSYCH: Normal affect, normal speech, normal behavior  SKIN: No rashes or lesions      MEDICATIONS:  MEDICATIONS  (STANDING):  chlorhexidine 0.12% Liquid 15 milliLiter(s) Oral Mucosa every 12 hours  enoxaparin Injectable 30 milliGRAM(s) SubCutaneous every 24 hours  norepinephrine Infusion 0.05 MICROgram(s)/kG/Min (4.22 mL/Hr) IV Continuous <Continuous>  piperacillin/tazobactam IVPB.. 3.375 Gram(s) IV Intermittent every 8 hours  propofol Infusion 50 MICROgram(s)/kG/Min (13.5 mL/Hr) IV Continuous <Continuous>    MEDICATIONS  (PRN):      ALLERGIES:  Allergies    No Known Allergies    Intolerances        LABS:                        13.4   5.40  )-----------( 222      ( 03 May 2024 05:11 )             46.4     05-03    147<H>  |  104  |  41<H>  ----------------------------<  126<H>  5.0   |  31  |  0.62    Ca    8.9      03 May 2024 05:11  Phos  4.4     05-03  Mg     2.50     05-03    TPro  6.6  /  Alb  3.8  /  TBili  0.7  /  DBili  x   /  AST  50<H>  /  ALT  61<H>  /  AlkPhos  123<H>  05-03    PT/INR - ( 03 May 2024 05:11 )   PT: 11.8 sec;   INR: 1.06 ratio           Urinalysis Basic - ( 03 May 2024 05:11 )    Color: Dark Yellow / Appearance: Clear / S.027 / pH: x  Gluc: 126 mg/dL / Ketone: Negative mg/dL  / Bili: Small / Urobili: 1.0 mg/dL   Blood: x / Protein: 100 mg/dL / Nitrite: Negative   Leuk Esterase: Negative / RBC: 1 /HPF / WBC 1 /HPF   Sq Epi: x / Non Sq Epi: 1 /HPF / Bacteria: Negative /HPF        IMAGING:    EKG: SUBJECTIVE: Patient seen and examined at bedside. Biting down on ETT, moving extremities spontaneously      VITAL SIGNS:  ICU Vital Signs Last 24 Hrs  T(C): 36.5 (03 May 2024 05:48), Max: 36.5 (03 May 2024 05:48)  T(F): 97.7 (03 May 2024 05:48), Max: 97.7 (03 May 2024 05:48)  HR: 105 (03 May 2024 07:20) (77 - 105)  BP: 106/76 (03 May 2024 07:00) (81/60 - 168/114)  BP(mean): 87 (03 May 2024 07:00) (69 - 134)  ABP: --  ABP(mean): --  RR: 18 (03 May 2024 07:00) (18 - 25)  SpO2: 100% (03 May 2024 07:20) (97% - 100%)    O2 Parameters below as of 03 May 2024 05:48  Patient On (Oxygen Delivery Method): ventilator    O2 Concentration (%): 50      Mode: AC/ CMV (Assist Control/ Continuous Mandatory Ventilation), RR (machine): 20, TV (machine): 380, FiO2: 50, PEEP: 5, ITime: 0.84, MAP: 12, PIP: 28  Plateau pressure:   P/F ratio:     - @ 07:01  -   @ 07:00  --------------------------------------------------------  IN: 111.8 mL / OUT: 150 mL / NET: -38.2 mL      CAPILLARY BLOOD GLUCOSE      PHYSICAL EXAM:     VITALS:   T(C): 36.5 (24 @ 05:48), Max: 36.5 (24 @ 05:48)  HR: 105 (24 @ 07:20) (77 - 105)  BP: 106/76 (24 @ 07:00) (81/60 - 168/114)  RR: 18 (24 @ 07:00) (18 - 25)  SpO2: 100% (24 @ 07:20) (97% - 100%)    GENERAL: intubated and sedated. Thin  CHEST/LUNG: Decreased breath sounds at lung bases  HEART: Regular rate and rhythm; No murmurs  ABDOMEN: BSx4; Soft, nontender, nondistended  EXTREMITIES:  2+ Peripheral Pulses. No LE edema      MEDICATIONS:  MEDICATIONS  (STANDING):  chlorhexidine 0.12% Liquid 15 milliLiter(s) Oral Mucosa every 12 hours  enoxaparin Injectable 30 milliGRAM(s) SubCutaneous every 24 hours  norepinephrine Infusion 0.05 MICROgram(s)/kG/Min (4.22 mL/Hr) IV Continuous <Continuous>  piperacillin/tazobactam IVPB.. 3.375 Gram(s) IV Intermittent every 8 hours  propofol Infusion 50 MICROgram(s)/kG/Min (13.5 mL/Hr) IV Continuous <Continuous>    MEDICATIONS  (PRN):      ALLERGIES:  Allergies    No Known Allergies    Intolerances        LABS:                        13.4   5.40  )-----------( 222      ( 03 May 2024 05:11 )             46.4     05-03    147<H>  |  104  |  41<H>  ----------------------------<  126<H>  5.0   |  31  |  0.62    Ca    8.9      03 May 2024 05:11  Phos  4.4     05-03  Mg     2.50     05-03    TPro  6.6  /  Alb  3.8  /  TBili  0.7  /  DBili  x   /  AST  50<H>  /  ALT  61<H>  /  AlkPhos  123<H>  05-03    PT/INR - ( 03 May 2024 05:11 )   PT: 11.8 sec;   INR: 1.06 ratio           Urinalysis Basic - ( 03 May 2024 05:11 )    Color: Dark Yellow / Appearance: Clear / S.027 / pH: x  Gluc: 126 mg/dL / Ketone: Negative mg/dL  / Bili: Small / Urobili: 1.0 mg/dL   Blood: x / Protein: 100 mg/dL / Nitrite: Negative   Leuk Esterase: Negative / RBC: 1 /HPF / WBC 1 /HPF   Sq Epi: x / Non Sq Epi: 1 /HPF / Bacteria: Negative /HPF       SUBJECTIVE: Patient seen and examined at bedside. Biting down on ETT, moving extremities spontaneously      VITAL SIGNS:  ICU Vital Signs Last 24 Hrs  T(C): 36.5 (03 May 2024 05:48), Max: 36.5 (03 May 2024 05:48)  T(F): 97.7 (03 May 2024 05:48), Max: 97.7 (03 May 2024 05:48)  HR: 105 (03 May 2024 07:20) (77 - 105)  BP: 106/76 (03 May 2024 07:00) (81/60 - 168/114)  BP(mean): 87 (03 May 2024 07:00) (69 - 134)  ABP: --  ABP(mean): --  RR: 18 (03 May 2024 07:00) (18 - 25)  SpO2: 100% (03 May 2024 07:20) (97% - 100%)    O2 Parameters below as of 03 May 2024 05:48  Patient On (Oxygen Delivery Method): ventilator    O2 Concentration (%): 50      Mode: AC/ CMV (Assist Control/ Continuous Mandatory Ventilation), RR (machine): 20, TV (machine): 380, FiO2: 50, PEEP: 5, ITime: 0.84, MAP: 12, PIP: 28  Plateau pressure:   P/F ratio:     - @ 07:01  -   @ 07:00  --------------------------------------------------------  IN: 111.8 mL / OUT: 150 mL / NET: -38.2 mL      CAPILLARY BLOOD GLUCOSE      PHYSICAL EXAM:     VITALS:   T(C): 36.5 (24 @ 05:48), Max: 36.5 (24 @ 05:48)  HR: 105 (24 @ 07:20) (77 - 105)  BP: 106/76 (24 @ 07:00) (81/60 - 168/114)  RR: 18 (24 @ 07:00) (18 - 25)  SpO2: 100% (24 @ 07:20) (97% - 100%)    GENERAL: intubated and sedated. Thin  CHEST/LUNG: Decreased breath sounds at lung bases  HEART: Regular rate and rhythm; No murmurs  ABDOMEN: BSx4; Soft, nontender, nondistended  EXTREMITIES:  2+ Peripheral Pulses. No LE edema      MEDICATIONS:  MEDICATIONS  (STANDING):  chlorhexidine 0.12% Liquid 15 milliLiter(s) Oral Mucosa every 12 hours  enoxaparin Injectable 30 milliGRAM(s) SubCutaneous every 24 hours  norepinephrine Infusion 0.05 MICROgram(s)/kG/Min (4.22 mL/Hr) IV Continuous <Continuous>  piperacillin/tazobactam IVPB.. 3.375 Gram(s) IV Intermittent every 8 hours  propofol Infusion 50 MICROgram(s)/kG/Min (13.5 mL/Hr) IV Continuous <Continuous>    MEDICATIONS  (PRN):      ALLERGIES:  Allergies    No Known Allergies    Intolerances        LABS:                        13.4   5.40  )-----------( 222      ( 03 May 2024 05:11 )             46.4     05-03    147<H>  |  104  |  41<H>  ----------------------------<  126<H>  5.0   |  31  |  0.62    Ca    8.9      03 May 2024 05:11  Phos  4.4     05-03  Mg     2.50     05-03    TPro  6.6  /  Alb  3.8  /  TBili  0.7  /  DBili  x   /  AST  50<H>  /  ALT  61<H>  /  AlkPhos  123<H>  05-03    PT/INR - ( 03 May 2024 05:11 )   PT: 11.8 sec;   INR: 1.06 ratio           Urinalysis Basic - ( 03 May 2024 05:11 )    Color: Dark Yellow / Appearance: Clear / S.027 / pH: x  Gluc: 126 mg/dL / Ketone: Negative mg/dL  / Bili: Small / Urobili: 1.0 mg/dL   Blood: x / Protein: 100 mg/dL / Nitrite: Negative   Leuk Esterase: Negative / RBC: 1 /HPF / WBC 1 /HPF   Sq Epi: x / Non Sq Epi: 1 /HPF / Bacteria: Negative /HPF      < from: TTE W or WO Ultrasound Enhancing Agent (24 @ 10:02) >  CONCLUSIONS:      1. Technically difficult image quality.   2. The left ventricle was not well visualized. Unable to evaluate the ejection fraction. The left ventricular cavity is normal in size. Left ventricular wall thickness is normal. There is normal LV mass and concentric remodeling. Left ventricular systolic function grossly appears reduced. The LV apex appears hypokinetic.   3. Normal right ventricular cavity size and probably normal systolic function.   4. Structurally normal mitral valve with normal leaflet excursion. There is calcification of the mitral valve annulus. There is mild to moderate mitral regurgitation.    < end of copied text >    < from: CT Chest w/ IV Cont (24 @ 05:29) >  IMPRESSION:    Atelectasis in both lower lobes.    Unchanged nodules in the middle lobe and lingula which likely reflect   nodular atelectasis.    Trace pleural effusions.    Unchanged dilated 4.5 cm ascending aorta.    < end of copied text >

## 2024-05-03 NOTE — ED PROVIDER NOTE - CLINICAL SUMMARY MEDICAL DECISION MAKING FREE TEXT BOX
89-year female with history of HTN, HLD, schizophrenia, dementia presenting with shortness of breath from the nursing home.  Patient profoundly hypoxic. Patient lethargic, sleepy appearing at triage.  Concern for systemic infection.  Labs, EKG, CXR, UA/urine culture, blood cultures, Vanco/cefepime/azithromycin ordered.  Given severity of hypoxia patient will require admission to the hospital.

## 2024-05-03 NOTE — PATIENT PROFILE ADULT - STATED REASON FOR ADMISSION
51 YOM with HTN, Hep C, CAD s/p MI, with sigmoid mass with colovesical fistula 3 Days Post-Op s/p sigmoidectomy with DLI and partial cystectomy with bladder repair and ureteral re-implant    Management per colorectal  Check SONNY creatinine today  Maintain mcnally for 2 weeks. Will obtain cystogram at that time (3/28) whether inpatient or outpatient  Continue oxybutynin for bladder spasms   Pt unable to respond at this time

## 2024-05-03 NOTE — H&P ADULT - HISTORY OF PRESENT ILLNESS
89 yo F with pmhx of HTN, HLD, schizophrenia, dementia comes to the ED from nursing facility where she was found to be hypoxic.       In the ED, her vitals were significant for tachypnea and hypoxia with her labs showing acidosis and pCO2>100 s/p broad spectrum antibiotics. She was then intubated and is now admitted to the MICU for further management.  89 yo F with pmhx of HTN, HLD, schizophrenia, dementia comes to the ED from nursing facility where she was found to be hypoxic.       In the ED, her vitals were significant for tachypnea and hypoxia with her labs showing acidosis and pCO2>100 s/p broad spectrum antibiotics. She was then intubated and is now admitted to the MICU for further management.

## 2024-05-03 NOTE — CHART NOTE - NSCHARTNOTEFT_GEN_A_CORE
: Haresh Slaughter    INDICATION:     PROCEDURE:  [X] LIMITED ECHO  [X] LIMITED CHEST  [] LIMITED RETROPERITONEAL  [] LIMITED ABDOMINAL  [] LIMITED DVT  [] NEEDLE GUIDANCE VASCULAR  [] NEEDLE GUIDANCE THORACENTESIS  [] NEEDLE GUIDANCE PARACENTESIS  [] NEEDLE GUIDANCE PERICARDIOCENTESIS  [] OTHER    FINDINGS/INTERPRETATION:    Cardiac  Findings: Poor windows, unable to evaluate    Lung  Findings: a line predominant patter b/l. Bilateral basilar consolidations R>L  Interpretation: B/l consolidations concerning for pneumonia    Images uploaded to [employer] : Mario Medina    INDICATION:   respiratory failure  PROCEDURE:  [] LIMITED ECHO  [X] LIMITED CHEST  [] LIMITED RETROPERITONEAL  [] LIMITED ABDOMINAL  [] LIMITED DVT  [] NEEDLE GUIDANCE VASCULAR  [] NEEDLE GUIDANCE THORACENTESIS  [] NEEDLE GUIDANCE PARACENTESIS  [] NEEDLE GUIDANCE PERICARDIOCENTESIS  [] OTHER    FINDINGS/INTERPRETATION:    Cardiac  Findings: Poor windows, unable to evaluate    Lung  Findings: a line predominant patter b/l. Bilateral basilar consolidations R>L  Interpretation: B/l consolidations concerning for pneumonia    Images uploaded to [email or web address]    I have assisted and supervised entire procedure.     Mario Lane MD

## 2024-05-03 NOTE — ED ADULT TRIAGE NOTE - CHIEF COMPLAINT QUOTE
Downtime Recovery: (2220) Pt from [facility], presents to ED with respiratory distress, per EMS pt satting 68% on 3L NC. Arrived on 10L Nonrebreather with improvement to 98%. Hx HTN, HLD, rib fractures. Charge RN made aware, pt directed to rm 17.

## 2024-05-03 NOTE — CHART NOTE - NSCHARTNOTEFT_GEN_A_CORE
Discussed with patients sister. The patient has a guardian, Jaciel Jara [phone]. Jaciel is a  who became guardian after working with the patient. Attempted to call number above but went to the law office.

## 2024-05-04 LAB
ADD ON TEST-SPECIMEN IN LAB: SIGNIFICANT CHANGE UP
ALBUMIN SERPL ELPH-MCNC: 3 G/DL — LOW (ref 3.3–5)
ALP SERPL-CCNC: 93 U/L — SIGNIFICANT CHANGE UP (ref 40–120)
ALT FLD-CCNC: 36 U/L — HIGH (ref 4–33)
ANION GAP SERPL CALC-SCNC: 8 MMOL/L — SIGNIFICANT CHANGE UP (ref 7–14)
APTT BLD: 29.6 SEC — SIGNIFICANT CHANGE UP (ref 24.5–35.6)
AST SERPL-CCNC: 28 U/L — SIGNIFICANT CHANGE UP (ref 4–32)
BASE EXCESS BLDV CALC-SCNC: 10 MMOL/L — HIGH (ref -2–3)
BASE EXCESS BLDV CALC-SCNC: 7.9 MMOL/L — HIGH (ref -2–3)
BASE EXCESS BLDV CALC-SCNC: 8.1 MMOL/L — HIGH (ref -2–3)
BILIRUB DIRECT SERPL-MCNC: 0.4 MG/DL — HIGH (ref 0–0.3)
BILIRUB INDIRECT FLD-MCNC: 1.4 MG/DL — HIGH (ref 0–1)
BILIRUB SERPL-MCNC: 1.8 MG/DL — HIGH (ref 0.2–1.2)
BLD GP AB SCN SERPL QL: NEGATIVE — SIGNIFICANT CHANGE UP
BLOOD GAS VENOUS COMPREHENSIVE RESULT: SIGNIFICANT CHANGE UP
BUN SERPL-MCNC: 31 MG/DL — HIGH (ref 7–23)
CALCIUM SERPL-MCNC: 7.8 MG/DL — LOW (ref 8.4–10.5)
CHLORIDE BLDV-SCNC: 102 MMOL/L — SIGNIFICANT CHANGE UP (ref 96–108)
CHLORIDE SERPL-SCNC: 105 MMOL/L — SIGNIFICANT CHANGE UP (ref 98–107)
CO2 BLDV-SCNC: 31.9 MMOL/L — HIGH (ref 22–26)
CO2 BLDV-SCNC: 34.5 MMOL/L — HIGH (ref 22–26)
CO2 BLDV-SCNC: 34.9 MMOL/L — HIGH (ref 22–26)
CO2 SERPL-SCNC: 28 MMOL/L — SIGNIFICANT CHANGE UP (ref 22–31)
CREAT SERPL-MCNC: 0.56 MG/DL — SIGNIFICANT CHANGE UP (ref 0.5–1.3)
CULTURE RESULTS: SIGNIFICANT CHANGE UP
EGFR: 83 ML/MIN/1.73M2 — SIGNIFICANT CHANGE UP
GAS PNL BLDV: 134 MMOL/L — LOW (ref 136–145)
GAS PNL BLDV: 134 MMOL/L — LOW (ref 136–145)
GAS PNL BLDV: 136 MMOL/L — SIGNIFICANT CHANGE UP (ref 136–145)
GLUCOSE BLDV-MCNC: 132 MG/DL — HIGH (ref 70–99)
GLUCOSE BLDV-MCNC: 132 MG/DL — HIGH (ref 70–99)
GLUCOSE BLDV-MCNC: 154 MG/DL — HIGH (ref 70–99)
GLUCOSE SERPL-MCNC: 134 MG/DL — HIGH (ref 70–99)
HCO3 BLDV-SCNC: 31 MMOL/L — HIGH (ref 22–29)
HCO3 BLDV-SCNC: 33 MMOL/L — HIGH (ref 22–29)
HCO3 BLDV-SCNC: 33 MMOL/L — HIGH (ref 22–29)
HCT VFR BLD CALC: 42.1 % — SIGNIFICANT CHANGE UP (ref 34.5–45)
HCT VFR BLDA CALC: 40 % — SIGNIFICANT CHANGE UP (ref 34.5–46.5)
HCT VFR BLDA CALC: 41 % — SIGNIFICANT CHANGE UP (ref 34.5–46.5)
HCT VFR BLDA CALC: 43 % — SIGNIFICANT CHANGE UP (ref 34.5–46.5)
HGB BLD CALC-MCNC: 13.2 G/DL — SIGNIFICANT CHANGE UP (ref 11.7–16.1)
HGB BLD CALC-MCNC: 13.7 G/DL — SIGNIFICANT CHANGE UP (ref 11.7–16.1)
HGB BLD CALC-MCNC: 14.3 G/DL — SIGNIFICANT CHANGE UP (ref 11.7–16.1)
HGB BLD-MCNC: 13.3 G/DL — SIGNIFICANT CHANGE UP (ref 11.5–15.5)
INR BLD: 1.18 RATIO — SIGNIFICANT CHANGE UP (ref 0.85–1.18)
LACTATE BLDV-MCNC: 1.8 MMOL/L — SIGNIFICANT CHANGE UP (ref 0.5–2)
LACTATE BLDV-MCNC: 1.9 MMOL/L — SIGNIFICANT CHANGE UP (ref 0.5–2)
LACTATE BLDV-MCNC: 2 MMOL/L — SIGNIFICANT CHANGE UP (ref 0.5–2)
MAGNESIUM SERPL-MCNC: 1.9 MG/DL — SIGNIFICANT CHANGE UP (ref 1.6–2.6)
MCHC RBC-ENTMCNC: 31.6 GM/DL — LOW (ref 32–36)
MCHC RBC-ENTMCNC: 32.9 PG — SIGNIFICANT CHANGE UP (ref 27–34)
MCV RBC AUTO: 104.2 FL — HIGH (ref 80–100)
NRBC # BLD: 0 /100 WBCS — SIGNIFICANT CHANGE UP (ref 0–0)
NRBC # FLD: 0 K/UL — SIGNIFICANT CHANGE UP (ref 0–0)
PCO2 BLDV: 36 MMHG — LOW (ref 39–52)
PCO2 BLDV: 39 MMHG — SIGNIFICANT CHANGE UP (ref 39–52)
PCO2 BLDV: 48 MMHG — SIGNIFICANT CHANGE UP (ref 39–52)
PH BLDV: 7.45 — HIGH (ref 7.32–7.43)
PH BLDV: 7.54 — HIGH (ref 7.32–7.43)
PH BLDV: 7.54 — HIGH (ref 7.32–7.43)
PHOSPHATE SERPL-MCNC: 1.3 MG/DL — LOW (ref 2.5–4.5)
PLATELET # BLD AUTO: 173 K/UL — SIGNIFICANT CHANGE UP (ref 150–400)
PO2 BLDV: 57 MMHG — HIGH (ref 25–45)
PO2 BLDV: 62 MMHG — HIGH (ref 25–45)
PO2 BLDV: 92 MMHG — HIGH (ref 25–45)
POTASSIUM BLDV-SCNC: 3.8 MMOL/L — SIGNIFICANT CHANGE UP (ref 3.5–5.1)
POTASSIUM BLDV-SCNC: 3.9 MMOL/L — SIGNIFICANT CHANGE UP (ref 3.5–5.1)
POTASSIUM BLDV-SCNC: 3.9 MMOL/L — SIGNIFICANT CHANGE UP (ref 3.5–5.1)
POTASSIUM SERPL-MCNC: 3.9 MMOL/L — SIGNIFICANT CHANGE UP (ref 3.5–5.3)
POTASSIUM SERPL-SCNC: 3.9 MMOL/L — SIGNIFICANT CHANGE UP (ref 3.5–5.3)
PROT SERPL-MCNC: 5.3 G/DL — LOW (ref 6–8.3)
PROTHROM AB SERPL-ACNC: 13.2 SEC — HIGH (ref 9.5–13)
RBC # BLD: 4.04 M/UL — SIGNIFICANT CHANGE UP (ref 3.8–5.2)
RBC # FLD: 14.6 % — HIGH (ref 10.3–14.5)
RH IG SCN BLD-IMP: POSITIVE — SIGNIFICANT CHANGE UP
SAO2 % BLDV: 87.8 % — SIGNIFICANT CHANGE UP (ref 67–88)
SAO2 % BLDV: 91.5 % — HIGH (ref 67–88)
SAO2 % BLDV: 97.3 % — HIGH (ref 67–88)
SODIUM SERPL-SCNC: 141 MMOL/L — SIGNIFICANT CHANGE UP (ref 135–145)
SPECIMEN SOURCE: SIGNIFICANT CHANGE UP
WBC # BLD: 4.93 K/UL — SIGNIFICANT CHANGE UP (ref 3.8–10.5)
WBC # FLD AUTO: 4.93 K/UL — SIGNIFICANT CHANGE UP (ref 3.8–10.5)

## 2024-05-04 PROCEDURE — 71045 X-RAY EXAM CHEST 1 VIEW: CPT | Mod: 26

## 2024-05-04 PROCEDURE — 99291 CRITICAL CARE FIRST HOUR: CPT

## 2024-05-04 RX ORDER — AZITHROMYCIN 500 MG/1
500 TABLET, FILM COATED ORAL ONCE
Refills: 0 | Status: COMPLETED | OUTPATIENT
Start: 2024-05-04 | End: 2024-05-04

## 2024-05-04 RX ORDER — AZITHROMYCIN 500 MG/1
500 TABLET, FILM COATED ORAL EVERY 24 HOURS
Refills: 0 | Status: DISCONTINUED | OUTPATIENT
Start: 2024-05-05 | End: 2024-05-06

## 2024-05-04 RX ORDER — SODIUM CHLORIDE 9 MG/ML
500 INJECTION, SOLUTION INTRAVENOUS ONCE
Refills: 0 | Status: COMPLETED | OUTPATIENT
Start: 2024-05-04 | End: 2024-05-04

## 2024-05-04 RX ORDER — AZITHROMYCIN 500 MG/1
TABLET, FILM COATED ORAL
Refills: 0 | Status: DISCONTINUED | OUTPATIENT
Start: 2024-05-04 | End: 2024-05-06

## 2024-05-04 RX ORDER — POTASSIUM PHOSPHATE, MONOBASIC POTASSIUM PHOSPHATE, DIBASIC 236; 224 MG/ML; MG/ML
30 INJECTION, SOLUTION INTRAVENOUS ONCE
Refills: 0 | Status: DISCONTINUED | OUTPATIENT
Start: 2024-05-04 | End: 2024-05-04

## 2024-05-04 RX ORDER — POTASSIUM CHLORIDE 20 MEQ
20 PACKET (EA) ORAL ONCE
Refills: 0 | Status: COMPLETED | OUTPATIENT
Start: 2024-05-04 | End: 2024-05-04

## 2024-05-04 RX ORDER — CHLORHEXIDINE GLUCONATE 213 G/1000ML
15 SOLUTION TOPICAL EVERY 12 HOURS
Refills: 0 | Status: DISCONTINUED | OUTPATIENT
Start: 2024-05-04 | End: 2024-05-06

## 2024-05-04 RX ADMIN — Medication 85 MILLIMOLE(S): at 03:54

## 2024-05-04 RX ADMIN — Medication 4.22 MICROGRAM(S)/KG/MIN: at 22:29

## 2024-05-04 RX ADMIN — Medication 20 MILLIEQUIVALENT(S): at 05:00

## 2024-05-04 RX ADMIN — CHLORHEXIDINE GLUCONATE 15 MILLILITER(S): 213 SOLUTION TOPICAL at 17:46

## 2024-05-04 RX ADMIN — PIPERACILLIN AND TAZOBACTAM 25 GRAM(S): 4; .5 INJECTION, POWDER, LYOPHILIZED, FOR SOLUTION INTRAVENOUS at 05:00

## 2024-05-04 RX ADMIN — PIPERACILLIN AND TAZOBACTAM 25 GRAM(S): 4; .5 INJECTION, POWDER, LYOPHILIZED, FOR SOLUTION INTRAVENOUS at 14:15

## 2024-05-04 RX ADMIN — SODIUM CHLORIDE 500 MILLILITER(S): 9 INJECTION, SOLUTION INTRAVENOUS at 02:59

## 2024-05-04 RX ADMIN — PIPERACILLIN AND TAZOBACTAM 25 GRAM(S): 4; .5 INJECTION, POWDER, LYOPHILIZED, FOR SOLUTION INTRAVENOUS at 22:29

## 2024-05-04 RX ADMIN — Medication 4.22 MICROGRAM(S)/KG/MIN: at 07:54

## 2024-05-04 RX ADMIN — CHLORHEXIDINE GLUCONATE 15 MILLILITER(S): 213 SOLUTION TOPICAL at 05:00

## 2024-05-04 RX ADMIN — DEXMEDETOMIDINE HYDROCHLORIDE IN 0.9% SODIUM CHLORIDE 4.93 MICROGRAM(S)/KG/HR: 4 INJECTION INTRAVENOUS at 07:53

## 2024-05-04 RX ADMIN — AZITHROMYCIN 255 MILLIGRAM(S): 500 TABLET, FILM COATED ORAL at 09:32

## 2024-05-04 RX ADMIN — ENOXAPARIN SODIUM 30 MILLIGRAM(S): 100 INJECTION SUBCUTANEOUS at 05:03

## 2024-05-04 RX ADMIN — DEXMEDETOMIDINE HYDROCHLORIDE IN 0.9% SODIUM CHLORIDE 4.93 MICROGRAM(S)/KG/HR: 4 INJECTION INTRAVENOUS at 22:29

## 2024-05-04 RX ADMIN — CHLORHEXIDINE GLUCONATE 1 APPLICATION(S): 213 SOLUTION TOPICAL at 11:34

## 2024-05-04 NOTE — PROGRESS NOTE ADULT - ASSESSMENT
89 yo F with pmhx of HTN, HLD, schizophrenia, dementia comes to the ED from nursing facility where she was found to be hypoxic. On admission, patient found to have pCO2>100 and was intubated in the ED for respiratory failure, likely ISO pneumonia.       NEURO:  Baseline: Unclear    #Encephalopathy   - patient with encephalopathy on admission  - likely due to elevated pCO2   - CT head with no acute pathology. Mild cerebral volume loss and chronic microvascular ischemic disease.       CARDIOVASCULAR:  -Keep Mg>2, K>4, P>3    #Left ventricular dysfunction  - Segmental dysfunction on TTE 2019  - Repeat TTE with poor study  but decreased LV function  - BNP elevated on admission  - Can consider ADHF as etiology of acute illness      #HTN  - hold anti-hypertensives in setting of acute illness      HEMODYNAMICS   - On/off levophed ISO sedation      RESPIRATORY:  #Acute hypoxemic hypercapnic respiratory failure  - Intubated 5/3  - Likely due to pneumonia vs ADHF  - cw infectious workup and plan as below    Vent Settings: 380/14/50/5    GI/NUTRITION:    #Transaminitis  - LE elevate on admission  - may be due to congestive hepatopathy, CTM  - ctm    Diet: Tube feeds    RENAL:   - Monitor urine output, may need IVF boluses if drops      ID:   #Sepsis  - Pt meets sepsis criteria due to tachypnea and tachycardia  - Source may be PNA, but infectious workup still pending  - RVP negative  - UA without infection  - f/u MRSA swab, blood cx, UCx,   - cw vanc by level (will vanc level as pt received level before MICU admission) (5/3 - )  - cw zosyn (5/3 - )    HEME:  - Maintain active type and screen    ENDOCRINE:  - Monitor finger sticks     DVT PPX:  - lovenox     ETHICS  - Pt is a goins of the state? Social work following 89 yo F with pmhx of HTN, HLD, schizophrenia, dementia comes to the ED from nursing facility where she was found to be hypoxic. On admission, patient found to have pCO2>100 and was intubated in the ED for respiratory failure, likely ISO pneumonia.       NEURO:  Baseline: Unclear.    #Encephalopathy   - Patient with encephalopathy on admission  - Likely due to elevated pCO2   - CT head with no acute pathology. Mild cerebral volume loss and chronic microvascular ischemic disease.     Sedated with precedex      CARDIOVASCULAR:  -Keep Mg>2, K>4, P>3    #Left ventricular dysfunction  - Segmental dysfunction on TTE 2019  - Repeat TTE with poor study but decreased LV function  - BNP 11k on admission      #HTN  - hold anti-hypertensives in setting of acute illness      HEMODYNAMICS   - On/off levophed ISO sedation      RESPIRATORY:  #Acute hypoxemic hypercapnic respiratory failure  - Intubated 5/3  - Likely due to pneumonia vs ADHF  - cw infectious workup and plan as below    Vent Settings: 350/10/50/5. Try to wean O2 requirement    GI/NUTRITION:    #Transaminitis  - LE elevate on admission  - may be due to congestive hepatopathy, CTM  - ctm    Diet: Tube feeds    RENAL:   - Monitor urine output, may need IVF boluses if drops      ID:   #Sepsis  - Pt meets sepsis criteria due to tachypnea and tachycardia  - Source may be PNA, but infectious workup still pending  - RVP negative  - UA without infection  - f/u blood cx, UCx,   - MRSA swab negative, will DC vanc.  - cw zosyn (5/3 - )    HEME:  - Maintain active type and screen    ENDOCRINE:  - Monitor finger sticks     DVT PPX:  - Lovenox     ETHICS  - Pt is a goins of the state? Per sister, guardian is Jaciel Jara [phone] 89 yo F with pmhx of HTN, HLD, schizophrenia, dementia comes to the ED from nursing facility where she was found to be hypoxic. On admission, patient found to have pCO2>100 and was intubated in the ED for respiratory failure, likely ISO pneumonia.       NEURO:  Baseline: Unclear.    #Encephalopathy   - Patient with encephalopathy on admission  - Likely due to elevated pCO2   - CT head with no acute pathology. Mild cerebral volume loss and chronic microvascular ischemic disease.   - Sedated with precedex    #Schizophrenia  - Will clarify home meds and plan to start them to help with weaning precedex    CARDIOVASCULAR:  -Keep Mg>2, K>4, P>3    #Left ventricular dysfunction  - Segmental dysfunction on TTE 2019  - Repeat TTE with poor study but decreased LV function  - BNP 11k on admission      #HTN  - hold anti-hypertensives in setting of acute illness      HEMODYNAMICS   - On/off levophed ISO sedation      RESPIRATORY:  #Acute hypoxemic hypercapnic respiratory failure  - Intubated 5/3  - Likely due to pneumonia vs ADHF  - cw infectious workup and plan as below    Vent Settings: 350/10/50/5. Try to wean O2 requirement    GI/NUTRITION:    #Transaminitis  - LE elevate on admission  - may be due to congestive hepatopathy, CTM  - ctm    Diet: Tube feeds    RENAL:   - Monitor urine output, may need IVF boluses if drops      ID:   #Sepsis  - Pt meets sepsis criteria due to tachypnea and tachycardia  - Source may be PNA, but infectious workup still pending  - RVP negative  - UA without infection  - Blood culture 5/2 negative x2  - Urine culture 5/2 negative  - MRSA swab negative, will DC vanc.  - Zosyn (5/3 - )  - Add azithromycin (5/4-5/6). Check urine legionella    HEME:  - Maintain active type and screen    ENDOCRINE:  - Monitor finger sticks     DVT PPX:  - Lovenox     ETHICS  - Pt is a goins of the state? Per sister, guardian is Jaciel Jara [phone]. He is aware of situation and will speak to family. 89 yo F with pmhx of HTN, HLD, schizophrenia, dementia comes to the ED from nursing facility where she was found to be hypoxic. On admission, patient found to have pCO2>100 and was intubated in the ED for respiratory failure, likely ISO pneumonia.       NEURO:  Baseline: Unclear.    #Encephalopathy   - Patient with encephalopathy on admission  - Likely due to elevated pCO2   - CT head with no acute pathology. Mild cerebral volume loss and chronic microvascular ischemic disease.   - Sedated with precedex    #Schizophrenia  - Will clarify home meds and plan to start them to help with weaning precedex    CARDIOVASCULAR:  -Keep Mg>2, K>4, P>3    #Left ventricular dysfunction  - Segmental dysfunction on TTE 2019  - Repeat TTE with poor study but decreased LV function  - BNP 11k on admission      #HTN  - hold anti-hypertensives in setting of acute illness      HEMODYNAMICS   - On/off levophed ISO sedation      RESPIRATORY:  #Acute hypoxemic hypercapnic respiratory failure  - Intubated 5/3  - Likely due to pneumonia vs ADHF  - cw infectious workup and plan as below    Vent Settings: 350/10/50/5. Try to wean O2 requirement    GI/NUTRITION:    #Transaminitis  - LE elevate on admission, now resolved  - T bili elevated 5/4, continue to trend    Diet: Tube feeds    RENAL:   - Monitor urine output, may need IVF boluses if drops      ID:   #Sepsis  - Pt meets sepsis criteria due to tachypnea and tachycardia  - Source may be PNA, but infectious workup still pending  - RVP negative  - UA without infection  - Blood culture 5/2 negative x2  - Urine culture 5/2 negative  - MRSA swab negative, will DC vanc.  - Zosyn (5/3 - )  - Add azithromycin (5/4-5/6). Check urine legionella    HEME:  - Maintain active type and screen    ENDOCRINE:  - Monitor finger sticks     DVT PPX:  - Lovenox     ETHICS  - Pt is a goins of the state? Per sister, guardian is Jaciel Jara [phone]. He is aware of situation and will speak to family.

## 2024-05-04 NOTE — PROGRESS NOTE ADULT - SUBJECTIVE AND OBJECTIVE BOX
CHIEF COMPLAINT:    SUBJECTIVE:     REVIEW OF SYSTEMS:    CONSTITUTIONAL: (  )  weakness,  (  ) fevers or chills  EYES/ENT: (  )visual changes;     NECK: (  ) pain or stiffness  RESPIRATORY:   (  )cough, wheezing, hemoptysis;  (  ) shortness of breath  CARDIOVASCULAR:  (  )chest pain or palpitations  GASTROINTESTINAL:   (  )abdominal or epigastric pain.  (  ) nausea, vomiting, or hematemesis;   (   ) diarrhea or constipation.   GENITOURINARY:   (    ) dysuria, frequency or hematuria  NEUROLOGICAL:  (   ) numbness or weakness   All other review of systems is negative unless indicated above    Vital Signs Last 24 Hrs  T(C): 36.7 (04 May 2024 04:00), Max: 38.2 (03 May 2024 20:00)  T(F): 98.1 (04 May 2024 04:00), Max: 100.7 (03 May 2024 20:00)  HR: 68 (04 May 2024 07:03) (63 - 115)  BP: 135/97 (04 May 2024 07:00) (66/51 - 159/102)  BP(mean): 109 (04 May 2024 07:00) (58 - 122)  RR: 18 (04 May 2024 07:00) (10 - 18)  SpO2: 100% (04 May 2024 07:03) (98% - 100%)    Parameters below as of 04 May 2024 07:00  Patient On (Oxygen Delivery Method): ventilator    O2 Concentration (%): 50    I&O's Summary    03 May 2024 07:01  -  04 May 2024 07:00  --------------------------------------------------------  IN: 2266.7 mL / OUT: 560 mL / NET: 1706.7 mL        CAPILLARY BLOOD GLUCOSE          PHYSICAL EXAM:    Constitutional:  (   ) NAD,   (   )awake and alert  HEENT: PERR, EOMI,    Neck: Soft and supple, No LAD, No JVD  Respiratory:  (    Breath sounds are clear bilaterally,    (   ) wheezing, rales or rhonchi  Cardiovascular:     (   )S1 and S2, regular rate and rhythm, no Murmurs, gallops or rubs  Gastrointestinal:  (   )Bowel Sounds present, soft,   (  )nontender, nondistended,    Extremities:    (  ) peripheral edema  Vascular: 2+ peripheral pulses  Neurological:    (    )A/O x 3,   (  ) focal deficits  Musculoskeletal:    (   )  normal strength b/l upper  (     ) normal  lower extremities  Skin: No rashes    MEDICATIONS:  MEDICATIONS  (STANDING):  chlorhexidine 0.12% Liquid 15 milliLiter(s) Oral Mucosa every 12 hours  chlorhexidine 2% Cloths 1 Application(s) Topical daily  dexMEDEtomidine Infusion 0.5 MICROgram(s)/kG/Hr (4.93 mL/Hr) IV Continuous <Continuous>  enoxaparin Injectable 30 milliGRAM(s) SubCutaneous every 24 hours  norepinephrine Infusion 0.05 MICROgram(s)/kG/Min (4.22 mL/Hr) IV Continuous <Continuous>  piperacillin/tazobactam IVPB.. 3.375 Gram(s) IV Intermittent every 8 hours      LABS: All Labs Reviewed:                        13.3   4.93  )-----------( 173      ( 04 May 2024 00:50 )             42.1     05-04    141  |  105  |  31<H>  ----------------------------<  134<H>  3.9   |  28  |  0.56    Ca    7.8<L>      04 May 2024 00:50  Phos  1.3     05-04  Mg     1.90     05-04    TPro  6.6  /  Alb  3.8  /  TBili  0.7  /  DBili  x   /  AST  50<H>  /  ALT  61<H>  /  AlkPhos  123<H>  05-03    PT/INR - ( 04 May 2024 00:50 )   PT: 13.2 sec;   INR: 1.18 ratio         PTT - ( 04 May 2024 00:50 )  PTT:29.6 sec  CARDIAC MARKERS ( 03 May 2024 05:11 )  x     / x     / 28 U/L / x     / x          Blood Culture:   Urine Culture      RADIOLOGY/EKG:    ASSESSMENT AND PLAN:    DVT PPX:    ADVANCED DIRECTIVE:    DISPOSITION: CHIEF COMPLAINT:patient remained intubated in the  ICU bed 3 sedated discussed with ICU team regarding her medication at the nursing home she was only on Lopressor 25 mg twice a day and supplement she was not on any antipsychotic meds due to her age she has history of ?schizophrenia  with slight agitation  89 yo F with pmhx of HTN, HLD, schizophrenia, dementia comes to the ED from nursing facility where she was found to be hypoxic.       In the ED, her vitals were significant for tachypnea and hypoxia with her labs showing acidosis and pCO2>100 s/p broad spectrum antibiotics. She was then intubated and is now admitted to the MICU for further management.     SUBJECTIVE:     REVIEW OF SYSTEMS:orally intubated    CONSTITUTIONAL: (  )  weakness,  (  ) fevers or chills  EYES/ENT: (  )visual changes;     NECK: (  ) pain or stiffness  RESPIRATORY:   (  )cough, wheezing, hemoptysis;  (  ) shortness of breath  CARDIOVASCULAR:  (  )chest pain or palpitations  GASTROINTESTINAL:   (  )abdominal or epigastric pain.  (  ) nausea, vomiting, or hematemesis;   (   ) diarrhea or constipation.   GENITOURINARY:   (    ) dysuria, frequency or hematuria  NEUROLOGICAL:  (   ) numbness or weakness   All other review of systems is negative unless indicated above    Vital Signs Last 24 Hrs  T(C): 36.7 (04 May 2024 04:00), Max: 38.2 (03 May 2024 20:00)  T(F): 98.1 (04 May 2024 04:00), Max: 100.7 (03 May 2024 20:00)  HR: 68 (04 May 2024 07:03) (63 - 115)  BP: 135/97 (04 May 2024 07:00) (66/51 - 159/102)  BP(mean): 109 (04 May 2024 07:00) (58 - 122)  RR: 18 (04 May 2024 07:00) (10 - 18)  SpO2: 100% (04 May 2024 07:03) (98% - 100%)    Parameters below as of 04 May 2024 07:00  Patient On (Oxygen Delivery Method): ventilator    O2 Concentration (%): 50    I&O's Summary    03 May 2024 07:01  -  04 May 2024 07:00  --------------------------------------------------------  IN: 2266.7 mL / OUT: 560 mL / NET: 1706.7 mL        CAPILLARY BLOOD GLUCOSE          PHYSICAL EXAM:    Constitutional:  (x   ) NAD,   (  sedated )   HEENT: PERR, EOMI,    Neck: Soft and supple, No LAD, No JVD  Respiratory:  (   x Breath sounds are clear bilaterally,    (   ) wheezing, rales or rhonchi  Cardiovascular:     ( x  )S1 and S2, regular rate and rhythm, no Murmurs, gallops or rubs  Gastrointestinal:  ( x  )Bowel Sounds present, soft,   (  )nontender, nondistended,    Extremities:    (  ) peripheral edema  Vascular: 2+ peripheral pulses  Neurological:    (  x  )A/O x 0   (  ) focal deficits  Musculoskeletal:    (   )  normal strength b/l upper  (     ) normal  lower extremities  Skin: No rashes    MEDICATIONS:  MEDICATIONS  (STANDING):  chlorhexidine 0.12% Liquid 15 milliLiter(s) Oral Mucosa every 12 hours  chlorhexidine 2% Cloths 1 Application(s) Topical daily  dexMEDEtomidine Infusion 0.5 MICROgram(s)/kG/Hr (4.93 mL/Hr) IV Continuous <Continuous>  enoxaparin Injectable 30 milliGRAM(s) SubCutaneous every 24 hours  norepinephrine Infusion 0.05 MICROgram(s)/kG/Min (4.22 mL/Hr) IV Continuous <Continuous>  piperacillin/tazobactam IVPB.. 3.375 Gram(s) IV Intermittent every 8 hours      LABS: All Labs Reviewed:                        13.3   4.93  )-----------( 173      ( 04 May 2024 00:50 )             42.1     05-04    141  |  105  |  31<H>  ----------------------------<  134<H>  3.9   |  28  |  0.56    Ca    7.8<L>      04 May 2024 00:50  Phos  1.3     05-04  Mg     1.90     05-04    TPro  6.6  /  Alb  3.8  /  TBili  0.7  /  DBili  x   /  AST  50<H>  /  ALT  61<H>  /  AlkPhos  123<H>  05-03    PT/INR - ( 04 May 2024 00:50 )   PT: 13.2 sec;   INR: 1.18 ratio         PTT - ( 04 May 2024 00:50 )  PTT:29.6 sec  CARDIAC MARKERS ( 03 May 2024 05:11 )  x     / x     / 28 U/L / x     / x          Blood Culture:   Urine Culture      RADIOLOGY/EKG:    ASSESSMENT AND PLAN:  89 yo F with pmhx of HTN, HLD, schizophrenia, dementia comes to the ED from nursing facility where she was found to be hypoxic. On admission, patient found to have pCO2>100 and was intubated in the ED. She is now admitted for further management.    #1 respiratory failure/ #Encephalopathy   - patient with encephalopathy on admission  - likely due to elevated pCO2     PLAN  - f/u CT head  - f/u TSH, ammonia, B12    #2 CARDIOVASCULAR: #Left ventricular dysfunction  -Keep Mg>2, K>4, P>3  - seen on TTE 2019  - repeat TTE this admission  - BNP elevated on admission  - can consider ADHF as etiology of acute illness     #3 #HTN  - hold anti-hypertensives in setting of acute illness     #4 GI/NUTRITION: Diet: Tube feeds      #5 #Transaminitis  - LE elevate on admission  - may be due to congestive hepatopathy    - #6 ID: #Sepsis  - pt meets sepsis criteria due to tachypnea and tachycardia  - source may be PNA, but infectious workup still pending    PLAN  - f/u MRSA swab, RVP, blood cx, UA (wnl), UCx,   - cw vanc by level ( will vanc level as pt received level before MICU admission)  - cw zosyn       DVT PPX:  - lovenox     5/3/2024 discuss with the team regarding patient that she was living with her sister at home    = 5/4/2024 patient remained intubated continue above ICU management sedated possible extubation in a.m. if condition remained stable  DVT PPX:    ADVANCED DIRECTIVE:    DISPOSITION:

## 2024-05-04 NOTE — PROGRESS NOTE ADULT - SUBJECTIVE AND OBJECTIVE BOX
INTERVAL HPI/OVERNIGHT EVENTS:    SUBJECTIVE: Patient seen and examined at bedside.       VITAL SIGNS:  ICU Vital Signs Last 24 Hrs  T(C): 36.7 (04 May 2024 04:00), Max: 38.2 (03 May 2024 20:00)  T(F): 98.1 (04 May 2024 04:00), Max: 100.7 (03 May 2024 20:00)  HR: 68 (04 May 2024 07:03) (63 - 115)  BP: 135/97 (04 May 2024 07:00) (66/51 - 159/102)  BP(mean): 109 (04 May 2024 07:00) (58 - 122)  ABP: --  ABP(mean): --  RR: 18 (04 May 2024 07:00) (10 - 18)  SpO2: 100% (04 May 2024 07:03) (98% - 100%)    O2 Parameters below as of 04 May 2024 07:00  Patient On (Oxygen Delivery Method): ventilator    O2 Concentration (%): 50      Mode: AC/ CMV (Assist Control/ Continuous Mandatory Ventilation), RR (machine): 10, TV (machine): 350, FiO2: 50, PEEP: 5, ITime: 0.62, MAP: 9, PIP: 25  Plateau pressure:   P/F ratio:     05-03 @ 07:01  -  05-04 @ 07:00  --------------------------------------------------------  IN: 2266.7 mL / OUT: 560 mL / NET: 1706.7 mL      CAPILLARY BLOOD GLUCOSE          PHYSICAL EXAM:     VITALS:   T(C): 36.7 (05-04-24 @ 04:00), Max: 38.2 (05-03-24 @ 20:00)  HR: 68 (05-04-24 @ 07:03) (63 - 115)  BP: 135/97 (05-04-24 @ 07:00) (66/51 - 159/102)  RR: 18 (05-04-24 @ 07:00) (10 - 18)  SpO2: 100% (05-04-24 @ 07:03) (98% - 100%)    GENERAL: NAD, lying in bed comfortably  HEAD:  Atraumatic, Normocephalic  EYES: EOMI, PERRLA, conjunctiva and sclera clear  ENT: Moist mucous membranes  NECK: Supple, No JVD  CHEST/LUNG: CTABL; No rales, rhonchi, wheezing, or rubs. Unlabored respirations  HEART: RRR. No M/R/G  ABDOMEN: Soft, nontender, non-distended, normoactive BS. No hepatomegaly  EXTREMITIES:  2+ Peripheral Pulses, brisk capillary refill. No clubbing, cyanosis, or edema  NERVOUS SYSTEM:  Alert & Oriented X3, speech clear. No deficits, CN II-XII intact. Normal sensation   MSK: FROM all 4 extremities, full and equal strength  PSYCH: Normal affect, normal speech, normal behavior  SKIN: No rashes or lesions      MEDICATIONS:  MEDICATIONS  (STANDING):  chlorhexidine 0.12% Liquid 15 milliLiter(s) Oral Mucosa every 12 hours  chlorhexidine 2% Cloths 1 Application(s) Topical daily  dexMEDEtomidine Infusion 0.5 MICROgram(s)/kG/Hr (4.93 mL/Hr) IV Continuous <Continuous>  enoxaparin Injectable 30 milliGRAM(s) SubCutaneous every 24 hours  norepinephrine Infusion 0.05 MICROgram(s)/kG/Min (4.22 mL/Hr) IV Continuous <Continuous>  piperacillin/tazobactam IVPB.. 3.375 Gram(s) IV Intermittent every 8 hours    MEDICATIONS  (PRN):      ALLERGIES:  Allergies    No Known Allergies    Intolerances        LABS:                        13.3   4.93  )-----------( 173      ( 04 May 2024 00:50 )             42.1     05-04    141  |  105  |  31<H>  ----------------------------<  134<H>  3.9   |  28  |  0.56    Ca    7.8<L>      04 May 2024 00:50  Phos  1.3     05-04  Mg     1.90     05-04    TPro  6.6  /  Alb  3.8  /  TBili  0.7  /  DBili  x   /  AST  50<H>  /  ALT  61<H>  /  AlkPhos  123<H>  05-03    PT/INR - ( 04 May 2024 00:50 )   PT: 13.2 sec;   INR: 1.18 ratio         PTT - ( 04 May 2024 00:50 )  PTT:29.6 sec  Urinalysis Basic - ( 04 May 2024 00:50 )    Color: x / Appearance: x / SG: x / pH: x  Gluc: 134 mg/dL / Ketone: x  / Bili: x / Urobili: x   Blood: x / Protein: x / Nitrite: x   Leuk Esterase: x / RBC: x / WBC x   Sq Epi: x / Non Sq Epi: x / Bacteria: x        IMAGING:    EKG: INTERVAL HPI/OVERNIGHT EVENTS: OGT placed feeds started. 500CC bolus for decreased urine output, with resultant increase in urine output. Vent rate decreased.    SUBJECTIVE: Patient seen and examined at bedside. On precedex 1.5. Not following commands. Per RN, when patient was on less sedation she was awake and moving, but did not tolerate PSV.      VITAL SIGNS:  ICU Vital Signs Last 24 Hrs  T(C): 36.7 (04 May 2024 04:00), Max: 38.2 (03 May 2024 20:00)  T(F): 98.1 (04 May 2024 04:00), Max: 100.7 (03 May 2024 20:00)  HR: 68 (04 May 2024 07:03) (63 - 115)  BP: 135/97 (04 May 2024 07:00) (66/51 - 159/102)  BP(mean): 109 (04 May 2024 07:00) (58 - 122)  ABP: --  ABP(mean): --  RR: 18 (04 May 2024 07:00) (10 - 18)  SpO2: 100% (04 May 2024 07:03) (98% - 100%)    O2 Parameters below as of 04 May 2024 07:00  Patient On (Oxygen Delivery Method): ventilator    O2 Concentration (%): 50      Mode: AC/ CMV (Assist Control/ Continuous Mandatory Ventilation), RR (machine): 10, TV (machine): 350, FiO2: 50, PEEP: 5, ITime: 0.62, MAP: 9, PIP: 25  Plateau pressure:   P/F ratio:     05-03 @ 07:01  -  05-04 @ 07:00  --------------------------------------------------------  IN: 2266.7 mL / OUT: 560 mL / NET: 1706.7 mL      CAPILLARY BLOOD GLUCOSE          PHYSICAL EXAM:     VITALS:   T(C): 36.7 (05-04-24 @ 04:00), Max: 38.2 (05-03-24 @ 20:00)  HR: 68 (05-04-24 @ 07:03) (63 - 115)  BP: 135/97 (05-04-24 @ 07:00) (66/51 - 159/102)  RR: 18 (05-04-24 @ 07:00) (10 - 18)  SpO2: 100% (05-04-24 @ 07:03) (98% - 100%)    GENERAL: intubated and sedated. Thin  CHEST/LUNG: Decreased breath sounds at lung bases  HEART: Regular rate and rhythm; No murmurs  ABDOMEN: BSx4; Soft, nontender, nondistended  EXTREMITIES:  2+ Peripheral Pulses. No LE edema      MEDICATIONS:  MEDICATIONS  (STANDING):  chlorhexidine 0.12% Liquid 15 milliLiter(s) Oral Mucosa every 12 hours  chlorhexidine 2% Cloths 1 Application(s) Topical daily  dexMEDEtomidine Infusion 0.5 MICROgram(s)/kG/Hr (4.93 mL/Hr) IV Continuous <Continuous>  enoxaparin Injectable 30 milliGRAM(s) SubCutaneous every 24 hours  norepinephrine Infusion 0.05 MICROgram(s)/kG/Min (4.22 mL/Hr) IV Continuous <Continuous>  piperacillin/tazobactam IVPB.. 3.375 Gram(s) IV Intermittent every 8 hours    MEDICATIONS  (PRN):      ALLERGIES:  Allergies    No Known Allergies    Intolerances        LABS:                        13.3   4.93  )-----------( 173      ( 04 May 2024 00:50 )             42.1     05-04    141  |  105  |  31<H>  ----------------------------<  134<H>  3.9   |  28  |  0.56    Ca    7.8<L>      04 May 2024 00:50  Phos  1.3     05-04  Mg     1.90     05-04    TPro  6.6  /  Alb  3.8  /  TBili  0.7  /  DBili  x   /  AST  50<H>  /  ALT  61<H>  /  AlkPhos  123<H>  05-03    PT/INR - ( 04 May 2024 00:50 )   PT: 13.2 sec;   INR: 1.18 ratio         PTT - ( 04 May 2024 00:50 )  PTT:29.6 sec  Urinalysis Basic - ( 04 May 2024 00:50 )    Color: x / Appearance: x / SG: x / pH: x  Gluc: 134 mg/dL / Ketone: x  / Bili: x / Urobili: x   Blood: x / Protein: x / Nitrite: x   Leuk Esterase: x / RBC: x / WBC x   Sq Epi: x / Non Sq Epi: x / Bacteria: x

## 2024-05-05 LAB
ANION GAP SERPL CALC-SCNC: 7 MMOL/L — SIGNIFICANT CHANGE UP (ref 7–14)
APTT BLD: 24.6 SEC — SIGNIFICANT CHANGE UP (ref 24.5–35.6)
BASE EXCESS BLDV CALC-SCNC: 7.3 MMOL/L — HIGH (ref -2–3)
BLOOD GAS VENOUS COMPREHENSIVE RESULT: SIGNIFICANT CHANGE UP
BUN SERPL-MCNC: 21 MG/DL — SIGNIFICANT CHANGE UP (ref 7–23)
CALCIUM SERPL-MCNC: 7.8 MG/DL — LOW (ref 8.4–10.5)
CHLORIDE BLDV-SCNC: 102 MMOL/L — SIGNIFICANT CHANGE UP (ref 96–108)
CHLORIDE SERPL-SCNC: 104 MMOL/L — SIGNIFICANT CHANGE UP (ref 98–107)
CO2 BLDV-SCNC: 34.7 MMOL/L — HIGH (ref 22–26)
CO2 SERPL-SCNC: 31 MMOL/L — SIGNIFICANT CHANGE UP (ref 22–31)
CREAT SERPL-MCNC: 0.61 MG/DL — SIGNIFICANT CHANGE UP (ref 0.5–1.3)
EGFR: 82 ML/MIN/1.73M2 — SIGNIFICANT CHANGE UP
GAS PNL BLDV: 135 MMOL/L — LOW (ref 136–145)
GLUCOSE BLDV-MCNC: 118 MG/DL — HIGH (ref 70–99)
GLUCOSE SERPL-MCNC: 118 MG/DL — HIGH (ref 70–99)
HCO3 BLDV-SCNC: 33 MMOL/L — HIGH (ref 22–29)
HCT VFR BLD CALC: 40.5 % — SIGNIFICANT CHANGE UP (ref 34.5–45)
HCT VFR BLDA CALC: 38 % — SIGNIFICANT CHANGE UP (ref 34.5–46.5)
HGB BLD CALC-MCNC: 12.6 G/DL — SIGNIFICANT CHANGE UP (ref 11.7–16.1)
HGB BLD-MCNC: 12.6 G/DL — SIGNIFICANT CHANGE UP (ref 11.5–15.5)
INR BLD: 1.08 RATIO — SIGNIFICANT CHANGE UP (ref 0.85–1.18)
LACTATE BLDV-MCNC: 1.6 MMOL/L — SIGNIFICANT CHANGE UP (ref 0.5–2)
LEGIONELLA AG UR QL: NEGATIVE — SIGNIFICANT CHANGE UP
MAGNESIUM SERPL-MCNC: 2 MG/DL — SIGNIFICANT CHANGE UP (ref 1.6–2.6)
MCHC RBC-ENTMCNC: 31.1 GM/DL — LOW (ref 32–36)
MCHC RBC-ENTMCNC: 32.9 PG — SIGNIFICANT CHANGE UP (ref 27–34)
MCV RBC AUTO: 105.7 FL — HIGH (ref 80–100)
NRBC # BLD: 0 /100 WBCS — SIGNIFICANT CHANGE UP (ref 0–0)
NRBC # FLD: 0 K/UL — SIGNIFICANT CHANGE UP (ref 0–0)
PCO2 BLDV: 51 MMHG — SIGNIFICANT CHANGE UP (ref 39–52)
PH BLDV: 7.42 — SIGNIFICANT CHANGE UP (ref 7.32–7.43)
PHOSPHATE SERPL-MCNC: 3.2 MG/DL — SIGNIFICANT CHANGE UP (ref 2.5–4.5)
PLATELET # BLD AUTO: 173 K/UL — SIGNIFICANT CHANGE UP (ref 150–400)
PO2 BLDV: 36 MMHG — SIGNIFICANT CHANGE UP (ref 25–45)
POTASSIUM BLDV-SCNC: 3.8 MMOL/L — SIGNIFICANT CHANGE UP (ref 3.5–5.1)
POTASSIUM SERPL-MCNC: 4 MMOL/L — SIGNIFICANT CHANGE UP (ref 3.5–5.3)
POTASSIUM SERPL-SCNC: 4 MMOL/L — SIGNIFICANT CHANGE UP (ref 3.5–5.3)
PROTHROM AB SERPL-ACNC: 12.2 SEC — SIGNIFICANT CHANGE UP (ref 9.5–13)
RBC # BLD: 3.83 M/UL — SIGNIFICANT CHANGE UP (ref 3.8–5.2)
RBC # FLD: 14.8 % — HIGH (ref 10.3–14.5)
SAO2 % BLDV: 53.4 % — LOW (ref 67–88)
SODIUM SERPL-SCNC: 142 MMOL/L — SIGNIFICANT CHANGE UP (ref 135–145)
WBC # BLD: 5.7 K/UL — SIGNIFICANT CHANGE UP (ref 3.8–10.5)
WBC # FLD AUTO: 5.7 K/UL — SIGNIFICANT CHANGE UP (ref 3.8–10.5)

## 2024-05-05 PROCEDURE — 99291 CRITICAL CARE FIRST HOUR: CPT

## 2024-05-05 RX ORDER — FENTANYL CITRATE 50 UG/ML
25 INJECTION INTRAVENOUS ONCE
Refills: 0 | Status: DISCONTINUED | OUTPATIENT
Start: 2024-05-05 | End: 2024-05-05

## 2024-05-05 RX ADMIN — CHLORHEXIDINE GLUCONATE 15 MILLILITER(S): 213 SOLUTION TOPICAL at 05:00

## 2024-05-05 RX ADMIN — PIPERACILLIN AND TAZOBACTAM 25 GRAM(S): 4; .5 INJECTION, POWDER, LYOPHILIZED, FOR SOLUTION INTRAVENOUS at 14:03

## 2024-05-05 RX ADMIN — CHLORHEXIDINE GLUCONATE 1 APPLICATION(S): 213 SOLUTION TOPICAL at 17:39

## 2024-05-05 RX ADMIN — PIPERACILLIN AND TAZOBACTAM 25 GRAM(S): 4; .5 INJECTION, POWDER, LYOPHILIZED, FOR SOLUTION INTRAVENOUS at 21:33

## 2024-05-05 RX ADMIN — CHLORHEXIDINE GLUCONATE 15 MILLILITER(S): 213 SOLUTION TOPICAL at 17:39

## 2024-05-05 RX ADMIN — FENTANYL CITRATE 25 MICROGRAM(S): 50 INJECTION INTRAVENOUS at 05:00

## 2024-05-05 RX ADMIN — ENOXAPARIN SODIUM 30 MILLIGRAM(S): 100 INJECTION SUBCUTANEOUS at 05:02

## 2024-05-05 RX ADMIN — AZITHROMYCIN 255 MILLIGRAM(S): 500 TABLET, FILM COATED ORAL at 10:22

## 2024-05-05 RX ADMIN — FENTANYL CITRATE 25 MICROGRAM(S): 50 INJECTION INTRAVENOUS at 05:30

## 2024-05-05 RX ADMIN — PIPERACILLIN AND TAZOBACTAM 25 GRAM(S): 4; .5 INJECTION, POWDER, LYOPHILIZED, FOR SOLUTION INTRAVENOUS at 05:00

## 2024-05-05 NOTE — PROGRESS NOTE ADULT - ASSESSMENT
89 yo F with pmhx of HTN, HLD, schizophrenia, dementia comes to the ED from nursing facility where she was found to be hypoxic. On admission, patient found to have pCO2>100 and was intubated in the ED for respiratory failure, likely ISO pneumonia.       NEURO:  Baseline: Unclear.    #Encephalopathy   - Patient with encephalopathy on admission  - Likely due to elevated pCO2   - CT head with no acute pathology. Mild cerebral volume loss and chronic microvascular ischemic disease.   - Sedated with precedex    #Schizophrenia  - Will clarify home meds and plan to start them to help with weaning precedex    CARDIOVASCULAR:  -Keep Mg>2, K>4, P>3    #Left ventricular dysfunction  - Segmental dysfunction on TTE 2019  - Repeat TTE with poor study but decreased LV function  - BNP 11k on admission      #HTN  - hold anti-hypertensives in setting of acute illness      HEMODYNAMICS   - On/off levophed ISO sedation      RESPIRATORY:  #Acute hypoxemic hypercapnic respiratory failure  - Intubated 5/3  - Likely due to pneumonia vs ADHF  - cw infectious workup and plan as below    Vent Settings: 350/10/50/5. Try to wean O2 requirement    GI/NUTRITION:    #Transaminitis  - LE elevate on admission, now resolved  - T bili elevated 5/4, continue to trend    Diet: Tube feeds    RENAL:   - Monitor urine output, may need IVF boluses if drops      ID:   #Sepsis  - Pt meets sepsis criteria due to tachypnea and tachycardia  - Source may be PNA, but infectious workup still pending  - RVP negative  - UA without infection  - Blood culture 5/2 negative x2  - Urine culture 5/2 negative  - MRSA swab negative, will DC vanc.  - Zosyn (5/3 - )  - Add azithromycin (5/4-5/6). Check urine legionella    HEME:  - Maintain active type and screen    ENDOCRINE:  - Monitor finger sticks     DVT PPX:  - Lovenox     ETHICS  - Pt is a goins of the state? Per sister, guardian is Jaciel Jara [phone]. He is aware of situation and will speak to family. 89 yo F with pmhx of HTN, HLD, schizophrenia, dementia comes to the ED from nursing facility where she was found to be hypoxic. On admission, patient found to have pCO2>100 and was intubated in the ED for respiratory failure, likely ISO pneumonia.       NEURO:  Baseline: Unclear.    #Encephalopathy   - Patient with encephalopathy on admission  - Likely due to elevated pCO2   - CT head with no acute pathology. Mild cerebral volume loss and chronic microvascular ischemic disease.   - Sedated with precedex, wean per ICU protocol    #Schizophrenia  - per d/w nursing home, patient not taking any antipsychotics or sedatives at baseline    CARDIOVASCULAR:  -Keep Mg>2, K>4, P>3    #Left ventricular dysfunction  - Segmental dysfunction on TTE 2019  - Repeat TTE with poor study but decreased LV function  - BNP 11k on admission      #HTN  - hold anti-hypertensives in setting of acute illness      HEMODYNAMICS   - On/off levophed ISO sedation      RESPIRATORY:  #Acute hypoxemic hypercapnic respiratory failure  - Intubated 5/3  - Likely due to pneumonia vs ADHF  - cw infectious workup and plan as below  - wean vent settings per ICU protocol    GI/NUTRITION:    #Transaminitis  - LE elevate on admission, now resolved  - T bili elevated 5/4, continue to trend    Diet: Tube feeds    RENAL:   - Monitor urine output, may need IVF boluses if drops      ID:   #Sepsis  - Pt meets sepsis criteria due to tachypnea and tachycardia  - Source may be PNA, but infectious workup still pending  - RVP negative  - UA without infection  - Blood culture 5/2 negative x2  - Urine culture 5/2 negative  - MRSA swab negative, will DC vanc.  - Zosyn (5/3 - )  - Add azithromycin (5/4-5/6). Check urine legionella    HEME:  - Maintain active type and screen    ENDOCRINE:  - Monitor finger sticks     DVT PPX:  - Lovenox     ETHICS  - Pt is a goins of the state? Per sister, guardian is Jaciel Jara [phone]. He is aware of situation and will speak to family.

## 2024-05-05 NOTE — PROGRESS NOTE ADULT - SUBJECTIVE AND OBJECTIVE BOX
Zenobia Howard MD  ---------------------------------------------------------------------------------------------  Patient is a 89y old  Female who presents with a chief complaint of AHRF (04 May 2024 09:01)      HPI:  89 yo F with pmhx of HTN, HLD, schizophrenia, dementia comes to the ED from nursing facility where she was found to be hypoxic.       In the ED, her vitals were significant for tachypnea and hypoxia with her labs showing acidosis and pCO2>100 s/p broad spectrum antibiotics. She was then intubated and is now admitted to the MICU for further management.  (03 May 2024 04:27)      SUBJECTIVE / OVERNIGHT EVENTS: got fent 25 x1 to maintain vent synchrony, patient seen and examined at bedside  ADDITIONAL REVIEW OF SYSTEMS:    MEDICATIONS  (STANDING):  azithromycin  IVPB      azithromycin  IVPB 500 milliGRAM(s) IV Intermittent every 24 hours  chlorhexidine 0.12% Liquid 15 milliLiter(s) Oral Mucosa every 12 hours  chlorhexidine 2% Cloths 1 Application(s) Topical daily  dexMEDEtomidine Infusion 0.5 MICROgram(s)/kG/Hr (4.93 mL/Hr) IV Continuous <Continuous>  enoxaparin Injectable 30 milliGRAM(s) SubCutaneous every 24 hours  piperacillin/tazobactam IVPB.. 3.375 Gram(s) IV Intermittent every 8 hours    MEDICATIONS  (PRN):    Allergies    No Known Allergies    Intolerances        ICU Vital Signs Last 24 Hrs  T(F): 98.5 (05 May 2024 04:00), Max: 98.9 (04 May 2024 08:00)  HR: 57 (05 May 2024 06:10) (50 - 86)  BP: 106/95 (05 May 2024 06:00) (94/79 - 135/97)  BP(mean): 100 (05 May 2024 06:00) (82 - 110)  ABP: --  ABP(mean): --  RR: 10 (05 May 2024 06:00) (10 - 23)  SpO2: 100% (05 May 2024 06:10) (97% - 100%)    Mode: AC/ CMV (Assist Control/ Continuous Mandatory Ventilation)  RR (machine): 10  TV (machine): 350  FiO2: 40  PEEP: 5  ITime: 0.77  MAP: 9  PIP: 25    Physical Exam:     I&O's Summary    03 May 2024 07:01  -  04 May 2024 07:00  --------------------------------------------------------  IN: 2266.7 mL / OUT: 560 mL / NET: 1706.7 mL    04 May 2024 07:01  -  05 May 2024 06:54  --------------------------------------------------------  IN: 699.1 mL / OUT: 840 mL / NET: -140.9 mL        LABS:                        12.6   5.70  )-----------( 173      ( 05 May 2024 00:53 )             40.5     05-05    142  |  104  |  21  ----------------------------<  118<H>  4.0   |  31  |  0.61    Ca    7.8<L>      05 May 2024 00:53  Phos  3.2     05-05  Mg     2.00     05-05    TPro  5.3<L>  /  Alb  3.0<L>  /  TBili  1.8<H>  /  DBili  0.4<H>  /  AST  28  /  ALT  36<H>  /  AlkPhos  93  05-04    PT/INR - ( 05 May 2024 00:53 )   PT: 12.2 sec;   INR: 1.08 ratio         PTT - ( 05 May 2024 00:53 )  PTT:24.6 sec      Venous: 05-05-24 @ 00:53 FiO2: -- Oxygen Sat% 53.4    Urinalysis Basic - ( 05 May 2024 00:53 )    Color: x / Appearance: x / SG: x / pH: x  Gluc: 118 mg/dL / Ketone: x  / Bili: x / Urobili: x   Blood: x / Protein: x / Nitrite: x   Leuk Esterase: x / RBC: x / WBC x   Sq Epi: x / Non Sq Epi: x / Bacteria: x          CAPILLARY BLOOD GLUCOSE          RADIOLOGY & ADDITIONAL TESTS:  Results Reviewed:   Imaging Personally Reviewed:  Electrocardiogram Personally Reviewed: Zenobia Howard MD  ---------------------------------------------------------------------------------------------  Patient is a 89y old  Female who presents with a chief complaint of AHRF (04 May 2024 09:01)      HPI:  89 yo F with pmhx of HTN, HLD, schizophrenia, dementia comes to the ED from nursing facility where she was found to be hypoxic.       In the ED, her vitals were significant for tachypnea and hypoxia with her labs showing acidosis and pCO2>100 s/p broad spectrum antibiotics. She was then intubated and is now admitted to the MICU for further management.  (03 May 2024 04:27)      SUBJECTIVE / OVERNIGHT EVENTS: got fent 25 x1 to maintain vent synchrony, patient seen and examined at bedside, remains intubated and sedated.  ADDITIONAL REVIEW OF SYSTEMS:    MEDICATIONS  (STANDING):  azithromycin  IVPB      azithromycin  IVPB 500 milliGRAM(s) IV Intermittent every 24 hours  chlorhexidine 0.12% Liquid 15 milliLiter(s) Oral Mucosa every 12 hours  chlorhexidine 2% Cloths 1 Application(s) Topical daily  dexMEDEtomidine Infusion 0.5 MICROgram(s)/kG/Hr (4.93 mL/Hr) IV Continuous <Continuous>  enoxaparin Injectable 30 milliGRAM(s) SubCutaneous every 24 hours  piperacillin/tazobactam IVPB.. 3.375 Gram(s) IV Intermittent every 8 hours    MEDICATIONS  (PRN):    Allergies    No Known Allergies    Intolerances        ICU Vital Signs Last 24 Hrs  T(F): 98.5 (05 May 2024 04:00), Max: 98.9 (04 May 2024 08:00)  HR: 57 (05 May 2024 06:10) (50 - 86)  BP: 106/95 (05 May 2024 06:00) (94/79 - 135/97)  BP(mean): 100 (05 May 2024 06:00) (82 - 110)  ABP: --  ABP(mean): --  RR: 10 (05 May 2024 06:00) (10 - 23)  SpO2: 100% (05 May 2024 06:10) (97% - 100%)    Mode: AC/ CMV (Assist Control/ Continuous Mandatory Ventilation)  RR (machine): 10  TV (machine): 350  FiO2: 40  PEEP: 5  ITime: 0.77  MAP: 9  PIP: 25    Physical Exam  GENERAL: intubated and sedated. Thin  CHEST/LUNG: Decreased breath sounds at lung bases  HEART: Regular rate and rhythm; No murmurs  ABDOMEN: BSx4; Soft, nontender, nondistended  EXTREMITIES:  2+ Peripheral Pulses. No LE edema      I&O's Summary    03 May 2024 07:01  -  04 May 2024 07:00  --------------------------------------------------------  IN: 2266.7 mL / OUT: 560 mL / NET: 1706.7 mL    04 May 2024 07:01  -  05 May 2024 06:54  --------------------------------------------------------  IN: 699.1 mL / OUT: 840 mL / NET: -140.9 mL        LABS:                        12.6   5.70  )-----------( 173      ( 05 May 2024 00:53 )             40.5     05-05    142  |  104  |  21  ----------------------------<  118<H>  4.0   |  31  |  0.61    Ca    7.8<L>      05 May 2024 00:53  Phos  3.2     05-05  Mg     2.00     05-05    TPro  5.3<L>  /  Alb  3.0<L>  /  TBili  1.8<H>  /  DBili  0.4<H>  /  AST  28  /  ALT  36<H>  /  AlkPhos  93  05-04    PT/INR - ( 05 May 2024 00:53 )   PT: 12.2 sec;   INR: 1.08 ratio         PTT - ( 05 May 2024 00:53 )  PTT:24.6 sec      Venous: 05-05-24 @ 00:53 FiO2: -- Oxygen Sat% 53.4    Urinalysis Basic - ( 05 May 2024 00:53 )    Color: x / Appearance: x / SG: x / pH: x  Gluc: 118 mg/dL / Ketone: x  / Bili: x / Urobili: x   Blood: x / Protein: x / Nitrite: x   Leuk Esterase: x / RBC: x / WBC x   Sq Epi: x / Non Sq Epi: x / Bacteria: x          CAPILLARY BLOOD GLUCOSE          RADIOLOGY & ADDITIONAL TESTS:  Results Reviewed:   Imaging Personally Reviewed:  Electrocardiogram Personally Reviewed:

## 2024-05-05 NOTE — PROGRESS NOTE ADULT - SUBJECTIVE AND OBJECTIVE BOX
CHIEF COMPLAINT:  patient remain in the ICU bed 3 orally intubated awake responds to verbal stimuli for possible extubation today if condition remained stable  SUBJECTIVE:     REVIEW OF SYSTEMS:orally intubated    CONSTITUTIONAL: (  )  weakness,  (  ) fevers or chills  EYES/ENT: (  )visual changes;     NECK: (  ) pain or stiffness  RESPIRATORY:   (  )cough, wheezing, hemoptysis;  (  ) shortness of breath  CARDIOVASCULAR:  (  )chest pain or palpitations  GASTROINTESTINAL:   (  )abdominal or epigastric pain.  (  ) nausea, vomiting, or hematemesis;   (   ) diarrhea or constipation.   GENITOURINARY:   (    ) dysuria, frequency or hematuria  NEUROLOGICAL:  (   ) numbness or weakness   All other review of systems is negative unless indicated above    Vital Signs Last 24 Hrs  T(C): 36.2 (05 May 2024 08:00), Max: 37.2 (04 May 2024 12:00)  T(F): 97.1 (05 May 2024 08:00), Max: 98.9 (04 May 2024 12:00)  HR: 53 (05 May 2024 08:00) (50 - 86)  BP: 96/66 (05 May 2024 08:00) (94/79 - 122/83)  BP(mean): 77 (05 May 2024 08:00) (77 - 100)  RR: 10 (05 May 2024 08:00) (10 - 22)  SpO2: 100% (05 May 2024 08:00) (97% - 100%)    Parameters below as of 05 May 2024 08:00  Patient On (Oxygen Delivery Method): ventilator    O2 Concentration (%): 40    I&O's Summary    04 May 2024 07:01  -  05 May 2024 07:00  --------------------------------------------------------  IN: 823.7 mL / OUT: 895 mL / NET: -71.3 mL    05 May 2024 07:01  -  05 May 2024 09:39  --------------------------------------------------------  IN: 12.8 mL / OUT: 20 mL / NET: -7.2 mL        CAPILLARY BLOOD GLUCOSE          PHYSICAL EXAM:    Constitutional:  ( x  ) NAD,    orally intubated  HEENT: PERR, EOMI,    Neck: Soft and supple, No LAD, No JVD  Respiratory:  (   x Breath sounds are clear bilaterally,    (   ) wheezing, rales or rhonchi  Cardiovascular:     (  x )S1 and S2, regular rate and rhythm, no Murmurs, gallops or rubs  Gastrointestinal:  ( x  )Bowel Sounds present, soft,   (  )nontender, nondistended,    Extremities:    (  ) peripheral edema  Vascular: 2+ peripheral pulses  Neurological:    (    )A/O x 3,   (  ) focal deficits  Musculoskeletal:    (   )  normal strength b/l upper  (     ) normal  lower extremities  Skin: No rashes    MEDICATIONS:  MEDICATIONS  (STANDING):  azithromycin  IVPB      azithromycin  IVPB 500 milliGRAM(s) IV Intermittent every 24 hours  chlorhexidine 0.12% Liquid 15 milliLiter(s) Oral Mucosa every 12 hours  chlorhexidine 2% Cloths 1 Application(s) Topical daily  dexMEDEtomidine Infusion 0.5 MICROgram(s)/kG/Hr (4.93 mL/Hr) IV Continuous <Continuous>  enoxaparin Injectable 30 milliGRAM(s) SubCutaneous every 24 hours  piperacillin/tazobactam IVPB.. 3.375 Gram(s) IV Intermittent every 8 hours      LABS: All Labs Reviewed:                        12.6   5.70  )-----------( 173      ( 05 May 2024 00:53 )             40.5     05-05    142  |  104  |  21  ----------------------------<  118<H>  4.0   |  31  |  0.61    Ca    7.8<L>      05 May 2024 00:53  Phos  3.2     05-05  Mg     2.00     05-05    TPro  5.3<L>  /  Alb  3.0<L>  /  TBili  1.8<H>  /  DBili  0.4<H>  /  AST  28  /  ALT  36<H>  /  AlkPhos  93  05-04    PT/INR - ( 05 May 2024 00:53 )   PT: 12.2 sec;   INR: 1.08 ratio         PTT - ( 05 May 2024 00:53 )  PTT:24.6 sec      Blood Culture: 05-02 @ 22:50  Organism --  Gram Stain Blood -- Gram Stain --  Specimen Source .Urine  Culture-Blood --      Urine Culture      RADIOLOGY/EKG:    ASSESSMENT AND PLAN:  89 yo F with pmhx of HTN, HLD, schizophrenia, dementia comes to the ED from nursing facility where she was found to be hypoxic. On admission, patient found to have pCO2>100 and was intubated in the ED. She is now admitted for further management.    #1 respiratory failure/ #Encephalopathy   - patient with encephalopathy on admission  - likely due to elevated pCO2     PLAN  - f/u CT head  - f/u TSH, ammonia, B12    #2 CARDIOVASCULAR: #Left ventricular dysfunction  -Keep Mg>2, K>4, P>3  - seen on TTE 2019  - repeat TTE this admission  - BNP elevated on admission  - can consider ADHF as etiology of acute illness     #3 #HTN  - hold anti-hypertensives in setting of acute illness     #4 GI/NUTRITION: Diet: Tube feeds      #5 #Transaminitis  - LE elevate on admission  - may be due to congestive hepatopathy    - #6 ID: #Sepsis  - pt meets sepsis criteria due to tachypnea and tachycardia  - source may be PNA, but infectious workup still pending    PLAN  - f/u MRSA swab, RVP, blood cx, UA (wnl), UCx,   - cw vanc by level ( will vanc level as pt received level before MICU admission)  - cw zosyn       DVT PPX:  - lovenox     5/3/2024 discuss with the team regarding patient that she was living with her sister at home    = 5/4/2024 patient remained intubated continue above ICU management sedated possible extubation in a.m. if condition remained stable   -5/5/2024 condition improving Zithromax was added by ICU team.     for possible extubation today if condition remained stable:  DVT PPX:    ADVANCED DIRECTIVE:    DISPOSITION:

## 2024-05-05 NOTE — PROGRESS NOTE ADULT - CRITICAL CARE ATTENDING COMMENT
Medical management as above, review of results/records, discussion with patient and primary team, documentation.
Medical management as above, review of results/records, discussion with patient and primary team, documentation.

## 2024-05-06 LAB
ANION GAP SERPL CALC-SCNC: 10 MMOL/L — SIGNIFICANT CHANGE UP (ref 7–14)
APTT BLD: 28.4 SEC — SIGNIFICANT CHANGE UP (ref 24.5–35.6)
BASE EXCESS BLDV CALC-SCNC: 8.5 MMOL/L — HIGH (ref -2–3)
BLOOD GAS VENOUS COMPREHENSIVE RESULT: SIGNIFICANT CHANGE UP
BUN SERPL-MCNC: 20 MG/DL — SIGNIFICANT CHANGE UP (ref 7–23)
CALCIUM SERPL-MCNC: 8.1 MG/DL — LOW (ref 8.4–10.5)
CHLORIDE BLDV-SCNC: 100 MMOL/L — SIGNIFICANT CHANGE UP (ref 96–108)
CHLORIDE SERPL-SCNC: 102 MMOL/L — SIGNIFICANT CHANGE UP (ref 98–107)
CO2 BLDV-SCNC: 36.6 MMOL/L — HIGH (ref 22–26)
CO2 SERPL-SCNC: 28 MMOL/L — SIGNIFICANT CHANGE UP (ref 22–31)
CREAT SERPL-MCNC: 0.63 MG/DL — SIGNIFICANT CHANGE UP (ref 0.5–1.3)
EGFR: 81 ML/MIN/1.73M2 — SIGNIFICANT CHANGE UP
GAS PNL BLDV: 136 MMOL/L — SIGNIFICANT CHANGE UP (ref 136–145)
GLUCOSE BLDV-MCNC: 109 MG/DL — HIGH (ref 70–99)
GLUCOSE SERPL-MCNC: 108 MG/DL — HIGH (ref 70–99)
HCO3 BLDV-SCNC: 35 MMOL/L — HIGH (ref 22–29)
HCT VFR BLD CALC: 40.8 % — SIGNIFICANT CHANGE UP (ref 34.5–45)
HCT VFR BLDA CALC: 39 % — SIGNIFICANT CHANGE UP (ref 34.5–46.5)
HGB BLD CALC-MCNC: 13.1 G/DL — SIGNIFICANT CHANGE UP (ref 11.7–16.1)
HGB BLD-MCNC: 13.1 G/DL — SIGNIFICANT CHANGE UP (ref 11.5–15.5)
INR BLD: 1.06 RATIO — SIGNIFICANT CHANGE UP (ref 0.85–1.18)
LACTATE BLDV-MCNC: 1.4 MMOL/L — SIGNIFICANT CHANGE UP (ref 0.5–2)
MAGNESIUM SERPL-MCNC: 2.1 MG/DL — SIGNIFICANT CHANGE UP (ref 1.6–2.6)
MCHC RBC-ENTMCNC: 32.1 GM/DL — SIGNIFICANT CHANGE UP (ref 32–36)
MCHC RBC-ENTMCNC: 33.5 PG — SIGNIFICANT CHANGE UP (ref 27–34)
MCV RBC AUTO: 104.3 FL — HIGH (ref 80–100)
NRBC # BLD: 0 /100 WBCS — SIGNIFICANT CHANGE UP (ref 0–0)
NRBC # FLD: 0 K/UL — SIGNIFICANT CHANGE UP (ref 0–0)
PCO2 BLDV: 55 MMHG — HIGH (ref 39–52)
PH BLDV: 7.41 — SIGNIFICANT CHANGE UP (ref 7.32–7.43)
PHOSPHATE SERPL-MCNC: 2.4 MG/DL — LOW (ref 2.5–4.5)
PLATELET # BLD AUTO: 157 K/UL — SIGNIFICANT CHANGE UP (ref 150–400)
PO2 BLDV: 28 MMHG — SIGNIFICANT CHANGE UP (ref 25–45)
POTASSIUM BLDV-SCNC: 3.9 MMOL/L — SIGNIFICANT CHANGE UP (ref 3.5–5.1)
POTASSIUM SERPL-MCNC: 3.9 MMOL/L — SIGNIFICANT CHANGE UP (ref 3.5–5.3)
POTASSIUM SERPL-SCNC: 3.9 MMOL/L — SIGNIFICANT CHANGE UP (ref 3.5–5.3)
PROTHROM AB SERPL-ACNC: 12 SEC — SIGNIFICANT CHANGE UP (ref 9.5–13)
RBC # BLD: 3.91 M/UL — SIGNIFICANT CHANGE UP (ref 3.8–5.2)
RBC # FLD: 14.9 % — HIGH (ref 10.3–14.5)
SAO2 % BLDV: 37.2 % — LOW (ref 67–88)
SODIUM SERPL-SCNC: 140 MMOL/L — SIGNIFICANT CHANGE UP (ref 135–145)
WBC # BLD: 5.07 K/UL — SIGNIFICANT CHANGE UP (ref 3.8–10.5)
WBC # FLD AUTO: 5.07 K/UL — SIGNIFICANT CHANGE UP (ref 3.8–10.5)

## 2024-05-06 PROCEDURE — 99291 CRITICAL CARE FIRST HOUR: CPT

## 2024-05-06 RX ORDER — CEFTRIAXONE 500 MG/1
1000 INJECTION, POWDER, FOR SOLUTION INTRAMUSCULAR; INTRAVENOUS EVERY 24 HOURS
Refills: 0 | Status: DISCONTINUED | OUTPATIENT
Start: 2024-05-06 | End: 2024-05-07

## 2024-05-06 RX ORDER — POTASSIUM PHOSPHATE, MONOBASIC POTASSIUM PHOSPHATE, DIBASIC 236; 224 MG/ML; MG/ML
30 INJECTION, SOLUTION INTRAVENOUS ONCE
Refills: 0 | Status: COMPLETED | OUTPATIENT
Start: 2024-05-06 | End: 2024-05-06

## 2024-05-06 RX ADMIN — DEXMEDETOMIDINE HYDROCHLORIDE IN 0.9% SODIUM CHLORIDE 4.93 MICROGRAM(S)/KG/HR: 4 INJECTION INTRAVENOUS at 08:23

## 2024-05-06 RX ADMIN — CEFTRIAXONE 100 MILLIGRAM(S): 500 INJECTION, POWDER, FOR SOLUTION INTRAMUSCULAR; INTRAVENOUS at 14:53

## 2024-05-06 RX ADMIN — PIPERACILLIN AND TAZOBACTAM 25 GRAM(S): 4; .5 INJECTION, POWDER, LYOPHILIZED, FOR SOLUTION INTRAVENOUS at 05:33

## 2024-05-06 RX ADMIN — CHLORHEXIDINE GLUCONATE 15 MILLILITER(S): 213 SOLUTION TOPICAL at 05:34

## 2024-05-06 RX ADMIN — POTASSIUM PHOSPHATE, MONOBASIC POTASSIUM PHOSPHATE, DIBASIC 83.33 MILLIMOLE(S): 236; 224 INJECTION, SOLUTION INTRAVENOUS at 05:33

## 2024-05-06 RX ADMIN — ENOXAPARIN SODIUM 30 MILLIGRAM(S): 100 INJECTION SUBCUTANEOUS at 05:34

## 2024-05-06 RX ADMIN — CHLORHEXIDINE GLUCONATE 1 APPLICATION(S): 213 SOLUTION TOPICAL at 12:33

## 2024-05-06 NOTE — DIETITIAN INITIAL EVALUATION ADULT - ADD RECOMMEND
1) Recommend Glucerna 1.5 via OGT at goal rate 40 mL/hr x18 hrs to provide 720 mL formula, 1080 kcal, 59.4 gm protein, 547 mL free water daily (meets 21.7 kcal/kg, 1.2 gm protein/kg @ IBW 49.8 kg). Additional provision of free water per medical discretion.   2) Monitor electrolytes (K+, Mg, P) and replete to within desired limits as clinically indicated.

## 2024-05-06 NOTE — DIETITIAN INITIAL EVALUATION ADULT - PHYSCIAL ASSESSMENT
Appearance consistent with BMI, underweight. Appearance consistent with BMI, underweight. Unable to perform nutrition focused physical exam.

## 2024-05-06 NOTE — DIETITIAN INITIAL EVALUATION ADULT - OTHER CALCULATIONS
Dosing weight (5/3) 86.8 lbs / 39.4 kg.  No recent weight history available via chart.  Ideal Body Weight: 110 lbs / 50 kg +/-10%

## 2024-05-06 NOTE — PROGRESS NOTE ADULT - SUBJECTIVE AND OBJECTIVE BOX
INTERVAL HPI/OVERNIGHT EVENTS:    SUBJECTIVE: Patient seen and examined at bedside. Intubated, sedated, ROS cannot be obtained.       VITAL SIGNS:  ICU Vital Signs Last 24 Hrs  T(C): 37.3 (06 May 2024 04:00), Max: 37.3 (06 May 2024 04:00)  T(F): 99.2 (06 May 2024 04:00), Max: 99.2 (06 May 2024 04:00)  HR: 72 (06 May 2024 07:26) (50 - 89)  BP: 115/78 (06 May 2024 07:00) (96/66 - 135/78)  BP(mean): 88 (06 May 2024 07:00) (77 - 100)  ABP: --  ABP(mean): --  RR: 13 (06 May 2024 07:00) (10 - 19)  SpO2: 100% (06 May 2024 07:26) (95% - 100%)    O2 Parameters below as of 06 May 2024 04:00  Patient On (Oxygen Delivery Method): ventilator    O2 Concentration (%): 30      Mode: AC/ CMV (Assist Control/ Continuous Mandatory Ventilation), RR (machine): 10, TV (machine): 350, FiO2: 30, PEEP: 5, ITime: 0.76, MAP: 11, PIP: 24  Plateau pressure:   P/F ratio:     05-05 @ 07:01  -  05-06 @ 07:00  --------------------------------------------------------  IN: 1557.2 mL / OUT: 585 mL / NET: 972.2 mL      CAPILLARY BLOOD GLUCOSE        ECG:    PHYSICAL EXAMINATION:  General: Comfortable, no acute distress, cooperative with exam.  HEENT: PERRLA, EOMI, moist mucous membranes.  Respiratory: CTAB, normal respiratory effort, no coughing, wheezes, crackles, or rales.  CV: RRR, S1S2, no murmurs, rubs or gallops. No JVD. Distal pulses intact.  Abdominal: Soft, nontender, nondistended, no rebound or guarding, normal bowel sounds.  Neurology: AOx3, no focal neuro defects, AMBROSIO x 4.  Extremities: No pitting edema, + Peripheral pulses.  Incisions:   Tubes:    MEDICATIONS:  MEDICATIONS  (STANDING):  azithromycin  IVPB      azithromycin  IVPB 500 milliGRAM(s) IV Intermittent every 24 hours  chlorhexidine 0.12% Liquid 15 milliLiter(s) Oral Mucosa every 12 hours  chlorhexidine 2% Cloths 1 Application(s) Topical daily  dexMEDEtomidine Infusion 0.5 MICROgram(s)/kG/Hr (4.93 mL/Hr) IV Continuous <Continuous>  enoxaparin Injectable 30 milliGRAM(s) SubCutaneous every 24 hours  piperacillin/tazobactam IVPB.. 3.375 Gram(s) IV Intermittent every 8 hours    MEDICATIONS  (PRN):      ALLERGIES:  Allergies    No Known Allergies    Intolerances        LABS:                        13.1   5.07  )-----------( 157      ( 06 May 2024 02:33 )             40.8     05-06    140  |  102  |  20  ----------------------------<  108<H>  3.9   |  28  |  0.63    Ca    8.1<L>      06 May 2024 02:33  Phos  2.4     05-06  Mg     2.10     05-06      PT/INR - ( 06 May 2024 02:33 )   PT: 12.0 sec;   INR: 1.06 ratio         PTT - ( 06 May 2024 02:33 )  PTT:28.4 sec  Urinalysis Basic - ( 06 May 2024 02:33 )    Color: x / Appearance: x / SG: x / pH: x  Gluc: 108 mg/dL / Ketone: x  / Bili: x / Urobili: x   Blood: x / Protein: x / Nitrite: x   Leuk Esterase: x / RBC: x / WBC x   Sq Epi: x / Non Sq Epi: x / Bacteria: x        RADIOLOGY & ADDITIONAL TESTS: Reviewed.

## 2024-05-06 NOTE — PROGRESS NOTE ADULT - SUBJECTIVE AND OBJECTIVE BOX
CHIEF COMPLAINT:  patient remained in ICU bed 3 orally intubated off sedation moving her head and verbal stimuli nursing team at the bedside  SUBJECTIVE:     REVIEW OF SYSTEMS:orally intubated    CONSTITUTIONAL: (  )  weakness,  (  ) fevers or chills  EYES/ENT: (  )visual changes;     NECK: (  ) pain or stiffness  RESPIRATORY:   (  )cough, wheezing, hemoptysis;  (  ) shortness of breath  CARDIOVASCULAR:  (  )chest pain or palpitations  GASTROINTESTINAL:   (  )abdominal or epigastric pain.  (  ) nausea, vomiting, or hematemesis;   (   ) diarrhea or constipation.   GENITOURINARY:   (    ) dysuria, frequency or hematuria  NEUROLOGICAL:  (   ) numbness or weakness   All other review of systems is negative unless indicated above    Vital Signs Last 24 Hrs  T(C): 37.8 (06 May 2024 16:00), Max: 37.8 (06 May 2024 12:00)  T(F): 100 (06 May 2024 16:00), Max: 100 (06 May 2024 12:00)  HR: 55 (06 May 2024 18:00) (53 - 89)  BP: 128/83 (06 May 2024 18:00) (93/82 - 132/91)  BP(mean): 98 (06 May 2024 18:00) (79 - 105)  RR: 17 (06 May 2024 18:00) (10 - 24)  SpO2: 100% (06 May 2024 18:00) (95% - 100%)         I&O's Summary    05 May 2024 07:01  -  06 May 2024 07:00  --------------------------------------------------------  IN: 1557.2 mL / OUT: 585 mL / NET: 972.2 mL    06 May 2024 07:01  -  06 May 2024 19:03  --------------------------------------------------------  IN: 203.6 mL / OUT: 135 mL / NET: 68.6 mL        CAPILLARY BLOOD GLUCOSE          PHYSICAL EXAM:  Constitutional:  ( x  ) NAD,    orally intubated  HEENT: PERR, EOMI,    Neck: Soft and supple, No LAD, No JVD  Respiratory:  (   x Breath sounds are clear bilaterally,    (   ) wheezing, rales or rhonchi  Cardiovascular:     (  x )S1 and S2, regular rate and rhythm, no Murmurs, gallops or rubs  Gastrointestinal:  ( x  )Bowel Sounds present, soft,   (  )nontender, nondistended,    Extremities:    (  ) peripheral edema  Vascular: 2+ peripheral pulses  Neurological:    (    )A/O x 3,   (  ) focal deficits  Musculoskeletal:    (   )  normal strength b/l upper  (     ) normal  lower extremities       MEDICATIONS:  MEDICATIONS  (STANDING):  cefTRIAXone   IVPB 1000 milliGRAM(s) IV Intermittent every 24 hours  chlorhexidine 2% Cloths 1 Application(s) Topical daily  dexMEDEtomidine Infusion 0.5 MICROgram(s)/kG/Hr (4.93 mL/Hr) IV Continuous <Continuous>  enoxaparin Injectable 30 milliGRAM(s) SubCutaneous every 24 hours      LABS: All Labs Reviewed:                        13.1   5.07  )-----------( 157      ( 06 May 2024 02:33 )             40.8     05-06    140  |  102  |  20  ----------------------------<  108<H>  3.9   |  28  |  0.63    Ca    8.1<L>      06 May 2024 02:33  Phos  2.4     05-06  Mg     2.10     05-06      PT/INR - ( 06 May 2024 02:33 )   PT: 12.0 sec;   INR: 1.06 ratio         PTT - ( 06 May 2024 02:33 )  PTT:28.4 sec      Blood Culture: 05-02 @ 22:50  Organism --  Gram Stain Blood -- Gram Stain --  Specimen Source .Urine  Culture-Blood --      Urine Culture      RADIOLOGY/EKG:    ASSESSMENT AND PLAN:  89 yo F with pmhx of HTN, HLD, schizophrenia, dementia comes to the ED from nursing facility where she was found to be hypoxic. On admission, patient found to have pCO2>100 and was intubated in the ED. She is now admitted for further management.    #1 respiratory failure/ #Encephalopathy   - patient with encephalopathy on admission  - likely due to elevated pCO2     PLAN  - f/u CT head  - f/u TSH, ammonia, B12    #2 CARDIOVASCULAR: #Left ventricular dysfunction  -Keep Mg>2, K>4, P>3  - seen on TTE 2019  - repeat TTE this admission  - BNP elevated on admission  - can consider ADHF as etiology of acute illness     #3 #HTN  - hold anti-hypertensives in setting of acute illness     #4 GI/NUTRITION: Diet: Tube feeds      #5 #Transaminitis  - LE elevate on admission  - may be due to congestive hepatopathy    - #6 ID: #Sepsis  - pt meets sepsis criteria due to tachypnea and tachycardia  - source may be PNA, but infectious workup still pending    PLAN  - f/u MRSA swab, RVP, blood cx, UA (wnl), UCx,   - cw vanc by level ( will vanc level as pt received level before MICU admission)  - cw zosyn       DVT PPX:  - lovenox     5/3/2024 discuss with the team regarding patient that she was living with her sister at home    = 5/4/2024 patient remained intubated continue above ICU management sedated possible extubation in a.m. if condition remained stable   -5/5/2024 condition improving Zithromax was added by ICU team.     for possible extubation today if condition remained stable:  -5/6/2024 patient off sedation the time of visit respond to verbal stimuli by turning her head for extubation today  DVT PPX:    ADVANCED DIRECTIVE:    DISPOSITION:

## 2024-05-06 NOTE — PROGRESS NOTE ADULT - ASSESSMENT
89 yo F with pmhx of HTN, HLD, schizophrenia, dementia comes to the ED from nursing facility where she was found to be hypoxic. On admission, patient found to have pCO2>100 and was intubated in the ED for respiratory failure, likely ISO pneumonia.       NEURO:  Baseline: Unclear.    #Encephalopathy   - Patient with encephalopathy on admission  - Likely due to elevated pCO2   - CT head with no acute pathology. Mild cerebral volume loss and chronic microvascular ischemic disease.   - Sedated with precedex, wean per ICU protocol    #Schizophrenia  - per d/w nursing home, patient not taking any antipsychotics or sedatives at baseline    CARDIOVASCULAR:  -Keep Mg>2, K>4, P>3    #Left ventricular dysfunction  - Segmental dysfunction on TTE 2019  - Repeat TTE with poor study but decreased LV function  - BNP 11k on admission      #HTN  - hold anti-hypertensives in setting of acute illness      HEMODYNAMICS   - On/off levophed ISO sedation      RESPIRATORY:  #Acute hypoxemic hypercapnic respiratory failure  - Intubated 5/3  - Likely due to pneumonia vs ADHF  - cw infectious workup and plan as below  - wean vent settings per ICU protocol    GI/NUTRITION:    #Transaminitis  - LE elevate on admission, now resolved  - T bili elevated 5/4, continue to trend    Diet: Tube feeds    RENAL:   - Monitor urine output, may need IVF boluses if drops      ID:   #Sepsis  - Pt meets sepsis criteria due to tachypnea and tachycardia  - Source may be PNA, but infectious workup still pending  - RVP negative  - UA without infection  - Blood culture 5/2 negative x2  - Urine culture 5/2 negative  - MRSA swab negative, will DC vanc.  - Zosyn (5/3 - )  - Add azithromycin (5/4-5/6). Check urine legionella    HEME:  - Maintain active type and screen    ENDOCRINE:  - Monitor finger sticks     DVT PPX:  - Lovenox     ETHICS  - Pt is a go[place] of the state? Per sister, guardian is Jaciel Jara [phone]. He is aware of situation and will speak to family. 89 yo F with pmhx of HTN, HLD, schizophrenia, dementia comes to the ED from nursing facility where she was found to be hypoxic. On admission, patient found to have pCO2>100 and was intubated in the ED for respiratory failure, likely ISO pneumonia.       NEURO:  Baseline: Unclear.    #Encephalopathy   - Patient with encephalopathy on admission  - Likely due to elevated pCO2   - CT head with no acute pathology. Mild cerebral volume loss and chronic microvascular ischemic disease.   - Sedated with precedex, wean per ICU protocol    #Schizophrenia  - per d/w nursing home, patient not taking any antipsychotics or sedatives at baseline    CARDIOVASCULAR:  -Keep Mg>2, K>4, P>3    #Left ventricular dysfunction  - Segmental dysfunction on TTE 2019  - Repeat TTE with poor study but decreased LV function  - BNP 11k on admission      #HTN  - hold anti-hypertensives in setting of acute illness      HEMODYNAMICS   - On/off levophed ISO sedation      RESPIRATORY:  #Acute hypoxemic hypercapnic respiratory failure  - Intubated 5/3  - Extubated to BiPAP 5/6  - Likely due to pneumonia vs ADHF  - cw infectious workup and plan as below    GI/NUTRITION:    #Transaminitis  - LE elevate on admission, now resolved  - T bili elevated 5/4, continue to trend    Diet: Tube feeds    RENAL:   - Monitor urine output, may need IVF boluses if drops      ID:   #Sepsis  - Pt meets sepsis criteria due to tachypnea and tachycardia  - Source may be PNA, but infectious workup still pending  - RVP negative  - UA without infection  - Blood culture 5/2 negative x2  - Urine culture 5/2 negative  - MRSA swab negative, will DC vanc.  - Zosyn (5/3 - 5/6)  - S/p azithromycin (5/4-5/6). urine legionella neg  - CTX 5.6-    HEME:  - Maintain active type and screen    ENDOCRINE:  - Monitor finger sticks     DVT PPX:  - Lovenox     ETHICS  - Pt is a go[place] of the state? Per sister, guardian is Jaciel Jara [phone]. He is aware of situation and will speak to family.

## 2024-05-06 NOTE — DIETITIAN INITIAL EVALUATION ADULT - OTHER INFO
Per chart, pt is 89 year old female PMH HTN, HLD, schizophrenia, dementia presenting with hypoxia likely secondary to PNA vs ADHF. Intubated (5/3).    Pt remains intubated and sedated, unable to participate in discussion. Comprehensive chart review completed. Pt presenting from nursing facility. NKFA.     Labs notable for hypophosphatemia. Unknown last BM, hypoactive bowel sounds per flowsheets.  Per chart, pt is 89 year old female PMH HTN, HLD, schizophrenia, dementia presenting with hypoxia likely secondary to PNA vs ADHF.    Pt remains intubated and sedated, unable to participate in discussion. Comprehensive chart review completed. No information regarding diet PTA available via [facility] transfer records. NKFA.     Pt has been maintained on Glucerna 1.5 at 10 mL/hr. OGT in place. Tube feeding on hold during time of visit. Labs notable for hypophosphatemia. Unknown last BM, hypoactive bowel sounds per flowsheets.

## 2024-05-07 LAB
ANION GAP SERPL CALC-SCNC: 15 MMOL/L — HIGH (ref 7–14)
APTT BLD: 30.7 SEC — SIGNIFICANT CHANGE UP (ref 24.5–35.6)
BASE EXCESS BLDV CALC-SCNC: 1.1 MMOL/L — SIGNIFICANT CHANGE UP (ref -2–3)
BLD GP AB SCN SERPL QL: NEGATIVE — SIGNIFICANT CHANGE UP
BLOOD GAS VENOUS COMPREHENSIVE RESULT: SIGNIFICANT CHANGE UP
BUN SERPL-MCNC: 26 MG/DL — HIGH (ref 7–23)
CALCIUM SERPL-MCNC: 7.9 MG/DL — LOW (ref 8.4–10.5)
CHLORIDE BLDV-SCNC: 103 MMOL/L — SIGNIFICANT CHANGE UP (ref 96–108)
CHLORIDE SERPL-SCNC: 104 MMOL/L — SIGNIFICANT CHANGE UP (ref 98–107)
CO2 BLDV-SCNC: 29.5 MMOL/L — HIGH (ref 22–26)
CO2 SERPL-SCNC: 21 MMOL/L — LOW (ref 22–31)
CREAT SERPL-MCNC: 0.49 MG/DL — LOW (ref 0.5–1.3)
EGFR: 86 ML/MIN/1.73M2 — SIGNIFICANT CHANGE UP
GAS PNL BLDV: 137 MMOL/L — SIGNIFICANT CHANGE UP (ref 136–145)
GLUCOSE BLDV-MCNC: 73 MG/DL — SIGNIFICANT CHANGE UP (ref 70–99)
GLUCOSE SERPL-MCNC: 72 MG/DL — SIGNIFICANT CHANGE UP (ref 70–99)
HCO3 BLDV-SCNC: 28 MMOL/L — SIGNIFICANT CHANGE UP (ref 22–29)
HCT VFR BLD CALC: 40.1 % — SIGNIFICANT CHANGE UP (ref 34.5–45)
HCT VFR BLDA CALC: 38 % — SIGNIFICANT CHANGE UP (ref 34.5–46.5)
HGB BLD CALC-MCNC: 12.6 G/DL — SIGNIFICANT CHANGE UP (ref 11.7–16.1)
HGB BLD-MCNC: 12.6 G/DL — SIGNIFICANT CHANGE UP (ref 11.5–15.5)
INR BLD: 1.06 RATIO — SIGNIFICANT CHANGE UP (ref 0.85–1.18)
LACTATE BLDV-MCNC: 1.3 MMOL/L — SIGNIFICANT CHANGE UP (ref 0.5–2)
MAGNESIUM SERPL-MCNC: 2.1 MG/DL — SIGNIFICANT CHANGE UP (ref 1.6–2.6)
MCHC RBC-ENTMCNC: 31.4 GM/DL — LOW (ref 32–36)
MCHC RBC-ENTMCNC: 32.9 PG — SIGNIFICANT CHANGE UP (ref 27–34)
MCV RBC AUTO: 104.7 FL — HIGH (ref 80–100)
NRBC # BLD: 0 /100 WBCS — SIGNIFICANT CHANGE UP (ref 0–0)
NRBC # FLD: 0 K/UL — SIGNIFICANT CHANGE UP (ref 0–0)
PCO2 BLDV: 53 MMHG — HIGH (ref 39–52)
PH BLDV: 7.33 — SIGNIFICANT CHANGE UP (ref 7.32–7.43)
PHOSPHATE SERPL-MCNC: 3.1 MG/DL — SIGNIFICANT CHANGE UP (ref 2.5–4.5)
PLATELET # BLD AUTO: 159 K/UL — SIGNIFICANT CHANGE UP (ref 150–400)
PO2 BLDV: 63 MMHG — HIGH (ref 25–45)
POTASSIUM BLDV-SCNC: 3.9 MMOL/L — SIGNIFICANT CHANGE UP (ref 3.5–5.1)
POTASSIUM SERPL-MCNC: 4.2 MMOL/L — SIGNIFICANT CHANGE UP (ref 3.5–5.3)
POTASSIUM SERPL-SCNC: 4.2 MMOL/L — SIGNIFICANT CHANGE UP (ref 3.5–5.3)
PROTHROM AB SERPL-ACNC: 11.9 SEC — SIGNIFICANT CHANGE UP (ref 9.5–13)
RBC # BLD: 3.83 M/UL — SIGNIFICANT CHANGE UP (ref 3.8–5.2)
RBC # FLD: 14.9 % — HIGH (ref 10.3–14.5)
RH IG SCN BLD-IMP: POSITIVE — SIGNIFICANT CHANGE UP
SAO2 % BLDV: 88.9 % — HIGH (ref 67–88)
SODIUM SERPL-SCNC: 140 MMOL/L — SIGNIFICANT CHANGE UP (ref 135–145)
WBC # BLD: 4.48 K/UL — SIGNIFICANT CHANGE UP (ref 3.8–10.5)
WBC # FLD AUTO: 4.48 K/UL — SIGNIFICANT CHANGE UP (ref 3.8–10.5)

## 2024-05-07 PROCEDURE — 99291 CRITICAL CARE FIRST HOUR: CPT

## 2024-05-07 RX ORDER — POLYETHYLENE GLYCOL 3350 17 G/17G
17 POWDER, FOR SOLUTION ORAL EVERY 12 HOURS
Refills: 0 | Status: DISCONTINUED | OUTPATIENT
Start: 2024-05-07 | End: 2024-05-14

## 2024-05-07 RX ORDER — SENNA PLUS 8.6 MG/1
2 TABLET ORAL AT BEDTIME
Refills: 0 | Status: DISCONTINUED | OUTPATIENT
Start: 2024-05-07 | End: 2024-05-14

## 2024-05-07 RX ADMIN — POLYETHYLENE GLYCOL 3350 17 GRAM(S): 17 POWDER, FOR SOLUTION ORAL at 17:45

## 2024-05-07 RX ADMIN — ENOXAPARIN SODIUM 30 MILLIGRAM(S): 100 INJECTION SUBCUTANEOUS at 06:24

## 2024-05-07 RX ADMIN — CHLORHEXIDINE GLUCONATE 1 APPLICATION(S): 213 SOLUTION TOPICAL at 12:46

## 2024-05-07 NOTE — PROGRESS NOTE ADULT - ASSESSMENT
89 yo F with pmhx of HTN, HLD, schizophrenia, dementia comes to the ED from nursing facility where she was found to be hypoxic. On admission, patient found to have pCO2>100 and was intubated in the ED for respiratory failure, likely ISO pneumonia.       NEURO:  Baseline: Unclear.    #Encephalopathy   - Patient with encephalopathy on admission  - Likely due to elevated pCO2   - CT head with no acute pathology. Mild cerebral volume loss and chronic microvascular ischemic disease.   - Sedated with precedex, wean per ICU protocol    #Schizophrenia  - per d/w nursing home, patient not taking any antipsychotics or sedatives at baseline    CARDIOVASCULAR:  -Keep Mg>2, K>4, P>3    #Left ventricular dysfunction  - Segmental dysfunction on TTE 2019  - Repeat TTE with poor study but decreased LV function  - BNP 11k on admission      #HTN  - hold anti-hypertensives in setting of acute illness      HEMODYNAMICS   - On/off levophed ISO sedation      RESPIRATORY:  #Acute hypoxemic hypercapnic respiratory failure  - Intubated 5/3  - Extubated to BiPAP 5/6  - Likely due to pneumonia vs ADHF  - cw infectious workup and plan as below    GI/NUTRITION:    #Transaminitis  - LE elevate on admission, now resolved  - T bili elevated 5/4, continue to trend    Diet: Tube feeds    RENAL:   - Monitor urine output, may need IVF boluses if drops      ID:   #Sepsis  - Pt meets sepsis criteria due to tachypnea and tachycardia  - Source may be PNA, but infectious workup still pending  - RVP negative  - UA without infection  - Blood culture 5/2 negative x2  - Urine culture 5/2 negative  - MRSA swab negative, will DC vanc.  - Zosyn (5/3 - 5/6)  - S/p azithromycin (5/4-5/6). urine legionella neg  - CTX 5.6-    HEME:  - Maintain active type and screen    ENDOCRINE:  - Monitor finger sticks     DVT PPX:  - Lovenox     ETHICS  - Pt is a [place] of the state? Per sister, guardian is Jaciel Jara [phone]. He is aware of situation and will speak to family. 89 yo F with pmhx of HTN, HLD, schizophrenia, dementia comes to the ED from nursing facility where she was found to be hypoxic. On admission, patient found to have pCO2>100 and was intubated in the ED for respiratory failure, likely ISO pneumonia.       NEURO:  Baseline: Unclear.    #Encephalopathy   - Patient with encephalopathy on admission  - Likely due to elevated pCO2   - CT head with no acute pathology. Mild cerebral volume loss and chronic microvascular ischemic disease.   - off sedation    #Schizophrenia  - per d/w nursing home, patient not taking any antipsychotics or sedatives at baseline    CARDIOVASCULAR:  -Keep Mg>2, K>4, P>3    #Left ventricular dysfunction  - Segmental dysfunction on TTE 2019  - Repeat TTE with poor study but decreased LV function  - BNP 11k on admission      #HTN  - hold anti-hypertensives in setting of acute illness      HEMODYNAMICS   - off pressors      RESPIRATORY:  #Acute hypoxemic hypercapnic respiratory failure  - Intubated 5/3  - Extubated to BiPAP 5/6  - BiPAP 10/5 qHS. RA during day  - Likely due to pneumonia vs ADHF    GI/NUTRITION:    #Transaminitis  - LE elevate on admission, now resolved  - T bili elevated 5/4, continue to trend    Diet: adv as tolerated    RENAL:   - Monitor urine output, may need IVF boluses if drops      ID:   #Sepsis  - Pt meets sepsis criteria due to tachypnea and tachycardia  - Source may be PNA, but infectious workup still pending  - RVP negative  - UA without infection  - Blood culture 5/2 negative x2  - Urine culture 5/2 negative  - MRSA swab negative, will DC vanc.  - Zosyn (5/3 - 5/6)  - S/p azithromycin (5/4-5/6). urine legionella neg  - S/p CTX 5/6    HEME:  - Maintain active type and screen    ENDOCRINE:  - Monitor finger sticks     DVT PPX:  - Lovenox     ETHICS  - Pt is a go[place] of the state? Per sister, guardian is Jaciel Jara [phone]. He is aware of situation and will speak to family.

## 2024-05-07 NOTE — PROGRESS NOTE ADULT - CRITICAL CARE SERVICES PROVIDED
Patient is critically ill, requiring critical care services.
no

## 2024-05-07 NOTE — PROGRESS NOTE ADULT - SUBJECTIVE AND OBJECTIVE BOX
CHIEF COMPLAINT:  89 yo F with pmhx of HTN, HLD, schizophrenia, dementia comes to the ED from nursing facility where she was found to be hypoxic.       In the ED, her vitals were significant for tachypnea and hypoxia with her labs showing acidosis and pCO2>100 s/p broad spectrum antibiotics. She was then intubated and is now admitted to the MICU for further management.     In MICU, pCO2 levels improved. Successfully extubated to BiPAP on 5/6. Tolerated RA with nocturnal BiPAP. Passed bedside dysphagia screen  SUBJECTIVE:     REVIEW OF SYSTEMS:    CONSTITUTIONAL: (  )  weakness,  (  ) fevers or chills  EYES/ENT: (  )visual changes;     NECK: (  ) pain or stiffness  RESPIRATORY:   (  )cough, wheezing, hemoptysis;  (  ) shortness of breath  CARDIOVASCULAR:  (  )chest pain or palpitations  GASTROINTESTINAL:   (  )abdominal or epigastric pain.  (  ) nausea, vomiting, or hematemesis;   (   ) diarrhea or constipation.   GENITOURINARY:   (    ) dysuria, frequency or hematuria  NEUROLOGICAL:  (   ) numbness or weakness   All other review of systems is negative unless indicated above    Vital Signs Last 24 Hrs  T(C): 35.6 (07 May 2024 16:00), Max: 36.2 (06 May 2024 20:00)  T(F): 96.1 (07 May 2024 16:00), Max: 97.2 (06 May 2024 20:00)  HR: 106 (07 May 2024 16:00) (40 - 124)  BP: 120/76 (07 May 2024 16:00) (104/83 - 143/89)  BP(mean): 90 (07 May 2024 16:00) (89 - 105)  RR: 27 (07 May 2024 16:00) (13 - 30)  SpO2: 97% (07 May 2024 16:00) (90% - 100%)    Parameters below as of 07 May 2024 16:00  Patient On (Oxygen Delivery Method): nasal cannula  O2 Flow (L/min): 2      I&O's Summary    06 May 2024 07:01  -  07 May 2024 07:00  --------------------------------------------------------  IN: 304.3 mL / OUT: 135 mL / NET: 169.3 mL        CAPILLARY BLOOD GLUCOSE          PHYSICAL EXAM:    Constitutional:  (   ) NAD,   (   )awake and alert  HEENT: PERR, EOMI,    Neck: Soft and supple, No LAD, No JVD  Respiratory:  (    Breath sounds are clear bilaterally,    (   ) wheezing, rales or rhonchi  Cardiovascular:     (   )S1 and S2, regular rate and rhythm, no Murmurs, gallops or rubs  Gastrointestinal:  (   )Bowel Sounds present, soft,   (  )nontender, nondistended,    Extremities:    (  ) peripheral edema  Vascular: 2+ peripheral pulses  Neurological:    (    )A/O x 3,   (  ) focal deficits  Musculoskeletal:    (   )  normal strength b/l upper  (     ) normal  lower extremities  Skin: No rashes    MEDICATIONS:  MEDICATIONS  (STANDING):  chlorhexidine 2% Cloths 1 Application(s) Topical daily  enoxaparin Injectable 30 milliGRAM(s) SubCutaneous every 24 hours  polyethylene glycol 3350 17 Gram(s) Oral every 12 hours  senna 2 Tablet(s) Oral at bedtime      LABS: All Labs Reviewed:                        12.6   4.48  )-----------( 159      ( 07 May 2024 02:50 )             40.1     05-07    140  |  104  |  26<H>  ----------------------------<  72  4.2   |  21<L>  |  0.49<L>    Ca    7.9<L>      07 May 2024 02:50  Phos  3.1     05-07  Mg     2.10     05-07      PT/INR - ( 07 May 2024 02:50 )   PT: 11.9 sec;   INR: 1.06 ratio         PTT - ( 07 May 2024 02:50 )  PTT:30.7 sec      Blood Culture: 05-02 @ 22:50  Organism --  Gram Stain Blood -- Gram Stain --  Specimen Source .Urine  Culture-Blood --      Urine Culture      RADIOLOGY/EKG:    ASSESSMENT AND PLAN:  89 yo F with pmhx of HTN, HLD, schizophrenia, dementia comes to the ED from nursing facility where she was found to be hypoxic.       In the ED, her vitals were significant for tachypnea and hypoxia with her labs showing acidosis and pCO2>100 s/p broad spectrum antibiotics. She was then intubated and is now admitted to the MICU for further management.     In MICU, pCO2 levels improved. Successfully extubated to BiPAP on 5/6. Tolerated RA with nocturnal BiPAP. Passed bedside dysphagia screen    DVT PPX:    ADVANCED DIRECTIVE:    DISPOSITION: CHIEF COMPLAINT:     patient was seen in ICU bed 3 extubated on CPAP very uncomfortable and be monitored by 1:1 discussed with ICU team to transition to nasal cannula and transferred to medical floor under my care  89 yo F with pmhx of HTN, HLD, schizophrenia, dementia comes to the ED from nursing facility where she was found to be hypoxic.       In the ED, her vitals were significant for tachypnea and hypoxia with her labs showing acidosis and pCO2>100 s/p broad spectrum antibiotics. She was then intubated and is now admitted to the MICU for further management.     In MICU, pCO2 levels improved. Successfully extubated to BiPAP on 5/6. Tolerated RA with nocturnal BiPAP. Passed bedside dysphagia screen  SUBJECTIVE:     REVIEW OF SYSTEMS: restlles    CONSTITUTIONAL: (  )  weakness,  (  ) fevers or chills  EYES/ENT: (  )visual changes;     NECK: (  ) pain or stiffness  RESPIRATORY:   (  )cough, wheezing, hemoptysis;  (  ) shortness of breath  CARDIOVASCULAR:  (  )chest pain or palpitations  GASTROINTESTINAL:   (  )abdominal or epigastric pain.  (  ) nausea, vomiting, or hematemesis;   (   ) diarrhea or constipation.   GENITOURINARY:   (    ) dysuria, frequency or hematuria  NEUROLOGICAL:  (   ) numbness or weakness   All other review of systems is negative unless indicated above    Vital Signs Last 24 Hrs  T(C): 35.6 (07 May 2024 16:00), Max: 36.2 (06 May 2024 20:00)  T(F): 96.1 (07 May 2024 16:00), Max: 97.2 (06 May 2024 20:00)  HR: 106 (07 May 2024 16:00) (40 - 124)  BP: 120/76 (07 May 2024 16:00) (104/83 - 143/89)  BP(mean): 90 (07 May 2024 16:00) (89 - 105)  RR: 27 (07 May 2024 16:00) (13 - 30)  SpO2: 97% (07 May 2024 16:00) (90% - 100%)    Parameters below as of 07 May 2024 16:00  Patient On (Oxygen Delivery Method): nasal cannula  O2 Flow (L/min): 2      I&O's Summary    06 May 2024 07:01  -  07 May 2024 07:00  --------------------------------------------------------  IN: 304.3 mL / OUT: 135 mL / NET: 169.3 mL        CAPILLARY BLOOD GLUCOSE          PHYSICAL EXAM:    Constitutional:  ( x  ) NAD,   (   )awake and alert  HEENT: PERR, EOMI,    Neck: Soft and supple, No LAD, No JVD  Respiratory:  (  x  Breath sounds are clear bilaterally,    (   ) wheezing, rales or rhonchi  Cardiovascular:     (   x)S1 and S2, regular rate and rhythm, no Murmurs, gallops or rubs  Gastrointestinal:  (  x )Bowel Sounds present, soft,   (  )nontender, nondistended,    Extremities:    (  ) peripheral edema  Vascular: 2+ peripheral pulses  Neurological:    (  x  )A/O x  1,   (  ) focal deficits  Musculoskeletal:    (   )  normal strength b/l upper  (     ) normal  lower extremities  Skin: No rashes    MEDICATIONS:  MEDICATIONS  (STANDING):  chlorhexidine 2% Cloths 1 Application(s) Topical daily  enoxaparin Injectable 30 milliGRAM(s) SubCutaneous every 24 hours  polyethylene glycol 3350 17 Gram(s) Oral every 12 hours  senna 2 Tablet(s) Oral at bedtime      LABS: All Labs Reviewed:                        12.6   4.48  )-----------( 159      ( 07 May 2024 02:50 )             40.1     05-07    140  |  104  |  26<H>  ----------------------------<  72  4.2   |  21<L>  |  0.49<L>    Ca    7.9<L>      07 May 2024 02:50  Phos  3.1     05-07  Mg     2.10     05-07      PT/INR - ( 07 May 2024 02:50 )   PT: 11.9 sec;   INR: 1.06 ratio         PTT - ( 07 May 2024 02:50 )  PTT:30.7 sec      Blood Culture: 05-02 @ 22:50  Organism --  Gram Stain Blood -- Gram Stain --  Specimen Source .Urine  Culture-Blood --      Urine Culture      RADIOLOGY/EKG:    ASSESSMENT AND PLAN:  89 yo F with pmhx of HTN, HLD, schizophrenia, dementia comes to the ED from nursing facility where she was found to be hypoxic.       In the ED, her vitals were significant for tachypnea and hypoxia with her labs showing acidosis and pCO2>100 s/p broad spectrum antibiotics. She was then intubated and is now admitted to the MICU for further management.     In MICU, pCO2 levels improved. Successfully extubated to BiPAP on 5/6. Tolerated RA with nocturnal BiPAP. Passed bedside dysphagia screen  89 yo F with pmhx of HTN, HLD, schizophrenia, dementia comes to the ED from nursing facility where she was found to be hypoxic. On admission, patient found to have pCO2>100 and was intubated in the ED. She is now admitted for further management.    #1 respiratory failure/ #Encephalopathy   - patient with encephalopathy on admission  - likely due to elevated pCO2     PLAN  - f/u CT head  - f/u TSH, ammonia, B12    #2 CARDIOVASCULAR: #Left ventricular dysfunction  -Keep Mg>2, K>4, P>3  - seen on TTE 2019  - repeat TTE this admission  - BNP elevated on admission  - can consider ADHF as etiology of acute illness     #3 #HTN  - hold anti-hypertensives in setting of acute illness     #4 GI/NUTRITION: Diet: Tube feeds      #5 #Transaminitis  - LE elevate on admission  - may be due to congestive hepatopathy    - #6 ID: #Sepsis  - pt meets sepsis criteria due to tachypnea and tachycardia  - source may be PNA, but infectious workup still pending    PLAN  - f/u MRSA swab, RVP, blood cx, UA (wnl), UCx,   - cw vanc by level ( will vanc level as pt received level before MICU admission)  - cw zosyn        5/3/2024 discuss with the team regarding patient that she was living with her sister at home    = 5/4/2024 patient remained intubated continue above ICU management sedated possible extubation in a.m. if condition remained stable   -5/5/2024 condition improving Zithromax was added by ICU team.     for possible extubation today if condition remained stable:  -5/6/2024 patient off sedation the time of visit respond to verbal stimuli by turning her head    -5/7/2024 patient agitated due to CPAP downgraded her oxygen and transferred to medical floor      ADVANCED DIRECTIVE:    DISPOSITION:

## 2024-05-07 NOTE — PROGRESS NOTE ADULT - SUBJECTIVE AND OBJECTIVE BOX
INTERVAL HPI/OVERNIGHT EVENTS:    SUBJECTIVE: Patient seen and examined at bedside. Intubated, sedated, ROS cannot be obtained.       VITAL SIGNS:  ICU Vital Signs Last 24 Hrs  T(C): 35.6 (07 May 2024 04:00), Max: 37.8 (06 May 2024 12:00)  T(F): 96 (07 May 2024 04:00), Max: 100 (06 May 2024 12:00)  HR: 65 (07 May 2024 07:00) (40 - 89)  BP: 139/86 (07 May 2024 07:00) (93/82 - 143/89)  BP(mean): 103 (07 May 2024 07:00) (82 - 105)  ABP: --  ABP(mean): --  RR: 18 (07 May 2024 07:00) (10 - 29)  SpO2: 100% (07 May 2024 07:00) (96% - 100%)    O2 Parameters below as of 07 May 2024 07:00  Patient On (Oxygen Delivery Method): BiPAP/CPAP    O2 Concentration (%): 40      Mode: AC/ CMV (Assist Control/ Continuous Mandatory Ventilation), RR (machine): 10, TV (machine): 350, FiO2: 30, PEEP: 5, ITime: 0.62, MAP: 10, PIP: 25  Plateau pressure:   P/F ratio:     05-06 @ 07:01  -  05-07 @ 07:00  --------------------------------------------------------  IN: 304.3 mL / OUT: 135 mL / NET: 169.3 mL      CAPILLARY BLOOD GLUCOSE        ECG:    PHYSICAL EXAMINATION:  General: Comfortable, no acute distress, cooperative with exam.  HEENT: PERRLA, EOMI, moist mucous membranes.  Respiratory: CTAB, normal respiratory effort, no coughing, wheezes, crackles, or rales.  CV: RRR, S1S2, no murmurs, rubs or gallops. No JVD. Distal pulses intact.  Abdominal: Soft, nontender, nondistended, no rebound or guarding, normal bowel sounds.  Neurology: AOx3, no focal neuro defects, AMBROSIO x 4.  Extremities: No pitting edema, + Peripheral pulses.  Incisions:   Tubes:    MEDICATIONS:  MEDICATIONS  (STANDING):  cefTRIAXone   IVPB 1000 milliGRAM(s) IV Intermittent every 24 hours  chlorhexidine 2% Cloths 1 Application(s) Topical daily  dexMEDEtomidine Infusion 0.5 MICROgram(s)/kG/Hr (4.93 mL/Hr) IV Continuous <Continuous>  enoxaparin Injectable 30 milliGRAM(s) SubCutaneous every 24 hours    MEDICATIONS  (PRN):      ALLERGIES:  Allergies    No Known Allergies    Intolerances        LABS:                        12.6   4.48  )-----------( 159      ( 07 May 2024 02:50 )             40.1     05-07    140  |  104  |  26<H>  ----------------------------<  72  4.2   |  21<L>  |  0.49<L>    Ca    7.9<L>      07 May 2024 02:50  Phos  3.1     05-07  Mg     2.10     05-07      PT/INR - ( 07 May 2024 02:50 )   PT: 11.9 sec;   INR: 1.06 ratio         PTT - ( 07 May 2024 02:50 )  PTT:30.7 sec  Urinalysis Basic - ( 07 May 2024 02:50 )    Color: x / Appearance: x / SG: x / pH: x  Gluc: 72 mg/dL / Ketone: x  / Bili: x / Urobili: x   Blood: x / Protein: x / Nitrite: x   Leuk Esterase: x / RBC: x / WBC x   Sq Epi: x / Non Sq Epi: x / Bacteria: x        RADIOLOGY & ADDITIONAL TESTS: Reviewed.   INTERVAL HPI/OVERNIGHT EVENTS:    SUBJECTIVE: Patient speaking and alert. States that she is hungry. Denies other complaints      VITAL SIGNS:  ICU Vital Signs Last 24 Hrs  T(C): 35.6 (07 May 2024 04:00), Max: 37.8 (06 May 2024 12:00)  T(F): 96 (07 May 2024 04:00), Max: 100 (06 May 2024 12:00)  HR: 65 (07 May 2024 07:00) (40 - 89)  BP: 139/86 (07 May 2024 07:00) (93/82 - 143/89)  BP(mean): 103 (07 May 2024 07:00) (82 - 105)  ABP: --  ABP(mean): --  RR: 18 (07 May 2024 07:00) (10 - 29)  SpO2: 100% (07 May 2024 07:00) (96% - 100%)    O2 Parameters below as of 07 May 2024 07:00  Patient On (Oxygen Delivery Method): BiPAP/CPAP    O2 Concentration (%): 40      Mode: AC/ CMV (Assist Control/ Continuous Mandatory Ventilation), RR (machine): 10, TV (machine): 350, FiO2: 30, PEEP: 5, ITime: 0.62, MAP: 10, PIP: 25  Plateau pressure:   P/F ratio:     05-06 @ 07:01  -  05-07 @ 07:00  --------------------------------------------------------  IN: 304.3 mL / OUT: 135 mL / NET: 169.3 mL      CAPILLARY BLOOD GLUCOSE        ECG:    PHYSICAL EXAMINATION:  GENERAL: well appearing in no acute distress, non-toxic appearing  HEENT: normal conjunctiva, oral mucosa moist  CARDIAC: regular rate and rhythm, normal S1S2, no appreciable murmurs, 2+ pulses in UE/LE b/l  PULM: normal breath sounds, clear to ascultation bilaterally, no rales, rhonchi, wheezing  GI: abdomen nondistended, soft, nontender, no guarding, rebound tenderness  NEURO: AAOx3  MSK: no peripheral edema, no calf tenderness b/l  SKIN: well-perfused, extremities warm, no visible rashes  PSYCH: appropriate mood and affect      MEDICATIONS:  MEDICATIONS  (STANDING):  cefTRIAXone   IVPB 1000 milliGRAM(s) IV Intermittent every 24 hours  chlorhexidine 2% Cloths 1 Application(s) Topical daily  dexMEDEtomidine Infusion 0.5 MICROgram(s)/kG/Hr (4.93 mL/Hr) IV Continuous <Continuous>  enoxaparin Injectable 30 milliGRAM(s) SubCutaneous every 24 hours    MEDICATIONS  (PRN):      ALLERGIES:  Allergies    No Known Allergies    Intolerances        LABS:                        12.6   4.48  )-----------( 159      ( 07 May 2024 02:50 )             40.1     05-07    140  |  104  |  26<H>  ----------------------------<  72  4.2   |  21<L>  |  0.49<L>    Ca    7.9<L>      07 May 2024 02:50  Phos  3.1     05-07  Mg     2.10     05-07      PT/INR - ( 07 May 2024 02:50 )   PT: 11.9 sec;   INR: 1.06 ratio         PTT - ( 07 May 2024 02:50 )  PTT:30.7 sec  Urinalysis Basic - ( 07 May 2024 02:50 )    Color: x / Appearance: x / SG: x / pH: x  Gluc: 72 mg/dL / Ketone: x  / Bili: x / Urobili: x   Blood: x / Protein: x / Nitrite: x   Leuk Esterase: x / RBC: x / WBC x   Sq Epi: x / Non Sq Epi: x / Bacteria: x        RADIOLOGY & ADDITIONAL TESTS: Reviewed.

## 2024-05-07 NOTE — CHART NOTE - NSCHARTNOTEFT_GEN_A_CORE
MICU Transfer Note  ---------------------------    Transfer from: MICU  Transfer to:  (  ) Medicine    (  ) Telemetry    (  ) RCU    (  ) Palliative    (  ) Stroke Unit    (  ) _______________  Accepting Physician:      MICU COURSE  89 yo F with pmhx of HTN, HLD, schizophrenia, dementia comes to the ED from nursing facility where she was found to be hypoxic.       In the ED, her vitals were significant for tachypnea and hypoxia with her labs showing acidosis and pCO2>100 s/p broad spectrum antibiotics. She was then intubated and is now admitted to the MICU for further management.     In MICU, pCO2 levels improved. Successfully extubated to BiPAP on 5/6. Tolerated RA with nocturnal BiPAP. Passed bedside dysphagia screen      OBJECTIVE --  Vital Signs Last 24 Hrs  T(C): 35 (07 May 2024 08:00), Max: 37.8 (06 May 2024 16:00)  T(F): 95 (07 May 2024 08:00), Max: 100 (06 May 2024 16:00)  HR: 92 (07 May 2024 12:00) (40 - 92)  BP: 116/74 (07 May 2024 12:00) (104/83 - 143/89)  BP(mean): 89 (07 May 2024 12:00) (89 - 105)  RR: 24 (07 May 2024 12:00) (10 - 29)  SpO2: 96% (07 May 2024 12:00) (96% - 100%)    Parameters below as of 07 May 2024 12:00  Patient On (Oxygen Delivery Method): nasal cannula  O2 Flow (L/min): 2      I&O's Summary    06 May 2024 07:01  -  07 May 2024 07:00  --------------------------------------------------------  IN: 304.3 mL / OUT: 135 mL / NET: 169.3 mL        MEDICATIONS  (STANDING):  chlorhexidine 2% Cloths 1 Application(s) Topical daily  enoxaparin Injectable 30 milliGRAM(s) SubCutaneous every 24 hours  polyethylene glycol 3350 17 Gram(s) Oral every 12 hours  senna 2 Tablet(s) Oral at bedtime    MEDICATIONS  (PRN):        LABS                                            12.6                  Neurophils% (auto):   x      (05-07 @ 02:50):    4.48 )-----------(159          Lymphocytes% (auto):  x                                             40.1                   Eosinphils% (auto):   x        Manual%: Neutrophils x    ; Lymphocytes x    ; Eosinophils x    ; Bands%: x    ; Blasts x                                    140    |  104    |  26                  Calcium: 7.9   / iCa: x      (05-07 @ 02:50)    ----------------------------<  72        Magnesium: 2.10                             4.2     |  21     |  0.49             Phosphorous: 3.1        ( 05-07 @ 02:50 )   PT: 11.9 sec;   INR: 1.06 ratio  aPTT: 30.7 sec          ASSESSMENT & PLAN:   89 yo F with pmhx of HTN, HLD, schizophrenia, dementia comes to the ED from nursing facility where she was found to be hypoxic. On admission, patient found to have pCO2>100 and was intubated in the ED for respiratory failure, likely ISO pneumonia.       NEURO:  Baseline: Unclear.    #Encephalopathy   - Patient with encephalopathy on admission  - Likely due to elevated pCO2   - CT head with no acute pathology. Mild cerebral volume loss and chronic microvascular ischemic disease.   - off sedation    #Schizophrenia  - per d/w nursing home, patient not taking any antipsychotics or sedatives at baseline    CARDIOVASCULAR:  -Keep Mg>2, K>4, P>3    #Left ventricular dysfunction  - Segmental dysfunction on TTE 2019  - Repeat TTE with poor study but decreased LV function  - BNP 11k on admission      #HTN  - hold anti-hypertensives in setting of acute illness      HEMODYNAMICS   - off pressors      RESPIRATORY:  #Acute hypoxemic hypercapnic respiratory failure  - Intubated 5/3  - Extubated to BiPAP 5/6  - BiPAP 10/5 qHS. RA during day  - Likely due to pneumonia vs ADHF    GI/NUTRITION:    #Transaminitis  - LE elevate on admission, now resolved  - T bili elevated 5/4, continue to trend    Diet: adv as tolerated    RENAL:   - Monitor urine output, may need IVF boluses if drops      ID:   #Sepsis  - Pt meets sepsis criteria due to tachypnea and tachycardia  - Source may be PNA, but infectious workup still pending  - RVP negative  - UA without infection  - Blood culture 5/2 negative x2  - Urine culture 5/2 negative  - MRSA swab negative, will DC vanc.  - Zosyn (5/3 - 5/6)  - S/p azithromycin (5/4-5/6). urine legionella neg  - S/p CTX 5/6    HEME:  - Maintain active type and screen    ENDOCRINE:  - Monitor finger sticks     DVT PPX:  - Lovenox     ETHICS  - Pt is a goins of the state? Per sister, guardian is Jaciel Jara [phone]. He is aware of situation and will speak to family.        For Follow-Up:  [ ] advance diet as tolerated  [ ] c/w noctural BiPAP MICU Transfer Note  ---------------------------    Transfer from: MICU  Transfer to:  (X) Medicine    (  ) Telemetry    (  ) RCU    (  ) Palliative    (  ) Stroke Unit    (  ) _______________  Accepting Physician: Zeferino Alfonso       MICU COURSE  89 yo F with pmhx of HTN, HLD, schizophrenia, dementia comes to the ED from nursing facility where she was found to be hypoxic.       In the ED, her vitals were significant for tachypnea and hypoxia with her labs showing acidosis and pCO2>100 s/p broad spectrum antibiotics. She was then intubated and is now admitted to the MICU for further management.     In MICU, pCO2 levels improved. Successfully extubated to BiPAP on 5/6. Tolerated RA with nocturnal BiPAP. Passed bedside dysphagia screen      OBJECTIVE --  Vital Signs Last 24 Hrs  T(C): 35 (07 May 2024 08:00), Max: 37.8 (06 May 2024 16:00)  T(F): 95 (07 May 2024 08:00), Max: 100 (06 May 2024 16:00)  HR: 92 (07 May 2024 12:00) (40 - 92)  BP: 116/74 (07 May 2024 12:00) (104/83 - 143/89)  BP(mean): 89 (07 May 2024 12:00) (89 - 105)  RR: 24 (07 May 2024 12:00) (10 - 29)  SpO2: 96% (07 May 2024 12:00) (96% - 100%)    Parameters below as of 07 May 2024 12:00  Patient On (Oxygen Delivery Method): nasal cannula  O2 Flow (L/min): 2      I&O's Summary    06 May 2024 07:01  -  07 May 2024 07:00  --------------------------------------------------------  IN: 304.3 mL / OUT: 135 mL / NET: 169.3 mL        MEDICATIONS  (STANDING):  chlorhexidine 2% Cloths 1 Application(s) Topical daily  enoxaparin Injectable 30 milliGRAM(s) SubCutaneous every 24 hours  polyethylene glycol 3350 17 Gram(s) Oral every 12 hours  senna 2 Tablet(s) Oral at bedtime    MEDICATIONS  (PRN):        LABS                                            12.6                  Neurophils% (auto):   x      (05-07 @ 02:50):    4.48 )-----------(159          Lymphocytes% (auto):  x                                             40.1                   Eosinphils% (auto):   x        Manual%: Neutrophils x    ; Lymphocytes x    ; Eosinophils x    ; Bands%: x    ; Blasts x                                    140    |  104    |  26                  Calcium: 7.9   / iCa: x      (05-07 @ 02:50)    ----------------------------<  72        Magnesium: 2.10                             4.2     |  21     |  0.49             Phosphorous: 3.1        ( 05-07 @ 02:50 )   PT: 11.9 sec;   INR: 1.06 ratio  aPTT: 30.7 sec          ASSESSMENT & PLAN:   89 yo F with pmhx of HTN, HLD, schizophrenia, dementia comes to the ED from nursing facility where she was found to be hypoxic. On admission, patient found to have pCO2>100 and was intubated in the ED for respiratory failure, likely ISO pneumonia.       NEURO:  Baseline: Unclear.    #Encephalopathy   - Patient with encephalopathy on admission  - Likely due to elevated pCO2   - CT head with no acute pathology. Mild cerebral volume loss and chronic microvascular ischemic disease.   - off sedation    #Schizophrenia  - per d/w nursing home, patient not taking any antipsychotics or sedatives at baseline    CARDIOVASCULAR:  -Keep Mg>2, K>4, P>3    #Left ventricular dysfunction  - Segmental dysfunction on TTE 2019  - Repeat TTE with poor study but decreased LV function  - BNP 11k on admission      #HTN  - hold anti-hypertensives in setting of acute illness      HEMODYNAMICS   - off pressors      RESPIRATORY:  #Acute hypoxemic hypercapnic respiratory failure  - Intubated 5/3  - Extubated to BiPAP 5/6  - BiPAP 10/5 qHS. RA during day  - Likely due to pneumonia vs ADHF    GI/NUTRITION:    #Transaminitis  - LE elevate on admission, now resolved  - T bili elevated 5/4, continue to trend    Diet: adv as tolerated    RENAL:   - Monitor urine output, may need IVF boluses if drops      ID:   #Sepsis  - Pt meets sepsis criteria due to tachypnea and tachycardia  - Source may be PNA, but infectious workup still pending  - RVP negative  - UA without infection  - Blood culture 5/2 negative x2  - Urine culture 5/2 negative  - MRSA swab negative, will DC vanc.  - Zosyn (5/3 - 5/6)  - S/p azithromycin (5/4-5/6). urine legionella neg  - S/p CTX 5/6    HEME:  - Maintain active type and screen    ENDOCRINE:  - Monitor finger sticks     DVT PPX:  - Lovenox     ETHICS  - Pt is a go[place] of the state? Per sister, guardian is Jaciel Jara [phone]. He is aware of situation and will speak to family.        For Follow-Up:  [ ] advance diet as tolerated  [ ] c/w noctural BiPAP MICU Transfer Note  ---------------------------    Transfer from: MICU  Transfer to:  (X) Medicine    (  ) Telemetry    (  ) RCU    (  ) Palliative    (  ) Stroke Unit    (  ) _______________  Accepting Physician: Jolanta Mitchell       MICU COURSE  89 yo F with pmhx of HTN, HLD, schizophrenia, dementia comes to the ED from nursing facility where she was found to be hypoxic.       In the ED, her vitals were significant for tachypnea and hypoxia with her labs showing acidosis and pCO2>100 s/p broad spectrum antibiotics. She was then intubated and is now admitted to the MICU for further management.     In MICU, pCO2 levels improved. Successfully extubated to BiPAP on 5/6. Tolerated RA with nocturnal BiPAP. Passed bedside dysphagia screen      OBJECTIVE --  Vital Signs Last 24 Hrs  T(C): 35 (07 May 2024 08:00), Max: 37.8 (06 May 2024 16:00)  T(F): 95 (07 May 2024 08:00), Max: 100 (06 May 2024 16:00)  HR: 92 (07 May 2024 12:00) (40 - 92)  BP: 116/74 (07 May 2024 12:00) (104/83 - 143/89)  BP(mean): 89 (07 May 2024 12:00) (89 - 105)  RR: 24 (07 May 2024 12:00) (10 - 29)  SpO2: 96% (07 May 2024 12:00) (96% - 100%)    Parameters below as of 07 May 2024 12:00  Patient On (Oxygen Delivery Method): nasal cannula  O2 Flow (L/min): 2      I&O's Summary    06 May 2024 07:01  -  07 May 2024 07:00  --------------------------------------------------------  IN: 304.3 mL / OUT: 135 mL / NET: 169.3 mL        MEDICATIONS  (STANDING):  chlorhexidine 2% Cloths 1 Application(s) Topical daily  enoxaparin Injectable 30 milliGRAM(s) SubCutaneous every 24 hours  polyethylene glycol 3350 17 Gram(s) Oral every 12 hours  senna 2 Tablet(s) Oral at bedtime    MEDICATIONS  (PRN):        LABS                                            12.6                  Neurophils% (auto):   x      (05-07 @ 02:50):    4.48 )-----------(159          Lymphocytes% (auto):  x                                             40.1                   Eosinphils% (auto):   x        Manual%: Neutrophils x    ; Lymphocytes x    ; Eosinophils x    ; Bands%: x    ; Blasts x                                    140    |  104    |  26                  Calcium: 7.9   / iCa: x      (05-07 @ 02:50)    ----------------------------<  72        Magnesium: 2.10                             4.2     |  21     |  0.49             Phosphorous: 3.1        ( 05-07 @ 02:50 )   PT: 11.9 sec;   INR: 1.06 ratio  aPTT: 30.7 sec          ASSESSMENT & PLAN:   89 yo F with pmhx of HTN, HLD, schizophrenia, dementia comes to the ED from nursing facility where she was found to be hypoxic. On admission, patient found to have pCO2>100 and was intubated in the ED for respiratory failure, likely ISO pneumonia.       NEURO:  Baseline: Unclear.    #Encephalopathy   - Patient with encephalopathy on admission  - Likely due to elevated pCO2   - CT head with no acute pathology. Mild cerebral volume loss and chronic microvascular ischemic disease.   - off sedation    #Schizophrenia  - per d/w nursing home, patient not taking any antipsychotics or sedatives at baseline    CARDIOVASCULAR:  -Keep Mg>2, K>4, P>3    #Left ventricular dysfunction  - Segmental dysfunction on TTE 2019  - Repeat TTE with poor study but decreased LV function  - BNP 11k on admission      #HTN  - hold anti-hypertensives in setting of acute illness      HEMODYNAMICS   - off pressors      RESPIRATORY:  #Acute hypoxemic hypercapnic respiratory failure  - Intubated 5/3  - Extubated to BiPAP 5/6  - BiPAP 10/5 qHS. RA during day  - Likely due to pneumonia vs ADHF    GI/NUTRITION:    #Transaminitis  - LE elevate on admission, now resolved  - T bili elevated 5/4, continue to trend    Diet: adv as tolerated    RENAL:   - Monitor urine output, may need IVF boluses if drops      ID:   #Sepsis  - Pt meets sepsis criteria due to tachypnea and tachycardia  - Source may be PNA, but infectious workup still pending  - RVP negative  - UA without infection  - Blood culture 5/2 negative x2  - Urine culture 5/2 negative  - MRSA swab negative, will DC vanc.  - Zosyn (5/3 - 5/6)  - S/p azithromycin (5/4-5/6). urine legionella neg  - S/p CTX 5/6    HEME:  - Maintain active type and screen    ENDOCRINE:  - Monitor finger sticks     DVT PPX:  - Lovenox     ETHICS  - Pt is a goins of the state? Per sister, guardian is Jaciel Jara [phone]. He is aware of situation and will speak to family.        For Follow-Up:  [ ] advance diet as tolerated  [ ] c/w noctural BiPAP

## 2024-05-07 NOTE — PROGRESS NOTE ADULT - ATTENDING COMMENTS
89 F with htn, hld, schizophrenia, dementia here with acute hypoxemic and hypercapnic respiratory failure due to community acquired pneumonia requiring intubation, septic shock due to the same     Extubated yesterday. Awake and conversant    # acute hypoxemic and hypercapnic respiratory failure  # septic shock  # RLL community acquired pneumonia   - c/w BIPAP PRN and QHS  - Will d/c abx   - per team, patient has a guardian who wants her to be full code, will d/w SW  - Speech and swallow lucy Villeda MD  Pulmonary & Critical Care
89 F with htn, hld, schizophrenia, dementia here with acute hypoxemic and hypercapnic respiratory failure due to community acquired pneumonia requiring intubation, septic shock due to the same     patient waking up, able to follow some commands    # acute hypoxemic and hypercapnic respiratory failure  # septic shock  # RLL community acquired pneumonia   - on 10/5 PS trials with sub optimal volumes but is asking for ett to be removed  - vasopressors prn  - c/w abx for total 5-7 day course  - per team, patient has a guardian who wants her to be full code, will d/w SW  - may extubate to NIPPV    Critically ill patient requiring frequent bedside visits with therapy changes.
89 yo F with pmhx of HTN, HLD, schizophrenia, dementia comes to the ED from nursing facility, admitted with Acute Hypoxemic Hypercapneic Respiratory Failure pCO2>100, s/p PADDY with RLL pneumonia.- CT head with no acute pathology.  Hx of Left ventricular dysfunction, continue prn pressor and ventilatory support. Pt tolerating SBT 10/5 today, continue as tolerated. Not on chronic psych meds per outpatient psych, continue precedex, Finish 7-day course of zosyn empirically. F/u with guardian regarding advance directives, pt is at high risk for needing re-intubation.
1. Acute hypoxemic and hypercapnic respiratory failure due to R> L pneumonia. Pt was hypercapnic . PCO2 down to mid 40's.      Decrease RR and continue other AC Vent settings. Repeat ABG.  CT  chest small bilateral effusions with bilateral consolidation. Place bite block. Pt chewing on ETT   2. ID. Continue Zosyn and Vancomycin. Check MRSA nasal swab.  3.Elevated LFTs. ? shock liver follow LFTs Pt was hypotensive  on pressors. Now off pressors.  4. DVT prophylaxis: Lovenox  5. Schizophrenia.: Hold meds while intubated and sedated.  6. Dementia at baseline  7. GOC.  Follow up with  on Health care Proxy
89 yo F with pmhx of HTN, HLD, schizophrenia, dementia comes to the ED from nursing facility, admitted with Acute Hypoxemic Hypercapneic Respiratory Failure pCO2>100, s/p PADDY with RLL pneumonia.- CT head with no acute pathology.  Hx of Left ventricular dysfunction, continue prn pressor and ventilatory support. Pt didn't tolerate SBT today, with persistent apnea. Not on chronic psych meds per outpatient psych, continue precedex, consider haldol with weaning and SBT trials. zosyn empirically.

## 2024-05-08 LAB
ANION GAP SERPL CALC-SCNC: 16 MMOL/L — HIGH (ref 7–14)
BUN SERPL-MCNC: 26 MG/DL — HIGH (ref 7–23)
CALCIUM SERPL-MCNC: 8.5 MG/DL — SIGNIFICANT CHANGE UP (ref 8.4–10.5)
CHLORIDE SERPL-SCNC: 106 MMOL/L — SIGNIFICANT CHANGE UP (ref 98–107)
CO2 SERPL-SCNC: 23 MMOL/L — SIGNIFICANT CHANGE UP (ref 22–31)
CREAT SERPL-MCNC: 0.61 MG/DL — SIGNIFICANT CHANGE UP (ref 0.5–1.3)
CULTURE RESULTS: SIGNIFICANT CHANGE UP
CULTURE RESULTS: SIGNIFICANT CHANGE UP
EGFR: 82 ML/MIN/1.73M2 — SIGNIFICANT CHANGE UP
GLUCOSE BLDC GLUCOMTR-MCNC: 124 MG/DL — HIGH (ref 70–99)
GLUCOSE BLDC GLUCOMTR-MCNC: 177 MG/DL — HIGH (ref 70–99)
GLUCOSE BLDC GLUCOMTR-MCNC: 56 MG/DL — LOW (ref 70–99)
GLUCOSE BLDC GLUCOMTR-MCNC: 63 MG/DL — LOW (ref 70–99)
GLUCOSE BLDC GLUCOMTR-MCNC: 84 MG/DL — SIGNIFICANT CHANGE UP (ref 70–99)
GLUCOSE BLDC GLUCOMTR-MCNC: 88 MG/DL — SIGNIFICANT CHANGE UP (ref 70–99)
GLUCOSE BLDC GLUCOMTR-MCNC: 96 MG/DL — SIGNIFICANT CHANGE UP (ref 70–99)
GLUCOSE SERPL-MCNC: 61 MG/DL — LOW (ref 70–99)
HCT VFR BLD CALC: 38.4 % — SIGNIFICANT CHANGE UP (ref 34.5–45)
HGB BLD-MCNC: 12.2 G/DL — SIGNIFICANT CHANGE UP (ref 11.5–15.5)
MAGNESIUM SERPL-MCNC: 2.4 MG/DL — SIGNIFICANT CHANGE UP (ref 1.6–2.6)
MCHC RBC-ENTMCNC: 31.8 GM/DL — LOW (ref 32–36)
MCHC RBC-ENTMCNC: 33.1 PG — SIGNIFICANT CHANGE UP (ref 27–34)
MCV RBC AUTO: 104.1 FL — HIGH (ref 80–100)
NRBC # BLD: 0 /100 WBCS — SIGNIFICANT CHANGE UP (ref 0–0)
NRBC # FLD: 0 K/UL — SIGNIFICANT CHANGE UP (ref 0–0)
PHOSPHATE SERPL-MCNC: 2.8 MG/DL — SIGNIFICANT CHANGE UP (ref 2.5–4.5)
PLATELET # BLD AUTO: 166 K/UL — SIGNIFICANT CHANGE UP (ref 150–400)
POTASSIUM SERPL-MCNC: 3.7 MMOL/L — SIGNIFICANT CHANGE UP (ref 3.5–5.3)
POTASSIUM SERPL-SCNC: 3.7 MMOL/L — SIGNIFICANT CHANGE UP (ref 3.5–5.3)
RBC # BLD: 3.69 M/UL — LOW (ref 3.8–5.2)
RBC # FLD: 15.2 % — HIGH (ref 10.3–14.5)
SODIUM SERPL-SCNC: 145 MMOL/L — SIGNIFICANT CHANGE UP (ref 135–145)
SPECIMEN SOURCE: SIGNIFICANT CHANGE UP
SPECIMEN SOURCE: SIGNIFICANT CHANGE UP
WBC # BLD: 6.82 K/UL — SIGNIFICANT CHANGE UP (ref 3.8–10.5)
WBC # FLD AUTO: 6.82 K/UL — SIGNIFICANT CHANGE UP (ref 3.8–10.5)

## 2024-05-08 RX ORDER — GLUCAGON INJECTION, SOLUTION 0.5 MG/.1ML
1 INJECTION, SOLUTION SUBCUTANEOUS ONCE
Refills: 0 | Status: COMPLETED | OUTPATIENT
Start: 2024-05-08 | End: 2024-05-08

## 2024-05-08 RX ORDER — DEXTROSE 50 % IN WATER 50 %
12.5 SYRINGE (ML) INTRAVENOUS ONCE
Refills: 0 | Status: COMPLETED | OUTPATIENT
Start: 2024-05-08 | End: 2024-05-08

## 2024-05-08 RX ORDER — SODIUM CHLORIDE 9 MG/ML
500 INJECTION, SOLUTION INTRAVENOUS ONCE
Refills: 0 | Status: COMPLETED | OUTPATIENT
Start: 2024-05-08 | End: 2024-05-08

## 2024-05-08 RX ADMIN — GLUCAGON INJECTION, SOLUTION 1 MILLIGRAM(S): 0.5 INJECTION, SOLUTION SUBCUTANEOUS at 18:56

## 2024-05-08 RX ADMIN — SODIUM CHLORIDE 500 MILLILITER(S): 9 INJECTION, SOLUTION INTRAVENOUS at 23:45

## 2024-05-08 RX ADMIN — Medication 12.5 GRAM(S): at 20:51

## 2024-05-08 RX ADMIN — CHLORHEXIDINE GLUCONATE 1 APPLICATION(S): 213 SOLUTION TOPICAL at 11:07

## 2024-05-08 NOTE — PROVIDER CONTACT NOTE (OTHER) - ASSESSMENT
Pt A&Ox1, restless still. No acute signs of distress noted. Pt tachycardic but no c/o pain. Pt on RA. EKG done and sent to TEAMS. Pt A&Ox1, restless still. No acute signs of distress noted. Pt tachycardic but no c/o pain. Pt on RA. EKG done and sent to TEAMS. HR back to 90s then up, /97, O2 100% RA

## 2024-05-08 NOTE — PROVIDER CONTACT NOTE (HYPOGLYCEMIA EVENT) - NS PROVIDER CONTACT BACKGROUND-HYPO
Age: 89y    Gender: Female    POCT Blood Glucose:  63 mg/dL (05-08-24 @ 18:39)  56 mg/dL (05-08-24 @ 18:38)      eMAR:

## 2024-05-08 NOTE — PROGRESS NOTE ADULT - SUBJECTIVE AND OBJECTIVE BOX
CHIEF COMPLAINT:    SUBJECTIVE:     REVIEW OF SYSTEMS:    CONSTITUTIONAL: (  )  weakness,  (  ) fevers or chills  EYES/ENT: (  )visual changes;     NECK: (  ) pain or stiffness  RESPIRATORY:   (  )cough, wheezing, hemoptysis;  (  ) shortness of breath  CARDIOVASCULAR:  (  )chest pain or palpitations  GASTROINTESTINAL:   (  )abdominal or epigastric pain.  (  ) nausea, vomiting, or hematemesis;   (   ) diarrhea or constipation.   GENITOURINARY:   (    ) dysuria, frequency or hematuria  NEUROLOGICAL:  (   ) numbness or weakness   All other review of systems is negative unless indicated above    Vital Signs Last 24 Hrs  T(C): 36.7 (08 May 2024 11:00), Max: 37.2 (07 May 2024 20:00)  T(F): 98.1 (08 May 2024 11:00), Max: 99 (07 May 2024 20:00)  HR: 84 (08 May 2024 11:00) (84 - 124)  BP: 167/90 (08 May 2024 11:00) (118/73 - 167/90)  BP(mean): 101 (07 May 2024 20:00) (90 - 101)  RR: 18 (08 May 2024 11:00) (18 - 30)  SpO2: 96% (08 May 2024 11:00) (93% - 98%)    Parameters below as of 08 May 2024 11:00  Patient On (Oxygen Delivery Method): nasal cannula  O2 Flow (L/min): 2      I&O's Summary    07 May 2024 07:01  -  08 May 2024 07:00  --------------------------------------------------------  IN: 0 mL / OUT: 100 mL / NET: -100 mL        CAPILLARY BLOOD GLUCOSE          PHYSICAL EXAM:    Constitutional:  (   ) NAD,   (   )awake and alert  HEENT: PERR, EOMI,    Neck: Soft and supple, No LAD, No JVD  Respiratory:  (    Breath sounds are clear bilaterally,    (   ) wheezing, rales or rhonchi  Cardiovascular:     (   )S1 and S2, regular rate and rhythm, no Murmurs, gallops or rubs  Gastrointestinal:  (   )Bowel Sounds present, soft,   (  )nontender, nondistended,    Extremities:    (  ) peripheral edema  Vascular: 2+ peripheral pulses  Neurological:    (    )A/O x 3,   (  ) focal deficits  Musculoskeletal:    (   )  normal strength b/l upper  (     ) normal  lower extremities  Skin: No rashes    MEDICATIONS:  MEDICATIONS  (STANDING):  chlorhexidine 2% Cloths 1 Application(s) Topical daily  enoxaparin Injectable 30 milliGRAM(s) SubCutaneous every 24 hours  polyethylene glycol 3350 17 Gram(s) Oral every 12 hours  senna 2 Tablet(s) Oral at bedtime      LABS: All Labs Reviewed:                        12.2   6.82  )-----------( 166      ( 08 May 2024 07:25 )             38.4     05-08    145  |  106  |  26<H>  ----------------------------<  61<L>  3.7   |  23  |  0.61    Ca    8.5      08 May 2024 07:25  Phos  2.8     05-08  Mg     2.40     05-08      PT/INR - ( 07 May 2024 02:50 )   PT: 11.9 sec;   INR: 1.06 ratio         PTT - ( 07 May 2024 02:50 )  PTT:30.7 sec      Blood Culture:   Urine Culture      RADIOLOGY/EKG:    ASSESSMENT AND PLAN:    DVT PPX:    ADVANCED DIRECTIVE:    DISPOSITION: CHIEF COMPLAINT:    patient she was transferred from MICU to [facility] on 5/7/2024  on oxygen .  agitated wants  to be out of bed to chair discussed with nursing and PCA at the bedside that she always at the nursing of sit on the chair without agitation but overall she is high risk to fall due to her medical condition       SUBJECTIVE:     REVIEW OF SYSTEMS:awake and verbal restless    CONSTITUTIONAL: (  )  weakness,  (  ) fevers or chills  EYES/ENT: (  )visual changes;     NECK: (  ) pain or stiffness  RESPIRATORY:   (  )cough, wheezing, hemoptysis;  (  ) shortness of breath  CARDIOVASCULAR:  (  )chest pain or palpitations  GASTROINTESTINAL:   (  )abdominal or epigastric pain.  (  ) nausea, vomiting, or hematemesis;   (   ) diarrhea or constipation.   GENITOURINARY:   (    ) dysuria, frequency or hematuria  NEUROLOGICAL:  (   ) numbness or weakness   All other review of systems is negative unless indicated above    Vital Signs Last 24 Hrs  T(C): 36.7 (08 May 2024 11:00), Max: 37.2 (07 May 2024 20:00)  T(F): 98.1 (08 May 2024 11:00), Max: 99 (07 May 2024 20:00)  HR: 84 (08 May 2024 11:00) (84 - 124)  BP: 167/90 (08 May 2024 11:00) (118/73 - 167/90)  BP(mean): 101 (07 May 2024 20:00) (90 - 101)  RR: 18 (08 May 2024 11:00) (18 - 30)  SpO2: 96% (08 May 2024 11:00) (93% - 98%)    Parameters below as of 08 May 2024 11:00  Patient On (Oxygen Delivery Method): nasal cannula  O2 Flow (L/min): 2      I&O's Summary    07 May 2024 07:01  -  08 May 2024 07:00  --------------------------------------------------------  IN: 0 mL / OUT: 100 mL / NET: -100 mL        CAPILLARY BLOOD GLUCOSE          PHYSICAL EXAM:    Constitutional:  ( x  ) NAD,   ( x  )awake and  verbal  HEENT: PERR, EOMI,    Neck: Soft and supple, No LAD, No JVD  Respiratory:  (  x  Breath sounds are clear bilaterally,    (   ) wheezing, rales or rhonchi  Cardiovascular:     ( x  )S1 and S2, regular rate and rhythm, no Murmurs, gallops or rubs  Gastrointestinal:  ( x  )Bowel Sounds present, soft,   (  )nontender, nondistended,    Extremities:    (  ) peripheral edema  Vascular: 2+ peripheral pulses  Neurological:    (   x )A/O x  1,   (  ) focal deficits  Musculoskeletal:    (   )  normal strength b/l upper  (     ) normal  lower extremities  Skin: No rashes    MEDICATIONS:  MEDICATIONS  (STANDING):  chlorhexidine 2% Cloths 1 Application(s) Topical daily  enoxaparin Injectable 30 milliGRAM(s) SubCutaneous every 24 hours  polyethylene glycol 3350 17 Gram(s) Oral every 12 hours  senna 2 Tablet(s) Oral at bedtime      LABS: All Labs Reviewed:                        12.2   6.82  )-----------( 166      ( 08 May 2024 07:25 )             38.4     05-08    145  |  106  |  26<H>  ----------------------------<  61<L>  3.7   |  23  |  0.61    Ca    8.5      08 May 2024 07:25  Phos  2.8     05-08  Mg     2.40     05-08      PT/INR - ( 07 May 2024 02:50 )   PT: 11.9 sec;   INR: 1.06 ratio         PTT - ( 07 May 2024 02:50 )  PTT:30.7 sec      Blood Culture:   Urine Culture      RADIOLOGY/EKG:    ASSESSMENT AND PLAN:  89 yo F with pmhx of HTN, HLD, schizophrenia, dementia comes to the ED from nursing facility where she was found to be hypoxic.       In the ED, her vitals were significant for tachypnea and hypoxia with her labs showing acidosis and pCO2>100 s/p broad spectrum antibiotics. She was then intubated and is now admitted to the MICU for further management.     In MICU, pCO2 levels improved. Successfully extubated to BiPAP on 5/6. Tolerated RA with nocturnal BiPAP. Passed bedside dysphagia screen  89 yo F with pmhx of HTN, HLD, schizophrenia, dementia comes to the ED from nursing facility where she was found to be hypoxic. On admission, patient found to have pCO2>100 and was intubated in the ED. She is now admitted for further management.    #1 respiratory failure/ #Encephalopathy   - patient with encephalopathy on admission  - likely due to elevated pCO2     PLAN  - f/u CT head  - f/u TSH, ammonia, B12    #2 CARDIOVASCULAR: #Left ventricular dysfunction  -Keep Mg>2, K>4, P>3  - seen on TTE 2019  - repeat TTE this admission  - BNP elevated on admission  - can consider ADHF as etiology of acute illness     #3 #HTN  - hold anti-hypertensives in setting of acute illness     #4 GI/NUTRITION: Diet: Tube feeds      #5 #Transaminitis  - LE elevate on admission  - may be due to congestive hepatopathy    - #6 ID: #Sepsis  - pt meets sepsis criteria due to tachypnea and tachycardia  - source may be PNA, but infectious workup still pending    PLAN  - f/u MRSA swab, RVP, blood cx, UA (wnl), UCx,   - cw vanc by level ( will vanc level as pt received level before MICU admission)  - cw zosyn        5/3/2024 discuss with the team regarding patient that she was living with her sister at home    = 5/4/2024 patient remained intubated continue above ICU management sedated possible extubation in a.m. if condition remained stable   -5/5/2024 condition improving Zithromax was added by ICU team.     for possible extubation today if condition remained stable:  -5/6/2024 patient off sedation the time of visit respond to verbal stimuli by turning her head   -5/8/2024 patient slightly agitated need bedside support this is her baseline she need to be out of bed to chair off antibiotics stable    DVT PPX:    ADVANCED DIRECTIVE:    DISPOSITION:

## 2024-05-09 LAB
GLUCOSE BLDC GLUCOMTR-MCNC: 112 MG/DL — HIGH (ref 70–99)
GLUCOSE BLDC GLUCOMTR-MCNC: 118 MG/DL — HIGH (ref 70–99)
GLUCOSE BLDC GLUCOMTR-MCNC: 129 MG/DL — HIGH (ref 70–99)
GLUCOSE BLDC GLUCOMTR-MCNC: 85 MG/DL — SIGNIFICANT CHANGE UP (ref 70–99)

## 2024-05-09 PROCEDURE — 93010 ELECTROCARDIOGRAM REPORT: CPT | Mod: 76

## 2024-05-09 RX ORDER — METOPROLOL TARTRATE 50 MG
2.5 TABLET ORAL ONCE
Refills: 0 | Status: COMPLETED | OUTPATIENT
Start: 2024-05-09 | End: 2024-05-09

## 2024-05-09 RX ORDER — METOPROLOL TARTRATE 50 MG
25 TABLET ORAL
Refills: 0 | Status: DISCONTINUED | OUTPATIENT
Start: 2024-05-09 | End: 2024-05-09

## 2024-05-09 RX ORDER — ACETAMINOPHEN 500 MG
600 TABLET ORAL ONCE
Refills: 0 | Status: COMPLETED | OUTPATIENT
Start: 2024-05-09 | End: 2024-05-09

## 2024-05-09 RX ORDER — METOPROLOL TARTRATE 50 MG
25 TABLET ORAL
Refills: 0 | Status: DISCONTINUED | OUTPATIENT
Start: 2024-05-09 | End: 2024-05-14

## 2024-05-09 RX ADMIN — POLYETHYLENE GLYCOL 3350 17 GRAM(S): 17 POWDER, FOR SOLUTION ORAL at 18:52

## 2024-05-09 RX ADMIN — SENNA PLUS 2 TABLET(S): 8.6 TABLET ORAL at 21:15

## 2024-05-09 RX ADMIN — Medication 240 MILLIGRAM(S): at 01:02

## 2024-05-09 RX ADMIN — Medication 25 MILLIGRAM(S): at 21:15

## 2024-05-09 RX ADMIN — Medication 25 MILLIGRAM(S): at 06:37

## 2024-05-09 RX ADMIN — ENOXAPARIN SODIUM 30 MILLIGRAM(S): 100 INJECTION SUBCUTANEOUS at 06:37

## 2024-05-09 RX ADMIN — CHLORHEXIDINE GLUCONATE 1 APPLICATION(S): 213 SOLUTION TOPICAL at 18:52

## 2024-05-09 RX ADMIN — POLYETHYLENE GLYCOL 3350 17 GRAM(S): 17 POWDER, FOR SOLUTION ORAL at 06:37

## 2024-05-09 RX ADMIN — Medication 2.5 MILLIGRAM(S): at 01:00

## 2024-05-09 NOTE — PROVIDER CONTACT NOTE (OTHER) - ASSESSMENT
Pt A&Ox1, tachycardic and hypertensive. Pt not restless or agitated. Pt resting in bed and has no c/o pain. No SOB noted. Pt has no increased WOB or accessory muscle use.

## 2024-05-09 NOTE — PROVIDER CONTACT NOTE (OTHER) - ASSESSMENT
absent Patient is AOx0-1 at baseline, not complaining of any chest pain or palpitations. Patient's vitals are /81, , T 98.1, SPO2 100, RR 18.

## 2024-05-09 NOTE — PROGRESS NOTE ADULT - SUBJECTIVE AND OBJECTIVE BOX
CHIEF COMPLAINT:  patient was seen with the nursing at the bedside laying in the bed with less less reported poor oral intake is poor hypoglycemic event noted  SUBJECTIVE:     REVIEW OF SYSTEMS:    CONSTITUTIONAL: (x  )  weakness,  (  ) fevers or chills  EYES/ENT: (  )visual changes;     NECK: (  ) pain or stiffness  RESPIRATORY:   (  )cough, wheezing, hemoptysis;  (  ) shortness of breath  CARDIOVASCULAR:  (  )chest pain or palpitations  GASTROINTESTINAL:   (  )abdominal or epigastric pain.  (  ) nausea, vomiting, or hematemesis;   (   ) diarrhea or constipation.   GENITOURINARY:   (    ) dysuria, frequency or hematuria  NEUROLOGICAL:  (   ) numbness or weakness   All other review of systems is negative unless indicated above    Vital Signs Last 24 Hrs  T(C): 36.8 (09 May 2024 18:05), Max: 36.9 (09 May 2024 03:25)  T(F): 98.2 (09 May 2024 18:05), Max: 98.5 (09 May 2024 03:25)  HR: 94 (09 May 2024 18:05) (61 - 130)  BP: 106/86 (09 May 2024 18:05) (106/86 - 178/117)  BP(mean): --  RR: 17 (09 May 2024 18:05) (16 - 20)  SpO2: 100% (09 May 2024 18:05) (98% - 100%)    Parameters below as of 09 May 2024 18:05  Patient On (Oxygen Delivery Method): room air        I&O's Summary    09 May 2024 07:01  -  09 May 2024 20:30  --------------------------------------------------------  IN: 720 mL / OUT: 0 mL / NET: 720 mL        CAPILLARY BLOOD GLUCOSE      POCT Blood Glucose.: 129 mg/dL (09 May 2024 18:04)  POCT Blood Glucose.: 118 mg/dL (09 May 2024 12:29)  POCT Blood Glucose.: 85 mg/dL (09 May 2024 07:19)  POCT Blood Glucose.: 112 mg/dL (09 May 2024 00:59)  POCT Blood Glucose.: 124 mg/dL (08 May 2024 23:23)  POCT Blood Glucose.: 177 mg/dL (08 May 2024 21:06)  POCT Blood Glucose.: 84 mg/dL (08 May 2024 20:37)      PHYSICAL EXAM:    Constitutional:  ( x  ) NAD,   ( x  )awake  verbal but confused at the baseline  HEENT: PERR, EOMI,    Neck: Soft and supple, No LAD, No JVD  Respiratory:  (  x  Breath sounds are clear bilaterally,    (   ) wheezing, rales or rhonchi  Cardiovascular:     (  x )S1 and S2, regular rate and rhythm, no Murmurs, gallops or rubs  Gastrointestinal:  (  x )Bowel Sounds present, soft,   (  )nontender, nondistended,    Extremities:    (  ) peripheral edema  Vascular: 2+ peripheral pulses  Neurological:    (  x  )A/O x  1,   (  ) focal deficits  Musculoskeletal:    (   )  normal strength b/l upper  (     ) normal  lower extremities  Skin: No rashes    MEDICATIONS:  MEDICATIONS  (STANDING):  chlorhexidine 2% Cloths 1 Application(s) Topical daily  enoxaparin Injectable 30 milliGRAM(s) SubCutaneous every 24 hours  metoprolol tartrate 25 milliGRAM(s) Oral two times a day  multivitamin 1 Tablet(s) Oral daily  polyethylene glycol 3350 17 Gram(s) Oral every 12 hours  senna 2 Tablet(s) Oral at bedtime      LABS: All Labs Reviewed:                        12.2   6.82  )-----------( 166      ( 08 May 2024 07:25 )             38.4     05-08    145  |  106  |  26<H>  ----------------------------<  61<L>  3.7   |  23  |  0.61    Ca    8.5      08 May 2024 07:25  Phos  2.8     05-08  Mg     2.40     05-08            Blood Culture:   Urine Culture      RADIOLOGY/EKG:    ASSESSMENT AND PLAN:  89 yo F with pmhx of HTN, HLD, schizophrenia, dementia comes to the ED from nursing facility where she was found to be hypoxic.       In the ED, her vitals were significant for tachypnea and hypoxia with her labs showing acidosis and pCO2>100 s/p broad spectrum antibiotics. She was then intubated and is now admitted to the MICU for further management.     In MICU, pCO2 levels improved. Successfully extubated to BiPAP on 5/6. Tolerated RA with nocturnal BiPAP. Passed bedside dysphagia screen  89 yo F with pmhx of HTN, HLD, schizophrenia, dementia comes to the ED from nursing facility where she was found to be hypoxic. On admission, patient found to have pCO2>100 and was intubated in the ED. She is now admitted for further management.    #1 respiratory failure/ #Encephalopathy   - patient with encephalopathy on admission  - likely due to elevated pCO2     PLAN  - f/u CT head  - f/u TSH, ammonia, B12    #2 CARDIOVASCULAR: #Left ventricular dysfunction  -Keep Mg>2, K>4, P>3  - seen on TTE 2019  - repeat TTE this admission  - BNP elevated on admission  - can consider ADHF as etiology of acute illness     #3 #HTN  - hold anti-hypertensives in setting of acute illness     #4 GI/NUTRITION: Diet: Tube feeds      #5 #Transaminitis  - LE elevate on admission  - may be due to congestive hepatopathy    - #6 ID: #Sepsis  - pt meets sepsis criteria due to tachypnea and tachycardia  - source may be PNA, but infectious workup still pending    PLAN  - f/u MRSA swab, RVP, blood cx, UA (wnl), UCx,   - cw vanc by level ( will vanc level as pt received level before MICU admission)  - cw zosyn        5/3/2024 discuss with the team regarding patient that she was living with her sister at home    = 5/4/2024 patient remained intubated continue above ICU management sedated possible extubation in a.m. if condition remained stable   -5/5/2024 condition improving Zithromax was added by ICU team.     for possible extubation today if condition remained stable:  -5/6/2024 patient off sedation the time of visit respond to verbal stimuli by turning her head   -5/8/2024 patient slightly agitated need bedside support this is her baseline she need to be out of bed to chair off antibiotics stable  -5/9/2024 continue supportive care encourage oral food to prevent hypoglycemia if symptoms persist  will use  IV hydration discussed with the nursing and ACP  DVT PPX:    ADVANCED DIRECTIVE:    DISPOSITION:

## 2024-05-09 NOTE — CHART NOTE - NSCHARTNOTEFT_GEN_A_CORE
Notified by RN patient tachycardic. HPI: 89 yo F with pmhx of HTN, HLD, schizophrenia, dementia admitted for ARHF 2/2 PNA. Pt with no h/o afib but on metoprolol for ?HTN. EKG: irregular sinus tachycardia. P-waves visible. Arrythmia on previous EKGs. Afebrile, rest of VSS.    Tachycardia  - afebrile  - s/p LR 500cc bolus and IV tylenol   - on metoprolol at home, metoprolol 2.5mg IV given   - to be transferred to tele Notified by RN patient tachycardic. HPI: 89 yo F with pmhx of HTN, HLD, schizophrenia, dementia admitted for ARHF 2/2 PNA. Pt with no h/o afib but on metoprolol for ?HTN. EKG: irregular sinus tachycardia. P-waves visible. Arrythmia on previous EKGs. Afebrile, rest of VSS. Pt A&Ox1.     Tachycardia  - afebrile  - s/p LR 500cc bolus and IV tylenol   - on metoprolol at home, metoprolol 2.5mg IV given   - to be transferred to tele floor   - cont to monitor HR    cont to monitor Notified by RN patient tachycardic. HPI: 89 yo F with pmhx of HTN, HLD, schizophrenia, dementia admitted for ARHF 2/2 PNA. Pt with no h/o afib but on metoprolol for ?HTN. EKG: irregular sinus tachycardia. P-waves visible. Arrythmia on previous EKGs. Afebrile, rest of VSS. Pt A&Ox1. NAD.     ICU Vital Signs Last 24 Hrs  T(C): 36.6 (08 May 2024 23:00), Max: 37.2 (08 May 2024 05:00)  T(F): 97.8 (08 May 2024 23:00), Max: 98.9 (08 May 2024 05:00)  HR: 130 (09 May 2024 01:00) (61 - 130)  BP: 178/117 (09 May 2024 01:00) (118/73 - 178/117)  BP(mean): --  ABP: --  ABP(mean): --  RR: 16 (09 May 2024 01:00) (16 - 20)  SpO2: 100% (09 May 2024 01:00) (96% - 100%)    Tachycardia  - afebrile  - s/p LR 500cc bolus and IV tylenol   - on metoprolol at home, metoprolol 2.5mg IV given   - to be transferred to tele floor   - cont to monitor HR    cont to monitor

## 2024-05-09 NOTE — CHART NOTE - NSCHARTNOTEFT_GEN_A_CORE
Pt seen for Consult / "assessment"     Medical Course: 89 y/o female with pmhx of HTN, HLD, schizophrenia, dementia comes to the ED from nursing facility where she was found to be hypoxic.       Nutrition Course: Unable to conduct nutrition interview 2/2 decreased cognition. Information obtained from comprehensive chart review and per PCA today: No recent episodes of nausea, vomiting, diarrhea, or constipation. Last BM noted 5/8 per RN flowsheets. No reports of any chewing/swallowing difficulties. Food preferences explored with PCA, no new preferences at present. Intake is poor-fair (25-50%) of clear liquid tray per PCA today. Feeding Skills: assistance required from staff. Pt noted with 2 diet orders in EMR, will confer with PA as to what diet Pt should be on. Recommend swallow evaluation to assess what is appropriate texture for Pt.     Pt noted with s/s of malnutrition per physical observation today, Pt noted with Severe muscle wasting: Clavicle, Thighs, Calves, and Severe Fat loss, Orbital, and Triceps regions.     Diet Prescription: Diet, Clear Liquid (05-07-24 @ 13:59)  Diet, Regular (05-07-24 @ 13:29)    Pertinent Medications: MEDICATIONS  (STANDING):  chlorhexidine 2% Cloths 1 Application(s) Topical daily  enoxaparin Injectable 30 milliGRAM(s) SubCutaneous every 24 hours  metoprolol tartrate 25 milliGRAM(s) Oral two times a day  polyethylene glycol 3350 17 Gram(s) Oral every 12 hours  senna 2 Tablet(s) Oral at bedtime    Pertinent Labs: 05-08 Na145 mmol/L Glu 61 mg/dL<L> K+ 3.7 mmol/L Cr  0.61 mg/dL BUN 26 mg/dL<H> 05-08 Phos 2.8 mg/dL 05-04 Alb 3.0 g/dL<L>      POCT Blood Glucose.: 118 mg/dL (09 May 2024 12:29)  POCT Blood Glucose.: 85 mg/dL (09 May 2024 07:19)  POCT Blood Glucose.: 112 mg/dL (09 May 2024 00:59)  POCT Blood Glucose.: 124 mg/dL (08 May 2024 23:23)  POCT Blood Glucose.: 177 mg/dL (08 May 2024 21:06)  POCT Blood Glucose.: 84 mg/dL (08 May 2024 20:37)  POCT Blood Glucose.: 96 mg/dL (08 May 2024 19:50)  POCT Blood Glucose.: 88 mg/dL (08 May 2024 19:19)  POCT Blood Glucose.: 63 mg/dL (08 May 2024 18:39)  POCT Blood Glucose.: 56 mg/dL (08 May 2024 18:38)      Weight: Height (cm): 157.5 (05-03 @ 05:48)  Weight (kg): 39.4 (05-03 @ 05:48)  BMI (kg/m2): 15.9 (05-03 @ 05:48)  Weight Assessment: No new weight to assess      Physical Assessment, per flowsheets:  Edema: None noted  Pressure Injury: Left heel DTI     Estimated Needs:   [X] No change since previous assessment,    Previous Nutrition Diagnosis: [x ] Inadequate Energy Intake   Nutrition Diagnosis is [x ] ongoing  [ ] resolved [ ] not applicable       ***New Nutrition Diagnosis: [ x] severe protein calorie malnutrition   r/t Decreased ability to consume adequate nutrition in the setting of acute on chronic illness AEB Severe Muscle loss, and Fat loss    Education:  [  ] Given today        Type of education provided:   [  ] Given on previous assessment by RD   [ x ] Not applicable 2/2 cognitive deficit  [  ] Pt refusal of education offered   [  ] Not applicable 2/2 current prognosis  [  ] Not warranted at present    Interventions:   1) Recommend swallow evaluation to assess need for altered consistency   2) Recommend multivitamin once daily for micronutrient provisions   3) Vital Health Shake 2x daily (1040 carlos and 44 gm protein)   4) Encourage PO intake and honor food preferences as able, provide necessary assistance at meals  5) RD to f/u prn     Monitor & Evaluate:  PO intake, tolerance to diet/supplement, nutrition related lab values, weight trends, BMs/GI distress, hydration status, skin integrity.    Iris Miller MS, RDN (Pager #[phone]) | Also available on TEAMS

## 2024-05-09 NOTE — DIETITIAN NUTRITION RISK NOTIFICATION - ADDITIONAL COMMENTS/DIETITIAN RECOMMENDATIONS
Please see Dietitian Initial Assessment for complete recommendations.  Keara Saavedra, HAL, CDN #[id number]  Also available on [employer] Teams 
Please see RD initial eval for additional recommendations

## 2024-05-10 LAB
ANION GAP SERPL CALC-SCNC: 16 MMOL/L — HIGH (ref 7–14)
BASOPHILS # BLD AUTO: 0.01 K/UL — SIGNIFICANT CHANGE UP (ref 0–0.2)
BASOPHILS NFR BLD AUTO: 0.1 % — SIGNIFICANT CHANGE UP (ref 0–2)
BUN SERPL-MCNC: 12 MG/DL — SIGNIFICANT CHANGE UP (ref 7–23)
CALCIUM SERPL-MCNC: 8.9 MG/DL — SIGNIFICANT CHANGE UP (ref 8.4–10.5)
CHLORIDE SERPL-SCNC: 104 MMOL/L — SIGNIFICANT CHANGE UP (ref 98–107)
CO2 SERPL-SCNC: 25 MMOL/L — SIGNIFICANT CHANGE UP (ref 22–31)
CREAT SERPL-MCNC: 0.42 MG/DL — LOW (ref 0.5–1.3)
EGFR: 89 ML/MIN/1.73M2 — SIGNIFICANT CHANGE UP
EOSINOPHIL # BLD AUTO: 0.07 K/UL — SIGNIFICANT CHANGE UP (ref 0–0.5)
EOSINOPHIL NFR BLD AUTO: 0.9 % — SIGNIFICANT CHANGE UP (ref 0–6)
GLUCOSE BLDC GLUCOMTR-MCNC: 102 MG/DL — HIGH (ref 70–99)
GLUCOSE BLDC GLUCOMTR-MCNC: 106 MG/DL — HIGH (ref 70–99)
GLUCOSE BLDC GLUCOMTR-MCNC: 97 MG/DL — SIGNIFICANT CHANGE UP (ref 70–99)
GLUCOSE SERPL-MCNC: 87 MG/DL — SIGNIFICANT CHANGE UP (ref 70–99)
HCT VFR BLD CALC: 45.9 % — HIGH (ref 34.5–45)
HGB BLD-MCNC: 14.4 G/DL — SIGNIFICANT CHANGE UP (ref 11.5–15.5)
IANC: 6.01 K/UL — SIGNIFICANT CHANGE UP (ref 1.8–7.4)
IMM GRANULOCYTES NFR BLD AUTO: 0.3 % — SIGNIFICANT CHANGE UP (ref 0–0.9)
LYMPHOCYTES # BLD AUTO: 0.44 K/UL — LOW (ref 1–3.3)
LYMPHOCYTES # BLD AUTO: 6 % — LOW (ref 13–44)
MAGNESIUM SERPL-MCNC: 1.9 MG/DL — SIGNIFICANT CHANGE UP (ref 1.6–2.6)
MCHC RBC-ENTMCNC: 31.4 GM/DL — LOW (ref 32–36)
MCHC RBC-ENTMCNC: 32.5 PG — SIGNIFICANT CHANGE UP (ref 27–34)
MCV RBC AUTO: 103.6 FL — HIGH (ref 80–100)
MONOCYTES # BLD AUTO: 0.82 K/UL — SIGNIFICANT CHANGE UP (ref 0–0.9)
MONOCYTES NFR BLD AUTO: 11.1 % — SIGNIFICANT CHANGE UP (ref 2–14)
NEUTROPHILS # BLD AUTO: 6.01 K/UL — SIGNIFICANT CHANGE UP (ref 1.8–7.4)
NEUTROPHILS NFR BLD AUTO: 81.6 % — HIGH (ref 43–77)
NRBC # BLD: 0 /100 WBCS — SIGNIFICANT CHANGE UP (ref 0–0)
NRBC # FLD: 0 K/UL — SIGNIFICANT CHANGE UP (ref 0–0)
PHOSPHATE SERPL-MCNC: 1.4 MG/DL — LOW (ref 2.5–4.5)
PLATELET # BLD AUTO: 137 K/UL — LOW (ref 150–400)
POTASSIUM SERPL-MCNC: 3.4 MMOL/L — LOW (ref 3.5–5.3)
POTASSIUM SERPL-SCNC: 3.4 MMOL/L — LOW (ref 3.5–5.3)
RBC # BLD: 4.43 M/UL — SIGNIFICANT CHANGE UP (ref 3.8–5.2)
RBC # FLD: 14.7 % — HIGH (ref 10.3–14.5)
SODIUM SERPL-SCNC: 145 MMOL/L — SIGNIFICANT CHANGE UP (ref 135–145)
WBC # BLD: 7.37 K/UL — SIGNIFICANT CHANGE UP (ref 3.8–10.5)
WBC # FLD AUTO: 7.37 K/UL — SIGNIFICANT CHANGE UP (ref 3.8–10.5)

## 2024-05-10 PROCEDURE — 90792 PSYCH DIAG EVAL W/MED SRVCS: CPT

## 2024-05-10 RX ORDER — HALOPERIDOL DECANOATE 100 MG/ML
0.03 INJECTION INTRAMUSCULAR EVERY 6 HOURS
Refills: 0 | Status: DISCONTINUED | OUTPATIENT
Start: 2024-05-10 | End: 2024-05-10

## 2024-05-10 RX ORDER — POTASSIUM CHLORIDE 20 MEQ
10 PACKET (EA) ORAL ONCE
Refills: 0 | Status: COMPLETED | OUTPATIENT
Start: 2024-05-10 | End: 2024-05-10

## 2024-05-10 RX ORDER — ENOXAPARIN SODIUM 100 MG/ML
30 INJECTION SUBCUTANEOUS EVERY 24 HOURS
Refills: 0 | Status: DISCONTINUED | OUTPATIENT
Start: 2024-05-10 | End: 2024-05-14

## 2024-05-10 RX ORDER — HALOPERIDOL DECANOATE 100 MG/ML
0.25 INJECTION INTRAMUSCULAR EVERY 6 HOURS
Refills: 0 | Status: DISCONTINUED | OUTPATIENT
Start: 2024-05-10 | End: 2024-05-14

## 2024-05-10 RX ORDER — ACETAMINOPHEN 500 MG
600 TABLET ORAL ONCE
Refills: 0 | Status: COMPLETED | OUTPATIENT
Start: 2024-05-10 | End: 2024-05-10

## 2024-05-10 RX ORDER — CHLORHEXIDINE GLUCONATE 213 G/1000ML
1 SOLUTION TOPICAL DAILY
Refills: 0 | Status: DISCONTINUED | OUTPATIENT
Start: 2024-05-10 | End: 2024-05-14

## 2024-05-10 RX ORDER — LANOLIN ALCOHOL/MO/W.PET/CERES
6 CREAM (GRAM) TOPICAL ONCE
Refills: 0 | Status: COMPLETED | OUTPATIENT
Start: 2024-05-10 | End: 2024-05-10

## 2024-05-10 RX ADMIN — Medication 6 MILLIGRAM(S): at 01:04

## 2024-05-10 RX ADMIN — Medication 240 MILLIGRAM(S): at 00:52

## 2024-05-10 RX ADMIN — ENOXAPARIN SODIUM 30 MILLIGRAM(S): 100 INJECTION SUBCUTANEOUS at 05:54

## 2024-05-10 RX ADMIN — Medication 100 MILLIEQUIVALENT(S): at 13:30

## 2024-05-10 RX ADMIN — CHLORHEXIDINE GLUCONATE 1 APPLICATION(S): 213 SOLUTION TOPICAL at 13:09

## 2024-05-10 RX ADMIN — Medication 600 MILLIGRAM(S): at 01:30

## 2024-05-10 NOTE — BH CONSULTATION LIAISON ASSESSMENT NOTE - PREPARATORY ACTS:
Detail Level: Detailed Medical Necessity Information: It is in your best interest to select a reason for this procedure from the list below. All of these items fulfill various CMS LCD requirements except the new and changing color options. Show Topical Anesthesia Variable?: Yes Post-Care Instructions: I reviewed with the patient in detail post-care instructions. Patient is to wear sunprotection, and avoid picking at any of the treated lesions. Pt may apply Vaseline to crusted or scabbing areas. Render Post-Care Instructions In Note?: no Spray Paint Text: The liquid nitrogen was applied to the skin utilizing a spray paint frosting technique. Consent: The patient's consent was obtained including but not limited to risks of crusting, scabbing, blistering, scarring, darker or lighter pigmentary change, recurrence, incomplete removal and infection. Medical Necessity Clause: This procedure was medically necessary because the lesions that were treated were: Unable to assess

## 2024-05-10 NOTE — CHART NOTE - NSCHARTNOTEFT_GEN_A_CORE
Medicine PA Note    Pt has been refusing meds (spits them out), pulls on lines. Confused. Pt placed on bilateral unsecured mittens for medical management. Attempted to reach out to Dr. Mitchell but was unable to reach him. Will attempt again later to determine plan of care.    Pasquale Demarco PA-C  x [phone]

## 2024-05-10 NOTE — SWALLOW BEDSIDE ASSESSMENT ADULT - COMMENTS
As per Internal Medicine Note dated 5/9/24 "89 yo F with pmhx of HTN, HLD, schizophrenia, dementia comes to the ED from nursing facility where she was found to be hypoxic."    CT Chest 5/3/24 "IMPRESSION: Atelectasis in both lower lobes. Unchanged nodules in the middle lobe and lingula which likely reflect nodular atelectasis. Trace pleural effusions. Unchanged dilated 4.5 cm ascending aorta."    CTH 5/3/24 "IMPRESSION: No CT evidence of acute intracranial pathology."    Patient is known to this service, seen for a Clinical Swallow Evaluation on 10/22/22 with recommendation of Easy to Chew Solids with Thin Liquids (see note for details).     Patient visited at bedside for clinical swallow evaluation. Patient initially in a sleep state, able to rouse with verbal and tactile cues. Per discussion with PCA, patient refused breakfast this AM.

## 2024-05-10 NOTE — BH CONSULTATION LIAISON ASSESSMENT NOTE - NSBHCHARTREVIEWVS_PSY_A_CORE FT
Vital Signs Last 24 Hrs  T(C): 36.6 (10 May 2024 09:30), Max: 37.4 (10 May 2024 00:15)  T(F): 97.9 (10 May 2024 09:30), Max: 99.4 (10 May 2024 00:15)  HR: 59 (10 May 2024 09:30) (59 - 106)  BP: 97/74 (10 May 2024 09:30) (97/74 - 149/104)  BP(mean): --  RR: 18 (10 May 2024 09:30) (17 - 18)  SpO2: 96% (10 May 2024 05:50) (94% - 100%)    Parameters below as of 10 May 2024 05:50  Patient On (Oxygen Delivery Method): room air

## 2024-05-10 NOTE — SWALLOW BEDSIDE ASSESSMENT ADULT - ADDITIONAL RECOMMENDATIONS
2. Medical team advised to reconsult this department with any change in medical status and/or as patient is increasingly willing to participate in PO trials.

## 2024-05-10 NOTE — BH CONSULTATION LIAISON ASSESSMENT NOTE - CURRENT MEDICATION
MEDICATIONS  (STANDING):  chlorhexidine 2% Cloths 1 Application(s) Topical daily  enoxaparin Injectable 30 milliGRAM(s) SubCutaneous every 24 hours  metoprolol tartrate 25 milliGRAM(s) Oral two times a day  multivitamin 1 Tablet(s) Oral daily  polyethylene glycol 3350 17 Gram(s) Oral every 12 hours  senna 2 Tablet(s) Oral at bedtime    MEDICATIONS  (PRN):

## 2024-05-10 NOTE — PROVIDER CONTACT NOTE (OTHER) - ASSESSMENT
Patient is agitated and restless. Patient is spitting out the crushed metoprolol in apple sauce. Patient's latest vitals were /104, HR 75, T 98.0, RR 18, [id number]. Telemetry shows HR fluctuating between 130-150.

## 2024-05-10 NOTE — BH CONSULTATION LIAISON ASSESSMENT NOTE - DIFFERENTIAL
Differential: behavioral disturbance in the context of dementia, acute psychotic decompensation, delirium

## 2024-05-10 NOTE — BH CONSULTATION LIAISON ASSESSMENT NOTE - RISK ASSESSMENT
Risk factors: unable to care for self 2/2 psychiatric/medical illness, schizophrenia    Protective factors: domiciled, access to treatment    Overall, patient is chronically elevated risk of harm and warrants further psychiatric assessment prior to discharge.

## 2024-05-10 NOTE — PROGRESS NOTE ADULT - SUBJECTIVE AND OBJECTIVE BOX
CHIEF COMPLAINT:  patient laying in the bed awake verbal restless trying to move her IV moving her telemetry monitor discussed with ACP in detail regarding her condition meds ask psych to be seen and use light dose of Seroquel if they agree  SUBJECTIVE:     REVIEW OF SYSTEMS:restless and agitated    CONSTITUTIONAL: (  )  weakness,  (  ) fevers or chills  EYES/ENT: (  )visual changes;     NECK: (  ) pain or stiffness  RESPIRATORY:   (  )cough, wheezing, hemoptysis;  (  ) shortness of breath  CARDIOVASCULAR:  (  )chest pain or palpitations  GASTROINTESTINAL:   (  )abdominal or epigastric pain.  (  ) nausea, vomiting, or hematemesis;   (   ) diarrhea or constipation.   GENITOURINARY:   (    ) dysuria, frequency or hematuria  NEUROLOGICAL:  (   ) numbness or weakness   All other review of systems is negative unless indicated above    Vital Signs Last 24 Hrs  T(C): 36.6 (10 May 2024 09:30), Max: 37.4 (10 May 2024 00:15)  T(F): 97.9 (10 May 2024 09:30), Max: 99.4 (10 May 2024 00:15)  HR: 59 (10 May 2024 09:30) (59 - 106)  BP: 97/74 (10 May 2024 09:30) (97/74 - 149/104)  BP(mean): --  RR: 18 (10 May 2024 09:30) (18 - 18)  SpO2: 96% (10 May 2024 05:50) (94% - 96%)    Parameters below as of 10 May 2024 05:50  Patient On (Oxygen Delivery Method): room air        I&O's Summary    09 May 2024 07:01  -  10 May 2024 07:00  --------------------------------------------------------  IN: 720 mL / OUT: 0 mL / NET: 720 mL        CAPILLARY BLOOD GLUCOSE      POCT Blood Glucose.: 102 mg/dL (10 May 2024 12:25)  POCT Blood Glucose.: 97 mg/dL (10 May 2024 06:23)  POCT Blood Glucose.: 106 mg/dL (10 May 2024 00:14)      PHYSICAL EXAM:    Constitutional:  ( x  ) NAD,   (   )awake and alert  HEENT: PERR, EOMI,    Neck: Soft and supple, No LAD, No JVD  Respiratory:  (  decreased Breath sounds    Cardiovascular:     ( x  )S1 and S2, regular rate and rhythm, no Murmurs, gallops or rubs  Gastrointestinal:  (x   )Bowel Sounds present, soft,   (  )nontender, nondistended,    Extremities:    (  ) peripheral edema  Vascular: 2+ peripheral pulses  Neurological:    (   x )A/O x  0,   (  ) focal deficits  Musculoskeletal:    (   )  normal strength b/l upper  (     ) normal  lower extremities  Skin: No rashes    MEDICATIONS:  MEDICATIONS  (STANDING):  chlorhexidine 2% Cloths 1 Application(s) Topical daily  enoxaparin Injectable 30 milliGRAM(s) SubCutaneous every 24 hours  metoprolol tartrate 25 milliGRAM(s) Oral two times a day  multivitamin 1 Tablet(s) Oral daily  polyethylene glycol 3350 17 Gram(s) Oral every 12 hours  senna 2 Tablet(s) Oral at bedtime      LABS: All Labs Reviewed:                        14.4   7.37  )-----------( 137      ( 10 May 2024 05:30 )             45.9     05-10    145  |  104  |  12  ----------------------------<  87  3.4<L>   |  25  |  0.42<L>    Ca    8.9      10 May 2024 05:30  Phos  1.4     05-10  Mg     1.90     05-10            Blood Culture:   Urine Culture      RADIOLOGY/EKG:    ASSESSMENT AND PLAN:  89 yo F with pmhx of HTN, HLD, schizophrenia, dementia comes to the ED from nursing facility where she was found to be hypoxic.       In the ED, her vitals were significant for tachypnea and hypoxia with her labs showing acidosis and pCO2>100 s/p broad spectrum antibiotics. She was then intubated and is now admitted to the MICU for further management.     In MICU, pCO2 levels improved. Successfully extubated to BiPAP on 5/6. Tolerated RA with nocturnal BiPAP. Passed bedside dysphagia screen  89 yo F with pmhx of HTN, HLD, schizophrenia, dementia comes to the ED from nursing facility where she was found to be hypoxic. On admission, patient found to have pCO2>100 and was intubated in the ED. She is now admitted for further management.    #1 respiratory failure/ #Encephalopathy   - patient with encephalopathy on admission  - likely due to elevated pCO2     PLAN  - f/u CT head  - f/u TSH, ammonia, B12    #2 CARDIOVASCULAR: #Left ventricular dysfunction  -Keep Mg>2, K>4, P>3  - seen on TTE 2019  - repeat TTE this admission  - BNP elevated on admission  - can consider ADHF as etiology of acute illness     #3 #HTN  - hold anti-hypertensives in setting of acute illness     #4 GI/NUTRITION: Diet: Tube feeds      #5 #Transaminitis  - LE elevate on admission  - may be due to congestive hepatopathy    - #6 ID: #Sepsis  - pt meets sepsis criteria due to tachypnea and tachycardia  - source may be PNA, but infectious workup still pending    PLAN  - f/u MRSA swab, RVP, blood cx, UA (wnl), UCx,   - cw vanc by level ( will vanc level as pt received level before MICU admission)  - cw zosyn        5/3/2024 discuss with the team regarding patient that she was living with her sister at home    = 5/4/2024 patient remained intubated continue above ICU management sedated possible extubation in a.m. if condition remained stable   -5/5/2024 condition improving Zithromax was added by ICU team.     for possible extubation today if condition remained stable:  -5/6/2024 patient off sedation the time of visit respond to verbal stimuli by turning her head   -5/8/2024 patient slightly agitated need bedside support this is her baseline she need to be out of bed to chair off antibiotics stable  -5/9/2024 continue supportive care encourage oral food to prevent hypoglycemia if symptoms persist  will use  IV hydration discussed with the nursing and ACP  -5/10/2024 agitated or restless continue 1-1 observation psych recommendation appreciated  DVT PPX:    ADVANCED DIRECTIVE:    DISPOSITION:

## 2024-05-10 NOTE — BH CONSULTATION LIAISON ASSESSMENT NOTE - SUMMARY
Patient is a 89 year old female, PMH of HTN, HLD, dementia, PPH of schizophrenia, lives in [facility], admitted acute hypoxic respiratory failure. Psychiatry consulted for agitation. On exam, patient is obtunded and does not respond to writer or open eyes. Per chart, patient has been intermittently agitated and pulling out lines. Differential: behavioral disturbance in the context of dementia, acute psychotic decompensation, delirium     Plan:  - Not currently on 1:1 CO, unable to assess for suicidality, If patient remains agitated or attempting to get out of bed, would recommend 1:1 CO for agitation  - Agitation PRNs: IV 0. Patient is a 89 year old female, PMH of HTN, HLD, dementia, PPH of schizophrenia, lives in [facility], admitted acute hypoxic respiratory failure. Psychiatry consulted for agitation. On exam, patient is obtunded and does not respond to writer or open eyes. Per chart, patient has been intermittently agitated and pulling out lines. Differential: behavioral disturbance in the context of dementia, acute psychotic decompensation, delirium. At this time, further psychiatric assessment is warranted to determine safety planning.     Plan:  - Not currently on 1:1 CO, unable to assess for suicidality, If patient remains agitated or attempting to get out of bed, would recommend 1:1 CO for agitation  - Would benefit from transfer to 7S if agitation persists  - Agitation PRNs: IV 0.025 mg Haldol Q6, please monitor QTc and obtain ECG if PRNs are given frequently to ensure Qtc<500ms  - Not on standing psychotropic medication, will defer starting standing regimen  - C/w use of mittens if patient continues with pulling out lines  - Defer medical treatment to primary team   Dispo: has placement in nursing home  Patient is a 89 year old female, PMH of HTN, HLD, dementia, PPH of schizophrenia, lives in [facility], admitted acute hypoxic respiratory failure. Psychiatry consulted for agitation. On exam, patient is obtunded and does not respond to writer or open eyes. Per chart, patient has been intermittently agitated and pulling out lines. Differential: behavioral disturbance in the context of dementia, acute psychotic decompensation, delirium. At this time, further psychiatric assessment is warranted to determine safety planning.     Plan:  - Not currently on 1:1 CO, unable to assess for suicidality, If patient remains agitated or attempting to get out of bed, would recommend 1:1 CO for agitation  - Would benefit from transfer to S if agitation persists  - Begin Ramelteon 8mg qHS, if not available would begin Melatonin 6mg qHS  - Agitation PRNs: IV 0.25 mg Haldol Q6, please monitor QTc and obtain ECG if PRNs are given frequently to ensure Qtc<500ms  - Not on standing psychotropic medication, will defer starting standing regimen  - C/w use of mittens if patient continues with pulling out lines  - Defer medical treatment to primary team   Dispo: has placement in nursing home

## 2024-05-10 NOTE — BH CONSULTATION LIAISON ASSESSMENT NOTE - NSBHCHARTREVIEWLAB_PSY_A_CORE FT
14.4   7.37  )-----------( 137      ( 10 May 2024 05:30 )             45.9       05-10    145  |  104  |  12  ----------------------------<  87  3.4<L>   |  25  |  0.42<L>    Ca    8.9      10 May 2024 05:30  Phos  1.4     05-10  Mg     1.90     05-10

## 2024-05-10 NOTE — BH CONSULTATION LIAISON ASSESSMENT NOTE - NSBHATTESTCOMMENTATTENDFT_PSY_A_CORE
89 year old female, PPH of schizophrenia, dementia, PMH of HTN, HLD, lives in NH, admitted acute hypoxic respiratory failure. Currently patient AOx1, able to nod when told she is in a hospital. Mouth agape, unable to follow simple commands. PRNs as above, would avoid meds until absolutely necessary given age and frailty. Would defer to guardian re: GOC. No expectation that psychiatric hospitalization would help patient. Psych will follow to reassess for agitation.

## 2024-05-10 NOTE — BH CONSULTATION LIAISON ASSESSMENT NOTE - HPI (INCLUDE ILLNESS QUALITY, SEVERITY, DURATION, TIMING, CONTEXT, MODIFYING FACTORS, ASSOCIATED SIGNS AND SYMPTOMS)
Patient is a 89 year old female, PMH of HTN, HLD, dementia, PPH of schizophrenia, lives in [facility], admitted acute hypoxic respiratory failure. Psychiatry consulted for agitation.    During hospital course, patient was intubated in the ICU due increased WOB and acidosis, now s/p broad spectrum antibiotics. Successfully extubated to BiPAP on 5/6. Now on RA with nocturnal BiPAP. Has been intermittently agitated, pulling at lines and refusing medication.     Patient was not on antipsychotics due to her age while at the nursing home. Per chart review, "SW called and spoke with admissions from [facility] who confirmed thatpatient is a LTC resident of their facility and that she has a permanent/legal guardian. Admissions rep confirmed that the patient is covered under Medicare/Medicaid and is being followed by PCP, Dr. Mitchell."    On exam, patient is somnolent. Does not respond to questions or open eyes. Does not follow basic commands. Withdraws from painful stimuli but does not respond to verbal/tactile stimulus. Intermittently rocks from side to side in bed.

## 2024-05-10 NOTE — SWALLOW BEDSIDE ASSESSMENT ADULT - SWALLOW EVAL: DIAGNOSIS
SLP provided trials of puree and thin liquids to assess swallow function. Upon presentation, patient shouting "no", pushing clinician's hand away and covering mouth with sheet. Despite multiple attempts and clinician/PCA encouragement, patient continued to present with same behavior. Therefore, oral and pharyngeal phases of the swallow could not be assessed.

## 2024-05-11 LAB
ANION GAP SERPL CALC-SCNC: 16 MMOL/L — HIGH (ref 7–14)
BASOPHILS # BLD AUTO: 0.01 K/UL — SIGNIFICANT CHANGE UP (ref 0–0.2)
BASOPHILS NFR BLD AUTO: 0.1 % — SIGNIFICANT CHANGE UP (ref 0–2)
BUN SERPL-MCNC: 21 MG/DL — SIGNIFICANT CHANGE UP (ref 7–23)
CALCIUM SERPL-MCNC: 9.1 MG/DL — SIGNIFICANT CHANGE UP (ref 8.4–10.5)
CHLORIDE SERPL-SCNC: 106 MMOL/L — SIGNIFICANT CHANGE UP (ref 98–107)
CO2 SERPL-SCNC: 28 MMOL/L — SIGNIFICANT CHANGE UP (ref 22–31)
CREAT SERPL-MCNC: 0.66 MG/DL — SIGNIFICANT CHANGE UP (ref 0.5–1.3)
EGFR: 80 ML/MIN/1.73M2 — SIGNIFICANT CHANGE UP
EOSINOPHIL # BLD AUTO: 0.04 K/UL — SIGNIFICANT CHANGE UP (ref 0–0.5)
EOSINOPHIL NFR BLD AUTO: 0.6 % — SIGNIFICANT CHANGE UP (ref 0–6)
GLUCOSE BLDC GLUCOMTR-MCNC: 71 MG/DL — SIGNIFICANT CHANGE UP (ref 70–99)
GLUCOSE BLDC GLUCOMTR-MCNC: 71 MG/DL — SIGNIFICANT CHANGE UP (ref 70–99)
GLUCOSE BLDC GLUCOMTR-MCNC: 81 MG/DL — SIGNIFICANT CHANGE UP (ref 70–99)
GLUCOSE BLDC GLUCOMTR-MCNC: 83 MG/DL — SIGNIFICANT CHANGE UP (ref 70–99)
GLUCOSE BLDC GLUCOMTR-MCNC: 83 MG/DL — SIGNIFICANT CHANGE UP (ref 70–99)
GLUCOSE SERPL-MCNC: 74 MG/DL — SIGNIFICANT CHANGE UP (ref 70–99)
HCT VFR BLD CALC: 42.6 % — SIGNIFICANT CHANGE UP (ref 34.5–45)
HGB BLD-MCNC: 13.7 G/DL — SIGNIFICANT CHANGE UP (ref 11.5–15.5)
IANC: 5.73 K/UL — SIGNIFICANT CHANGE UP (ref 1.8–7.4)
IMM GRANULOCYTES NFR BLD AUTO: 0.4 % — SIGNIFICANT CHANGE UP (ref 0–0.9)
LYMPHOCYTES # BLD AUTO: 0.45 K/UL — LOW (ref 1–3.3)
LYMPHOCYTES # BLD AUTO: 6.4 % — LOW (ref 13–44)
MAGNESIUM SERPL-MCNC: 2 MG/DL — SIGNIFICANT CHANGE UP (ref 1.6–2.6)
MCHC RBC-ENTMCNC: 32.2 GM/DL — SIGNIFICANT CHANGE UP (ref 32–36)
MCHC RBC-ENTMCNC: 33.5 PG — SIGNIFICANT CHANGE UP (ref 27–34)
MCV RBC AUTO: 104.2 FL — HIGH (ref 80–100)
MONOCYTES # BLD AUTO: 0.8 K/UL — SIGNIFICANT CHANGE UP (ref 0–0.9)
MONOCYTES NFR BLD AUTO: 11.3 % — SIGNIFICANT CHANGE UP (ref 2–14)
NEUTROPHILS # BLD AUTO: 5.73 K/UL — SIGNIFICANT CHANGE UP (ref 1.8–7.4)
NEUTROPHILS NFR BLD AUTO: 81.2 % — HIGH (ref 43–77)
NRBC # BLD: 0 /100 WBCS — SIGNIFICANT CHANGE UP (ref 0–0)
NRBC # FLD: 0 K/UL — SIGNIFICANT CHANGE UP (ref 0–0)
PHOSPHATE SERPL-MCNC: 2.3 MG/DL — LOW (ref 2.5–4.5)
PLATELET # BLD AUTO: 164 K/UL — SIGNIFICANT CHANGE UP (ref 150–400)
POTASSIUM SERPL-MCNC: 4 MMOL/L — SIGNIFICANT CHANGE UP (ref 3.5–5.3)
POTASSIUM SERPL-SCNC: 4 MMOL/L — SIGNIFICANT CHANGE UP (ref 3.5–5.3)
RBC # BLD: 4.09 M/UL — SIGNIFICANT CHANGE UP (ref 3.8–5.2)
RBC # FLD: 14.5 % — SIGNIFICANT CHANGE UP (ref 10.3–14.5)
SODIUM SERPL-SCNC: 150 MMOL/L — HIGH (ref 135–145)
WBC # BLD: 7.06 K/UL — SIGNIFICANT CHANGE UP (ref 3.8–10.5)
WBC # FLD AUTO: 7.06 K/UL — SIGNIFICANT CHANGE UP (ref 3.8–10.5)

## 2024-05-11 RX ORDER — DEXTROSE 50 % IN WATER 50 %
12.5 SYRINGE (ML) INTRAVENOUS ONCE
Refills: 0 | Status: DISCONTINUED | OUTPATIENT
Start: 2024-05-11 | End: 2024-05-14

## 2024-05-11 RX ADMIN — POLYETHYLENE GLYCOL 3350 17 GRAM(S): 17 POWDER, FOR SOLUTION ORAL at 10:15

## 2024-05-11 RX ADMIN — ENOXAPARIN SODIUM 30 MILLIGRAM(S): 100 INJECTION SUBCUTANEOUS at 06:18

## 2024-05-11 RX ADMIN — SENNA PLUS 2 TABLET(S): 8.6 TABLET ORAL at 22:17

## 2024-05-11 RX ADMIN — Medication 25 MILLIGRAM(S): at 18:29

## 2024-05-11 RX ADMIN — CHLORHEXIDINE GLUCONATE 1 APPLICATION(S): 213 SOLUTION TOPICAL at 13:28

## 2024-05-11 RX ADMIN — Medication 25 MILLIGRAM(S): at 10:15

## 2024-05-11 NOTE — PROVIDER CONTACT NOTE (OTHER) - SITUATION
Pt HR tachycardic in 150s sustaining for about 20 seconds and 120s over a minute
Patient tachycardic to the 130s on telemetry.
Patient unable to take PO metoprolol
Pt BG 84
Patient tachycardic, HR fluctuating from 130-150
Pt blood glucose 83
Pt refused AM ORAL meds. Pt would not open her mouth to take the meds.
Pt BG 96
Pt HR 130s, /117

## 2024-05-11 NOTE — PROGRESS NOTE ADULT - SUBJECTIVE AND OBJECTIVE BOX
CHIEF COMPLAINT:  patient laying in the bed awake verbal  he was at the baseline nursing team at the bedside trying to do her a.m. care  SUBJECTIVE:     REVIEW OF SYSTEMS:restless and agitated    CONSTITUTIONAL: (x  )  weakness,  (  ) fevers or chills  EYES/ENT: (  )visual changes;     NECK: (  ) pain or stiffness  RESPIRATORY:   (  )cough, wheezing, hemoptysis;  (  ) shortness of breath  CARDIOVASCULAR:  (  )chest pain or palpitations  GASTROINTESTINAL:   (  )abdominal or epigastric pain.  (  ) nausea, vomiting, or hematemesis;   (   ) diarrhea or constipation.   GENITOURINARY:   (    ) dysuria, frequency or hematuria  NEUROLOGICAL:  (   ) numbness or weakness   All other review of systems is negative unless indicated above    Vital Signs Last 24 Hrs   ICU Vital Signs Last 24 Hrs  T(C): 36.7 (11 May 2024 18:25), Max: 36.7 (11 May 2024 10:10)  T(F): 98.1 (11 May 2024 18:25), Max: 98.1 (11 May 2024 10:10)  HR: 98 (11 May 2024 18:25) (65 - 118)  BP: 125/90 (11 May 2024 18:25) (119/81 - 128/89)  BP(mean): --  ABP: --  ABP(mean): --  RR: 18 (11 May 2024 18:25) (18 - 18)  SpO2: 100% (11 May 2024 18:25) (98% - 100%)    O2 Parameters below as of 11 May 2024 18:25  Patient On (Oxygen Delivery Method): room air                I&O's Summary    09 May 2024 07:01  -  10 May 2024 07:00  --------------------------------------------------------  IN: 720 mL / OUT: 0 mL / NET: 720 mL        CAPILLARY BLOOD GLUCOSE      POCT Blood Glucose.: 102 mg/dL (10 May 2024 12:25)  POCT Blood Glucose.: 97 mg/dL (10 May 2024 06:23)  POCT Blood Glucose.: 106 mg/dL (10 May 2024 00:14)      PHYSICAL EXAM:    Constitutional:  ( x  ) NAD,   (   )awake and alert  HEENT: PERR, EOMI,    Neck: Soft and supple, No LAD, No JVD  Respiratory:  (  decreased Breath sounds    Cardiovascular:     ( x  )S1 and S2, regular rate and rhythm, no Murmurs, gallops or rubs  Gastrointestinal:  (x   )Bowel Sounds present, soft,   (  )nontender, nondistended,    Extremities:    (  ) peripheral edema  Vascular: 2+ peripheral pulses  Neurological:    (   x )A/O x  0,   (  ) focal deficits  Musculoskeletal:    (   )  normal strength b/l upper  (     ) normal  lower extremities  Skin: No rashes  MEDICATIONS  (STANDING):  chlorhexidine 2% Cloths 1 Application(s) Topical daily  dextrose 50% Injectable 12.5 Gram(s) IV Push once  enoxaparin Injectable 30 milliGRAM(s) SubCutaneous every 24 hours  metoprolol tartrate 25 milliGRAM(s) Oral two times a day  multivitamin 1 Tablet(s) Oral daily  polyethylene glycol 3350 17 Gram(s) Oral every 12 hours  senna 2 Tablet(s) Oral at bedtime         LABS: All Labs Reviewed:                        14.4   7.37  )-----------( 137      ( 10 May 2024 05:30 )             45.9     05-10    145  |  104  |  12  ----------------------------<  87  3.4<L>   |  25  |  0.42<L>    Ca    8.9      10 May 2024 05:30  Phos  1.4     05-10  Mg     1.90     05-10            Blood Culture:   Urine Culture      RADIOLOGY/EKG:    ASSESSMENT AND PLAN:  89 yo F with pmhx of HTN, HLD, schizophrenia, dementia comes to the ED from nursing facility where she was found to be hypoxic.       In the ED, her vitals were significant for tachypnea and hypoxia with her labs showing acidosis and pCO2>100 s/p broad spectrum antibiotics. She was then intubated and is now admitted to the MICU for further management.     In MICU, pCO2 levels improved. Successfully extubated to BiPAP on 5/6. Tolerated RA with nocturnal BiPAP. Passed bedside dysphagia screen  89 yo F with pmhx of HTN, HLD, schizophrenia, dementia comes to the ED from nursing facility where she was found to be hypoxic. On admission, patient found to have pCO2>100 and was intubated in the ED. She is now admitted for further management.    #1 respiratory failure/ #Encephalopathy   - patient with encephalopathy on admission  - likely due to elevated pCO2     PLAN  - f/u CT head  - f/u TSH, ammonia, B12    #2 CARDIOVASCULAR: #Left ventricular dysfunction  -Keep Mg>2, K>4, P>3  - seen on TTE 2019  - repeat TTE this admission  - BNP elevated on admission  - can consider ADHF as etiology of acute illness     #3 #HTN  - hold anti-hypertensives in setting of acute illness     #4 GI/NUTRITION: Diet: Tube feeds      #5 #Transaminitis  - LE elevate on admission  - may be due to congestive hepatopathy    - #6 ID: #Sepsis  - pt meets sepsis criteria due to tachypnea and tachycardia  - source may be PNA, but infectious workup still pending    PLAN  - f/u MRSA swab, RVP, blood cx, UA (wnl), UCx,   - cw vanc by level ( will vanc level as pt received level before MICU admission)  - cw zosyn        5/3/2024 discuss with the team regarding patient that she was living with her sister at home    = 5/4/2024 patient remained intubated continue above ICU management sedated possible extubation in a.m. if condition remained stable   -5/5/2024 condition improving Zithromax was added by ICU team.     for possible extubation today if condition remained stable:  -5/6/2024 patient off sedation the time of visit respond to verbal stimuli by turning her head   -5/8/2024 patient slightly agitated need bedside support this is her baseline she need to be out of bed to chair off antibiotics stable  -5/9/2024 continue supportive care encourage oral food to prevent hypoglycemia if symptoms persist  will use  IV hydration discussed with the nursing and ACP  -5/10/2024 agitated or restless continue 1-1 observation psych recommendation appreciated  -5/11/2024 continue supportive care Haldol as needed as per psych recommendation for agitation

## 2024-05-11 NOTE — PROVIDER CONTACT NOTE (OTHER) - RECOMMENDATIONS
EKG repeat
Push PO metoprolol to breakfast time and endorse to day shift RN
IM glucagon again
Notify provider
IV glucagon again
Awaiting provider orders
Notify provider
Fluids?
awaiting provider orders

## 2024-05-11 NOTE — PROVIDER CONTACT NOTE (OTHER) - NAME OF MD/NP/PA/DO NOTIFIED:
Janice Parker
Jaimie Sanz
Sofia Doss ACP
Pasquale Demarco, ACP
Sofia Doss
Jaimie Sanz
Janice Parker

## 2024-05-11 NOTE — PROVIDER CONTACT NOTE (OTHER) - ASSESSMENT
Pt refused AM ORAL meds. Pt would not open her mouth to take the meds. VS as per flowsheet. Quality 110: Preventive Care And Screening: Influenza Immunization: Influenza Immunization Administered during Influenza season Detail Level: Detailed

## 2024-05-11 NOTE — PROVIDER CONTACT NOTE (OTHER) - DATE AND TIME:
08-May-2024 20:46
09-May-2024 05:10
11-May-2024 06:49
09-May-2024 01:00
09-May-2024 20:53
08-May-2024 19:51
11-May-2024 06:47
08-May-2024 23:23
10-May-2024 07:07

## 2024-05-11 NOTE — PROVIDER CONTACT NOTE (OTHER) - REASON
Pt BG 96
Patient tachycardic, HR fluctuating from 130-150
Pt HR 130s, /117
Pt blood glucose 83
Pt HR tachycardic in 150s sustaining for about 20 seconds and 120s over a minute
Pt refused AM ORAL meds. Pt would not open her mouth to take the meds.
Patient unable to take PO metoprolol
Pt BG 84
Patient tachycardic to the 130s

## 2024-05-12 LAB
ANION GAP SERPL CALC-SCNC: 10 MMOL/L — SIGNIFICANT CHANGE UP (ref 7–14)
BASOPHILS # BLD AUTO: 0.01 K/UL — SIGNIFICANT CHANGE UP (ref 0–0.2)
BASOPHILS NFR BLD AUTO: 0.2 % — SIGNIFICANT CHANGE UP (ref 0–2)
BUN SERPL-MCNC: 26 MG/DL — HIGH (ref 7–23)
CALCIUM SERPL-MCNC: 9.4 MG/DL — SIGNIFICANT CHANGE UP (ref 8.4–10.5)
CHLORIDE SERPL-SCNC: 106 MMOL/L — SIGNIFICANT CHANGE UP (ref 98–107)
CO2 SERPL-SCNC: 32 MMOL/L — HIGH (ref 22–31)
CREAT SERPL-MCNC: 0.57 MG/DL — SIGNIFICANT CHANGE UP (ref 0.5–1.3)
EGFR: 83 ML/MIN/1.73M2 — SIGNIFICANT CHANGE UP
EOSINOPHIL # BLD AUTO: 0.13 K/UL — SIGNIFICANT CHANGE UP (ref 0–0.5)
EOSINOPHIL NFR BLD AUTO: 2.4 % — SIGNIFICANT CHANGE UP (ref 0–6)
GLUCOSE BLDC GLUCOMTR-MCNC: 111 MG/DL — HIGH (ref 70–99)
GLUCOSE BLDC GLUCOMTR-MCNC: 120 MG/DL — HIGH (ref 70–99)
GLUCOSE BLDC GLUCOMTR-MCNC: 84 MG/DL — SIGNIFICANT CHANGE UP (ref 70–99)
GLUCOSE BLDC GLUCOMTR-MCNC: 95 MG/DL — SIGNIFICANT CHANGE UP (ref 70–99)
GLUCOSE SERPL-MCNC: 91 MG/DL — SIGNIFICANT CHANGE UP (ref 70–99)
HCT VFR BLD CALC: 41.1 % — SIGNIFICANT CHANGE UP (ref 34.5–45)
HGB BLD-MCNC: 12.9 G/DL — SIGNIFICANT CHANGE UP (ref 11.5–15.5)
IANC: 4.16 K/UL — SIGNIFICANT CHANGE UP (ref 1.8–7.4)
IMM GRANULOCYTES NFR BLD AUTO: 0.2 % — SIGNIFICANT CHANGE UP (ref 0–0.9)
LYMPHOCYTES # BLD AUTO: 0.53 K/UL — LOW (ref 1–3.3)
LYMPHOCYTES # BLD AUTO: 9.7 % — LOW (ref 13–44)
MAGNESIUM SERPL-MCNC: 2 MG/DL — SIGNIFICANT CHANGE UP (ref 1.6–2.6)
MCHC RBC-ENTMCNC: 31.4 GM/DL — LOW (ref 32–36)
MCHC RBC-ENTMCNC: 32.7 PG — SIGNIFICANT CHANGE UP (ref 27–34)
MCV RBC AUTO: 104.3 FL — HIGH (ref 80–100)
MONOCYTES # BLD AUTO: 0.6 K/UL — SIGNIFICANT CHANGE UP (ref 0–0.9)
MONOCYTES NFR BLD AUTO: 11 % — SIGNIFICANT CHANGE UP (ref 2–14)
NEUTROPHILS # BLD AUTO: 4.16 K/UL — SIGNIFICANT CHANGE UP (ref 1.8–7.4)
NEUTROPHILS NFR BLD AUTO: 76.5 % — SIGNIFICANT CHANGE UP (ref 43–77)
NRBC # BLD: 0 /100 WBCS — SIGNIFICANT CHANGE UP (ref 0–0)
NRBC # FLD: 0 K/UL — SIGNIFICANT CHANGE UP (ref 0–0)
PHOSPHATE SERPL-MCNC: 1.7 MG/DL — LOW (ref 2.5–4.5)
PLATELET # BLD AUTO: 157 K/UL — SIGNIFICANT CHANGE UP (ref 150–400)
POTASSIUM SERPL-MCNC: 3.6 MMOL/L — SIGNIFICANT CHANGE UP (ref 3.5–5.3)
POTASSIUM SERPL-SCNC: 3.6 MMOL/L — SIGNIFICANT CHANGE UP (ref 3.5–5.3)
RBC # BLD: 3.94 M/UL — SIGNIFICANT CHANGE UP (ref 3.8–5.2)
RBC # FLD: 14.7 % — HIGH (ref 10.3–14.5)
SODIUM SERPL-SCNC: 148 MMOL/L — HIGH (ref 135–145)
WBC # BLD: 5.44 K/UL — SIGNIFICANT CHANGE UP (ref 3.8–10.5)
WBC # FLD AUTO: 5.44 K/UL — SIGNIFICANT CHANGE UP (ref 3.8–10.5)

## 2024-05-12 RX ORDER — POTASSIUM PHOSPHATE, MONOBASIC POTASSIUM PHOSPHATE, DIBASIC 236; 224 MG/ML; MG/ML
15 INJECTION, SOLUTION INTRAVENOUS ONCE
Refills: 0 | Status: COMPLETED | OUTPATIENT
Start: 2024-05-12 | End: 2024-05-12

## 2024-05-12 RX ORDER — SODIUM CHLORIDE 9 MG/ML
1000 INJECTION, SOLUTION INTRAVENOUS
Refills: 0 | Status: DISCONTINUED | OUTPATIENT
Start: 2024-05-12 | End: 2024-05-13

## 2024-05-12 RX ADMIN — SENNA PLUS 2 TABLET(S): 8.6 TABLET ORAL at 21:53

## 2024-05-12 RX ADMIN — Medication 1 TABLET(S): at 17:13

## 2024-05-12 RX ADMIN — POTASSIUM PHOSPHATE, MONOBASIC POTASSIUM PHOSPHATE, DIBASIC 62.5 MILLIMOLE(S): 236; 224 INJECTION, SOLUTION INTRAVENOUS at 17:06

## 2024-05-12 RX ADMIN — ENOXAPARIN SODIUM 30 MILLIGRAM(S): 100 INJECTION SUBCUTANEOUS at 05:39

## 2024-05-12 RX ADMIN — Medication 25 MILLIGRAM(S): at 17:13

## 2024-05-12 RX ADMIN — POLYETHYLENE GLYCOL 3350 17 GRAM(S): 17 POWDER, FOR SOLUTION ORAL at 17:13

## 2024-05-12 RX ADMIN — POLYETHYLENE GLYCOL 3350 17 GRAM(S): 17 POWDER, FOR SOLUTION ORAL at 05:39

## 2024-05-12 RX ADMIN — CHLORHEXIDINE GLUCONATE 1 APPLICATION(S): 213 SOLUTION TOPICAL at 11:30

## 2024-05-12 RX ADMIN — Medication 25 MILLIGRAM(S): at 05:39

## 2024-05-12 RX ADMIN — SODIUM CHLORIDE 50 MILLILITER(S): 9 INJECTION, SOLUTION INTRAVENOUS at 10:35

## 2024-05-12 NOTE — PROGRESS NOTE ADULT - SUBJECTIVE AND OBJECTIVE BOX
CHIEF COMPLAINT:  patient was seen earlier this morning laying in the beds requesting to open her mittens hypernatremia noted discuss with ACP  to  start her on IV hydration  SUBJECTIVE:     REVIEW OF SYSTEMS:    CONSTITUTIONAL: ( x )  weakness,  (  ) fevers or chills  EYES/ENT: (  )visual changes;     NECK: (  ) pain or stiffness  RESPIRATORY:   (  )cough, wheezing, hemoptysis;  (  ) shortness of breath  CARDIOVASCULAR:  (  )chest pain or palpitations  GASTROINTESTINAL:   (  )abdominal or epigastric pain.  (  ) nausea, vomiting, or hematemesis;   (   ) diarrhea or constipation.   GENITOURINARY:   (    ) dysuria, frequency or hematuria  NEUROLOGICAL:  (   ) numbness or weakness   All other review of systems is negative unless indicated above    Vital Signs Last 24 Hrs  T(C): 36.8 (12 May 2024 13:00), Max: 36.8 (12 May 2024 05:25)  T(F): 98.3 (12 May 2024 13:00), Max: 98.3 (12 May 2024 13:00)  HR: 77 (12 May 2024 13:00) (77 - 98)  BP: 145/90 (12 May 2024 13:00) (125/90 - 145/90)  BP(mean): --  RR: 18 (12 May 2024 13:00) (18 - 19)  SpO2: 100% (12 May 2024 13:00) (98% - 100%)    Parameters below as of 12 May 2024 13:00  Patient On (Oxygen Delivery Method): room air        I&O's Summary      CAPILLARY BLOOD GLUCOSE      POCT Blood Glucose.: 111 mg/dL (12 May 2024 12:05)  POCT Blood Glucose.: 95 mg/dL (12 May 2024 04:49)  POCT Blood Glucose.: 84 mg/dL (12 May 2024 01:27)  POCT Blood Glucose.: 83 mg/dL (11 May 2024 20:29)  POCT Blood Glucose.: 71 mg/dL (11 May 2024 17:35)      PHYSICAL EXAM:    Constitutional:  ( x  ) NAD,   (   )awake  restless and confused  HEENT: PERR, EOMI,    Neck: Soft and supple, No LAD, No JVD  Respiratory:  ( decrease   Breath sounds are clear bilaterally,    (   ) wheezing, rales or rhonchi  Cardiovascular:     ( x  )S1 and S2, regular rate and rhythm, no Murmurs, gallops or rubs  Gastrointestinal:  (  x )Bowel Sounds present, soft,   (  )nontender, nondistended,    Extremities:    (  ) peripheral edema  Vascular: 2+ peripheral pulses  Neurological:    (  x  )A/O x 0,   (  ) focal deficits  Musculoskeletal:    (   )  normal strength b/l upper  (     ) normal  lower extremities  Skin: No rashes    MEDICATIONS:  MEDICATIONS  (STANDING):  chlorhexidine 2% Cloths 1 Application(s) Topical daily  dextrose 5% + sodium chloride 0.45%. 1000 milliLiter(s) (50 mL/Hr) IV Continuous <Continuous>  dextrose 50% Injectable 12.5 Gram(s) IV Push once  enoxaparin Injectable 30 milliGRAM(s) SubCutaneous every 24 hours  metoprolol tartrate 25 milliGRAM(s) Oral two times a day  multivitamin 1 Tablet(s) Oral daily  polyethylene glycol 3350 17 Gram(s) Oral every 12 hours  senna 2 Tablet(s) Oral at bedtime      LABS: All Labs Reviewed:                        12.9   5.44  )-----------( 157      ( 12 May 2024 04:50 )             41.1     05-12    148<H>  |  106  |  26<H>  ----------------------------<  91  3.6   |  32<H>  |  0.57    Ca    9.4      12 May 2024 06:53  Phos  1.7     05-12  Mg     2.00     05-12            Blood Culture:   Urine Culture      RADIOLOGY/EKG:    ASSESSMENT AND PLAN:  89 yo F with pmhx of HTN, HLD, schizophrenia, dementia comes to the ED from nursing facility where she was found to be hypoxic.       In the ED, her vitals were significant for tachypnea and hypoxia with her labs showing acidosis and pCO2>100 s/p broad spectrum antibiotics. She was then intubated and is now admitted to the MICU for further management.     In MICU, pCO2 levels improved. Successfully extubated to BiPAP on 5/6. Tolerated RA with nocturnal BiPAP. Passed bedside dysphagia screen  89 yo F with pmhx of HTN, HLD, schizophrenia, dementia comes to the ED from nursing facility where she was found to be hypoxic. On admission, patient found to have pCO2>100 and was intubated in the ED. She is now admitted for further management.    #1 respiratory failure/ #Encephalopathy   - patient with encephalopathy on admission  - likely due to elevated pCO2     PLAN  - f/u CT head  - f/u TSH, ammonia, B12    #2 CARDIOVASCULAR: #Left ventricular dysfunction  -Keep Mg>2, K>4, P>3  - seen on TTE 2019  - repeat TTE this admission  - BNP elevated on admission  - can consider ADHF as etiology of acute illness     #3 #HTN  - hold anti-hypertensives in setting of acute illness     #4 GI/NUTRITION: Diet: Tube feeds      #5 #Transaminitis  - LE elevate on admission  - may be due to congestive hepatopathy    - #6 ID: #Sepsis  - pt meets sepsis criteria due to tachypnea and tachycardia  - source may be PNA, but infectious workup still pending    PLAN  - f/u MRSA swab, RVP, blood cx, UA (wnl), UCx,   - cw vanc by level ( will vanc level as pt received level before MICU admission)  - cw zosyn        5/3/2024 discuss with the team regarding patient that she was living with her sister at home    = 5/4/2024 patient remained intubated continue above ICU management sedated possible extubation in a.m. if condition remained stable   -5/5/2024 condition improving Zithromax was added by ICU team.     for possible extubation today if condition remained stable:  -5/6/2024 patient off sedation the time of visit respond to verbal stimuli by turning her head   -5/8/2024 patient slightly agitated need bedside support this is her baseline she need to be out of bed to chair off antibiotics stable  -5/9/2024 continue supportive care encourage oral food to prevent hypoglycemia if symptoms persist  will use  IV hydration discussed with the nursing and ACP  -5/10/2024 agitated or restless continue 1-1 observation psych recommendation appreciated  -5/11/2024 continue supportive care Haldol as needed as per psych recommendation for agitation  -5/12/2024 patient is still restless hypernatremia noted started IV hydration and gentle  DVT PPX:    ADVANCED DIRECTIVE:    DISPOSITION:

## 2024-05-13 LAB
ANION GAP SERPL CALC-SCNC: 15 MMOL/L — HIGH (ref 7–14)
BASOPHILS # BLD AUTO: 0 K/UL — SIGNIFICANT CHANGE UP (ref 0–0.2)
BASOPHILS NFR BLD AUTO: 0 % — SIGNIFICANT CHANGE UP (ref 0–2)
BUN SERPL-MCNC: 22 MG/DL — SIGNIFICANT CHANGE UP (ref 7–23)
CALCIUM SERPL-MCNC: 9 MG/DL — SIGNIFICANT CHANGE UP (ref 8.4–10.5)
CHLORIDE SERPL-SCNC: 105 MMOL/L — SIGNIFICANT CHANGE UP (ref 98–107)
CO2 SERPL-SCNC: 28 MMOL/L — SIGNIFICANT CHANGE UP (ref 22–31)
CREAT SERPL-MCNC: 0.66 MG/DL — SIGNIFICANT CHANGE UP (ref 0.5–1.3)
EGFR: 80 ML/MIN/1.73M2 — SIGNIFICANT CHANGE UP
EOSINOPHIL # BLD AUTO: 0.06 K/UL — SIGNIFICANT CHANGE UP (ref 0–0.5)
EOSINOPHIL NFR BLD AUTO: 1.4 % — SIGNIFICANT CHANGE UP (ref 0–6)
GLUCOSE BLDC GLUCOMTR-MCNC: 108 MG/DL — HIGH (ref 70–99)
GLUCOSE BLDC GLUCOMTR-MCNC: 123 MG/DL — HIGH (ref 70–99)
GLUCOSE BLDC GLUCOMTR-MCNC: 123 MG/DL — HIGH (ref 70–99)
GLUCOSE BLDC GLUCOMTR-MCNC: 131 MG/DL — HIGH (ref 70–99)
GLUCOSE SERPL-MCNC: 118 MG/DL — HIGH (ref 70–99)
HCT VFR BLD CALC: 43 % — SIGNIFICANT CHANGE UP (ref 34.5–45)
HGB BLD-MCNC: 13.2 G/DL — SIGNIFICANT CHANGE UP (ref 11.5–15.5)
IANC: 3.28 K/UL — SIGNIFICANT CHANGE UP (ref 1.8–7.4)
IMM GRANULOCYTES NFR BLD AUTO: 0.5 % — SIGNIFICANT CHANGE UP (ref 0–0.9)
LYMPHOCYTES # BLD AUTO: 0.48 K/UL — LOW (ref 1–3.3)
LYMPHOCYTES # BLD AUTO: 11.1 % — LOW (ref 13–44)
MAGNESIUM SERPL-MCNC: 1.9 MG/DL — SIGNIFICANT CHANGE UP (ref 1.6–2.6)
MCHC RBC-ENTMCNC: 30.7 GM/DL — LOW (ref 32–36)
MCHC RBC-ENTMCNC: 32.1 PG — SIGNIFICANT CHANGE UP (ref 27–34)
MCV RBC AUTO: 104.6 FL — HIGH (ref 80–100)
MONOCYTES # BLD AUTO: 0.5 K/UL — SIGNIFICANT CHANGE UP (ref 0–0.9)
MONOCYTES NFR BLD AUTO: 11.5 % — SIGNIFICANT CHANGE UP (ref 2–14)
NEUTROPHILS # BLD AUTO: 3.28 K/UL — SIGNIFICANT CHANGE UP (ref 1.8–7.4)
NEUTROPHILS NFR BLD AUTO: 75.5 % — SIGNIFICANT CHANGE UP (ref 43–77)
NRBC # BLD: 0 /100 WBCS — SIGNIFICANT CHANGE UP (ref 0–0)
NRBC # FLD: 0 K/UL — SIGNIFICANT CHANGE UP (ref 0–0)
PHOSPHATE SERPL-MCNC: 2.5 MG/DL — SIGNIFICANT CHANGE UP (ref 2.5–4.5)
PLATELET # BLD AUTO: 163 K/UL — SIGNIFICANT CHANGE UP (ref 150–400)
POTASSIUM SERPL-MCNC: 3.1 MMOL/L — LOW (ref 3.5–5.3)
POTASSIUM SERPL-SCNC: 3.1 MMOL/L — LOW (ref 3.5–5.3)
RBC # BLD: 4.11 M/UL — SIGNIFICANT CHANGE UP (ref 3.8–5.2)
RBC # FLD: 14.4 % — SIGNIFICANT CHANGE UP (ref 10.3–14.5)
SODIUM SERPL-SCNC: 148 MMOL/L — HIGH (ref 135–145)
WBC # BLD: 4.34 K/UL — SIGNIFICANT CHANGE UP (ref 3.8–10.5)
WBC # FLD AUTO: 4.34 K/UL — SIGNIFICANT CHANGE UP (ref 3.8–10.5)

## 2024-05-13 RX ORDER — POTASSIUM CHLORIDE 20 MEQ
20 PACKET (EA) ORAL ONCE
Refills: 0 | Status: COMPLETED | OUTPATIENT
Start: 2024-05-13 | End: 2024-05-13

## 2024-05-13 RX ORDER — SODIUM CHLORIDE 9 MG/ML
1000 INJECTION, SOLUTION INTRAVENOUS
Refills: 0 | Status: DISCONTINUED | OUTPATIENT
Start: 2024-05-13 | End: 2024-05-14

## 2024-05-13 RX ORDER — METOPROLOL TARTRATE 50 MG
5 TABLET ORAL ONCE
Refills: 0 | Status: COMPLETED | OUTPATIENT
Start: 2024-05-13 | End: 2024-05-13

## 2024-05-13 RX ADMIN — ENOXAPARIN SODIUM 30 MILLIGRAM(S): 100 INJECTION SUBCUTANEOUS at 06:01

## 2024-05-13 RX ADMIN — CHLORHEXIDINE GLUCONATE 1 APPLICATION(S): 213 SOLUTION TOPICAL at 18:35

## 2024-05-13 RX ADMIN — POLYETHYLENE GLYCOL 3350 17 GRAM(S): 17 POWDER, FOR SOLUTION ORAL at 06:01

## 2024-05-13 RX ADMIN — Medication 5 MILLIGRAM(S): at 15:27

## 2024-05-13 RX ADMIN — SODIUM CHLORIDE 50 MILLILITER(S): 9 INJECTION, SOLUTION INTRAVENOUS at 13:52

## 2024-05-13 RX ADMIN — Medication 25 MILLIGRAM(S): at 06:01

## 2024-05-13 RX ADMIN — SENNA PLUS 2 TABLET(S): 8.6 TABLET ORAL at 21:36

## 2024-05-13 RX ADMIN — Medication 20 MILLIEQUIVALENT(S): at 15:27

## 2024-05-13 RX ADMIN — POLYETHYLENE GLYCOL 3350 17 GRAM(S): 17 POWDER, FOR SOLUTION ORAL at 18:35

## 2024-05-13 RX ADMIN — Medication 1 TABLET(S): at 18:34

## 2024-05-13 RX ADMIN — Medication 25 MILLIGRAM(S): at 18:34

## 2024-05-13 NOTE — BH CONSULTATION LIAISON PROGRESS NOTE - NSBHASSESSMENTFT_PSY_ALL_CORE
Patient is a 89 year old female, PMH of HTN, HLD, dementia, PPH of schizophrenia, lives in [facility], admitted acute hypoxic respiratory failure. Psychiatry consulted for agitation. On exam, patient is obtunded and does not respond to writer or open eyes. Per chart, patient has been intermittently agitated and pulling out lines. Differential: behavioral disturbance in the context of dementia, acute psychotic decompensation, delirium. At this time, further psychiatric assessment is warranted to determine safety planning.     5/13 - verbal, AOx1, no agitation PRNs over the weekend, denies mood symptoms. no AVH      Plan:  - Not currently on 1:1 CO, unable to assess for suicidality, If patient remains agitated or attempting to get out of bed, would recommend 1:1 CO for agitation  - Would benefit from transfer to  if agitation persists  - Can begin Ramelteon 8mg qHS, if not available would begin Melatonin 6mg qHS  - Agitation PRNs: IV 0.25 mg Haldol Q6, please monitor QTc and obtain ECG if PRNs are given frequently to ensure Qtc<500ms  - Not on standing psychotropic medication, will defer starting standing regimen  - C/w use of mittens if patient continues with pulling out lines  - Defer medical treatment to primary team

## 2024-05-13 NOTE — BH CONSULTATION LIAISON PROGRESS NOTE - NSBHFUPINTERVALHXFT_PSY_A_CORE
No PRNs over the weekend. More interactive on exam today. Opens eyes and makes eye contact, improved from Friday. States that she would like to have a private room and would like the temperature of the room to be warmer. Denies depressive sx or anxiety. Denies AVH. Denies SI/HI. AOX1  - not oriented to date or place. No rigidity on exam. Does not understand context of why she is in hospital.

## 2024-05-13 NOTE — BH CONSULTATION LIAISON PROGRESS NOTE - NSBHATTESTCOMMENTATTENDFT_PSY_A_CORE
patient improving, more awake, knows she is in a hospital but not which one, denies SI/HI, feels "okay", feels "this place is great", encouraged to eat more. No psych c/i to discharge.

## 2024-05-13 NOTE — BH CONSULTATION LIAISON PROGRESS NOTE - CURRENT MEDICATION
MEDICATIONS  (STANDING):  chlorhexidine 2% Cloths 1 Application(s) Topical daily  dextrose 5% + sodium chloride 0.45% 1000 milliLiter(s) (50 mL/Hr) IV Continuous <Continuous>  dextrose 50% Injectable 12.5 Gram(s) IV Push once  enoxaparin Injectable 30 milliGRAM(s) SubCutaneous every 24 hours  metoprolol tartrate 25 milliGRAM(s) Oral two times a day  multivitamin 1 Tablet(s) Oral daily  polyethylene glycol 3350 17 Gram(s) Oral every 12 hours  potassium chloride   Powder 20 milliEquivalent(s) Oral once  senna 2 Tablet(s) Oral at bedtime    MEDICATIONS  (PRN):  haloperidol    Injectable 0.25 milliGRAM(s) IV Push every 6 hours PRN agitation

## 2024-05-13 NOTE — PROGRESS NOTE ADULT - PROVIDER SPECIALTY LIST ADULT
Internal Medicine
MICU
Internal Medicine
Internal Medicine
MICU
Internal Medicine
MICU

## 2024-05-13 NOTE — PROGRESS NOTE ADULT - SUBJECTIVE AND OBJECTIVE BOX
CHIEF COMPLAINT:  Patient was seen by the nursing team this morning requesting her mittens to be removed but patient high risk for fall and also removal of her IV line.   SUBJECTIVE:     REVIEW OF SYSTEMS:  verbal but agitated and decreased oral intake, refused to eat  CONSTITUTIONAL: ( x )  weakness,  (  ) fevers or chills  EYES/ENT: (  )visual changes;     NECK: (  ) pain or stiffness  RESPIRATORY:   (  )cough, wheezing, hemoptysis;  (  ) shortness of breath  CARDIOVASCULAR:  (  )chest pain or palpitations  GASTROINTESTINAL:   (  )abdominal or epigastric pain.  (  ) nausea, vomiting, or hematemesis;   (   ) diarrhea or constipation.   GENITOURINARY:   (    ) dysuria, frequency or hematuria  NEUROLOGICAL:  (   ) numbness or weakness   All other review of systems is negative unless indicated above    Vital Signs Last 24 Hrs  T(C): 36.6 (13 May 2024 14:00), Max: 36.7 (13 May 2024 06:00)  T(F): 97.8 (13 May 2024 14:00), Max: 98 (13 May 2024 06:00)  HR: 87 (13 May 2024 16:30) (78 - 91)  BP: 161/90 (13 May 2024 16:30) (136/83 - 161/90)  BP(mean): --  RR: 18 (13 May 2024 14:00) (18 - 18)  SpO2: 98% (13 May 2024 14:00) (95% - 98%)    Parameters below as of 13 May 2024 14:00  Patient On (Oxygen Delivery Method): room air        I&O's Summary      CAPILLARY BLOOD GLUCOSE      POCT Blood Glucose.: 123 mg/dL (13 May 2024 12:31)  POCT Blood Glucose.: 123 mg/dL (13 May 2024 06:56)  POCT Blood Glucose.: 108 mg/dL (13 May 2024 01:40)  POCT Blood Glucose.: 120 mg/dL (12 May 2024 18:20)      PHYSICAL EXAM:    Constitutional:  (  x ) NAD,   (   x)awake and restless  HEENT: PERR, EOMI,    Neck: Soft and supple, No LAD, No JVD  Respiratory:  (    Breath sounds are clear bilaterally,    (   ) wheezing, rales or rhonchi  Cardiovascular:     ( x  )S1 and S2, regular rate and rhythm, no Murmurs, gallops or rubs  Gastrointestinal:  (   )Bowel Sounds present, soft,   (  )nontender, nondistended,    Extremities:    (  ) peripheral edema  Vascular: 2+ peripheral pulses  Neurological:    (  x  )A/O x 0,   (  ) focal deficits  Musculoskeletal:    (   )  normal strength b/l upper  (     ) normal  lower extremities  Skin: No rashes    MEDICATIONS:  MEDICATIONS  (STANDING):  chlorhexidine 2% Cloths 1 Application(s) Topical daily  dextrose 5% + sodium chloride 0.45% 1000 milliLiter(s) (50 mL/Hr) IV Continuous <Continuous>  dextrose 50% Injectable 12.5 Gram(s) IV Push once  enoxaparin Injectable 30 milliGRAM(s) SubCutaneous every 24 hours  metoprolol tartrate 25 milliGRAM(s) Oral two times a day  multivitamin 1 Tablet(s) Oral daily  polyethylene glycol 3350 17 Gram(s) Oral every 12 hours  senna 2 Tablet(s) Oral at bedtime      LABS: All Labs Reviewed:                        13.2   4.34  )-----------( 163      ( 13 May 2024 05:10 )             43.0     05-13    148<H>  |  105  |  22  ----------------------------<  118<H>  3.1<L>   |  28  |  0.66    Ca    9.0      13 May 2024 05:10  Phos  2.5     05-13  Mg     1.90     05-13            Blood Culture:   Urine Culture      RADIOLOGY/EKG:    ASSESSMENT AND PLAN:  89 yo F with pmhx of HTN, HLD, schizophrenia, dementia comes to the ED from nursing facility where she was found to be hypoxic.       In the ED, her vitals were significant for tachypnea and hypoxia with her labs showing acidosis and pCO2>100 s/p broad spectrum antibiotics. She was then intubated and is now admitted to the MICU for further management.     In MICU, pCO2 levels improved. Successfully extubated to BiPAP on 5/6. Tolerated RA with nocturnal BiPAP. Passed bedside dysphagia screen  89 yo F with pmhx of HTN, HLD, schizophrenia, dementia comes to the ED from nursing facility where she was found to be hypoxic. On admission, patient found to have pCO2>100 and was intubated in the ED. She is now admitted for further management.    #1 respiratory failure/ #Encephalopathy   - patient with encephalopathy on admission  - likely due to elevated pCO2     PLAN  - f/u CT head  - f/u TSH, ammonia, B12    #2 CARDIOVASCULAR: #Left ventricular dysfunction  -Keep Mg>2, K>4, P>3  - seen on TTE 2019  - repeat TTE this admission  - BNP elevated on admission  - can consider ADHF as etiology of acute illness     #3 #HTN  - hold anti-hypertensives in setting of acute illness     #4 GI/NUTRITION: Diet: Tube feeds      #5 #Transaminitis  - LE elevate on admission  - may be due to congestive hepatopathy    - #6 ID: #Sepsis  - pt meets sepsis criteria due to tachypnea and tachycardia  - source may be PNA, but infectious workup still pending    PLAN  - f/u MRSA swab, RVP, blood cx, UA (wnl), UCx,   - cw vanc by level ( will vanc level as pt received level before MICU admission)  - cw zosyn        5/3/2024 discuss with the team regarding patient that she was living with her sister at home    = 5/4/2024 patient remained intubated continue above ICU management sedated possible extubation in a.m. if condition remained stable   -5/5/2024 condition improving Zithromax was added by ICU team.     for possible extubation today if condition remained stable:  -5/6/2024 patient off sedation the time of visit respond to verbal stimuli by turning her head   -5/8/2024 patient slightly agitated need bedside support this is her baseline she need to be out of bed to chair off antibiotics stable  -5/9/2024 continue supportive care encourage oral food to prevent hypoglycemia if symptoms persist  will use  IV hydration discussed with the nursing and ACP  -5/10/2024 agitated or restless continue 1-1 observation psych recommendation appreciated  -5/11/2024 continue supportive care Haldol as needed as per psych recommendation for agitation  -5/12/2024 patient is still restless hypernatremia noted started IV hydration and gentle  -5/13/2024 continue supportive care and IV hydration    DVT PPX:    ADVANCED DIRECTIVE:    DISPOSITION:

## 2024-05-13 NOTE — BH CONSULTATION LIAISON PROGRESS NOTE - NSBHCHARTREVIEWVS_PSY_A_CORE FT
Vital Signs Last 24 Hrs  T(C): 36.6 (13 May 2024 14:00), Max: 36.7 (13 May 2024 06:00)  T(F): 97.8 (13 May 2024 14:00), Max: 98 (13 May 2024 06:00)  HR: 91 (13 May 2024 14:00) (62 - 91)  BP: 157/100 (13 May 2024 14:00) (136/83 - 157/100)  BP(mean): --  RR: 18 (13 May 2024 14:00) (18 - 19)  SpO2: 98% (13 May 2024 14:00) (95% - 100%)    Parameters below as of 13 May 2024 14:00  Patient On (Oxygen Delivery Method): room air

## 2024-05-13 NOTE — BH CONSULTATION LIAISON PROGRESS NOTE - NSBHCONSULTFOLLOWAFTERCARE_PSY_A_CORE FT
F/u with psych at [facility]  [address], [facility], [address], [place] [address]  [phone]  [email or web address]: [phone]

## 2024-05-13 NOTE — PROGRESS NOTE ADULT - NUTRITIONAL ASSESSMENT
This patient has been assessed with a concern for Malnutrition and has been determined to have a diagnosis/diagnoses of Severe protein-calorie malnutrition and Underweight (BMI < 19).    This patient is being managed with:   Diet Pureed-  Supplement Feeding Modality:  Oral  Glucerna Shake Cans or Servings Per Day:  1       Frequency:  Two Times a day  Entered: May  9 2024  2:58PM  
This patient has been assessed with a concern for Malnutrition and has been determined to have a diagnosis/diagnoses of Underweight (BMI < 19).    This patient is being managed with:   Diet NPO-  Except Medications  Entered: May  7 2024  5:28AM  
This patient has been assessed with a concern for Malnutrition and has been determined to have a diagnosis/diagnoses of Severe protein-calorie malnutrition and Underweight (BMI < 19).    This patient is being managed with:   Diet Pureed-  Supplement Feeding Modality:  Oral  Glucerna Shake Cans or Servings Per Day:  1       Frequency:  Two Times a day  Entered: May  9 2024  2:58PM  

## 2024-05-14 ENCOUNTER — TRANSCRIPTION ENCOUNTER (OUTPATIENT)
Age: 89
End: 2024-05-14

## 2024-05-14 VITALS
TEMPERATURE: 98 F | HEART RATE: 73 BPM | DIASTOLIC BLOOD PRESSURE: 106 MMHG | RESPIRATION RATE: 17 BRPM | OXYGEN SATURATION: 95 % | SYSTOLIC BLOOD PRESSURE: 147 MMHG

## 2024-05-14 DIAGNOSIS — R53.2 FUNCTIONAL QUADRIPLEGIA: ICD-10-CM

## 2024-05-14 DIAGNOSIS — Z51.5 ENCOUNTER FOR PALLIATIVE CARE: ICD-10-CM

## 2024-05-14 DIAGNOSIS — F03.90 UNSPECIFIED DEMENTIA WITHOUT BEHAVIORAL DISTURBANCE: ICD-10-CM

## 2024-05-14 DIAGNOSIS — Z71.89 OTHER SPECIFIED COUNSELING: ICD-10-CM

## 2024-05-14 LAB
ANION GAP SERPL CALC-SCNC: 14 MMOL/L — SIGNIFICANT CHANGE UP (ref 7–14)
BASOPHILS # BLD AUTO: 0 K/UL — SIGNIFICANT CHANGE UP (ref 0–0.2)
BASOPHILS NFR BLD AUTO: 0 % — SIGNIFICANT CHANGE UP (ref 0–2)
BUN SERPL-MCNC: 22 MG/DL — SIGNIFICANT CHANGE UP (ref 7–23)
CALCIUM SERPL-MCNC: 9.2 MG/DL — SIGNIFICANT CHANGE UP (ref 8.4–10.5)
CHLORIDE SERPL-SCNC: 102 MMOL/L — SIGNIFICANT CHANGE UP (ref 98–107)
CO2 SERPL-SCNC: 27 MMOL/L — SIGNIFICANT CHANGE UP (ref 22–31)
CREAT SERPL-MCNC: 0.69 MG/DL — SIGNIFICANT CHANGE UP (ref 0.5–1.3)
EGFR: 79 ML/MIN/1.73M2 — SIGNIFICANT CHANGE UP
EOSINOPHIL # BLD AUTO: 0.03 K/UL — SIGNIFICANT CHANGE UP (ref 0–0.5)
EOSINOPHIL NFR BLD AUTO: 0.6 % — SIGNIFICANT CHANGE UP (ref 0–6)
GLUCOSE BLDC GLUCOMTR-MCNC: 103 MG/DL — HIGH (ref 70–99)
GLUCOSE BLDC GLUCOMTR-MCNC: 109 MG/DL — HIGH (ref 70–99)
GLUCOSE BLDC GLUCOMTR-MCNC: 123 MG/DL — HIGH (ref 70–99)
GLUCOSE BLDC GLUCOMTR-MCNC: 149 MG/DL — HIGH (ref 70–99)
GLUCOSE SERPL-MCNC: 109 MG/DL — HIGH (ref 70–99)
HCT VFR BLD CALC: 45.1 % — HIGH (ref 34.5–45)
HGB BLD-MCNC: 14.6 G/DL — SIGNIFICANT CHANGE UP (ref 11.5–15.5)
IANC: 4.28 K/UL — SIGNIFICANT CHANGE UP (ref 1.8–7.4)
IMM GRANULOCYTES NFR BLD AUTO: 0.2 % — SIGNIFICANT CHANGE UP (ref 0–0.9)
LYMPHOCYTES # BLD AUTO: 0.38 K/UL — LOW (ref 1–3.3)
LYMPHOCYTES # BLD AUTO: 7.3 % — LOW (ref 13–44)
MAGNESIUM SERPL-MCNC: 1.9 MG/DL — SIGNIFICANT CHANGE UP (ref 1.6–2.6)
MCHC RBC-ENTMCNC: 32.4 GM/DL — SIGNIFICANT CHANGE UP (ref 32–36)
MCHC RBC-ENTMCNC: 33.2 PG — SIGNIFICANT CHANGE UP (ref 27–34)
MCV RBC AUTO: 102.5 FL — HIGH (ref 80–100)
MONOCYTES # BLD AUTO: 0.48 K/UL — SIGNIFICANT CHANGE UP (ref 0–0.9)
MONOCYTES NFR BLD AUTO: 9.3 % — SIGNIFICANT CHANGE UP (ref 2–14)
NEUTROPHILS # BLD AUTO: 4.28 K/UL — SIGNIFICANT CHANGE UP (ref 1.8–7.4)
NEUTROPHILS NFR BLD AUTO: 82.6 % — HIGH (ref 43–77)
NRBC # BLD: 0 /100 WBCS — SIGNIFICANT CHANGE UP (ref 0–0)
NRBC # FLD: 0 K/UL — SIGNIFICANT CHANGE UP (ref 0–0)
PHOSPHATE SERPL-MCNC: 2.5 MG/DL — SIGNIFICANT CHANGE UP (ref 2.5–4.5)
PLATELET # BLD AUTO: 166 K/UL — SIGNIFICANT CHANGE UP (ref 150–400)
POTASSIUM SERPL-MCNC: 3.5 MMOL/L — SIGNIFICANT CHANGE UP (ref 3.5–5.3)
POTASSIUM SERPL-SCNC: 3.5 MMOL/L — SIGNIFICANT CHANGE UP (ref 3.5–5.3)
RBC # BLD: 4.4 M/UL — SIGNIFICANT CHANGE UP (ref 3.8–5.2)
RBC # FLD: 14.5 % — SIGNIFICANT CHANGE UP (ref 10.3–14.5)
SODIUM SERPL-SCNC: 143 MMOL/L — SIGNIFICANT CHANGE UP (ref 135–145)
WBC # BLD: 5.18 K/UL — SIGNIFICANT CHANGE UP (ref 3.8–10.5)
WBC # FLD AUTO: 5.18 K/UL — SIGNIFICANT CHANGE UP (ref 3.8–10.5)

## 2024-05-14 PROCEDURE — 99223 1ST HOSP IP/OBS HIGH 75: CPT

## 2024-05-14 PROCEDURE — 99497 ADVNCD CARE PLAN 30 MIN: CPT | Mod: 25

## 2024-05-14 RX ORDER — METOPROLOL TARTRATE 50 MG
1 TABLET ORAL
Qty: 0 | Refills: 0 | DISCHARGE
Start: 2024-05-14

## 2024-05-14 RX ORDER — SENNA PLUS 8.6 MG/1
2 TABLET ORAL
Qty: 0 | Refills: 0 | DISCHARGE
Start: 2024-05-14

## 2024-05-14 RX ORDER — POLYETHYLENE GLYCOL 3350 17 G/17G
17 POWDER, FOR SOLUTION ORAL
Qty: 0 | Refills: 0 | DISCHARGE
Start: 2024-05-14

## 2024-05-14 RX ADMIN — ENOXAPARIN SODIUM 30 MILLIGRAM(S): 100 INJECTION SUBCUTANEOUS at 05:29

## 2024-05-14 RX ADMIN — Medication 25 MILLIGRAM(S): at 05:29

## 2024-05-14 RX ADMIN — POLYETHYLENE GLYCOL 3350 17 GRAM(S): 17 POWDER, FOR SOLUTION ORAL at 05:28

## 2024-05-14 RX ADMIN — CHLORHEXIDINE GLUCONATE 1 APPLICATION(S): 213 SOLUTION TOPICAL at 11:51

## 2024-05-14 NOTE — DISCHARGE NOTE NURSING/CASE MANAGEMENT/SOCIAL WORK - NSDCCRNAME_GEN_ALL_CORE_FT
[facility] ([id number] [address])

## 2024-05-14 NOTE — CONSULT NOTE ADULT - PROBLEM SELECTOR RECOMMENDATION 2
PPSV 30%   Patient with minimal PO intake but has PO route. Patient bedbound?, non-verbal with this provider   Please assist with ADLs

## 2024-05-14 NOTE — CONSULT NOTE ADULT - PROBLEM SELECTOR RECOMMENDATION 3
Per chart review, patient has court appointed guardian- Jaciel Jara [phone], that was established on 2/26/ 2024. Per chart review, patient has court appointed guardian- Jaciel Jara [phone] (office); [phone] (cellphone), that was established on 2/26/ 2024. FULL CODE- see separate goc note.    dispo: NH when medically stable.

## 2024-05-14 NOTE — DISCHARGE NOTE PROVIDER - NSDCMRMEDTOKEN_GEN_ALL_CORE_FT
acetaminophen 325 mg oral tablet: 2 tab(s) orally every 6 hours, As needed, Mild Pain (1 - 3)  Centrum oral tablet: 1 tab(s) orally once a day  melatonin 6 mg oral tablet, disintegratin tab(s) orally once a day (at bedtime)  metoprolol tartrate 25 mg oral tablet: 1 tab(s) orally 2 times a day  Multiple Vitamins oral tablet: 1 tab(s) orally once a day  polyethylene glycol 3350 oral powder for reconstitution: 17 gram(s) orally every 12 hours  senna leaf extract oral tablet: 2 tab(s) orally once a day (at bedtime)  simvastatin 20 mg oral tablet: 1 tab(s) orally once a day (at bedtime) on Monday and Friday

## 2024-05-14 NOTE — ADVANCED PRACTICE NURSE CONSULT - ASSESSMENT
General: A&Ox1, cachectic with temporal wasting (as per PCA in room, patient refusing to eat breakfast), somnolent, incontinent of urine and stool with external female urinary catching device (prima fit) in place. Skin warm, dry with increased moisture in intertriginous folds, adequate skin turgor, scattered areas of hyperpigmentation and hypopigmentation, scattered areas of ecchymosis on bilateral upper extremities with no hematomas.    Vascular: Bilateral lower extremities with scattered areas of hyper and hypopigmentation. Thin, shiny, taught skin with no hair growth. Thickened-yellow hyperpigmented overgrown toenails to BL first toe, rest of toes thin and rigid.  Increased coolness from midfoot to distal toes. Capillary refill <3 seconds. Unable to palpate DP/PT pulses, unable to retrieve doppler sounds. Bilateral feet with blueish discoloration, most likely 2/2 arterial compromise, but no evidence of acute overt ischemia at this time.    L heel: Arterial wound compromised by pressure, presenting as DTPI measuring [id number] with 100% purple maroon discoloration with bogginess noted. Periwound skin intact, but flaky. No induration, no crepitus, no edema, no temperature changes, no overt signs of infection noted. Goals of care: Monitor for tissue type changes, float heels at all times, antimicrobial support.

## 2024-05-14 NOTE — ED PROVIDER NOTE - NS ED MD DISPO SPECIAL CONSIDERATION1
Hospital Medicine Daily Progress Note    Date of Service  5/14/2024    Chief Complaint  Azalea Gan is a 74 y.o. female admitted 5/8/2024 with palpitations    Hospital Course  75yo PMHx PAFib with abalation in March this year, HTN, pulmonary HTN, JOSH, BMI 40, previous CVA, hypothyroid, TV regurgitatio.  Presented on 5/8 with  AFib RVR to [facility].  On 5/11 transferred to ICU due to worsening resp status and required intubation for pulmonary edema.  In ICU treated for possible pneumonia as well as cardiogenic pulmonary edema with forced diuresis.  Had an episode of polymorphic VT, ?AFib with aberrancy (underlying LBBB)  Extubated 5/13    Interval Problem Update  Patient states she is feeling good this morning.  Denies shortness of breath or any pain.  Does note that she is weak, has not been out of bed in several days.  No other complaints.    I have discussed this patient's plan of care and discharge plan at IDT rounds today with Case Management, Nursing, Nursing leadership, and other members of the IDT team.    Consultants/Specialty  cardiology    Code Status  Full Code    Disposition  The patient is not medically cleared for discharge to home or a post-acute facility.      I have placed the appropriate orders for post-discharge needs.    Review of Systems  Review of Systems   Constitutional:  Negative for chills and fever.   Respiratory:  Negative for cough and shortness of breath.    Cardiovascular:  Negative for chest pain, palpitations and leg swelling.   Gastrointestinal:  Negative for abdominal pain, diarrhea, nausea and vomiting.   Genitourinary:  Negative for dysuria and urgency.   Musculoskeletal:  Negative for back pain.   Skin:  Negative for rash.   Neurological:  Positive for weakness. Negative for dizziness, loss of consciousness and headaches.        Physical Exam  Temp:  [36.3 °C (97.3 °F)-37 °C (98.6 °F)] 36.4 °C (97.6 °F)  Pulse:  [55-81] 68  Resp:  [11-34] 23  BP: ()/(51-62) 122/57  SpO2:  [90  %-97 %] 97 %    Physical Exam  Constitutional:       General: She is not in acute distress.     Appearance: Normal appearance. She is well-developed. She is obese. She is not diaphoretic.   HENT:      Head: Normocephalic and atraumatic.   Neck:      Vascular: No JVD.   Cardiovascular:      Rate and Rhythm: Normal rate and regular rhythm.      Heart sounds: Murmur heard.   Pulmonary:      Effort: Pulmonary effort is normal. No respiratory distress.      Breath sounds: No stridor. No wheezing or rales.   Abdominal:      Palpations: Abdomen is soft.      Tenderness: There is no abdominal tenderness. There is no guarding or rebound.   Musculoskeletal:      Right lower leg: No edema.      Left lower leg: No edema.   Skin:     General: Skin is warm and dry.      Capillary Refill: Capillary refill takes less than 2 seconds.      Coloration: Skin is pale.      Findings: No rash.   Neurological:      Mental Status: She is oriented to person, place, and time.   Psychiatric:         Mood and Affect: Mood normal.         Behavior: Behavior normal.         Thought Content: Thought content normal.         Fluids    Intake/Output Summary (Last 24 hours) at 5/14/2024 0909  Last data filed at 5/14/2024 0800  Gross per 24 hour   Intake 1250.42 ml   Output 3280 ml   Net -2029.58 ml       Laboratory  Recent Labs     05/13/24  0320 05/13/24  1639 05/14/24  0345   WBC 9.0 6.8 4.6*   RBC 3.64* 3.45* 3.56*   HEMOGLOBIN 10.8* 10.1* 10.5*   HEMATOCRIT 33.3* 31.9* 33.2*   MCV 91.5 92.5 93.3   MCH 29.7 29.3 29.5   MCHC 32.4 31.7* 31.6*   RDW 54.8* 55.4* 55.1*   PLATELETCT 203 188 200   MPV 11.2 11.1 11.5     Recent Labs     05/12/24  0415 05/13/24  0320 05/14/24  0345   SODIUM 138 137 143   POTASSIUM 3.6 3.4* 4.5   CHLORIDE 102 102 103   CO2 23 22 27   GLUCOSE 123* 122* 136*   BUN 23* 26* 40*   CREATININE 1.23 1.37 1.54*   CALCIUM 8.7 8.2* 8.5     Recent Labs     05/13/24  1639 05/13/24  2340 05/14/24  0548   APTT 42.1* 105.7* 152.5*   INR  1.83*  --   --          Recent Labs     05/11/24  1830 05/13/24  0320   TRIGLYCERIDE 69 121       Imaging  DX-CHEST-PORTABLE (1 VIEW)   Final Result      1.  Possible small right pleural effusion.   2.  Similar right greater than left airspace disease.   3.  Suboptimal patient positioning.   4.  Cardiomegaly.      DX-CHEST-PORTABLE (1 VIEW)   Final Result      1.  Worsening hypoinflation   2.  Improvement of multifocal RIGHT lung consolidation.   3.  Supportive tubing is unchanged.      DX-CHEST-PORTABLE (1 VIEW)   Final Result      1.  Lines and tubes appear appropriately located      2.  Development of right lung airspace process which could be pneumonia or edema      DX-CHEST-PORTABLE (1 VIEW)   Final Result      Cardiomegaly and pulmonary vascular dilation or again noted. No acute process.      EC-ECHOCARDIOGRAM COMPLETE W/O CONT   Final Result      NM-CARDIAC STRESS TEST   Final Result      CL-LEFT HEART CATHETERIZATION WITH POSSIBLE INTERVENTION    (Results Pending)   EC-ECHOCARDIOGRAM COMPLETE W/O CONT    (Results Pending)        Assessment/Plan  * Acute hypoxic respiratory failure (HCC)  Assessment & Plan  Patient intubated on May 11, and subsequently extubated May 13  Etiology felt to be acute pulmonary edema from cardiogenic etiology as well as possible pneumonia.  Complete course of antibiotics  Continue forced diuresis  Mobilize  O2 and RT protocols    Acute kidney injury (HCC)  Assessment & Plan  Patient has been aggressively diuresed, and is perhaps on the dry side.  DC Lasix and monitor  Hold off on IV fluids for now and see if the patient normalizes without intervention  Daily BMP  Renally dose medications as appropriate    Aspiration pneumonia (HCC)  Assessment & Plan  Complete 5 days of antibiotics tomorrow  Cultures have been negative with exception of 1 of 2 bottles of a coag negative staph most likely contaminant    Fatigue  Assessment & Plan  Suspect untreated sleep apnea  Will need pulmonary  follow-up once  respiratory status is maximized for an outpatient sleep study    Heart failure with preserved ejection fraction (HCC)  Assessment & Plan  In part rate related due to A-fib RVR  Cardiology following  Completing ischemic workup with left heart cath today  Appropriately diuresed: Hold on further diuresis for the moment    Positive cardiac stress test  Assessment & Plan  Left heart cath    Atrial fibrillation with rapid ventricular response (HCC)- (present on admission)  Assessment & Plan    She is s/p DINESH[facility]DCCV at [facility] in March.   Her last echo in 3/2024 with moderate MR, moderate dilatation of LA, EF of 45-50% but in the setting of arrhythmia.   Anticoagulation with apixaban: Currently on hold with heparin drip pending left heart cath  Rate control with digoxin  Soft pressures have limited the ability to use beta-blockade or calcium channel blockers  Cardiology following  Continue telemetry    Hypothyroidism- (present on admission)  Assessment & Plan  TSH wnl this admission, likely not contributing to afib.   - Continue with home levothyroxine     Depression- (present on admission)  Assessment & Plan  Continue with prozac     Ventricular tachyarrhythmia (HCC)- (present on admission)  Assessment & Plan  Ventricular tachycardia versus atrial fibrillation with aberrancy  Cardiology following  Continue telemetry  Completing ischemic workup    Essential hypertension- (present on admission)  Assessment & Plan  Blood pressure borderline, holding home BP meds   Monitor           VTE prophylaxis: VTE Selection    I have performed a physical exam and reviewed and updated ROS and Plan today (5/14/2024). In review of yesterday's note (5/13/2024), there are no changes except as documented above.         None

## 2024-05-14 NOTE — DISCHARGE NOTE NURSING/CASE MANAGEMENT/SOCIAL WORK - PATIENT PORTAL LINK FT
You can access the FollowMyHealth Patient Portal offered by  by registering at the following website: [email or web address]. By joining "[employer]’s FollowMyHealth portal, you will also be able to view your health information using other applications (apps) compatible with our system.

## 2024-05-14 NOTE — CHART NOTE - NSCHARTNOTEFT_GEN_A_CORE
Pt seen for SEVERE MALNUTRITION FOLLOW UP     Medical Course:  89 y/o female with pmhx of HTN, HLD, schizophrenia, dementia comes to the ED from nursing facility where she was found to be hypoxic. On admission, patient found to have pCO2>100 and was intubated in the ED. She is now admitted for further management.       Nutrition Course:   Unable to conduct nutrition interview 2/2 decreased cognition. Information obtained from comprehensive chart review and per RN today: No recent episodes of nausea, vomiting, diarrhea, or constipation. Last BM noted 5/8 per RN flowsheets. On bowel regimen. Continue to monitor and document BMs in flowsheets. No reports of any chewing/swallowing difficulties. Noted with swallow eval on 5/10, per SLP note defer diet to MD. Intake is 0-50% per RN flowsheets and as observed at bedside. Feeding Skills: total assistance required from staff. Pt noted with untouched tray at breakfast this morning and x4 untouched nutrition protein supplements at bedside as well unopened (Vital and Glucerna). Given that Pt is not consuming much food/supplements will recommend consideration for appetite stimulant.      Diet Prescription: Diet, Pureed:   Supplement Feeding Modality:  Oral  Glucerna Shake Cans or Servings Per Day:  1       Frequency:  Two Times a day (05-09-24 @ 14:59)    Pertinent Medications: MEDICATIONS  (STANDING):  chlorhexidine 2% Cloths 1 Application(s) Topical daily  dextrose 5% + sodium chloride 0.45% 1000 milliLiter(s) (50 mL/Hr) IV Continuous <Continuous>  dextrose 50% Injectable 12.5 Gram(s) IV Push once  enoxaparin Injectable 30 milliGRAM(s) SubCutaneous every 24 hours  metoprolol tartrate 25 milliGRAM(s) Oral two times a day  multivitamin 1 Tablet(s) Oral daily  polyethylene glycol 3350 17 Gram(s) Oral every 12 hours  senna 2 Tablet(s) Oral at bedtime    MEDICATIONS  (PRN):  haloperidol    Injectable 0.25 milliGRAM(s) IV Push every 6 hours PRN agitation    Pertinent Labs: 05-14 Na143 mmol/L Glu 109 mg/dL<H> K+ 3.5 mmol/L Cr  0.69 mg/dL BUN 22 mg/dL 05-14 Phos 2.5 mg/dL    POCT Blood Glucose.: 123 mg/dL (14 May 2024 06:17)  POCT Blood Glucose.: 149 mg/dL (14 May 2024 00:52)  POCT Blood Glucose.: 131 mg/dL (13 May 2024 17:39)  POCT Blood Glucose.: 123 mg/dL (13 May 2024 12:31)      Weight: Height (cm): 157.5 (05-03 @ 05:48)  Weight (kg): 39.4 (05-03 @ 05:48)  BMI (kg/m2): 15.9 (05-03 @ 05:48)  Weight Assessment: No new weight to assess      Physical Assessment, per flowsheets:  Edema: none noted  Pressure Injury: left DTI    Estimated Needs:   [X] No change since previous assessment    Previous Nutrition Diagnosis: [x ] Malnutrition   Nutrition Diagnosis is [ x] ongoing  [ ] resolved [ ] not applicable   New Nutrition Diagnosis: [x ] not applicable     Education:  [  ] Given today        Type of education provided:   [  ] Given on previous assessment by RD   [ x ] Not applicable 2/2 cognitive deficit  [  ] Pt refusal of education offered   [  ] Not applicable 2/2 current prognosis  [  ] Not warranted at present    Interventions:   1) Continue on current PO diet: Pureed per MD discretion  2) Consider appetite stimulant   3)  dept to continue to provide Vital Health Shake 2x daily (1040 carlos and 44 gm protein)  4) May d/c Glucerna BID as Pt receiving alternative supplement   5) Continue on multivitamin once daily for micronutrient provisions   6) RD to f/u prn     Monitor & Evaluate:  PO intake, tolerance to diet/supplement, nutrition related lab values, weight trends, BMs/GI distress, hydration status, skin integrity.    Iris Miller MS, RDN (Pager #[phone]) | Also available on TEAMS

## 2024-05-14 NOTE — CHART NOTE - NSCHARTNOTESELECT_GEN_ALL_CORE
Event Note
Goals of Care
Event Note
Event Note
MICU Transfer Note/Event Note
Nutrition Services
Nutrition Services
POCUS/Event Note
discharge planning/Event Note

## 2024-05-14 NOTE — CONSULT NOTE ADULT - CONVERSATION DETAILS
Referral for complex decision making and symptom management in setting of advanced illness. Introduced role of GaP team to patient's guardian (document found in her physical chart) Jaciel Jara who did explain that he does make medical decisions including EOL decisions for Ms. Rouse in conjunction with patient's two adult sisters- Lucinda and Maribel (however they have some memory impairments, which is why he is assigned as her court appointed guardian). He has been updated by the unit SW that Ms. Rouse is stable to return back to her NH today. He is aware of patient's clinical course including intubation. We discussed the trajectory of advanced dementia and explained that dementia is an irreversible non-curative condition. He shared understanding of her clinical condition and had spoken to pt's sisters as well as medical team when patient was intubated. He shared that he feels that patient would not benefit from repeated intubations and attempt at CPR but "at the time, patient's sisters asked him to give patient as much time on earth that she can have and to keep her alive". To honor their wishes, patient will remain full code for now but he will have ongoing [facility] discussions with patient's sisters in the future but ultimately does understand and is willing to establish advance directives for patients in the future.

## 2024-05-14 NOTE — CONSULT NOTE ADULT - ASSESSMENT
89 yo F with pmhx of HTN, HLD, schizophrenia, dementia comes to the ED from nursing facility where she was found to be hypoxic. Palliative consulted for complex decision making regarding goc in setting of advanced illness.

## 2024-05-14 NOTE — CONSULT NOTE ADULT - PROBLEM SELECTOR RECOMMENDATION 9
Patient from [facility] (resident since 10/24/2022), did not participate in S&S exam, unclear what her mental status is at baseline. During my encounter, patient was non-verbal, intermittently nodded head to yes/no questions.   > Per RN, patient has PO route but does not take much in orally   > Appreciate  recs for agitation

## 2024-05-14 NOTE — CONSULT NOTE ADULT - PROBLEM SELECTOR RECOMMENDATION 4
In the event of newly developing, evolving, or worsening symptoms, please contact the Palliative Medicine team via pager (if the patient is at [facility] #[phone] or if the patient is at [facility] #[phone]) The Geriatric and Palliative Medicine service has coverage 24 hours a day/ 7 days a week to provide medical recommendations regarding symptom management needs via telephone.

## 2024-05-14 NOTE — DISCHARGE NOTE PROVIDER - HOSPITAL COURSE
89 yo F with pmhx of HTN, HLD, schizophrenia, dementia comes to the ED from nursing facility where she was found to be hypoxic. On admission, patient found to have pCO2>100 and was intubated in the ED, and admitted to MICU. In MICU, pCO2 levels improved. Successfully extubated to BiPAP on 5/6. Tolerated RA with nocturnal BiPAP. Passed bedside dysphagia screen.     Dementia.   Patient from [facility] (resident since 10/24/2022), did not participate in S&S exam, alert and oriented x 0 at baseline.   Seen by Behavioral Health Service with recommendation for Ramelteon 8mg qHS, if not available would begin Melatonin 6mg qHS      Functional quadriplegia. PPSV 30%   Patient with minimal PO intake but has PO route. Patient bedbound?, non-verbal with this provider   Please assist with ADLs.    Problem: Electrolyte derangements  Patient with fluctuations in oral intake-overall poor and requires substantial encouragement  Hypernatremia and Hypokalemia resolved with IV hydration     Problem: L heel: Arterial wound  Bilateral heels: Cleanse with NS, pat dry. Paint with betadine, allow to dry.   Secure with abdominal pad, kerlix and ensure heels are floating with z-flow pillow or z-flow boots at all times.  Recommending NATHEN/PVR - discussed with Attending MD Mitchell will complete as outpatient at facility.     ·Problem: Advanced care planning/counseling discussion. Palliative Team consulted for assistance with goals of care planning.   Per chart review, patient has court appointed guardian- Jaciel Jara [phone], that was established on 2/26/ 2024.      On 5/14/24 this case was reviewed with Dr. Mitchell, the patient is medically stable and optimized for discharge as per attending. All medications were reviewed and prescriptions were sent to mutually agreed upon pharmacy. Discharge plan reviewed with patient and/or family.

## 2024-05-14 NOTE — DISCHARGE NOTE PROVIDER - NSDCCPCAREPLAN_GEN_ALL_CORE_FT
PRINCIPAL DISCHARGE DIAGNOSIS  Diagnosis: Sepsis with acute hypoxic respiratory failure  Assessment and Plan of Treatment: Patient was treated for aspiration pneumonia while admitted. Please maintain Aspiration precautions when feeding.         SECONDARY DISCHARGE DIAGNOSES  Diagnosis: Dementia  Assessment and Plan of Treatment: Dementia is a loss of mental skills that affects daily life. It is different from mild memory loss that occurs with aging. Dementia can cause problems with memory, thinking clearly, and planning. It is different for everyone. But it usually gets worse slowly. Some people who have dementia can function well for a long time. But at some point it may become hard for the person to care for himself or herself.  ****Seen by Behavioral Health during hospitalization with recommendation for starting melatonin 6 mg at bedtime to assist with behavior associated with chronic dementia diagnosis. Follow up with primary care MD within 1 week of discharge recommended.       Diagnosis: Aspiration pneumonia  Assessment and Plan of Treatment: Pneumonia is an infection of the lungs. Pneumonia may be caused by bacteria, viruses, or funguses. Symptoms include coughing, fever, chest pain when breathing deeply or coughing, shortness of breath, fatigue, or muscle aches. Pneumonia can be diagnosed with a medical history and physical exam, as well as other tests which may include a chest X-ray. If you were prescribed an antibiotic medicine, take it as told by your health care provider and do not stop taking the antibiotic even if you start to feel better. Do not use tobacco products, including cigarettes, chewing tobacco, and e-cigarettes.  SEEK IMMEDIATE MEDICAL CARE IF YOU HAVE ANY OF THE FOLLOWING SYMPTOMS: worsening shortness of breath, worsening chest pain, coughing up blood, change in mental status, lightheadedness/dizziness.      Diagnosis: Arterial insufficiency with ischemic ulcer  Assessment and Plan of Treatment: L heel: Arterial wound  Bilateral heels: Cleanse with NS, pat dry. Paint with betadine, allow to dry.   Secure with abdominal pad, kerlix and ensure heels are floating with z-flow pillow or z-flow boots at all times.  Recommending NATHEN/PVR - discussed with Attending MD Mitchell will complete as outpatient at facility.    Diagnosis: HTN (hypertension)  Assessment and Plan of Treatment: .Continue blood pressure medication regimen as directed. Monitor for any visual changes, headaches or dizziness.  Monitor blood pressure regularly.  Follow up with your primary care provider for further management for high blood pressure.      Diagnosis: HLD (hyperlipidemia)  Assessment and Plan of Treatment: .Continue prescribed medications to control your cholesterol levels and a DASH (Low fat/salt) diet. Follow up with your primary care provider upon discharge for further management and monitoring of cholesterol levels.      Diagnosis: Functional quadriplegia  Assessment and Plan of Treatment:

## 2024-05-14 NOTE — ADVANCED PRACTICE NURSE CONSULT - RECOMMEDATIONS
Recommending NATHEN/PVR to r/o arterial compromise, if abnormal please consult vascular    Recommending podiatry consult for full thickness wound to L heel (DTPI w/ boggy tissue type)    Topical Recommendations    Bilateral heels: Cleanse with NS, pat dry. Paint with betadine, allow to dry. Secure with abdominal pad, kerlix and ensure heels are floating with z-flow pillow or z-flow boots at all times, thank you!    Sacrum to BL buttocks: Cleanse with skin cleanser, pat dry. Apply Brooke moisture barrier cream twice a day and PRN with incontinent episodes.    Continue low air loss bed therapy, continue heel elevation, continue to turn & reposition per protocol, soft pillow between bony prominences, continue moisture management with barrier creams & single breathable pad, continue measures to decrease friction/shear/pressure. Continue with nutritional support as per dietary/orders.    Plan of care discussed with primary ACP Tammy Fleming.    Please contact Wound Care Service Line if we can be of further assistance (ext [phone]).

## 2024-05-14 NOTE — CHART NOTE - NSCHARTNOTEFT_GEN_A_CORE
Discharge Date  14-May-2024  Admission Date  03-May-2024 04:05  Reason for Admission  [facility]  Hospital Course    89 yo F with pmhx of HTN, HLD, schizophrenia, dementia comes to the ED from nursing facility where she was found to be hypoxic. On admission, patient found to have pCO2>100 and was intubated in the ED, and admitted to MICU. In MICU, pCO2 levels improved. Successfully extubated to BiPAP on 5/6. Tolerated RA with nocturnal BiPAP. Passed bedside dysphagia screen.     Dementia.   Patient from [facility] (resident since 10/24/2022), did not participate in S&S exam, alert and oriented x 0 at baseline.   Seen by [facility] with recommendation for Ramelteon 8mg qHS, if not available would begin Melatonin 6mg qHS      Functional quadriplegia. PPSV 30%   Patient with minimal PO intake but has PO route. Patient bedbound?, non-verbal with this provider   Please assist with ADLs.    Problem: Electrolyte derangements  Patient with fluctuations in oral intake-overall poor and requires substantial encouragement  Hypernatremia and Hypokalemia resolved with IV hydration     Problem: L heel: Arterial wound  Bilateral heels: Cleanse with NS, pat dry. Paint with betadine, allow to dry.   Secure with abdominal pad, kerlix and ensure heels are floating with z-flow pillow or z-flow boots at all times.  Recommending NATHEN/PVR - discussed with Attending MD Mitchell will complete as outpatient at facility.     ·Problem: Advanced care planning/counseling discussion. Palliative Team consulted for assistance with goals of care planning.   Per chart review, patient has court appointed guardian- Jaciel Jara [phone], that was established on 2/26/ 2024.  - 5/14/2024   discussed with ACP and nursing patient  to transfer nursing home  t DNR/DNI/palliative CARE Discharge Date  14-May-2024  Admission Date  03-May-2024 04:05  Reason for Admission  [facility]  Hospital Course    89 yo F with pmhx of HTN, HLD, schizophrenia, dementia comes to the ED from nursing facility where she was found to be hypoxic. On admission, patient found to have pCO2>100 and was intubated in the ED, and admitted to MICU. In MICU, pCO2 levels improved. Successfully extubated to BiPAP on 5/6. Tolerated RA with nocturnal BiPAP. Passed bedside dysphagia screen.     Dementia.   Patient from [facility] (resident since 10/24/2022), did not participate in S&S exam, alert and oriented x 0 at baseline.   Seen by [facility] with recommendation for Ramelteon 8mg qHS, if not available would begin Melatonin 6mg qHS      Functional quadriplegia. PPSV 30%   Patient with minimal PO intake but has PO route. Patient bedbound?, non-verbal with this provider   Please assist with ADLs.    Problem: Electrolyte derangements  Patient with fluctuations in oral intake-overall poor and requires substantial encouragement  Hypernatremia and Hypokalemia resolved with IV hydration     Problem: L heel: Arterial wound  Bilateral heels: Cleanse with NS, pat dry. Paint with betadine, allow to dry.   Secure with abdominal pad, kerlix and ensure heels are floating with z-flow pillow or z-flow boots at all times.  Recommending NATHEN/PVR - discussed with Attending MD Mitchell will complete as outpatient at facility.     ·Problem: Advanced care planning/counseling discussion. Palliative Team consulted for assistance with goals of care planning.   Per chart review, patient has court appointed guardian- Jaciel Jara [phone], that was established on 2/26/ 2024.  - 5/14/2024   discussed with ACP and nursing patient  to transfer nursing home  t DNR/DNI/palliative CARE  Medical attending 5/21/2024  Patient initially admitted to ICU due to septic shock intubated had pneumonia and UTI at that time she was on Levophed and was weaned off and transferred to the floor and discharged to nursing home as a comfort care

## 2024-05-14 NOTE — DISCHARGE NOTE NURSING/CASE MANAGEMENT/SOCIAL WORK - NSDCVIVACCINE_GEN_ALL_CORE_FT
Tdap; 04-Nov-2019 00:15; Rigoberto Brennan (RN); [employer]; [id number] (Exp. Date: 22-Nov-2021); IntraMuscular; Deltoid Left.; 0.5 milliLiter(s); VIS (VIS Published: 09-May-2013, VIS Presented: 04-Nov-2019);   Tdap; 20-Oct-2022 17:46; Jhoana Vizcarra (EDDIE); [employer]; [id number] (Exp. Date: 08-Dec-2024); IntraMuscular; Deltoid Right.; 0.5 milliLiter(s); VIS (VIS Published: 09-May-2013, VIS Presented: 20-Oct-2022);

## 2024-05-14 NOTE — CHART NOTE - NSCHARTNOTEFT_GEN_A_CORE
Hospitalist Medicine NP     Telephone conversation with Court Appointed Guardian Jaciel Jara [phone] (office); [phone] (cellphone), that was established on 2/26/2024  I met with patient and the primary care giver / designated health care proxy..     After speaking with my colleague from Palliative care this morning, he was aware of patient's clinical course including intubation. We discussed the trajectory of advanced dementia and explained that dementia is an irreversible non-curative condition. He shared understanding of her clinical condition and had spoken to pt's sisters as well as medical team when patient was intubated. He shared that he feels that patient would not benefit from repeated intubations and attempt at CPR. Discussed in details about current diagnosis of severe protein calorie malnutrition secondary to advanced dementia, limited treatment options and prognosis. In agreement that given advanced dementia diagnosis, patient's disease is following the expected course with intermittent periods of poor oral intake, and that she will not likely benefit from aggressive interventions at this time. The medical team and guardian are in agreement that the Ms. Rouse will benefit from comfort measures with limited interventions upon returning to the nursing home.     MOLST form completed to reflect DNR/DNI/no enteral feeding tube  Limited interventions: IV fluids and antibiotics if deemed necessary  No hemodialysis  Hospitalization transfers only if unable to control pain or other symptoms while in facility    In addition, telephone conversation with SULEMA Segura at [facility], made aware of conversation with guardian, & informed of newly completed advanced care directives.     Patient to be discharged to NH today.     On 5/14/24 this case was reviewed with Dr. Mitchell, the patient is medically stable and optimized for discharge as per attending.      PASCUAL Fleming [place]  Medicine [id number]

## 2024-05-14 NOTE — CONSULT NOTE ADULT - SUBJECTIVE AND OBJECTIVE BOX
[facility] Geriatrics and Palliative Care  Rubina Beasley, Palliative Care Attending  Contact Info: Page [phone] (including Nights/Weekends), message on Microsoft Teams (Rubina Beasley), or leave  at Palliative Office [phone] (non-urgent)     Date of [id number] @ 10:07  HPI: 89 yo F with pmhx of HTN, HLD, schizophrenia, dementia comes to the ED from nursing facility where she was found to be hypoxic.     In the ED, her vitals were significant for tachypnea and hypoxia with her labs showing acidosis and pCO2>100 s/p broad spectrum antibiotics. She was then intubated and is now admitted to the MICU for further management.  (03 May 2024 04:27)    PERTINENT PM/SXH:   Gait difficulty  Sacral ulcer  Hypertension  Schizophrenia    History of total left hip replacement    FAMILY HISTORY:  Family history of diabetes mellitus  parents    Family history of cerebrovascular accident (CVA)  mother      Family Hx substance abuse [ ]yes [ ]no  ITEMS NOT CHECKED ARE NOT PRESENT    SOCIAL HISTORY: Per chart review, patient has legal guardian, Jaciel Jara. During prior admissions in 2019 and 2022, patient's next of kin were 2 adult sisters- John Rouse ([phone])  Significant other/partner[ ]  Children[ ]  Catholic/Spirituality:  Substance hx:  [ ]   Tobacco hx:  [ ]   Alcohol hx: [ ]   Home Opioid hx: NONE [ ] I-Stop Reference No: [id number]  Living Situation: [ ]Home  [x ]Long term care- [facility]   [ ]Rehab [ ]Other    ADVANCE DIRECTIVES:    DNR/MOLST  [ ]  Living Will  [ ]   DECISION MAKER(s):  [ ] Health Care Proxy(s)  [ ] Surrogate(s)  [x ] Guardian           Name(s): Jaciel Jara Phone Number(s): [phone]    BASELINE (I)ADL(s) (prior to admission):  Bacon: [ ]Total  [ ] Moderate [ x]Dependent    Allergies    No Known Allergies    Intolerances    MEDICATIONS  (STANDING):  chlorhexidine 2% Cloths 1 Application(s) Topical daily  dextrose 5% + sodium chloride 0.45% 1000 milliLiter(s) (50 mL/Hr) IV Continuous <Continuous>  dextrose 50% Injectable 12.5 Gram(s) IV Push once  enoxaparin Injectable 30 milliGRAM(s) SubCutaneous every 24 hours  metoprolol tartrate 25 milliGRAM(s) Oral two times a day  multivitamin 1 Tablet(s) Oral daily  polyethylene glycol 3350 17 Gram(s) Oral every 12 hours  senna 2 Tablet(s) Oral at bedtime    MEDICATIONS  (PRN):  haloperidol    Injectable 0.25 milliGRAM(s) IV Push every 6 hours PRN agitation    PRESENT SYMPTOMS: [x ]Unable to self-report  [ ] CPOT [x ] PAINADs [x ] RDOS  Source if other than patient:  [ ]Family   [ ]Team     Pain: [ ]yes [ ]no - Limited ROS as patient intermittently nods head yes/no. Was not verbal with this provider.   QOL impact -   Location -                    Aggravating factors -  Quality -  Radiation -  Timing-  Severity (0-10 scale):  Minimal acceptable level/pain goal (0-10 scale):     CPOT:    https://www.[facility].org/getattachment/[email or web address]-[email or web address]/Critical-Care-Pain-Observation-Tool-(CPOT)    Dyspnea:                           [ ]Mild [ ]Moderate [ ]Severe  Anxiety:                             [ ]Mild [ ]Moderate [ ]Severe  Fatigue:                             [ ]Mild [ ]Moderate [ ]Severe  Nausea:                             [ ]Mild [ ]Moderate [ ]Severe  Loss of appetite:              [ ]Mild [ ]Moderate [ ]Severe  Constipation:                    [ ]Mild [ ]Moderate [ ]Severe    Other Symptoms:  [ ]All other review of systems negative     PCSSQ[Palliative Care Spiritual Screening Question]   Severity (0-10):  Chaplaincy Referral: [ ] yes [ ] refused [ ] following [X ] deferred     Caregiver [place]? : [ ] yes [ ] no [ X] Deferred [ ] Declined             Social work referral [ ] Patient & Family Centered Care Referral [ ]  Anticipatory Grief present?:  [ ] yes [ ] no  [X ] Deferred                  Social work referral [ ] Patient & Family Centered Care Referral [ ]    PHYSICAL EXAM:  Vital Signs Last 24 Hrs  T(C): 36.7 (14 May 2024 05:30), Max: 36.7 (13 May 2024 21:30)  T(F): 98.1 (14 May 2024 05:30), Max: 98.1 (13 May 2024 21:30)  HR: 98 (14 May 2024 05:30) (81 - 98)  BP: 163/100 (14 May 2024 05:30) (157/100 - 163/100)  BP(mean): --  RR: 18 (14 May 2024 05:30) (18 - 18)  SpO2: 96% (14 May 2024 05:30) (95% - 100%)    Parameters below as of 13 May 2024 18:30  Patient On (Oxygen Delivery Method): room air     I&O's Summary    GENERAL: [ ]Cachexia    [ ]Alert  [ ]Oriented x   [x ]Lethargic  [ ]Unarousable  [ ]Verbal  [x ]Non-Verbal  Behavioral:   [ ] Anxiety  [ ] Delirium [ ] Agitation [x ] Other  HEENT:  [ ]Normal   [x]Dry mouth   [ ]ET Tube/Trach  [ ]Oral lesions  PULMONARY:   [ ]Clear [ ]Tachypnea  [ ]Audible excessive secretions   [ ]Rhonchi        [ ]Right [ ]Left [ ]Bilateral  [ ]Crackles        [ ]Right [ ]Left [ ]Bilateral  [ ]Wheezing     [ ]Right [ ]Left [ ]Bilateral  [x ]Diminished breath sounds [ ]right [ ]left [x ]bilateral  CARDIOVASCULAR:    [x ]Regular [ ]Irregular [ ]Tachy  [ ]Waylon [ ]Murmur [ ]Other  GASTROINTESTINAL:  [ x]Soft  [ ]Distended   [ ]+BS  [x ]Non tender [ ]Tender  [ ]Other [ ]PEG [ ]OGT/ NGT  Last BM: fecal incontinence   GENITOURINARY:  [ ]Normal [x ] Incontinent   [ ]Oliguria/Anuria   [ ]Joyner  MUSCULOSKELETAL:   [ ]Normal   [ x]Weakness  [ x]Bed/Wheelchair bound [ ]Edema  NEUROLOGIC:   [ ]No focal deficits  [x ]Cognitive impairment  [ ]Dysphagia [ ]Dysarthria [ ]Paresis [ ]Other   SKIN: Please see flowsheets   [ ]Normal  [ ]Rash  [x ]Other- Left Heel DTI,   [ ]Pressure ulcer(s)       Present on admission [ ]y [ ]n    CRITICAL CARE:  [ ] Shock Present  [ ]Septic [ ]Cardiogenic [ ]Neurologic [ ]Hypovolemic  [ ]  Vasopressors [ ]  Inotropes   [ ]Respiratory failure present [ ]Mechanical ventilation [ ]Non-invasive ventilatory support [ ]High flow    [ ]Acute  [ ]Chronic [ ]Hypoxic  [ ]Hypercarbic [ ]Other  [ ]Other organ failure     LABS:                        14.6   5.18  )-----------( 166      ( 14 May 2024 06:10 )             45.1   05-14    143  |  102  |  22  ----------------------------<  109<H>  3.5   |  27  |  0.69    Ca    9.2      14 May 2024 06:10  Phos  2.5     05-14  Mg     1.90     05-14        Urinalysis Basic - ( 14 May 2024 06:10 )    Color: x / Appearance: x / SG: x / pH: x  Gluc: 109 mg/dL / Ketone: x  / Bili: x / Urobili: x   Blood: x / Protein: x / Nitrite: x   Leuk Esterase: x / RBC: x / WBC x   Sq Epi: x / Non Sq Epi: x / Bacteria: x      RADIOLOGY & ADDITIONAL STUDIES: < from: CT Head No Cont (05.03.24 @ 05:30) >  FINDINGS:  There is no CT evidence of acute intracranial hemorrhage, mass effect or   midline shift.  There is no acute loss of gray matter-white matter   differentiation.    Thereis mild cerebral volume loss and mild periventricular white matter   hypoattenuation compatible with chronic microvascular ischemic disease. A   stable 1.2 cm hypoattenuating focus in the left basal ganglionic region   may represent a dilated perivascular space, rather than a lacunar infarct.      There is mild patchy ethmoid air cell mucosal thickening.    The mastoids are clear bilaterally.    The calvarium and skull base appear within normal limits.    IMPRESSION:  No CT evidence of acute intracranial pathology.    < end of copied text >    < from: CT Chest w/ IV Cont (05.03.24 @ 05:29) >  IMPRESSION:    Atelectasis in both lower lobes.    Unchanged nodules in the middle lobe and lingula which likely reflect   nodular atelectasis.    Trace pleural effusions.    Unchanged dilated 4.5 cm ascending aorta.    < end of copied text >      PROTEIN CALORIE MALNUTRITION PRESENT: [ ]mild [ ]moderate [ ]severe [ ]underweight [ ]morbid obesity  https://www.andeal.org/vault/2440/web/files/ONC/Table_Clinical%20Characteristics%20to%20Document%20Malnutrition-White%20JV%20et%20al%202012.pdf    Height (cm): 157.5 (05-03-24 @ 05:48)  Weight (kg): 39.4 (05-03-24 @ 05:48)  BMI (kg/m2): 15.9 (05-03-24 @ 05:48)    [x ]PPSV2 < or = to 30% [ ]significant weight loss  [ ]poor nutritional intake  [ ]anasarca[ ]Artificial Nutrition      Other REFERRALS:  [ ]Hospice  [ ]Child Life  [ ]Social Work  [ ]Case management [ ]Holistic Therapy    [facility] Geriatrics and Palliative Care  Rubina Beasley Palliative Care Attending  Contact Info: Page [phone] (including Nights/Weekends), message on Microsoft Teams (Rubina Beasley), or leave  at Palliative Office [phone] (non-urgent)     Date of [id number] @ 10:07  HPI: 89 yo F with pmhx of HTN, HLD, schizophrenia, dementia comes to the ED from nursing facility where she was found to be hypoxic.     In the ED, her vitals were significant for tachypnea and hypoxia with her labs showing acidosis and pCO2>100 s/p broad spectrum antibiotics. She was then intubated and is now admitted to the MICU for further management.  (03 May 2024 04:27)    PERTINENT PM/SXH:   Gait difficulty  Sacral ulcer  Hypertension  Schizophrenia    History of total left hip replacement    FAMILY HISTORY:  Family history of diabetes mellitus  parents    Family history of cerebrovascular accident (CVA)  mother      Family Hx substance abuse [ ]yes [ ]no  ITEMS NOT CHECKED ARE NOT PRESENT    SOCIAL HISTORY: Per chart review, patient has legal guardian, Jaciel Jara. During prior admissions in 2019 and 2022, patient's next of kin were 2 adult sisters- John Rouse ([phone])  Significant other/partner[ ]  Children[ ]  Yazidism/Spirituality:  Substance hx:  [ ]   Tobacco hx:  [ ]   Alcohol hx: [ ]   Home Opioid hx: NONE [ ] I-Stop Reference No: [id number]  Living Situation: [ ]Home  [x ]Long term care- [facility]   [ ]Rehab [ ]Other    ADVANCE DIRECTIVES:    DNR/MOLST  [ ]  Living Will  [ ]   DECISION MAKER(s):  [ ] Health Care Proxy(s)  [ ] Surrogate(s)  [x ] Guardian           Name(s): Jaciel Jara Phone Number(s): [phone]; [phone]    BASELINE (I)ADL(s) (prior to admission):  Wells: [ ]Total  [ ] Moderate [ x]Dependent    Allergies    No Known Allergies    Intolerances    MEDICATIONS  (STANDING):  chlorhexidine 2% Cloths 1 Application(s) Topical daily  dextrose 5% + sodium chloride 0.45% 1000 milliLiter(s) (50 mL/Hr) IV Continuous <Continuous>  dextrose 50% Injectable 12.5 Gram(s) IV Push once  enoxaparin Injectable 30 milliGRAM(s) SubCutaneous every 24 hours  metoprolol tartrate 25 milliGRAM(s) Oral two times a day  multivitamin 1 Tablet(s) Oral daily  polyethylene glycol 3350 17 Gram(s) Oral every 12 hours  senna 2 Tablet(s) Oral at bedtime    MEDICATIONS  (PRN):  haloperidol    Injectable 0.25 milliGRAM(s) IV Push every 6 hours PRN agitation    PRESENT SYMPTOMS: [x ]Unable to self-report  [ ] CPOT [x ] PAINADs [x ] RDOS  Source if other than patient:  [ ]Family   [ ]Team     Pain: [ ]yes [ ]no - Limited ROS as patient intermittently nods head yes/no. Was not verbal with this provider.   QOL impact -   Location -                    Aggravating factors -  Quality -  Radiation -  Timing-  Severity (0-10 scale):  Minimal acceptable level/pain goal (0-10 scale):     CPOT:    https://www.[facility].org/getattachment/lkq64r38-4s6p-3c4j-2w5r-0510y7291q4o/Critical-Care-Pain-Observation-Tool-(CPOT)    Dyspnea:                           [ ]Mild [ ]Moderate [ ]Severe  Anxiety:                             [ ]Mild [ ]Moderate [ ]Severe  Fatigue:                             [ ]Mild [ ]Moderate [ ]Severe  Nausea:                             [ ]Mild [ ]Moderate [ ]Severe  Loss of appetite:              [ ]Mild [ ]Moderate [ ]Severe  Constipation:                    [ ]Mild [ ]Moderate [ ]Severe    Other Symptoms:  [ ]All other review of systems negative     PCSSQ[Palliative Care Spiritual Screening Question]   Severity (0-10):  Chaplaincy Referral: [ ] yes [ ] refused [ ] following [X ] deferred     Caregiver [place]? : [ ] yes [ ] no [ X] Deferred [ ] Declined             Social work referral [ ] Patient & Family Centered Care Referral [ ]  Anticipatory Grief present?:  [ ] yes [ ] no  [X ] Deferred                  Social work referral [ ] Patient & Family Centered Care Referral [ ]    PHYSICAL EXAM:  Vital Signs Last 24 Hrs  T(C): 36.7 (14 May 2024 05:30), Max: 36.7 (13 May 2024 21:30)  T(F): 98.1 (14 May 2024 05:30), Max: 98.1 (13 May 2024 21:30)  HR: 98 (14 May 2024 05:30) (81 - 98)  BP: 163/100 (14 May 2024 05:30) (157/100 - 163/100)  BP(mean): --  RR: 18 (14 May 2024 05:30) (18 - 18)  SpO2: 96% (14 May 2024 05:30) (95% - 100%)    Parameters below as of 13 May 2024 18:30  Patient On (Oxygen Delivery Method): room air     I&O's Summary    GENERAL: [ ]Cachexia    [ ]Alert  [ ]Oriented x   [x ]Lethargic  [ ]Unarousable  [ ]Verbal  [x ]Non-Verbal  Behavioral:   [ ] Anxiety  [ ] Delirium [ ] Agitation [x ] Other  HEENT:  [ ]Normal   [x]Dry mouth   [ ]ET Tube/Trach  [ ]Oral lesions  PULMONARY:   [ ]Clear [ ]Tachypnea  [ ]Audible excessive secretions   [ ]Rhonchi        [ ]Right [ ]Left [ ]Bilateral  [ ]Crackles        [ ]Right [ ]Left [ ]Bilateral  [ ]Wheezing     [ ]Right [ ]Left [ ]Bilateral  [x ]Diminished breath sounds [ ]right [ ]left [x ]bilateral  CARDIOVASCULAR:    [x ]Regular [ ]Irregular [ ]Tachy  [ ]Waylon [ ]Murmur [ ]Other  GASTROINTESTINAL:  [ x]Soft  [ ]Distended   [ ]+BS  [x ]Non tender [ ]Tender  [ ]Other [ ]PEG [ ]OGT/ NGT  Last BM: fecal incontinence   GENITOURINARY:  [ ]Normal [x ] Incontinent   [ ]Oliguria/Anuria   [ ]Joyner  MUSCULOSKELETAL:   [ ]Normal   [ x]Weakness  [ x]Bed/Wheelchair bound [ ]Edema  NEUROLOGIC:   [ ]No focal deficits  [x ]Cognitive impairment  [ ]Dysphagia [ ]Dysarthria [ ]Paresis [ ]Other   SKIN: Please see flowsheets   [ ]Normal  [ ]Rash  [x ]Other- Left Heel DTI,   [ ]Pressure ulcer(s)       Present on admission [ ]y [ ]n    CRITICAL CARE:  [ ] Shock Present  [ ]Septic [ ]Cardiogenic [ ]Neurologic [ ]Hypovolemic  [ ]  Vasopressors [ ]  Inotropes   [ ]Respiratory failure present [ ]Mechanical ventilation [ ]Non-invasive ventilatory support [ ]High flow    [ ]Acute  [ ]Chronic [ ]Hypoxic  [ ]Hypercarbic [ ]Other  [ ]Other organ failure     LABS:                        14.6   5.18  )-----------( 166      ( 14 May 2024 06:10 )             45.1   05-14    143  |  102  |  22  ----------------------------<  109<H>  3.5   |  27  |  0.69    Ca    9.2      14 May 2024 06:10  Phos  2.5     05-14  Mg     1.90     05-14        Urinalysis Basic - ( 14 May 2024 06:10 )    Color: x / Appearance: x / SG: x / pH: x  Gluc: 109 mg/dL / Ketone: x  / Bili: x / Urobili: x   Blood: x / Protein: x / Nitrite: x   Leuk Esterase: x / RBC: x / WBC x   Sq Epi: x / Non Sq Epi: x / Bacteria: x      RADIOLOGY & ADDITIONAL STUDIES: < from: CT Head No Cont (05.03.24 @ 05:30) >  FINDINGS:  There is no CT evidence of acute intracranial hemorrhage, mass effect or   midline shift.  There is no acute loss of gray matter-white matter   differentiation.    Thereis mild cerebral volume loss and mild periventricular white matter   hypoattenuation compatible with chronic microvascular ischemic disease. A   stable 1.2 cm hypoattenuating focus in the left basal ganglionic region   may represent a dilated perivascular space, rather than a lacunar infarct.      There is mild patchy ethmoid air cell mucosal thickening.    The mastoids are clear bilaterally.    The calvarium and skull base appear within normal limits.    IMPRESSION:  No CT evidence of acute intracranial pathology.    < end of copied text >    < from: CT Chest w/ IV Cont (05.03.24 @ 05:29) >  IMPRESSION:    Atelectasis in both lower lobes.    Unchanged nodules in the middle lobe and lingula which likely reflect   nodular atelectasis.    Trace pleural effusions.    Unchanged dilated 4.5 cm ascending aorta.    < end of copied text >      PROTEIN CALORIE MALNUTRITION PRESENT: [ ]mild [ ]moderate [ ]severe [ ]underweight [ ]morbid obesity  https://www.andeal.org/vault/2440/web/files/ONC/Table_Clinical%20Characteristics%20to%20Document%20Malnutrition-White%20JV%20et%20al%202012.pdf    Height (cm): 157.5 (05-03-24 @ 05:48)  Weight (kg): 39.4 (05-03-24 @ 05:48)  BMI (kg/m2): 15.9 (05-03-24 @ 05:48)    [x ]PPSV2 < or = to 30% [ ]significant weight loss  [ ]poor nutritional intake  [ ]anasarca[ ]Artificial Nutrition      Other REFERRALS:  [ ]Hospice  [ ]Child Life  [ ]Social Work  [ ]Case management [ ]Holistic Therapy

## 2024-05-14 NOTE — DISCHARGE NOTE PROVIDER - CARE PROVIDER_API CALL
Jolanta Mitchell [place]  Internal Medicine  [id number] [place] [id number]  Phone: [phone]  Fax: [phone]  Established Patient  Follow Up Time:

## 2024-05-14 NOTE — ADVANCED PRACTICE NURSE CONSULT - REASON FOR CONSULT
Patient seen on skin care rounds after wound care referral received for assessment of skin impairment and recommendations of topical management of a L heel DTPI. As per H&P, patient is a 89 yo F with pmhx of HTN, HLD, schizophrenia, dementia comes to the ED from nursing facility where she was found to be hypoxic.     Chart reviewed: WBC: 5.18, H&H: 14.6/45.1, Platelets: 166, INR: 1.06, A1C: 5.4, BMI: 15.9, Paul 10.

## 2024-05-14 NOTE — DISCHARGE NOTE PROVIDER - DETAILS OF MALNUTRITION DIAGNOSIS/DIAGNOSES
This patient has been assessed with a concern for Malnutrition and was treated during this hospitalization for the following Nutrition diagnosis/diagnoses:     -  05/09/2024: Severe protein-calorie malnutrition   -  05/09/2024: Underweight (BMI < 19)   -  05/06/2024: Underweight (BMI < 19)

## 2024-05-15 ENCOUNTER — TRANSCRIPTION ENCOUNTER (OUTPATIENT)
Age: 89
End: 2024-05-15

## 2024-05-18 ENCOUNTER — INPATIENT (INPATIENT)
Facility: HOSPITAL | Age: 89
LOS: 3 days | Discharge: OTHER INSTITUTION | End: 2024-05-22
Attending: INTERNAL MEDICINE | Admitting: INTERNAL MEDICINE
Payer: MEDICARE

## 2024-05-18 VITALS
DIASTOLIC BLOOD PRESSURE: 67 MMHG | HEIGHT: 62 IN | SYSTOLIC BLOOD PRESSURE: 100 MMHG | TEMPERATURE: 98 F | RESPIRATION RATE: 18 BRPM | HEART RATE: 73 BPM | OXYGEN SATURATION: 96 %

## 2024-05-18 DIAGNOSIS — Z96.642 PRESENCE OF LEFT ARTIFICIAL HIP JOINT: Chronic | ICD-10-CM

## 2024-05-18 LAB
ADD ON TEST-SPECIMEN IN LAB: SIGNIFICANT CHANGE UP
ALBUMIN SERPL ELPH-MCNC: 3.7 G/DL — SIGNIFICANT CHANGE UP (ref 3.3–5)
ALP SERPL-CCNC: 79 U/L — SIGNIFICANT CHANGE UP (ref 40–120)
ALT FLD-CCNC: 14 U/L — SIGNIFICANT CHANGE UP (ref 4–33)
ANION GAP SERPL CALC-SCNC: 21 MMOL/L — HIGH (ref 7–14)
ANISOCYTOSIS BLD QL: SLIGHT — SIGNIFICANT CHANGE UP
APPEARANCE UR: ABNORMAL
APTT BLD: 27.7 SEC — SIGNIFICANT CHANGE UP (ref 24.5–35.6)
AST SERPL-CCNC: 24 U/L — SIGNIFICANT CHANGE UP (ref 4–32)
BACTERIA # UR AUTO: NEGATIVE /HPF — SIGNIFICANT CHANGE UP
BASE EXCESS BLDV CALC-SCNC: 0.6 MMOL/L — SIGNIFICANT CHANGE UP (ref -2–3)
BASOPHILS # BLD AUTO: 0 K/UL — SIGNIFICANT CHANGE UP (ref 0–0.2)
BASOPHILS NFR BLD AUTO: 0 % — SIGNIFICANT CHANGE UP (ref 0–2)
BILIRUB SERPL-MCNC: 1.2 MG/DL — SIGNIFICANT CHANGE UP (ref 0.2–1.2)
BILIRUB UR-MCNC: ABNORMAL
BLOOD GAS VENOUS COMPREHENSIVE RESULT: SIGNIFICANT CHANGE UP
BUN SERPL-MCNC: 82 MG/DL — HIGH (ref 7–23)
CALCIUM SERPL-MCNC: 9.2 MG/DL — SIGNIFICANT CHANGE UP (ref 8.4–10.5)
CAST: 22 /LPF — HIGH (ref 0–4)
CHLORIDE BLDV-SCNC: 111 MMOL/L — HIGH (ref 96–108)
CHLORIDE SERPL-SCNC: 111 MMOL/L — HIGH (ref 98–107)
CO2 BLDV-SCNC: 30.2 MMOL/L — HIGH (ref 22–26)
CO2 SERPL-SCNC: 23 MMOL/L — SIGNIFICANT CHANGE UP (ref 22–31)
COLOR SPEC: SIGNIFICANT CHANGE UP
CREAT SERPL-MCNC: 3.39 MG/DL — HIGH (ref 0.5–1.3)
DACRYOCYTES BLD QL SMEAR: SLIGHT — SIGNIFICANT CHANGE UP
DIFF PNL FLD: NEGATIVE — SIGNIFICANT CHANGE UP
EGFR: 12 ML/MIN/1.73M2 — LOW
EOSINOPHIL # BLD AUTO: 0 K/UL — SIGNIFICANT CHANGE UP (ref 0–0.5)
EOSINOPHIL NFR BLD AUTO: 0 % — SIGNIFICANT CHANGE UP (ref 0–6)
GAS PNL BLDV: 152 MMOL/L — HIGH (ref 136–145)
GLUCOSE BLDV-MCNC: 119 MG/DL — HIGH (ref 70–99)
GLUCOSE SERPL-MCNC: 114 MG/DL — HIGH (ref 70–99)
GLUCOSE UR QL: NEGATIVE MG/DL — SIGNIFICANT CHANGE UP
HCO3 BLDV-SCNC: 28 MMOL/L — SIGNIFICANT CHANGE UP (ref 22–29)
HCT VFR BLD CALC: 42 % — SIGNIFICANT CHANGE UP (ref 34.5–45)
HCT VFR BLDA CALC: 39 % — SIGNIFICANT CHANGE UP (ref 34.5–46.5)
HGB BLD CALC-MCNC: 13.1 G/DL — SIGNIFICANT CHANGE UP (ref 11.7–16.1)
HGB BLD-MCNC: 12.7 G/DL — SIGNIFICANT CHANGE UP (ref 11.5–15.5)
HYALINE CASTS # UR AUTO: PRESENT
HYPOCHROMIA BLD QL: SLIGHT — SIGNIFICANT CHANGE UP
IANC: 6.4 K/UL — SIGNIFICANT CHANGE UP (ref 1.8–7.4)
INR BLD: 1.15 RATIO — SIGNIFICANT CHANGE UP (ref 0.85–1.18)
KETONES UR-MCNC: ABNORMAL MG/DL
LACTATE BLDV-MCNC: 2.5 MMOL/L — HIGH (ref 0.5–2)
LEUKOCYTE ESTERASE UR-ACNC: ABNORMAL
LYMPHOCYTES # BLD AUTO: 0.47 K/UL — LOW (ref 1–3.3)
LYMPHOCYTES # BLD AUTO: 6.1 % — LOW (ref 13–44)
MACROCYTES BLD QL: SLIGHT — SIGNIFICANT CHANGE UP
MCHC RBC-ENTMCNC: 30.2 GM/DL — LOW (ref 32–36)
MCHC RBC-ENTMCNC: 32.4 PG — SIGNIFICANT CHANGE UP (ref 27–34)
MCV RBC AUTO: 107.1 FL — HIGH (ref 80–100)
MICROCYTES BLD QL: SLIGHT — SIGNIFICANT CHANGE UP
MONOCYTES # BLD AUTO: 0.2 K/UL — SIGNIFICANT CHANGE UP (ref 0–0.9)
MONOCYTES NFR BLD AUTO: 2.6 % — SIGNIFICANT CHANGE UP (ref 2–14)
NEUTROPHILS # BLD AUTO: 6.99 K/UL — SIGNIFICANT CHANGE UP (ref 1.8–7.4)
NEUTROPHILS NFR BLD AUTO: 87 % — HIGH (ref 43–77)
NEUTS BAND # BLD: 4.3 % — SIGNIFICANT CHANGE UP (ref 0–6)
NITRITE UR-MCNC: NEGATIVE — SIGNIFICANT CHANGE UP
NT-PROBNP SERPL-SCNC: 8432 PG/ML — HIGH
OVALOCYTES BLD QL SMEAR: SLIGHT — SIGNIFICANT CHANGE UP
PCO2 BLDV: 59 MMHG — HIGH (ref 39–52)
PH BLDV: 7.29 — LOW (ref 7.32–7.43)
PH UR: 5.5 — SIGNIFICANT CHANGE UP (ref 5–8)
PLAT MORPH BLD: NORMAL — SIGNIFICANT CHANGE UP
PLATELET # BLD AUTO: 146 K/UL — LOW (ref 150–400)
PLATELET COUNT - ESTIMATE: ABNORMAL
PO2 BLDV: 58 MMHG — HIGH (ref 25–45)
POIKILOCYTOSIS BLD QL AUTO: SLIGHT — SIGNIFICANT CHANGE UP
POLYCHROMASIA BLD QL SMEAR: SLIGHT — SIGNIFICANT CHANGE UP
POTASSIUM BLDV-SCNC: 3.7 MMOL/L — SIGNIFICANT CHANGE UP (ref 3.5–5.1)
POTASSIUM SERPL-MCNC: 3.8 MMOL/L — SIGNIFICANT CHANGE UP (ref 3.5–5.3)
POTASSIUM SERPL-SCNC: 3.8 MMOL/L — SIGNIFICANT CHANGE UP (ref 3.5–5.3)
PROT SERPL-MCNC: 6.6 G/DL — SIGNIFICANT CHANGE UP (ref 6–8.3)
PROT UR-MCNC: 100 MG/DL
PROTHROM AB SERPL-ACNC: 12.9 SEC — SIGNIFICANT CHANGE UP (ref 9.5–13)
RBC # BLD: 3.92 M/UL — SIGNIFICANT CHANGE UP (ref 3.8–5.2)
RBC # FLD: 15 % — HIGH (ref 10.3–14.5)
RBC BLD AUTO: ABNORMAL
RBC CASTS # UR COMP ASSIST: 9 /HPF — HIGH (ref 0–4)
REVIEW: SIGNIFICANT CHANGE UP
SAO2 % BLDV: 83.2 % — SIGNIFICANT CHANGE UP (ref 67–88)
SODIUM SERPL-SCNC: 155 MMOL/L — HIGH (ref 135–145)
SP GR SPEC: 1.03 — HIGH (ref 1–1.03)
SQUAMOUS # UR AUTO: 17 /HPF — HIGH (ref 0–5)
TROPONIN T, HIGH SENSITIVITY RESULT: 170 NG/L — CRITICAL HIGH
UROBILINOGEN FLD QL: 1 MG/DL — SIGNIFICANT CHANGE UP (ref 0.2–1)
WBC # BLD: 7.66 K/UL — SIGNIFICANT CHANGE UP (ref 3.8–10.5)
WBC # FLD AUTO: 7.66 K/UL — SIGNIFICANT CHANGE UP (ref 3.8–10.5)
WBC UR QL: 7 /HPF — HIGH (ref 0–5)

## 2024-05-18 PROCEDURE — 99285 EMERGENCY DEPT VISIT HI MDM: CPT

## 2024-05-18 PROCEDURE — 71045 X-RAY EXAM CHEST 1 VIEW: CPT | Mod: 26

## 2024-05-18 RX ORDER — HALOPERIDOL DECANOATE 100 MG/ML
2.5 INJECTION INTRAMUSCULAR ONCE
Refills: 0 | Status: COMPLETED | OUTPATIENT
Start: 2024-05-18 | End: 2024-05-18

## 2024-05-18 RX ORDER — SODIUM CHLORIDE 9 MG/ML
1000 INJECTION INTRAMUSCULAR; INTRAVENOUS; SUBCUTANEOUS ONCE
Refills: 0 | Status: COMPLETED | OUTPATIENT
Start: 2024-05-18 | End: 2024-05-18

## 2024-05-18 RX ADMIN — HALOPERIDOL DECANOATE 2.5 MILLIGRAM(S): 100 INJECTION INTRAMUSCULAR at 17:33

## 2024-05-18 RX ADMIN — HALOPERIDOL DECANOATE 2.5 MILLIGRAM(S): 100 INJECTION INTRAMUSCULAR at 20:54

## 2024-05-18 RX ADMIN — SODIUM CHLORIDE 1000 MILLILITER(S): 9 INJECTION INTRAMUSCULAR; INTRAVENOUS; SUBCUTANEOUS at 22:05

## 2024-05-18 NOTE — ED ADULT NURSE NOTE - NSFALLHARMRISKINTERV_ED_ALL_ED
Assistance OOB with selected safe patient handling equipment if applicable/Assistance with ambulation/Communicate risk of Fall with Harm to all staff, patient, and family/Monitor gait and stability/Monitor for mental status changes and reorient to person, place, and time, as needed/Provide visual cue: red socks, yellow wristband, yellow gown, etc/Reinforce activity limits and safety measures with patient and family/Toileting schedule using arm’s reach rule for commode and bathroom/Use of alarms - bed, stretcher, chair and/or video monitoring/Bed in lowest position, wheels locked, appropriate side rails in place/Call bell, personal items and telephone in reach/Instruct patient to call for assistance before getting out of bed/chair/stretcher/Non-slip footwear applied when patient is off stretcher/[place] to call system/Physically safe environment - no spills, clutter or unnecessary equipment/Purposeful Proactive Rounding/Room/bathroom lighting operational, light cord in reach

## 2024-05-18 NOTE — ED PROVIDER NOTE - PHYSICAL EXAMINATION
General appearance: NAD, afebrile    Eyes: anicteric sclerae, LISSY, EOMI   HENT: Atraumatic; oropharynx clear, MMM and no ulcerations, no pharyngeal erythema or exudate   Neck: Trachea midline; Full range of motion, supple   Pulm: CTA bl, normal respiratory effort and no intercostal retractions, normal work of breathing   CV: RRR, No murmurs, rubs, or gallops.    Abdomen: mild abd ttp. Soft, non-distended; no guarding or rebound   Extremities: No peripheral edema or extremity lymphadenopathy   Skin: Dry, normal temperature, turgor and texture; no rash, ulcers or subcutaneous nodules

## 2024-05-18 NOTE — ED PROVIDER NOTE - OBJECTIVE STATEMENT
89F w/ hx HTN, HLD, schizophrenia, dementia presenting with hematuria. Pt discharged recently on prn o2 after treatment for aspiration pna. Pt presenting today with hematuria in celeste. Pt unable to provide history.

## 2024-05-18 NOTE — ED PROVIDER NOTE - PROGRESS NOTE DETAILS
Kennedy Norman, PGY3 This patient was signed out to me.  Per chart review patient appears to have DNR/DNI with only comfort measures including fluids and antibiotics.    Patient has elevated troponin, likely due to demand. will get rpt trop w/ ck, ckmb. Kennedy Norman, PGY3 Telephone conversation with Court Appointed Guardian Jaciel Jara [phone] (office); [phone] (cellphone).   They  are ok with treatments like IVF and abx, but prefer comfort measures as much as possible.  no central line. no invasive treatments.

## 2024-05-18 NOTE — ED PROVIDER NOTE - CLINICAL SUMMARY MEDICAL DECISION MAKING FREE TEXT BOX
89F w/ hx HTN, HLD, schizophrenia, dementia presenting with hematuria. Pt discharged recently on prn o2 after treatment for aspiration pna. Pt presenting today with hematuria in celeste. Pt unable to provide history. Exam shows AOx0, mild abd ttp, hematuria in celeste. will get labs, cxr, replace celeste.

## 2024-05-18 NOTE — ED PROVIDER NOTE - ATTENDING CONTRIBUTION TO CARE
Ruben Szymanski DO:  patient seen and evaluated with the resident.  I was present for key portions of the History & Physical, and I agree with the Impression & Plan. 89 yo f pmh HTN, HLD, schizophrenia, dementia , pw hematuria. Patient unable to provide history of collateral due to baseline mental status.  Patient arrives tachypneic, tachycardic.  On nonrebreather.  Will trial off nonrebreather to determine if patient is truly hypoxic.  Patient with mild abdominal TTP.Noted tachycardia, recent admission.  Will likely require imaging, resuscitation, reassess.  Patient is DNR/DNI with limited interventions.  Will change Joyner. Ruben Szymanski DO:  patient seen and evaluated with the resident.  I was present for key portions of the History & Physical, and I agree with the Impression & Plan. 89 yo f pmh HTN, HLD, schizophrenia, dementia , pw hematuria. Patient unable to provide history of collateral due to baseline mental status.  Patient arrives tachypneic, tachycardic.  On nonrebreather.  Will trial off nonrebreather to determine if patient is truly hypoxic.  Patient with mild abdominal TTP.Noted tachycardia, recent admission.  Will likely require imaging, resuscitation, reassess.  Patient is DNR/DNI with limited interventions.  Will change Joyner..

## 2024-05-18 NOTE — ED ADULT NURSE NOTE - CHIEF COMPLAINT QUOTE
brought from [facility] ,pt non verbal and gets agitated pt o2 sat on RA 88%  EMS placed pt on 8 litres with NRB due to pt grabbing n/c off

## 2024-05-18 NOTE — ED ADULT NURSE REASSESSMENT NOTE - NS ED NURSE REASSESS COMMENT FT1
Received report from EDDIE Smith @[id number]. History of dementia, responds to verbal and tactile stimulation. Patient was brought from [facility] for hematuria. Hematuria noted in celeste. She is anxious, pulling at her lines and pulling the cardiac leads off. MD aware. ED tech monitor patient. Awaiting further orders.

## 2024-05-18 NOTE — ED ADULT NURSE NOTE - OBJECTIVE STATEMENT
89 year old female received to rm 3, AAOx0 nonverbal bedbound brought in by EMS from [facility] for hemturia in celeste bag. pt hypoxic in field 88% on room air. pt arrives on 8L via NC and NRB. pt combative and pulling lines/oxygen off. pt medicated per md orders. PIV #20  right forearm established, labs drawn and sent. bruising noted to right upper arm and forearm. Sinus tachycardia on cardiac monitor. unable to obtain pulse ox at this time after multiple attempts. pt calm s/p haldol. pt on NRB @10L. bed in lowest position, safety maintained. handoff report given to EDDIE TINSLEY

## 2024-05-18 NOTE — ED ADULT TRIAGE NOTE - CHIEF COMPLAINT QUOTE
"      PSYCHIATRIC DISCHARGE SUMMARY     Patient Identification:  Name:  Jeanette Dexter  Age:  15 y.o.  Sex:  female  :  2002  MRN:  [id number]  Visit Number:  [id number]      Date of Admission:2017   Date of Discharge:  2017    Discharge Diagnosis:  Active Problems:    Severe single current episode of major depressive disorder, without psychotic features    Social anxiety disorder        Admission Diagnosis:  MDD (major depressive disorder) [F32.9]     Hospital Course  Patient is a 15 y.o. female presented with worsening depression and suicidal ideation.  The patient also had a history of social anxiety.  She had been started on Prozac approximately 3-4 months ago and noticed little benefit if not worsening of symptoms.  The patient was agreeable to changing Prozac to Zoloft 25 mg nightly.  This was then increase to 50 mg nightly.  She reported improvement in sleep.  Family session was completed and the patient was felt safe to discharge home.  While on the unit, the patient reported resolution of suicidal thoughts and did not engage in any self-harm behaviors.    Mental Status Exam upon discharge:   Mood \"good\"   Affect-congruent, appropriate, stable  Thought Content-goal directed, no delusional material present  Thought process-linear, organized.  Suicidality: No SI  Homicidality: No HI  Perception: No AH/VH    Procedures Performed         Consults:   Consults     No orders found from 10/30/2017 to 2017.          Pertinent Test Results:   CMP, CBC unremarkable. UA had 2+ bacteria. Urine cx: normal elizabeth   UDS was negative.  Urine pregnancy was negative.    Condition on Discharge:  stable    Vital Signs  Temp:  [97.4 °F (36.3 °C)-97.8 °F (36.6 °C)] 97.4 °F (36.3 °C)  Heart Rate:  [85] 85  Resp:  [18-20] 18  BP: (120)/(78-80) 120/80      Discharge Disposition:  home      Discharge Medications:   Jeanette Dexter   Home Medication Instructions SMITA:[id number]    Printed on:17 1647 "   Medication Information                      FLUoxetine (PROzac) 10 MG capsule  Take 10 mg by mouth Daily.                 Discharge Diet: regular     Activity at Discharge: as tolerated    Follow-up Appointments  [facility]    Test Results Pending at Discharge      Clinician:   John Ortiz MD  12/01/17  4:47 PM     brought from [facility] ,pt non verbal and gets agitated pt o2 sat on RA 88%  EMS placed pt on 8 litres with NRB due to pt grabbing n/c off

## 2024-05-19 DIAGNOSIS — R31.9 HEMATURIA, UNSPECIFIED: ICD-10-CM

## 2024-05-19 DIAGNOSIS — R94.31 ABNORMAL ELECTROCARDIOGRAM [ECG] [EKG]: ICD-10-CM

## 2024-05-19 DIAGNOSIS — I10 ESSENTIAL (PRIMARY) HYPERTENSION: ICD-10-CM

## 2024-05-19 DIAGNOSIS — N17.0 ACUTE KIDNEY FAILURE WITH TUBULAR NECROSIS: ICD-10-CM

## 2024-05-19 DIAGNOSIS — F20.9 SCHIZOPHRENIA, UNSPECIFIED: ICD-10-CM

## 2024-05-19 DIAGNOSIS — E78.5 HYPERLIPIDEMIA, UNSPECIFIED: ICD-10-CM

## 2024-05-19 DIAGNOSIS — E87.0 HYPEROSMOLALITY AND HYPERNATREMIA: ICD-10-CM

## 2024-05-19 DIAGNOSIS — E87.20 ACIDOSIS, UNSPECIFIED: ICD-10-CM

## 2024-05-19 DIAGNOSIS — A41.9 SEPSIS, UNSPECIFIED ORGANISM: ICD-10-CM

## 2024-05-19 DIAGNOSIS — I24.89 OTHER FORMS OF ACUTE ISCHEMIC HEART DISEASE: ICD-10-CM

## 2024-05-19 DIAGNOSIS — Z29.9 ENCOUNTER FOR PROPHYLACTIC MEASURES, UNSPECIFIED: ICD-10-CM

## 2024-05-19 LAB
ALBUMIN SERPL ELPH-MCNC: 2.3 G/DL — LOW (ref 3.3–5)
ALP SERPL-CCNC: 53 U/L — SIGNIFICANT CHANGE UP (ref 40–120)
ALT FLD-CCNC: 10 U/L — SIGNIFICANT CHANGE UP (ref 4–33)
ANION GAP SERPL CALC-SCNC: 21 MMOL/L — HIGH (ref 7–14)
AST SERPL-CCNC: 19 U/L — SIGNIFICANT CHANGE UP (ref 4–32)
BASE EXCESS BLDV CALC-SCNC: -4 MMOL/L — LOW (ref -2–3)
BILIRUB SERPL-MCNC: 0.6 MG/DL — SIGNIFICANT CHANGE UP (ref 0.2–1.2)
BLD GP AB SCN SERPL QL: NEGATIVE — SIGNIFICANT CHANGE UP
BLD GP AB SCN SERPL QL: NEGATIVE — SIGNIFICANT CHANGE UP
BUN SERPL-MCNC: 68 MG/DL — HIGH (ref 7–23)
CA-I SERPL-SCNC: 1.14 MMOL/L — LOW (ref 1.15–1.33)
CALCIUM SERPL-MCNC: 7.7 MG/DL — LOW (ref 8.4–10.5)
CHLORIDE BLDV-SCNC: 116 MMOL/L — HIGH (ref 96–108)
CHLORIDE SERPL-SCNC: 109 MMOL/L — HIGH (ref 98–107)
CO2 BLDV-SCNC: 23.6 MMOL/L — SIGNIFICANT CHANGE UP (ref 22–26)
CO2 SERPL-SCNC: 18 MMOL/L — LOW (ref 22–31)
CREAT SERPL-MCNC: 2.72 MG/DL — HIGH (ref 0.5–1.3)
CULTURE RESULTS: SIGNIFICANT CHANGE UP
EGFR: 16 ML/MIN/1.73M2 — LOW
GAS PNL BLDA: SIGNIFICANT CHANGE UP
GAS PNL BLDV: 147 MMOL/L — HIGH (ref 136–145)
GAS PNL BLDV: SIGNIFICANT CHANGE UP
GLUCOSE BLDV-MCNC: 119 MG/DL — HIGH (ref 70–99)
GLUCOSE SERPL-MCNC: 87 MG/DL — SIGNIFICANT CHANGE UP (ref 70–99)
HCO3 BLDV-SCNC: 22 MMOL/L — SIGNIFICANT CHANGE UP (ref 22–29)
HCT VFR BLD CALC: 15.4 % — CRITICAL LOW (ref 34.5–45)
HCT VFR BLD CALC: 22.9 % — LOW (ref 34.5–45)
HCT VFR BLD CALC: 33.7 % — LOW (ref 34.5–45)
HCT VFR BLDA CALC: 33 % — LOW (ref 34.5–46.5)
HGB BLD CALC-MCNC: 10.9 G/DL — LOW (ref 11.7–16.1)
HGB BLD-MCNC: 10.3 G/DL — LOW (ref 11.5–15.5)
HGB BLD-MCNC: 4.4 G/DL — CRITICAL LOW (ref 11.5–15.5)
HGB BLD-MCNC: 6.8 G/DL — CRITICAL LOW (ref 11.5–15.5)
LACTATE BLDV-MCNC: 3.5 MMOL/L — HIGH (ref 0.5–2)
LACTATE SERPL-SCNC: 6.6 MMOL/L — CRITICAL HIGH (ref 0.5–2)
LG PLATELETS BLD QL AUTO: SLIGHT — SIGNIFICANT CHANGE UP
MAGNESIUM SERPL-MCNC: 1.8 MG/DL — SIGNIFICANT CHANGE UP (ref 1.6–2.6)
MANUAL SMEAR VERIFICATION: SIGNIFICANT CHANGE UP
MCHC RBC-ENTMCNC: 28.6 GM/DL — LOW (ref 32–36)
MCHC RBC-ENTMCNC: 29.7 GM/DL — LOW (ref 32–36)
MCHC RBC-ENTMCNC: 30.6 GM/DL — LOW (ref 32–36)
MCHC RBC-ENTMCNC: 32.6 PG — SIGNIFICANT CHANGE UP (ref 27–34)
MCHC RBC-ENTMCNC: 32.9 PG — SIGNIFICANT CHANGE UP (ref 27–34)
MCHC RBC-ENTMCNC: 33.1 PG — SIGNIFICANT CHANGE UP (ref 27–34)
MCV RBC AUTO: 108.4 FL — HIGH (ref 80–100)
MCV RBC AUTO: 110.6 FL — HIGH (ref 80–100)
MCV RBC AUTO: 114.1 FL — HIGH (ref 80–100)
MRSA PCR RESULT.: SIGNIFICANT CHANGE UP
NRBC # BLD: 0 /100 WBCS — SIGNIFICANT CHANGE UP (ref 0–0)
NRBC # FLD: 0.02 K/UL — HIGH (ref 0–0)
NRBC # FLD: 0.02 K/UL — HIGH (ref 0–0)
NRBC # FLD: SIGNIFICANT CHANGE UP K/UL (ref 0–0)
PCO2 BLDV: 44 MMHG — SIGNIFICANT CHANGE UP (ref 39–52)
PH BLDV: 7.31 — LOW (ref 7.32–7.43)
PHOSPHATE SERPL-MCNC: 4.6 MG/DL — HIGH (ref 2.5–4.5)
PLAT MORPH BLD: NORMAL — SIGNIFICANT CHANGE UP
PLATELET # BLD AUTO: 111 K/UL — LOW (ref 150–400)
PLATELET # BLD AUTO: 53 K/UL — LOW (ref 150–400)
PLATELET # BLD AUTO: 77 K/UL — LOW (ref 150–400)
PLATELET CLUMP BLD QL SMEAR: ABNORMAL
PLATELET COUNT - ESTIMATE: ABNORMAL
PO2 BLDV: 88 MMHG — HIGH (ref 25–45)
POTASSIUM BLDV-SCNC: 4.2 MMOL/L — SIGNIFICANT CHANGE UP (ref 3.5–5.1)
POTASSIUM SERPL-MCNC: 4 MMOL/L — SIGNIFICANT CHANGE UP (ref 3.5–5.3)
POTASSIUM SERPL-SCNC: 4 MMOL/L — SIGNIFICANT CHANGE UP (ref 3.5–5.3)
PROT SERPL-MCNC: 4.3 G/DL — LOW (ref 6–8.3)
RBC # BLD: 1.35 M/UL — LOW (ref 3.8–5.2)
RBC # BLD: 2.07 M/UL — LOW (ref 3.8–5.2)
RBC # BLD: 3.11 M/UL — LOW (ref 3.8–5.2)
RBC # FLD: 15.5 % — HIGH (ref 10.3–14.5)
RBC # FLD: 15.5 % — HIGH (ref 10.3–14.5)
RBC # FLD: 15.7 % — HIGH (ref 10.3–14.5)
RBC BLD AUTO: SIGNIFICANT CHANGE UP
RH IG SCN BLD-IMP: POSITIVE — SIGNIFICANT CHANGE UP
RH IG SCN BLD-IMP: POSITIVE — SIGNIFICANT CHANGE UP
S AUREUS DNA NOSE QL NAA+PROBE: SIGNIFICANT CHANGE UP
SAO2 % BLDV: 97 % — HIGH (ref 67–88)
SODIUM SERPL-SCNC: 148 MMOL/L — HIGH (ref 135–145)
SPECIMEN SOURCE: SIGNIFICANT CHANGE UP
TROPONIN T, HIGH SENSITIVITY RESULT: 129 NG/L — CRITICAL HIGH
WBC # BLD: 3.72 K/UL — LOW (ref 3.8–10.5)
WBC # BLD: 5.06 K/UL — SIGNIFICANT CHANGE UP (ref 3.8–10.5)
WBC # BLD: 7.42 K/UL — SIGNIFICANT CHANGE UP (ref 3.8–10.5)
WBC # FLD AUTO: 3.72 K/UL — LOW (ref 3.8–10.5)
WBC # FLD AUTO: 5.06 K/UL — SIGNIFICANT CHANGE UP (ref 3.8–10.5)
WBC # FLD AUTO: 7.42 K/UL — SIGNIFICANT CHANGE UP (ref 3.8–10.5)

## 2024-05-19 PROCEDURE — 99223 1ST HOSP IP/OBS HIGH 75: CPT

## 2024-05-19 PROCEDURE — 71275 CT ANGIOGRAPHY CHEST: CPT | Mod: 26,MC

## 2024-05-19 PROCEDURE — 74177 CT ABD & PELVIS W/CONTRAST: CPT | Mod: 26,MC

## 2024-05-19 RX ORDER — SIMVASTATIN 20 MG/1
20 TABLET, FILM COATED ORAL AT BEDTIME
Refills: 0 | Status: DISCONTINUED | OUTPATIENT
Start: 2024-05-19 | End: 2024-05-19

## 2024-05-19 RX ORDER — ONDANSETRON 8 MG/1
4 TABLET, FILM COATED ORAL EVERY 8 HOURS
Refills: 0 | Status: DISCONTINUED | OUTPATIENT
Start: 2024-05-19 | End: 2024-05-22

## 2024-05-19 RX ORDER — LANOLIN ALCOHOL/MO/W.PET/CERES
3 CREAM (GRAM) TOPICAL AT BEDTIME
Refills: 0 | Status: DISCONTINUED | OUTPATIENT
Start: 2024-05-19 | End: 2024-05-19

## 2024-05-19 RX ORDER — ACETAMINOPHEN 500 MG
650 TABLET ORAL EVERY 6 HOURS
Refills: 0 | Status: DISCONTINUED | OUTPATIENT
Start: 2024-05-19 | End: 2024-05-19

## 2024-05-19 RX ORDER — SODIUM CHLORIDE 9 MG/ML
1000 INJECTION, SOLUTION INTRAVENOUS ONCE
Refills: 0 | Status: COMPLETED | OUTPATIENT
Start: 2024-05-19 | End: 2024-05-19

## 2024-05-19 RX ORDER — MIRTAZAPINE 45 MG/1
7.5 TABLET, ORALLY DISINTEGRATING ORAL DAILY
Refills: 0 | Status: DISCONTINUED | OUTPATIENT
Start: 2024-05-19 | End: 2024-05-19

## 2024-05-19 RX ORDER — PIPERACILLIN AND TAZOBACTAM 4; .5 G/20ML; G/20ML
3.38 INJECTION, POWDER, LYOPHILIZED, FOR SOLUTION INTRAVENOUS ONCE
Refills: 0 | Status: COMPLETED | OUTPATIENT
Start: 2024-05-19 | End: 2024-05-19

## 2024-05-19 RX ORDER — MULTIVIT-MIN/FERROUS GLUCONATE 9 MG/15 ML
1 LIQUID (ML) ORAL DAILY
Refills: 0 | Status: DISCONTINUED | OUTPATIENT
Start: 2024-05-19 | End: 2024-05-19

## 2024-05-19 RX ORDER — SODIUM CHLORIDE 9 MG/ML
1000 INJECTION, SOLUTION INTRAVENOUS
Refills: 0 | Status: DISCONTINUED | OUTPATIENT
Start: 2024-05-19 | End: 2024-05-20

## 2024-05-19 RX ORDER — SENNA PLUS 8.6 MG/1
2 TABLET ORAL AT BEDTIME
Refills: 0 | Status: DISCONTINUED | OUTPATIENT
Start: 2024-05-19 | End: 2024-05-19

## 2024-05-19 RX ORDER — PIPERACILLIN AND TAZOBACTAM 4; .5 G/20ML; G/20ML
3.38 INJECTION, POWDER, LYOPHILIZED, FOR SOLUTION INTRAVENOUS EVERY 12 HOURS
Refills: 0 | Status: DISCONTINUED | OUTPATIENT
Start: 2024-05-20 | End: 2024-05-20

## 2024-05-19 RX ORDER — POLYETHYLENE GLYCOL 3350 17 G/17G
17 POWDER, FOR SOLUTION ORAL EVERY 12 HOURS
Refills: 0 | Status: DISCONTINUED | OUTPATIENT
Start: 2024-05-19 | End: 2024-05-19

## 2024-05-19 RX ORDER — VANCOMYCIN HCL 1 G
750 VIAL (EA) INTRAVENOUS ONCE
Refills: 0 | Status: COMPLETED | OUTPATIENT
Start: 2024-05-19 | End: 2024-05-19

## 2024-05-19 RX ADMIN — SODIUM CHLORIDE 100 MILLILITER(S): 9 INJECTION, SOLUTION INTRAVENOUS at 09:53

## 2024-05-19 RX ADMIN — Medication 250 MILLIGRAM(S): at 13:45

## 2024-05-19 RX ADMIN — PIPERACILLIN AND TAZOBACTAM 25 GRAM(S): 4; .5 INJECTION, POWDER, LYOPHILIZED, FOR SOLUTION INTRAVENOUS at 18:26

## 2024-05-19 RX ADMIN — SODIUM CHLORIDE 1000 MILLILITER(S): 9 INJECTION, SOLUTION INTRAVENOUS at 13:32

## 2024-05-19 RX ADMIN — PIPERACILLIN AND TAZOBACTAM 200 GRAM(S): 4; .5 INJECTION, POWDER, LYOPHILIZED, FOR SOLUTION INTRAVENOUS at 09:53

## 2024-05-19 NOTE — H&P ADULT - PROBLEM SELECTOR PLAN 1
Pt brought to ED for concern for hematuria  - UA with 9 RBC and negative blood  - vibha hematuria noted in celeste bag  - ddx incldues infection, trauma  - follow up urine culture  - continue broad spectrum abx  - check bladder scan to ensure no urinary retention

## 2024-05-19 NOTE — H&P ADULT - NSHPLABSRESULTS_GEN_ALL_CORE
12.7   7.66  )-----------( 146      ( 18 May 2024 18:11 )             42.0     05-18    155<H>  |  111<H>  |  82<H>  ----------------------------<  114<H>  3.8   |  23  |  3.39<H>    Ca    9.2      18 May 2024 18:11    TPro  6.6  /  Alb  3.7  /  TBili  1.2  /  DBili  x   /  AST  24  /  ALT  14  /  AlkPhos  79  05-18    PT/INR - ( 18 May 2024 18:11 )   PT: 12.9 sec;   INR: 1.15 ratio         PTT - ( 18 May 2024 18:11 )  PTT:27.7 sec  CARDIAC MARKERS ( 18 May 2024 18:11 )  x     / x     / 89 U/L / x     / 4.1 ng/mL        CAPILLARY BLOOD GLUCOSE      POCT Blood Glucose.: 115 mg/dL (18 May 2024 16:16)      Urinalysis Basic - ( 18 May 2024 19:54 )    Color: Dark Yellow / Appearance: Turbid / S.031 / pH: x  Gluc: x / Ketone: Trace mg/dL  / Bili: Small / Urobili: 1.0 mg/dL   Blood: x / Protein: 100 mg/dL / Nitrite: Negative   Leuk Esterase: Trace / RBC: 9 /HPF / WBC 7 /HPF   Sq Epi: x / Non Sq Epi: 17 /HPF / Bacteria: Negative /HPF          CT- A chest, CT A/P: personally reviewed  IMPRESSION:  No pulmonary embolism.    No acute parenchymal or pleural lung disease when compared with prior   exam from 5/3/2024. Improved bilateral pleural effusions.    Evaluation of the abdomen and pelvis limited by streak artifact from   bilateral femoral arthroplasties, motion, and patient positioning with   arms over abdomen.    No gross acute findings on CT the abdomen and pelvis.    Chronic findings, as described above..      EKG: Sinus tachycardia with ; QTc 597; poor R wave progression with Q waves in V4-V6; no ST elevations or depressions

## 2024-05-19 NOTE — H&P ADULT - PROBLEM SELECTOR PLAN 11
DVT ppx: SCDs given hematuria  Diet: pureed  Dispo: pending    plan d/w Guardian as outlined in GOC.  DNR/DNI. No invasive procedures. Continue IVF and abx

## 2024-05-19 NOTE — H&P ADULT - NSHPPHYSICALEXAM_GEN_ALL_CORE
T(C): 36.4 (05-19-24 @ 09:00), Max: 37.1 (05-18-24 @ 16:46)  HR: 70 (05-19-24 @ 09:00) (70 - 120)  BP: 137/87 (05-19-24 @ 09:00) (69/48 - 137/87)  RR: 19 (05-19-24 @ 09:00) (13 - 20)  SpO2: 98% (05-19-24 @ 09:00) (94% - 100%)    CONSTITUTIONAL: cachexia; ill appearing  EYES: PERRLA and symmetric, EOMI, No conjunctival or scleral injection, non-icteric  ENMT: dry mucus membranes, no pharyngeal injection or exudates  NECK: Supple, symmetric and without tracheal deviation   RESP: No respiratory distress, no use of accessory muscles; CTA b/l, no WRR  CV: RRR, +S1S2, no MRG; no JVD; no peripheral edema; decreased peripheral pulses  GI: Soft, NT, ND, no rebound, no guarding; no palpable masses; no hepatosplenomegaly; no hernia palpated  MSK:  examination of the (head/neck/spine/ribs/pelvis, RUE, LUE, RLE, LLE) without misalignment,            Normal ROM without pain, no spinal tenderness; muscle wasting  SKIN: distal cyanosis to b/l hands and feet  NEURO: CN II-XII intact; no focal motor deficits, sensation intact in upper and lower extremities b/l to light touch   PSYCH: non verbal, appears agitated

## 2024-05-19 NOTE — H&P ADULT - PROBLEM SELECTOR PLAN 5
Troponins rising in setting of sepsis, ADI  - EKG without acute findings  - continue tele monitoring  - TTE as above

## 2024-05-19 NOTE — H&P ADULT - PROBLEM SELECTOR PLAN 3
Pt with ADI likely 2/2 ATN given hypotension, sepsis and casts seen on UA  - continue IVF  - check bladder scan, if urinary retention will need to change celeste  - trend BMP, renally dose medications and avoid nephrotoxins  - no HD per MOLST form

## 2024-05-19 NOTE — H&P ADULT - TIME BILLING
Review of laboratory data, radiology results, consultants' recommendations, documentation in [place], discussion with patient/house staff and interdisciplinary staff (such as , social workers, etc). Interventions were performed as documented above.

## 2024-05-19 NOTE — H&P ADULT - CONVERSATION DETAILS
Discussed care with Guardian, Jaciel. I explained that though Candy's vital signs are currently stable, she looks like she may be actively dying. Jaciel agreed that pt should be DNR/DNI with focus on comfort measures, but also requests to continue IVF and IV abx. He agrees that pt should not have any invasive procedures such as central line placement and that vasopressors are not within goals of care.    Also discussed care with [facility] staff, who will fax over copy of MOLST form.

## 2024-05-19 NOTE — H&P ADULT - PROBLEM SELECTOR PLAN 2
Pt with hypotension, hypothermia, tachycardia  - ddx includes UTI, CT C/A/P without clear source of infection  - check BCx and MRSA nares, follow up UCx  - start broad spectrum abx with vancomycin x1 given ADI and zosyn  - trend VS, lactate

## 2024-05-19 NOTE — CHART NOTE - NSCHARTNOTEFT_GEN_A_CORE
Pt found to have decreasing hemoglobin from 12-->6-->4. Pt hypotensive to the 70s on manual blood pressure. Attempted to reach guardian Jaciel several times regarding blood transfusion without answer, message left. Will transfuse pt 2U pRBC without consent given low BP, but given prior conversation I think aggressive blood transfusion with MTP, central line placement and further imaging is not in line with GOC discussed with Jaciel earlier today. Plan d/w ACP and nurse at bedside. Plan also d/w ICU attending Dr. Valdes who will evaluate pt as well. Pt found to have decreasing hemoglobin from 12-->6-->4. Pt hypotensive to the 70s on manual blood pressure. Attempted to reach guardian Jaciel several times regarding blood transfusion without answer, message left. Will transfuse pt 2U pRBC without consent given low BP, but given prior conversation I think aggressive blood transfusion with MTP, central line placement and further imaging is not in line with GOC discussed with Jaciel earlier today. Plan d/w ACP and nurse at bedside. Plan also d/w ICU attending Dr. Valdes who will evaluate pt as well.      Update 2:45PM  Repeat Hb 10 on CBC and VBG. Hemoglobin values of 4 and 6 likely erroneous from IVF contamination. No need to transfuse at this time. Continue with IVF. Updated plan d/w ICU (with no need for them to evaluate at this time), ACP, and nursing

## 2024-05-19 NOTE — CHART NOTE - NSCHARTNOTEFT_GEN_A_CORE
Received call back from Guardian, Jaciel. Discussed pt condition with him and he understands pt is likely actively dying. He is currently preparing the patient's elderly siblings but they are not yet ready to make patient full comfort. I discussed blood transfusion with Jaciel who is amenable to transfusing 1-2U as needed for anemia, but agrees that repeated transfusions and MTP would cause undo distress on the patient.    Would recommend transfusion for typical indications such as Hb <7.   Blood transfusion consent to be placed in the chart    Plan d/w ACP.

## 2024-05-19 NOTE — CHART NOTE - NSCHARTNOTEFT_GEN_A_CORE
RN called to report that she cannot obtain BP electronically. Upon eval of pt: took Manual BP 78/56, HR 70, RR 18. Pt hypoxic confirmed with ABG 73% on 100% NR O2.  -IV NS bolus infusing  -d/w Attending who d/w GOC w/ legal guardian who only allows IV fluids and antibiotics at this point with comfort as target. MOLST in chart.     ICU Vital Signs Last 24 Hrs  T(C): 36.4 (19 May 2024 09:00), Max: 37.1 (18 May 2024 16:46)  T(F): 97.5 (19 May 2024 09:00), Max: 98.8 (18 May 2024 16:46)  HR: 70 (19 May 2024 14:15) (70 - 120)  BP: 78/56 (19 May 2024 14:15) (69/48 - 137/87)  BP(mean): 82 (19 May 2024 02:30) (56 - 89)  ABP: --  ABP(mean): --  RR: 18 (19 May 2024 14:15) (13 - 20)  SpO2: 98% (19 May 2024 09:00) (94% - 100%)    O2 Parameters below as of 19 May 2024 09:00  Patient On (Oxygen Delivery Method): nasal cannula  O2 Flow (L/min): 4                            10.3   7.42  )-----------( 111      ( 19 May 2024 14:05 )             33.7     05-19    148<H>  |  109<H>  |  68<H>  ----------------------------<  87  4.0   |  18<L>  |  2.72<H>    Ca    7.7<L>      19 May 2024 10:58  Phos  4.6     05-19  Mg     1.80     05-19    TPro  4.3<L>  /  Alb  2.3<L>  /  TBili  0.6  /  DBili  x   /  AST  19  /  ALT  10  /  AlkPhos  53  05-19    ABG - ( 19 May 2024 14:05 )  pH, Arterial: 7.19  pH, Blood: x     /  pCO2: 68    /  pO2: 51    / HCO3: 26    / Base Excess: -3.1  /  SaO2: 73.9              Urinalysis Basic - ( 19 May 2024 10:58 )    Color: x / Appearance: x / SG: x / pH: x  Gluc: 87 mg/dL / Ketone: x  / Bili: x / Urobili: x   Blood: x / Protein: x / Nitrite: x   Leuk Esterase: x / RBC: x / WBC x   Sq Epi: x / Non Sq Epi: x / Bacteria: x      PT/INR - ( 18 May 2024 18:11 )   PT: 12.9 sec;   INR: 1.15 ratio         PTT - ( 18 May 2024 18:11 )  PTT:27.7 sec  LIVER FUNCTIONS - ( 19 May 2024 10:58 )  Alb: 2.3 g/dL / Pro: 4.3 g/dL / ALK PHOS: 53 U/L / ALT: 10 U/L / AST: 19 U/L / GGT: x           CAPILLARY BLOOD GLUCOSE      POCT Blood Glucose.: 115 mg/dL (18 May 2024 16:16)    I&O's Detail      #Hematuria.   - UA with 9 RBC and negative blood  - vibha hematuria noted in celeste bag  - ddx incldues infection, trauma  - follow up urine culture  - continue broad spectrum abx  Bladder Scan: 0cc    #Sepsis, unspecified organism.   -Pt with hypotension, hypothermia, tachycardia  - ddx includes UTI, CT C/A/P without clear source of infection  - check BCx and MRSA nares, follow up UCx  - start broad spectrum abx with vancomycin x1 given ADI and zosyn  - trend VS, lactate.    #Acute Hypoxemic Resp Failure  -very hard to sense pt's O2 sat due to cold extremities  -ABG at 1400 showed 73% on 100% NR  -MOLST form    #Acute kidney injury (ADI) with acute tubular necrosis (ATN).   -ADI likely 2/2 ATN given hypotension, sepsis and casts seen on UA  - continue IVF  - check bladder scan, if urinary retention will need to change celeste  - trend BMP, renally dose medications and avoid nephrotoxins  - no HD per MOLST form.    #Lactic acidosis.   -monitor  - continue IVF  - check TTE to evaluate for cardiac etiology, though pt does not appear fluid overloaded pt has prior TTE with reduced LVSF.    #Demand ischemia.   ·  Plan: Troponins rising in setting of sepsis, ADI  - EKG without acute findings  - TTE as above.    #Prolonged QT interval.   ·  Plan: QTc 597  - avoid QT prolonging agents    #Anemia  hg 12.7--.6.8-->4.4 (LAB ERROR)  Arterial CBC w/ ABG showed 10.3

## 2024-05-19 NOTE — H&P ADULT - ASSESSMENT
89F with dementia, schizophrenia, HLD presents with hematuria, found to have sepsis of unclear etiology with ADI, lactic acidosis and demand ischemia

## 2024-05-19 NOTE — H&P ADULT - HISTORY OF PRESENT ILLNESS
89F with dementia, schizophrenia, HLD presents with hematuria. Pt is non verbal and unable to provide history. Pt was recently admitted to [facility] for aspiration PNA s/p intubation with subsequent extubation and eventual discharge back to [facility]. Per documentation pt developed hematuria and was transferred to [facility] for further management.    While in the ED pt was hypothermic, hypotensive and tachycardic. s/p 1L IVF and haldol 5mg for agitation.

## 2024-05-20 DIAGNOSIS — R53.2 FUNCTIONAL QUADRIPLEGIA: ICD-10-CM

## 2024-05-20 DIAGNOSIS — R45.1 RESTLESSNESS AND AGITATION: ICD-10-CM

## 2024-05-20 DIAGNOSIS — Z71.89 OTHER SPECIFIED COUNSELING: ICD-10-CM

## 2024-05-20 DIAGNOSIS — F03.90 UNSPECIFIED DEMENTIA, UNSPECIFIED SEVERITY, WITHOUT BEHAVIORAL DISTURBANCE, PSYCHOTIC DISTURBANCE, MOOD DISTURBANCE, AND ANXIETY: ICD-10-CM

## 2024-05-20 DIAGNOSIS — Z51.5 ENCOUNTER FOR PALLIATIVE CARE: ICD-10-CM

## 2024-05-20 LAB
ANION GAP SERPL CALC-SCNC: 17 MMOL/L — HIGH (ref 7–14)
ANION GAP SERPL CALC-SCNC: 19 MMOL/L — HIGH (ref 7–14)
ANION GAP SERPL CALC-SCNC: 20 MMOL/L — HIGH (ref 7–14)
BUN SERPL-MCNC: 77 MG/DL — HIGH (ref 7–23)
BUN SERPL-MCNC: 84 MG/DL — HIGH (ref 7–23)
BUN SERPL-MCNC: 86 MG/DL — HIGH (ref 7–23)
CALCIUM SERPL-MCNC: 7.4 MG/DL — LOW (ref 8.4–10.5)
CALCIUM SERPL-MCNC: 8.5 MG/DL — SIGNIFICANT CHANGE UP (ref 8.4–10.5)
CALCIUM SERPL-MCNC: 8.6 MG/DL — SIGNIFICANT CHANGE UP (ref 8.4–10.5)
CHLORIDE SERPL-SCNC: 108 MMOL/L — HIGH (ref 98–107)
CHLORIDE SERPL-SCNC: 110 MMOL/L — HIGH (ref 98–107)
CHLORIDE SERPL-SCNC: 94 MMOL/L — LOW (ref 98–107)
CO2 SERPL-SCNC: 19 MMOL/L — LOW (ref 22–31)
CO2 SERPL-SCNC: 21 MMOL/L — LOW (ref 22–31)
CO2 SERPL-SCNC: 22 MMOL/L — SIGNIFICANT CHANGE UP (ref 22–31)
CREAT SERPL-MCNC: 3.87 MG/DL — HIGH (ref 0.5–1.3)
CREAT SERPL-MCNC: 3.92 MG/DL — HIGH (ref 0.5–1.3)
CREAT SERPL-MCNC: 4.31 MG/DL — HIGH (ref 0.5–1.3)
EGFR: 10 ML/MIN/1.73M2 — LOW
EGFR: 10 ML/MIN/1.73M2 — LOW
EGFR: 9 ML/MIN/1.73M2 — LOW
GLUCOSE SERPL-MCNC: 145 MG/DL — HIGH (ref 70–99)
GLUCOSE SERPL-MCNC: 675 MG/DL — CRITICAL HIGH (ref 70–99)
GLUCOSE SERPL-MCNC: 97 MG/DL — SIGNIFICANT CHANGE UP (ref 70–99)
HCT VFR BLD CALC: 34.9 % — SIGNIFICANT CHANGE UP (ref 34.5–45)
HGB BLD-MCNC: 10.7 G/DL — LOW (ref 11.5–15.5)
MAGNESIUM SERPL-MCNC: 2.2 MG/DL — SIGNIFICANT CHANGE UP (ref 1.6–2.6)
MCHC RBC-ENTMCNC: 30.7 GM/DL — LOW (ref 32–36)
MCHC RBC-ENTMCNC: 33.6 PG — SIGNIFICANT CHANGE UP (ref 27–34)
MCV RBC AUTO: 109.7 FL — HIGH (ref 80–100)
NRBC # BLD: 0 /100 WBCS — SIGNIFICANT CHANGE UP (ref 0–0)
NRBC # FLD: 0.04 K/UL — HIGH (ref 0–0)
PHOSPHATE SERPL-MCNC: 6.1 MG/DL — HIGH (ref 2.5–4.5)
PLATELET # BLD AUTO: 107 K/UL — LOW (ref 150–400)
POTASSIUM SERPL-MCNC: 3.7 MMOL/L — SIGNIFICANT CHANGE UP (ref 3.5–5.3)
POTASSIUM SERPL-MCNC: 4.2 MMOL/L — SIGNIFICANT CHANGE UP (ref 3.5–5.3)
POTASSIUM SERPL-MCNC: 4.3 MMOL/L — SIGNIFICANT CHANGE UP (ref 3.5–5.3)
POTASSIUM SERPL-SCNC: 3.7 MMOL/L — SIGNIFICANT CHANGE UP (ref 3.5–5.3)
POTASSIUM SERPL-SCNC: 4.2 MMOL/L — SIGNIFICANT CHANGE UP (ref 3.5–5.3)
POTASSIUM SERPL-SCNC: 4.3 MMOL/L — SIGNIFICANT CHANGE UP (ref 3.5–5.3)
RBC # BLD: 3.18 M/UL — LOW (ref 3.8–5.2)
RBC # FLD: 15.4 % — HIGH (ref 10.3–14.5)
SODIUM SERPL-SCNC: 130 MMOL/L — LOW (ref 135–145)
SODIUM SERPL-SCNC: 149 MMOL/L — HIGH (ref 135–145)
SODIUM SERPL-SCNC: 151 MMOL/L — HIGH (ref 135–145)
VANCOMYCIN TROUGH SERPL-MCNC: 11.3 UG/ML — SIGNIFICANT CHANGE UP (ref 10–20)
WBC # BLD: 8.62 K/UL — SIGNIFICANT CHANGE UP (ref 3.8–10.5)
WBC # FLD AUTO: 8.62 K/UL — SIGNIFICANT CHANGE UP (ref 3.8–10.5)

## 2024-05-20 PROCEDURE — 99223 1ST HOSP IP/OBS HIGH 75: CPT

## 2024-05-20 RX ORDER — SODIUM CHLORIDE 9 MG/ML
1000 INJECTION, SOLUTION INTRAVENOUS
Refills: 0 | Status: DISCONTINUED | OUTPATIENT
Start: 2024-05-20 | End: 2024-05-21

## 2024-05-20 RX ORDER — HYDROMORPHONE HYDROCHLORIDE 2 MG/ML
0.2 INJECTION INTRAMUSCULAR; INTRAVENOUS; SUBCUTANEOUS
Refills: 0 | Status: DISCONTINUED | OUTPATIENT
Start: 2024-05-20 | End: 2024-05-22

## 2024-05-20 RX ORDER — CHLORHEXIDINE GLUCONATE 213 G/1000ML
1 SOLUTION TOPICAL DAILY
Refills: 0 | Status: DISCONTINUED | OUTPATIENT
Start: 2024-05-20 | End: 2024-05-22

## 2024-05-20 RX ADMIN — SODIUM CHLORIDE 65 MILLILITER(S): 9 INJECTION, SOLUTION INTRAVENOUS at 21:37

## 2024-05-20 RX ADMIN — CHLORHEXIDINE GLUCONATE 1 APPLICATION(S): 213 SOLUTION TOPICAL at 18:28

## 2024-05-20 RX ADMIN — PIPERACILLIN AND TAZOBACTAM 25 GRAM(S): 4; .5 INJECTION, POWDER, LYOPHILIZED, FOR SOLUTION INTRAVENOUS at 05:29

## 2024-05-20 RX ADMIN — SODIUM CHLORIDE 65 MILLILITER(S): 9 INJECTION, SOLUTION INTRAVENOUS at 10:02

## 2024-05-20 RX ADMIN — Medication 0.25 MILLIGRAM(S): at 21:36

## 2024-05-20 NOTE — PROGRESS NOTE ADULT - PROBLEM SELECTOR PLAN 1
Pt brought to ED for concern for hematuria  - UA with 9 RBC and negative blood  - vibha hematuria noted in celeste bag  - ddx incldues infection, trauma  - follow up urine culture  - continue broad spectrum abx  - check bladder scan to ensure no urinary retention  - palliative eval for GOC, likely hospice

## 2024-05-20 NOTE — CHART NOTE - NSCHARTNOTEFT_GEN_A_CORE
Pt with worsen kidney function and not improving on IVF and IV abx. Mental status A&OX0    Called and spoke with Guardian Jaciel Jara at [phone]. Updated Mr Grissom regarding patient's medical status and pt not improving with abx and now worsen kidney function. On non rebreather with increase breathing rate. Mr Grissom aware pt is not doing well and may not have much time, he is agreeable to transition patient to comfort care. Stop IV abx, he would still like to continue IVF at this time. Mr Grissom is open to ativan as needed for agitation/restless and Dilaudid as needed for work of breathing and pain. He would like pt to be comfortable. He agrees that at this point lab draw is no longer indicated.     Above discussed with palliative Dr Beasley recommended 0.2mg IV dilauid q2hr prn (pain/SOB), ativan 0.25q6hr prn for agitation and restless.    Above discussed with Dr Valenzuela.

## 2024-05-20 NOTE — CONSULT NOTE ADULT - PROBLEM SELECTOR RECOMMENDATION 5
Per chart review, patient has court appointed guardian- Jaciel Jara [phone] (office); [phone] (cellphone), that was established on 2/26/ 2024.  > Appreciate MOLST form completed during prior admission on 5/14 for DNR/DNI  > Appreciate GOC discussion on admission- 5/19 - He is amenable to IVF and IV abx   > 5/20: Palliative team reached out to Jaciel Jara and left voicemail on both office and cellphone. Would plan on discussing transition to comfort and likely inpt hospice services if he calls back.

## 2024-05-20 NOTE — CONSULT NOTE ADULT - PROBLEM SELECTOR RECOMMENDATION 3
Patient appeared restless during encounter today, taking off NRB mask.   > QTc on 5/9- 499ms   > Would add IV ativan 0.25mg q6h prn agitation/restlessness if guardian is amenable to symptom directed care. Palliative provider has been unable to reach him despite calling both office and cellphone numbers provided.

## 2024-05-20 NOTE — CONSULT NOTE ADULT - SUBJECTIVE AND OBJECTIVE BOX
[facility] Geriatrics and Palliative Care  Rubina Beasley Palliative Care Attending  Contact Info: Page [phone] (including Nights/Weekends), message on Microsoft Teams (Rubina Beasley), or leave  at Palliative Office [phone] (non-urgent)     Date of [id number] @ 13:50  HPI:  89F with dementia, schizophrenia, HLD presents with hematuria. Pt is non verbal and unable to provide history. Pt was recently admitted to [facility] for aspiration PNA s/p intubation with subsequent extubation and eventual discharge back to [facility]. Per documentation pt developed hematuria and was transferred to [facility] for further management.    While in the ED pt was hypothermic, hypotensive and tachycardic. s/p 1L IVF and haldol 5mg for agitation. (19 May 2024 09:52)    PERTINENT PM/SXH:   No pertinent past medical history    Gait difficulty    Sacral ulcer    Hypertension    Schizophrenia      No significant past surgical history    History of total left hip replacement      FAMILY HISTORY:  Family history of diabetes mellitus  parents    Family history of cerebrovascular accident (CVA)  mother      Family Hx substance abuse [ ]yes [ ]no  ITEMS NOT CHECKED ARE NOT PRESENT    SOCIAL HISTORY:   Significant other/partner[ ]  Children[ ]  Yarsanism/Spirituality:  Substance hx:  [ ]   Tobacco hx:  [ ]   Alcohol hx: [ ]   Home Opioid hx:  [ ] I-Stop Reference No:  Living Situation: [ ]Home  [ ]Long term care  [ ]Rehab [ ]Other    ADVANCE DIRECTIVES:    DNR/MOLST  [ ]  Living Will  [ ]   DECISION MAKER(s):  [ ] Health Care Proxy(s)  [ ] Surrogate(s)  [ ] Guardian           Name(s): Phone Number(s):    BASELINE (I)ADL(s) (prior to admission):  [place]: [ ]Total  [ ] Moderate [ ]Dependent    Allergies    No Known Allergies    Intolerances    MEDICATIONS  (STANDING):  dextrose 5%. 1000 milliLiter(s) (65 mL/Hr) IV Continuous <Continuous>  piperacillin/tazobactam IVPB.. 3.375 Gram(s) IV Intermittent every 12 hours    MEDICATIONS  (PRN):  ondansetron Injectable 4 milliGRAM(s) IV Push every 8 hours PRN Nausea and/or Vomiting    PRESENT SYMPTOMS: [ ]Unable to self-report  [ ] CPOT [ ] PAINADs [ ] RDOS  Source if other than patient:  [ ]Family   [ ]Team     Pain: [ ]yes [ ]no  QOL impact -   Location -                    Aggravating factors -  Quality -  Radiation -  Timing-  Severity (0-10 scale):  Minimal acceptable level/pain goal (0-10 scale):     CPOT:    https://www.[facility].org/getattachment/[email or web address]/Critical-Care-Pain-Observation-Tool-(CPOT)    Dyspnea:                           [ ]Mild [ ]Moderate [ ]Severe  Anxiety:                             [ ]Mild [ ]Moderate [ ]Severe  Fatigue:                             [ ]Mild [ ]Moderate [ ]Severe  Nausea:                             [ ]Mild [ ]Moderate [ ]Severe  Loss of appetite:              [ ]Mild [ ]Moderate [ ]Severe  Constipation:                    [ ]Mild [ ]Moderate [ ]Severe    Other Symptoms:  [ ]All other review of systems negative     PCSSQ[Palliative Care Spiritual Screening Question]   Severity (0-10):  Chaplaincy Referral: [ ] yes [ ] refused [ ] following [ ] deferred     Caregiver [place]? : [ ] yes [ ] no [ ] Deferred [ ] Declined             Social work referral [ ] Patient & Family Centered Care Referral [ ]  Anticipatory Grief present?:  [ ] yes [ ] no  [ ] Deferred                  Social work referral [ ] Patient & Family Centered Care Referral [ ]    PHYSICAL EXAM:  Vital Signs Last 24 Hrs  T(C): 36.7 (20 May 2024 05:00), Max: 36.8 (19 May 2024 21:30)  T(F): 98 (20 May 2024 05:00), Max: 98.2 (19 May 2024 21:30)  HR: 88 (20 May 2024 05:00) (70 - 102)  BP: 109/70 (20 May 2024 05:00) (78/56 - 122/76)  BP(mean): --  RR: 20 (20 May 2024 05:00) (18 - 22)  SpO2: 98% (20 May 2024 05:00) (94% - 98%)    Parameters below as of 20 May 2024 05:00  Patient On (Oxygen Delivery Method): mask, nonrebreather  O2 Flow (L/min): 10   I&O's Summary    20 May 2024 07:01  -  20 May 2024 13:50  --------------------------------------------------------  IN: 0 mL / OUT: 50 mL / NET: -50 mL      GENERAL: [ ]Cachexia    [ ]Alert  [ ]Oriented x   [ ]Lethargic  [ ]Unarousable  [ ]Verbal  [ ]Non-Verbal  Behavioral:   [ ] Anxiety  [ ] Delirium [ ] Agitation [ ] Other  HEENT:  [ ]Normal   [ ]Dry mouth   [ ]ET Tube/Trach  [ ]Oral lesions  PULMONARY:   [ ]Clear [ ]Tachypnea  [ ]Audible excessive secretions   [ ]Rhonchi        [ ]Right [ ]Left [ ]Bilateral  [ ]Crackles        [ ]Right [ ]Left [ ]Bilateral  [ ]Wheezing     [ ]Right [ ]Left [ ]Bilateral  [ ]Diminished breath sounds [ ]right [ ]left [ ]bilateral  CARDIOVASCULAR:    [ ]Regular [ ]Irregular [ ]Tachy  [ ]Waylon [ ]Murmur [ ]Other  GASTROINTESTINAL:  [ ]Soft  [ ]Distended   [ ]+BS  [ ]Non tender [ ]Tender  [ ]Other [ ]PEG [ ]OGT/ NGT  Last BM:  GENITOURINARY:  [ ]Normal [ ] Incontinent   [ ]Oliguria/Anuria   [ ]Joyner  MUSCULOSKELETAL:   [ ]Normal   [ ]Weakness  [ ]Bed/Wheelchair bound [ ]Edema  NEUROLOGIC:   [ ]No focal deficits  [ ]Cognitive impairment  [ ]Dysphagia [ ]Dysarthria [ ]Paresis [ ]Other   SKIN: Please see flowsheets   [ ]Normal  [ ]Rash  [ ]Other  [ ]Pressure ulcer(s)       Present on admission [ ]y [ ]n    CRITICAL CARE:  [ ] Shock Present  [ ]Septic [ ]Cardiogenic [ ]Neurologic [ ]Hypovolemic  [ ]  Vasopressors [ ]  Inotropes   [ ]Respiratory failure present [ ]Mechanical ventilation [ ]Non-invasive ventilatory support [ ]High flow    [ ]Acute  [ ]Chronic [ ]Hypoxic  [ ]Hypercarbic [ ]Other  [ ]Other organ failure     LABS:                        10.7   8.62  )-----------( 107      ( 20 May 2024 05:08 )             34.9   05-20    151<H>  |  110<H>  |  84<H>  ----------------------------<  97  4.3   |  21<L>  |  3.92<H>    Ca    8.6      20 May 2024 05:08  Phos  6.1     05-20  Mg     2.20     05-20    TPro  4.3<L>  /  Alb  2.3<L>  /  TBili  0.6  /  DBili  x   /  AST  19  /  ALT  10  /  AlkPhos  53  05-19  PT/INR - ( 18 May 2024 18:11 )   PT: 12.9 sec;   INR: 1.15 ratio         PTT - ( 18 May 2024 18:11 )  PTT:27.7 sec    Urinalysis Basic - ( 20 May 2024 05:08 )    Color: x / Appearance: x / SG: x / pH: x  Gluc: 97 mg/dL / Ketone: x  / Bili: x / Urobili: x   Blood: x / Protein: x / Nitrite: x   Leuk Esterase: x / RBC: x / WBC x   Sq Epi: x / Non Sq Epi: x / Bacteria: x      RADIOLOGY & ADDITIONAL STUDIES:    PROTEIN CALORIE MALNUTRITION PRESENT: [ ]mild [ ]moderate [ ]severe [ ]underweight [ ]morbid obesity  https://www.andeal.org/vault/2440/web/files/ONC/Table_Clinical%20Characteristics%20to%20Document%20Malnutrition-White%20JV%20et%20al%202012.pdf    Height (cm): 157.5 (05-18-24 @ 16:07), 157.5 (05-03-24 @ 05:48)  Weight (kg): 39.4 (05-03-24 @ 05:48)  BMI (kg/m2): 15.9 (05-18-24 @ 16:07), 15.9 (05-03-24 @ 05:48)    [ ]PPSV2 < or = to 30% [ ]significant weight loss  [ ]poor nutritional intake  [ ]anasarca[ ]Artificial Nutrition      Other REFERRALS:  [ ]Hospice  [ ]Child Life  [ ]Social Work  [ ]Case management [ ]Holistic Therapy     Goals of Care Document:  [facility] Geriatrics and Palliative Care  Rubina Beasley, Palliative Care Attending  Contact Info: Page [phone] (including Nights/Weekends), message on Microsoft Teams (Rubina Beasley), or leave  at Palliative Office [phone] (non-urgent)     Date of [id number] @ 13:50  HPI: 89F with dementia, schizophrenia, HLD presents with hematuria. Pt is non verbal and unable to provide history. Pt was recently admitted to [facility] for aspiration PNA s/p intubation with subsequent extubation and eventual discharge back to [facility]. Per documentation pt developed hematuria and was transferred to [facility] for further management.    While in the ED pt was hypothermic, hypotensive and tachycardic. s/p 1L IVF and haldol 5mg for agitation. (19 May 2024 09:52)    PERTINENT PM/SXH:   Gait difficulty  Sacral ulcer  Hypertension  Schizophrenia    No significant past surgical history  History of total left hip replacement    FAMILY HISTORY:  Family history of diabetes mellitus  parents    Family history of cerebrovascular accident (CVA)  mother    Family Hx substance abuse [ ]yes [ ]no  ITEMS NOT CHECKED ARE NOT PRESENT    SOCIAL HISTORY: Per chart review, patient has legal guardian, Jaciel Jara.   Significant other/partner[ ]  Children[ ]  Pentecostalism/Spirituality:  Substance hx:  [ ]   Tobacco hx:  [ ]   Alcohol hx: [ ]   Home Opioid hx: NONE [ ] I-Stop Reference No: [id number]  Living Situation: [ ]Home  [x ]Long term care- [facility]   [ ]Rehab [ ]Other    ADVANCE DIRECTIVES:    DNR/MOLST  [ ]  Living Will  [ ]   DECISION MAKER(s):  [ ] Health Care Proxy(s)  [ ] Surrogate(s)  [x ] Guardian           Name(s): Jaciel Jara Phone Number(s): [phone]; [phone]    BASELINE (I)ADL(s) (prior to admission): patient from [facility]   [place]: [ ]Total  [ ] Moderate [ x]Dependent    Allergies    No Known Allergies    Intolerances    MEDICATIONS  (STANDING):  dextrose 5%. 1000 milliLiter(s) (65 mL/Hr) IV Continuous <Continuous>  piperacillin/tazobactam IVPB.. 3.375 Gram(s) IV Intermittent every 12 hours    MEDICATIONS  (PRN):  ondansetron Injectable 4 milliGRAM(s) IV Push every 8 hours PRN Nausea and/or Vomiting    PRESENT SYMPTOMS: [x ]Unable to self-report  [ ] CPOT [x ] PAINADs [x ] RDOS  Source if other than patient:  [ ]Family   [x ]Team     Pain: [ ]yes [ ]no  QOL impact -   Location -                    Aggravating factors -  Quality -  Radiation -  Timing-  Severity (0-10 scale):  Minimal acceptable level/pain goal (0-10 scale):     CPOT:    https://www.[facility].org/getattachment/[email or web address]/Critical-Care-Pain-Observation-Tool-(CPOT)    Dyspnea:                           [ ]Mild [ ]Moderate [ ]Severe  Anxiety:                             [ ]Mild [ ]Moderate [ ]Severe  Fatigue:                             [ ]Mild [ ]Moderate [ ]Severe  Nausea:                             [ ]Mild [ ]Moderate [ ]Severe  Loss of appetite:              [ ]Mild [ ]Moderate [ ]Severe  Constipation:                    [ ]Mild [ ]Moderate [ ]Severe    Other Symptoms:  [ ]All other review of systems negative     PCSSQ[Palliative Care Spiritual Screening Question]   Severity (0-10):  Chaplaincy Referral: [ ] yes [ ] refused [ ] following [x ] deferred     Caregiver [employer]? : [ ] yes [ ] no [ x] Deferred [ ] Declined             Social work referral [ ] Patient & Family Centered Care Referral [ ]  Anticipatory Grief present?:  [ ] yes [ ] no  [ x] Deferred                  Social work referral [ ] Patient & Family Centered Care Referral [ ]    PHYSICAL EXAM:  Vital Signs Last 24 Hrs  T(C): 36.7 (20 May 2024 05:00), Max: 36.8 (19 May 2024 21:30)  T(F): 98 (20 May 2024 05:00), Max: 98.2 (19 May 2024 21:30)  HR: 88 (20 May 2024 05:00) (70 - 102)  BP: 109/70 (20 May 2024 05:00) (78/56 - 122/76)  BP(mean): --  RR: 20 (20 May 2024 05:00) (18 - 22)  SpO2: 98% (20 May 2024 05:00) (94% - 98%)    Parameters below as of 20 May 2024 05:00  Patient On (Oxygen Delivery Method): mask, nonrebreather  O2 Flow (L/min): 10   I&O's Summary    20 May 2024 07:01  -  20 May 2024 13:50  --------------------------------------------------------  IN: 0 mL / OUT: 50 mL / NET: -50 mL      GENERAL: [ ]Cachexia  Pt awake, not re-directable, taking off NRB mask   [ ]Alert  [ ]Oriented x   [ ]Lethargic  [ ]Unarousable  [ ]Verbal  [x ]Non-Verbal  Behavioral:   [ ] Anxiety  [ ] Delirium [ x] Agitation [x ] Other- restless   HEENT:  [ ]Normal   [x]Dry mouth   [ ]ET Tube/Trach  [ ]Oral lesions  PULMONARY:   [ ]Clear [x ]Tachypnea  [ ]Audible excessive secretions   [ ]Rhonchi        [ ]Right [ ]Left [ ]Bilateral  [ ]Crackles        [ ]Right [ ]Left [ ]Bilateral  [ ]Wheezing     [ ]Right [ ]Left [ ]Bilateral  [x ]Diminished breath sounds [ ]right [ ]left [x ]bilateral  CARDIOVASCULAR:    [x ]Regular [ ]Irregular [ ]Tachy  [ ]Waylon [ ]Murmur [ ]Other  GASTROINTESTINAL:  [ x]Soft  [ ]Distended   [ ]+BS  [x ]Non tender [ ]Tender  [ ]Other [ ]PEG [ ]OGT/ NGT  Last BM: fecal incontinence   GENITOURINARY:  [ ]Normal [x ] Incontinent   [ ]Oliguria/Anuria   [ ]Joyner  MUSCULOSKELETAL:   [ ]Normal   [ x]Weakness  [ x]Bed/Wheelchair bound [ ]Edema  NEUROLOGIC:   [ ]No focal deficits  [x ]Cognitive impairment  [ ]Dysphagia [ ]Dysarthria [ ]Paresis [ ]Other   SKIN: Please see flowsheets   [ ]Normal  [ ]Rash  [x ]Other- Left Heel DTI,   [ ]Pressure ulcer(s)       Present on admission [ ]y [ ]n    CRITICAL CARE:  [ ] Shock Present  [ ]Septic [ ]Cardiogenic [ ]Neurologic [ ]Hypovolemic  [ ]  Vasopressors [ ]  Inotropes   [ ]Respiratory failure present [ ]Mechanical ventilation [ ]Non-invasive ventilatory support [ ]High flow    [ ]Acute  [ ]Chronic [ ]Hypoxic  [ ]Hypercarbic [ ]Other  [ ]Other organ failure     LABS:                        10.7   8.62  )-----------( 107      ( 20 May 2024 05:08 )             34.9   05-20    151<H>  |  110<H>  |  84<H>  ----------------------------<  97  4.3   |  21<L>  |  3.92<H>    Ca    8.6      20 May 2024 05:08  Phos  6.1     05-20  Mg     2.20     05-20    TPro  4.3<L>  /  Alb  2.3<L>  /  TBili  0.6  /  DBili  x   /  AST  19  /  ALT  10  /  AlkPhos  53  05-19  PT/INR - ( 18 May 2024 18:11 )   PT: 12.9 sec;   INR: 1.15 ratio         PTT - ( 18 May 2024 18:11 )  PTT:27.7 sec    Urinalysis Basic - ( 20 May 2024 05:08 )    Color: x / Appearance: x / SG: x / pH: x  Gluc: 97 mg/dL / Ketone: x  / Bili: x / Urobili: x   Blood: x / Protein: x / Nitrite: x   Leuk Esterase: x / RBC: x / WBC x   Sq Epi: x / Non Sq Epi: x / Bacteria: x      RADIOLOGY & ADDITIONAL STUDIES: < from: CT Abdomen and Pelvis w/ IV Cont (05.19.24 @ 00:37) >  IMPRESSION:  No pulmonary embolism.    No acute parenchymal or pleural lung disease when compared with prior   exam from 5/3/2024. Improved bilateral pleural effusions.    Evaluation of the abdomen and pelvis limited by streak artifact from   bilateral femoral arthroplasties, motion, and patient positioning with   arms over abdomen.    No gross acute findings on CT the abdomen and pelvis.    Chronic findings, as described above..    < end of copied text >      PROTEIN CALORIE MALNUTRITION PRESENT: [ ]mild [ ]moderate [ ]severe [ ]underweight [ ]morbid obesity  https://www.andeal.org/vault/8190/web/files/ONC/Table_Clinical%20Characteristics%20to%20Document%20Malnutrition-White%20JV%20et%20al%202012.pdf    Height (cm): 157.5 (05-18-24 @ 16:07), 157.5 (05-03-24 @ 05:48)  Weight (kg): 39.4 (05-03-24 @ 05:48)  BMI (kg/m2): 15.9 (05-18-24 @ 16:07), 15.9 (05-03-24 @ 05:48)    [x ]PPSV2 < or = to 30% [ ]significant weight loss  [ ]poor nutritional intake  [ ]anasarca[ ]Artificial Nutrition      Other REFERRALS:  [x ]Hospice  [ ]Child Life  [ ]Social Work  [ ]Case management [ ]Holistic Therapy     Goals of Care Document:

## 2024-05-20 NOTE — PROVIDER CONTACT NOTE (CRITICAL VALUE NOTIFICATION) - ASSESSMENT
approx 30Ml vibha blood noted in celeste bag. discussing with provider need for irrigation or assessment due to minimal output or urine
Patient remain non-verbal. No s/s of pain and/or discomfort. VS in flowsheet.
Patient asymptomatic

## 2024-05-20 NOTE — PROGRESS NOTE ADULT - SUBJECTIVE AND OBJECTIVE BOX
Name of Patient : ROBBIE PEÑA  MRN: [id number]  Date of visit: 05-20-24       Subjective: Patient seen and examined. No new events except as noted.   nonverbal     REVIEW OF SYSTEMS:  limited    MEDICATIONS:  MEDICATIONS  (STANDING):  chlorhexidine 2% Cloths 1 Application(s) Topical daily  dextrose 5%. 1000 milliLiter(s) (65 mL/Hr) IV Continuous <Continuous>      PHYSICAL EXAM:  T(C): 36.4 (05-20-24 @ 17:35), Max: 36.8 (05-19-24 @ 21:30)  HR: 90 (05-20-24 @ 17:35) (86 - 98)  BP: 121/76 (05-20-24 @ 17:35) (109/70 - 122/76)  RR: 20 (05-20-24 @ 17:35) (20 - 22)  SpO2: 98% (05-20-24 @ 17:35) (94% - 98%)  Wt(kg): --  I&O's Summary    20 May 2024 07:01  -  20 May 2024 21:00  --------------------------------------------------------  IN: 0 mL / OUT: 50 mL / NET: -50 mL          Appearance: letjargic, nonvebrla, on mask   recent labs, Imaging and EKGs personally reviewed     05-20-24 @ 07:01  -  05-20-24 @ 21:00  --------------------------------------------------------  IN: 0 mL / OUT: 50 mL / NET: -50 mL                          10.7   8.62  )-----------( 107      ( 20 May 2024 05:08 )             34.9               05-20    149<H>  |  108<H>  |  86<H>  ----------------------------<  145<H>  4.2   |  22  |  4.31<H>    Ca    8.5      20 May 2024 17:00  Phos  6.1     05-20  Mg     2.20     05-20    TPro  4.3<L>  /  Alb  2.3<L>  /  TBili  0.6  /  DBili  x   /  AST  19  /  ALT  10  /  AlkPhos  53  05-19                     ABG - ( 19 May 2024 14:05 )  pH, Arterial: 7.19  pH, Blood: x     /  pCO2: 68    /  pO2: 51    / HCO3: 26    / Base Excess: -3.1  /  SaO2: 73.9              Urinalysis Basic - ( 20 May 2024 17:00 )    Color: x / Appearance: x / SG: x / pH: x  Gluc: 145 mg/dL / Ketone: x  / Bili: x / Urobili: x   Blood: x / Protein: x / Nitrite: x   Leuk Esterase: x / RBC: x / WBC x   Sq Epi: x / Non Sq Epi: x / Bacteria: x

## 2024-05-20 NOTE — CONSULT NOTE ADULT - PROBLEM SELECTOR RECOMMENDATION 6
Thank you for allowing us to participate in your patient's care. We will continue to follow with you. Please page [phone] for any q's or c's. The Geriatric and Palliative Medicine service has coverage 24 hours a day/ 7 days a week to provide medical recommendations regarding symptom management needs via telephone.    Rubina Beasley D.O.   Palliative Medicine

## 2024-05-20 NOTE — PROVIDER CONTACT NOTE (CRITICAL VALUE NOTIFICATION) - BACKGROUND
Patient receiving Dextrose IV
89 y.o female admitted on 5/19/24 for hematuria with PMH of dementia, schizophrenia, and HLD.

## 2024-05-20 NOTE — CONSULT NOTE ADULT - PROBLEM SELECTOR RECOMMENDATION 9
Patient from [facility] (resident since 10/24/2022). patient bedbound, nonverbal  > NPO   > Patient was seen by  during most recent admission (5/3-5/14) - on ramelteon 8mg qhs  > Pt currently being treated empirically with IV zosyn

## 2024-05-20 NOTE — CONSULT NOTE ADULT - ASSESSMENT
reaching out to guardian- Mr. Jaciel Jara- awaiting callback      89F with dementia, schizophrenia, HLD presents with hematuria, found to have sepsis of unclear etiology with ADI, lactic acidosis and demand ischemia. Palliative consulted for complex decision making regarding goc in setting of advanced dementia.

## 2024-05-20 NOTE — PROVIDER CONTACT NOTE (CRITICAL VALUE NOTIFICATION) - SITUATION
hgb 6.8
Critical glucose reported from lab
PH 7.1
Critical lab values received for Hgb of 4.4 and HCT of 15.4.

## 2024-05-20 NOTE — PROVIDER CONTACT NOTE (CRITICAL VALUE NOTIFICATION) - ACTION/TREATMENT ORDERED:
Repeat CBC.
primary nurse and provider aware. no new orders at this time
BMP ordered stat and drawn
no new orders at this time. primary Rn and provider aware

## 2024-05-21 DIAGNOSIS — R52 PAIN, UNSPECIFIED: ICD-10-CM

## 2024-05-21 DIAGNOSIS — R06.00 DYSPNEA, UNSPECIFIED: ICD-10-CM

## 2024-05-21 PROCEDURE — 99497 ADVNCD CARE PLAN 30 MIN: CPT | Mod: 25

## 2024-05-21 PROCEDURE — 99233 SBSQ HOSP IP/OBS HIGH 50: CPT

## 2024-05-21 RX ORDER — HYDROMORPHONE HYDROCHLORIDE 2 MG/ML
0.2 INJECTION INTRAMUSCULAR; INTRAVENOUS; SUBCUTANEOUS EVERY 8 HOURS
Refills: 0 | Status: DISCONTINUED | OUTPATIENT
Start: 2024-05-21 | End: 2024-05-22

## 2024-05-21 RX ADMIN — HYDROMORPHONE HYDROCHLORIDE 0.2 MILLIGRAM(S): 2 INJECTION INTRAMUSCULAR; INTRAVENOUS; SUBCUTANEOUS at 22:47

## 2024-05-21 RX ADMIN — HYDROMORPHONE HYDROCHLORIDE 0.2 MILLIGRAM(S): 2 INJECTION INTRAMUSCULAR; INTRAVENOUS; SUBCUTANEOUS at 22:33

## 2024-05-21 RX ADMIN — CHLORHEXIDINE GLUCONATE 1 APPLICATION(S): 213 SOLUTION TOPICAL at 11:14

## 2024-05-21 NOTE — PROGRESS NOTE ADULT - PROBLEM SELECTOR PLAN 1
Patient from [facility] (resident since 10/24/2022). patient bedbound, nonverbal  > NPO   > Patient was seen by  during most recent admission (5/3-5/14) - on ramelteon 8mg qhs  > Pt currently being treated empirically with IV zosyn. Patient from [facility] (resident since 10/24/2022). patient bedbound, nonverbal  > NPO   > Patient was seen by  during most recent admission (5/3-5/14) - on ramelteon 8mg qhs  > He is amenable to discontinuing IV abx

## 2024-05-21 NOTE — PATIENT PROFILE ADULT - FALL HARM RISK - HARM RISK INTERVENTIONS

## 2024-05-21 NOTE — PROGRESS NOTE ADULT - PROBLEM SELECTOR PLAN 7
Per chart review, patient has court appointed guardian- Jaciel Jara [phone] (office); [phone] (cellphone), that was established on 2/26/ 2024.  > Appreciate MOLST form completed during prior admission on 5/14 for DNR/DNI  > Appreciate GOC discussion on admission- 5/19 - He is amenable to IVF and IV abx   > 5/20: Palliative team reached out to Jaciel Jara and left voicemail on both office and cellphone. Would plan on discussing transition to comfort and likely inpt hospice services if he calls back.  > 5/21: Appreciate goc discussion from primary team (event note from PA on 5/20 evening). Palliative provider left 2 new voicemails for guardian this AM, awaiting call back. Reviewed care coordination notes as guardian was amenable to hospice referral. Per chart review, patient has court appointed guardian- Jaciel Jara [phone] (office); [phone] (cellphone), that was established on 2/26/ 2024.  > Appreciate MOLST form completed during prior admission on 5/14 for DNR/DNI  > Appreciate GOC discussion on admission- 5/19 - He is amenable to IVF and IV abx   > 5/20: Palliative team reached out to Jaciel Jara and left voicemail on both office and cellphone. Would plan on discussing transition to comfort and likely inpt hospice services if he calls back.  > 5/21: Appreciate goc discussion from primary team (event note from PA on 5/20 evening). Reviewed care coordination notes as guardian was amenable to hospice referral. Re-affirmed dnr/dni, comfort care, symptom directed medications. He was amenable to discontinuing IVF.

## 2024-05-21 NOTE — PROGRESS NOTE ADULT - NS ATTEST RISK PROBLEM GEN_ALL_CORE FT
In the setting of parenteral controlled substance administration, clinical monitoring required for side effects such as respiratory depression, constipation and opioid induced neurotoxicity.  This patient is at [x ]high [ ] medium [ ] low risk due to advanced illness.     [x ] The patient is receiving IV and has required  escalation for uncontrolled symptoms.  [ ] To taper and monitor for emergence of symptoms.  [ ] Symptoms controlled with present regimen. In the setting of parenteral controlled substance administration, clinical monitoring required for side effects such as respiratory depression, constipation and opioid induced neurotoxicity.  This patient is at [x ]high [ ] medium [ ] low risk due to advanced illness.     [x ] The patient is receiving IV and has required  escalation for uncontrolled symptoms.  [ ] To taper and monitor for emergence of symptoms.  [ ] Symptoms controlled with present regimen.    16 mins spent discussing goc separately with guardian

## 2024-05-21 NOTE — PATIENT PROFILE ADULT - FUNCTIONAL ASSESSMENT - DAILY ACTIVITY 4.
Diagnosis: Recurrent DVT   Goal range: 2.0-3.0  Spoke with patient via phone for follow up anticoagulation visit.   Last INR on 8/9/21 was 3.0.  Dose maintained.   Today's INR is 3.0 per home meter and is within goal range.    Current warfarin total weekly dose of 24 mg verified.  Informed the INR result is within therapeutic range and instructed to maintain current dose per protocol. Discussed dose and return date of 2 wks per home meter for next INR. See Anticoagulation flowsheet.     is in the office today supervising the treatment. Note forwarded for review.    Instructed to contact the clinic with any unusual bleeding or bruising, any changes in medications, diet, health status, lifestyle, or any other changes, questions or concerns. Verbalized understanding of all discussed.     
External INR result from [facility], result of 3.0. Goal range should be 2.0-3.0.   
1 = Total assistance

## 2024-05-21 NOTE — PROGRESS NOTE ADULT - SUBJECTIVE AND OBJECTIVE BOX
[facility] Geriatrics and Palliative Care  Rubina Beasley Palliative Care Attending  Contact Info: Page [phone] (including Nights/Weekends), message on Microsoft Teams (Rubina Beasley), or leave VM at Palliative Office [phone] (non-urgent)   Date of [id number] @ 11:24    SUBJECTIVE AND OBJECTIVE:     Indication for Geriatrics and Palliative Care Services/INTERVAL HPI:    OVERNIGHT EVENTS:    DNR on chart:DNI  DNI      Allergies    No Known Allergies    Intolerances    MEDICATIONS  (STANDING):  chlorhexidine 2% Cloths 1 Application(s) Topical daily  dextrose 5%. 1000 milliLiter(s) (65 mL/Hr) IV Continuous <Continuous>    MEDICATIONS  (PRN):  HYDROmorphone  Injectable 0.2 milliGRAM(s) IV Push every 2 hours PRN dypnea/work of breathing/pain  LORazepam   Injectable 0.25 milliGRAM(s) IV Push every 6 hours PRN agitation/restless  ondansetron Injectable 4 milliGRAM(s) IV Push every 8 hours PRN Nausea and/or Vomiting      ITEMS UNCHECKED ARE NOT PRESENT    PRESENT SYMPTOMS: [ ]Unable to self-report - see [ ] CPOT [ ] PAINADS [ ] RDOS  Source if other than patient:  [ ]Family   [ ]Team     Pain:  [ ]yes [ ]no  QOL impact -   Location -                    Aggravating factors -  Quality -  Radiation -  Timing-  Severity (0-10 scale):  Minimal acceptable level/ pain goal (0-10 scale):     CPOT:    https://www.[facility].org/getattachment/[email or web address]/Critical-Care-Pain-Observation-Tool-(CPOT)    Dyspnea:                           [ ]Mild [ ]Moderate [ ]Severe  Anxiety:                             [ ]Mild [ ]Moderate [ ]Severe  Fatigue:                             [ ]Mild [ ]Moderate [ ]Severe  Nausea:                             [ ]Mild [ ]Moderate [ ]Severe  Loss of appetite:              [ ]Mild [ ]Moderate [ ]Severe  Constipation:                    [ ]Mild [ ]Moderate [ ]Severe  Other Symptoms:  [ ]All other review of systems negative     PCSSQ[Palliative Care Spiritual Screening Question]   Severity (0-10):  Score of 4 or > indicate consideration of Chaplaincy referral.  Chaplaincy Referral: [ ] yes [ ] refused [ ] following [ ] deferred    Caregiver [place]? : [ ] yes [ ] no [ ] Deferred [ ] Declined             Social work referral [ ] Patient & Family Centered Care Referral [ ]  Anticipatory Grief present?:  [ ] yes [ ] no  [ ] Deferred                  Social work referral [ ] Patient & Family Centered Care Referral [ ]      PHYSICAL EXAM:  Vital Signs Last 24 Hrs  T(C): 37.1 (21 May 2024 06:07), Max: 37.1 (21 May 2024 06:07)  T(F): 98.7 (21 May 2024 06:07), Max: 98.7 (21 May 2024 06:07)  HR: 96 (21 May 2024 06:07) (90 - 98)  BP: 93/58 (21 May 2024 06:07) (93/58 - 121/76)  BP(mean): --  RR: 20 (21 May 2024 06:07) (20 - 22)  SpO2: 98% (21 May 2024 06:07) (94% - 98%)    Parameters below as of 21 May 2024 06:07  Patient On (Oxygen Delivery Method): mask, nonrebreather  O2 Flow (L/min): 10   I&O's Summary    20 May 2024 07:01  -  21 May 2024 07:00  --------------------------------------------------------  IN: 0 mL / OUT: 50 mL / NET: -50 mL       GENERAL: [ ]Cachexia    [ ]Alert  [ ]Oriented x   [ ]Lethargic  [ ]Unarousable  [ ]Verbal  [ ]Non-Verbal  Behavioral:   [ ]Anxiety  [ ]Delirium [ ]Agitation [ ]Other  HEENT:  [ ]Normal   [ ]Dry mouth   [ ]ET Tube/Trach  [ ]Oral lesions  PULMONARY:   [ ]Clear [ ]Tachypnea  [ ]Audible excessive secretions   [ ]Rhonchi        [ ]Right [ ]Left [ ]Bilateral  [ ]Crackles        [ ]Right [ ]Left [ ]Bilateral  [ ]Wheezing     [ ]Right [ ]Left [ ]Bilateral  [ ]Diminished BS [ ] Right [ ]Left [ ]Bilateral  CARDIOVASCULAR:    [ ]Regular [ ]Irregular [ ]Tachy  [ ]Waylon [ ]Murmur [ ]Other  GASTROINTESTINAL:  [ ]Soft  [ ]Distended   [ ]+BS  [ ]Non tender [ ]Tender  [ ]Other [ ]PEG [ ]OGT/ NGT   Last BM:   GENITOURINARY:  [ ]Normal [ ]Incontinent   [ ]Oliguria/Anuria   [ ]Joyner  MUSCULOSKELETAL:   [ ]Normal   [ ]Weakness  [ ]Bed/Wheelchair bound [ ]Edema  NEUROLOGIC:   [ ]No focal deficits  [ ] Cognitive impairment  [ ] Dysphagia [ ]Dysarthria [ ] Paresis [ ]Other   SKIN: Please see flowsheets   [ ]Normal  [ ]Rash  [ ]Other  [ ]Pressure ulcer(s) [ ]y [ ]n present on admission    CRITICAL CARE:  [ ]Shock Present  [ ]Septic [ ]Cardiogenic [ ]Neurologic [ ]Hypovolemic  [ ]Vasopressors [ ]Inotropes  [ ]Respiratory failure present [ ]Mechanical Ventilation [ ]Non-invasive ventilatory support [ ]High-Flow   [ ]Acute  [ ]Chronic [ ]Hypoxic  [ ]Hypercarbic [ ]Other  [ ]Other organ failure     LABS:                        10.7   8.62  )-----------( 107      ( 20 May 2024 05:08 )             34.9   05-20    149<H>  |  108<H>  |  86<H>  ----------------------------<  145<H>  4.2   |  22  |  4.31<H>    Ca    8.5      20 May 2024 17:00  Phos  6.1     05-20  Mg     2.20     05-20        Urinalysis Basic - ( 20 May 2024 17:00 )    Color: x / Appearance: x / SG: x / pH: x  Gluc: 145 mg/dL / Ketone: x  / Bili: x / Urobili: x   Blood: x / Protein: x / Nitrite: x   Leuk Esterase: x / RBC: x / WBC x   Sq Epi: x / Non Sq Epi: x / Bacteria: x      RADIOLOGY & ADDITIONAL STUDIES:    Protein Calorie Malnutrition Present: [ ]mild [ ]moderate [ ]severe [ ]underweight [ ]morbid obesity  https://www.andeal.org/vault/2560/web/files/ONC/Table_Clinical%20Characteristics%20to%20Document%20Malnutrition-White%20JV%20et%20al%202012.pdf    Height (cm): 157.5 (05-18-24 @ 16:07), 157.5 (05-03-24 @ 05:48)  Weight (kg): 39.4 (05-03-24 @ 05:48)  BMI (kg/m2): 15.9 (05-18-24 @ 16:07), 15.9 (05-03-24 @ 05:48)    [ ]PPSV2 < or = 30%  [ ]significant weight loss [ ]poor nutritional intake [ ]anasarca[ ]Artificial Nutrition    Other REFERRALS:  [ ]Hospice  [ ]Child Life  [ ]Social Work  [ ]Case management [ ]Holistic Therapy     Goals of Care Document: Health system Geriatrics and Palliative Care  Rubina Beasley Palliative Care Attending  Contact Info: Page [phone] (including Nights/Weekends), message on Microsoft Teams (Rubina Beasley), or leave  at Palliative Office [phone] (non-urgent)   Date of [id number] @ 11:24    SUBJECTIVE AND OBJECTIVE: Patient seen this AM with NRB in place. Patient lethargic, appears to be at the end of life. .     Indication for Geriatrics and Palliative Care Services/INTERVAL HPI: sx management and goc in setting of advanced illness.     OVERNIGHT EVENTS:  > 5/21: Over the past 24 hours, patient required PRNs of 0.25mg IV ativan x1.     DNR on chart:DNI  DNI      Allergies    No Known Allergies    Intolerances    MEDICATIONS  (STANDING):  chlorhexidine 2% Cloths 1 Application(s) Topical daily  dextrose 5%. 1000 milliLiter(s) (65 mL/Hr) IV Continuous <Continuous>    MEDICATIONS  (PRN):  HYDROmorphone  Injectable 0.2 milliGRAM(s) IV Push every 2 hours PRN dypnea/work of breathing/pain  LORazepam   Injectable 0.25 milliGRAM(s) IV Push every 6 hours PRN agitation/restless  ondansetron Injectable 4 milliGRAM(s) IV Push every 8 hours PRN Nausea and/or Vomiting      ITEMS UNCHECKED ARE NOT PRESENT    PRESENT SYMPTOMS: [x ]Unable to self-report - see [ ] CPOT [ x] PAINADS [ x] RDOS  Source if other than patient:  [ ]Family   [ ]Team     Pain:  [ ]yes [ ]no  QOL impact -   Location -                    Aggravating factors -  Quality -  Radiation -  Timing-  Severity (0-10 scale):  Minimal acceptable level/ pain goal (0-10 scale):     CPOT:    https://www.[facility].org/getattachment/jrj94k42-0u4t-9a1e-9z5p-7430x2172i7c/Critical-Care-Pain-Observation-Tool-(CPOT)    Dyspnea:                           [ ]Mild [ ]Moderate [ ]Severe  Anxiety:                             [ ]Mild [ ]Moderate [ ]Severe  Fatigue:                             [ ]Mild [ ]Moderate [ ]Severe  Nausea:                             [ ]Mild [ ]Moderate [ ]Severe  Loss of appetite:              [ ]Mild [ ]Moderate [ ]Severe  Constipation:                    [ ]Mild [ ]Moderate [ ]Severe  Other Symptoms:  [ ]All other review of systems negative     PCSSQ[Palliative Care Spiritual Screening Question]   Severity (0-10):  Score of 4 or > indicate consideration of Chaplaincy referral.  Chaplaincy Referral: [ ] yes [ ] refused [ ] following [x ] deferred    Caregiver [place]? : [ ] yes [ ] no [x ] Deferred [ ] Declined             Social work referral [ ] Patient & Family Centered Care Referral [ ]  Anticipatory Grief present?:  [ ] yes [ ] no  [ x] Deferred                  Social work referral [ ] Patient & Family Centered Care Referral [ ]      PHYSICAL EXAM:  Vital Signs Last 24 Hrs  T(C): 37.1 (21 May 2024 06:07), Max: 37.1 (21 May 2024 06:07)  T(F): 98.7 (21 May 2024 06:07), Max: 98.7 (21 May 2024 06:07)  HR: 96 (21 May 2024 06:07) (90 - 98)  BP: 93/58 (21 May 2024 06:07) (93/58 - 121/76)  BP(mean): --  RR: 20 (21 May 2024 06:07) (20 - 22)  SpO2: 98% (21 May 2024 06:07) (94% - 98%)    Parameters below as of 21 May 2024 06:07  Patient On (Oxygen Delivery Method): mask, nonrebreather  O2 Flow (L/min): 10   I&O's Summary    20 May 2024 07:01  -  21 May 2024 07:00  --------------------------------------------------------  IN: 0 mL / OUT: 50 mL / NET: -50 mL       GENERAL: [ ]Cachexia    [ ]Alert  [ ]Oriented x   [x ]Lethargic  [ ]Unarousable  [ ]Verbal  [x ]Non-Verbal  Behavioral:   [ ] Anxiety  [ ] Delirium [ ] Agitation [x ] Other- restless   HEENT:  [ ]Normal   [x]Dry mouth   [ ]ET Tube/Trach  [ ]Oral lesions  PULMONARY:   [ ]Clear [x ]Tachypnea  [ ]Audible excessive secretions   [ ]Rhonchi        [ ]Right [ ]Left [ ]Bilateral  [ ]Crackles        [ ]Right [ ]Left [ ]Bilateral  [ ]Wheezing     [ ]Right [ ]Left [ ]Bilateral  [x ]Diminished breath sounds [ ]right [ ]left [x ]bilateral  CARDIOVASCULAR:    [x ]Regular [ ]Irregular [ ]Tachy  [ ]Waylon [ ]Murmur [ ]Other  GASTROINTESTINAL:  [ x]Soft  [ ]Distended   [ ]+BS  [x ]Non tender [ ]Tender  [ ]Other [ ]PEG [ ]OGT/ NGT  Last BM: fecal incontinence   GENITOURINARY:  [ ]Normal [x ] Incontinent   [ ]Oliguria/Anuria   [ ]Joyner  MUSCULOSKELETAL:   [ ]Normal   [ x]Weakness  [ x]Bed/Wheelchair bound [ ]Edema  NEUROLOGIC:   [ ]No focal deficits  [x ]Cognitive impairment  [ ]Dysphagia [ ]Dysarthria [ ]Paresis [ ]Other   SKIN: Please see flowsheets   [ ]Normal  [ ]Rash  [x ]Other- Left Heel DTI,   [ ]Pressure ulcer(s)       Present on admission [ ]y [ ]n    CRITICAL CARE:  [ ]Shock Present  [ ]Septic [ ]Cardiogenic [ ]Neurologic [ ]Hypovolemic  [ ]Vasopressors [ ]Inotropes  [ ]Respiratory failure present [ ]Mechanical Ventilation [ ]Non-invasive ventilatory support [ ]High-Flow   [ ]Acute  [ ]Chronic [ ]Hypoxic  [ ]Hypercarbic [ ]Other  [ ]Other organ failure     LABS:                        10.7   8.62  )-----------( 107      ( 20 May 2024 05:08 )             34.9   05-20    149<H>  |  108<H>  |  86<H>  ----------------------------<  145<H>  4.2   |  22  |  4.31<H>    Ca    8.5      20 May 2024 17:00  Phos  6.1     05-20  Mg     2.20     05-20        Urinalysis Basic - ( 20 May 2024 17:00 )    Color: x / Appearance: x / SG: x / pH: x  Gluc: 145 mg/dL / Ketone: x  / Bili: x / Urobili: x   Blood: x / Protein: x / Nitrite: x   Leuk Esterase: x / RBC: x / WBC x   Sq Epi: x / Non Sq Epi: x / Bacteria: x      RADIOLOGY & ADDITIONAL STUDIES: no new     Protein Calorie Malnutrition Present: [ ]mild [ ]moderate [ ]severe [ ]underweight [ ]morbid obesity  https://www.andeal.org/vault/2440/web/files/ONC/Table_Clinical%20Characteristics%20to%20Document%20Malnutrition-White%20JV%20et%20al%202012.pdf    Height (cm): 157.5 (05-18-24 @ 16:07), 157.5 (05-03-24 @ 05:48)  Weight (kg): 39.4 (05-03-24 @ 05:48)  BMI (kg/m2): 15.9 (05-18-24 @ 16:07), 15.9 (05-03-24 @ 05:48)    [ x]PPSV2 < or = 30%  [ ]significant weight loss [ ]poor nutritional intake [ ]anasarca[ ]Artificial Nutrition    Other REFERRALS:  [ x]Hospice  [ ]Child Life  [ ]Social Work  [ ]Case management [ ]Holistic Therapy

## 2024-05-21 NOTE — PROGRESS NOTE ADULT - SUBJECTIVE AND OBJECTIVE BOX
CHIEF COMPLAINT:    SUBJECTIVE:     REVIEW OF SYSTEMS:    CONSTITUTIONAL: (  )  weakness,  (  ) fevers or chills  EYES/ENT: (  )visual changes;     NECK: (  ) pain or stiffness  RESPIRATORY:   (  )cough, wheezing, hemoptysis;  (  ) shortness of breath  CARDIOVASCULAR:  (  )chest pain or palpitations  GASTROINTESTINAL:   (  )abdominal or epigastric pain.  (  ) nausea, vomiting, or hematemesis;   (   ) diarrhea or constipation.   GENITOURINARY:   (    ) dysuria, frequency or hematuria  NEUROLOGICAL:  (   ) numbness or weakness   All other review of systems is negative unless indicated above    Vital Signs Last 24 Hrs  T(C): 37.1 (21 May 2024 06:07), Max: 37.1 (21 May 2024 06:07)  T(F): 98.7 (21 May 2024 06:07), Max: 98.7 (21 May 2024 06:07)  HR: 96 (21 May 2024 06:07) (90 - 98)  BP: 93/58 (21 May 2024 06:07) (93/58 - 121/76)  BP(mean): --  RR: 20 (21 May 2024 06:07) (20 - 22)  SpO2: 98% (21 May 2024 06:07) (94% - 98%)    Parameters below as of 21 May 2024 06:07  Patient On (Oxygen Delivery Method): mask, nonrebreather  O2 Flow (L/min): 10      I&O's Summary    20 May 2024 07:01  -  21 May 2024 07:00  --------------------------------------------------------  IN: 0 mL / OUT: 50 mL / NET: -50 mL        CAPILLARY BLOOD GLUCOSE          PHYSICAL EXAM:    Constitutional:  (   ) NAD,   (   )awake and alert  HEENT: PERR, EOMI,    Neck: Soft and supple, No LAD, No JVD  Respiratory:  (    Breath sounds are clear bilaterally,    (   ) wheezing, rales or rhonchi  Cardiovascular:     (   )S1 and S2, regular rate and rhythm, no Murmurs, gallops or rubs  Gastrointestinal:  (   )Bowel Sounds present, soft,   (  )nontender, nondistended,    Extremities:    (  ) peripheral edema  Vascular: 2+ peripheral pulses  Neurological:    (    )A/O x 3,   (  ) focal deficits  Musculoskeletal:    (   )  normal strength b/l upper  (     ) normal  lower extremities  Skin: No rashes    MEDICATIONS:  MEDICATIONS  (STANDING):  chlorhexidine 2% Cloths 1 Application(s) Topical daily  dextrose 5%. 1000 milliLiter(s) (65 mL/Hr) IV Continuous <Continuous>      LABS: All Labs Reviewed:                        10.7   8.62  )-----------( 107      ( 20 May 2024 05:08 )             34.9     05-20    149<H>  |  108<H>  |  86<H>  ----------------------------<  145<H>  4.2   |  22  |  4.31<H>    Ca    8.5      20 May 2024 17:00  Phos  6.1     05-20  Mg     2.20     05-20    TPro  4.3<L>  /  Alb  2.3<L>  /  TBili  0.6  /  DBili  x   /  AST  19  /  ALT  10  /  AlkPhos  53  05-19          Blood Culture: 05-19 @ 12:30  Organism --  Gram Stain Blood -- Gram Stain --  Specimen Source .Blood Blood-Peripheral  Culture-Blood --    05-19 @ 11:40  Organism --  Gram Stain Blood -- Gram Stain --  Specimen Source .Blood Blood-Peripheral  Culture-Blood --    05-18 @ 19:27  Organism --  Gram Stain Blood -- Gram Stain --  Specimen Source Clean Catch Clean Catch (Midstream)  Culture-Blood --      Urine Culture      RADIOLOGY/EKG:    ASSESSMENT AND PLAN:    DVT PPX:    ADVANCED DIRECTIVE:    DISPOSITION: CHIEF COMPLAINT:coverage appreciateted    SUBJECTIVE:  Patient seen this AM with NRB in place. Patient lethargic, appears to be at the end of life. .     REVIEW OF SYSTEMS:nonresponsive    CONSTITUTIONAL: (  )  weakness,  (  ) fevers or chills  EYES/ENT: (  )visual changes;     NECK: (  ) pain or stiffness  RESPIRATORY:   (  )cough, wheezing, hemoptysis;  (  ) shortness of breath  CARDIOVASCULAR:  (  )chest pain or palpitations  GASTROINTESTINAL:   (  )abdominal or epigastric pain.  (  ) nausea, vomiting, or hematemesis;   (   ) diarrhea or constipation.   GENITOURINARY:   (    ) dysuria, frequency or hematuria  NEUROLOGICAL:  (   ) numbness or weakness   All other review of systems is negative unless indicated above    Vital Signs Last 24 Hrs  T(C): 37.1 (21 May 2024 06:07), Max: 37.1 (21 May 2024 06:07)  T(F): 98.7 (21 May 2024 06:07), Max: 98.7 (21 May 2024 06:07)  HR: 96 (21 May 2024 06:07) (90 - 98)  BP: 93/58 (21 May 2024 06:07) (93/58 - 121/76)  BP(mean): --  RR: 20 (21 May 2024 06:07) (20 - 22)  SpO2: 98% (21 May 2024 06:07) (94% - 98%)    Parameters below as of 21 May 2024 06:07  Patient On (Oxygen Delivery Method): mask, nonrebreather  O2 Flow (L/min): 10      I&O's Summary    20 May 2024 07:01  -  21 May 2024 07:00  --------------------------------------------------------  IN: 0 mL / OUT: 50 mL / NET: -50 mL        CAPILLARY BLOOD GLUCOSE          PHYSICAL EXAM:    Constitutional:   unresponsive  HEENT: PERR, EOMI,    Neck: Soft and supple, No LAD, No JVD  Respiratory:  ( decrease   Breath sounds are clear bilaterally,    (   ) wheezing, rales or rhonchi  Cardiovascular:     (  tachycardic )S1 and S2, regular rate and rhythm, no Murmurs, gallops or rubs  Gastrointestinal:  (   )Bowel Sounds present, soft,   (  )nontender, nondistended,    Extremities:    (  ) peripheral edema  Vascular: 2+ peripheral pulses  Neurological:    (  x  )A/O x 0   (  ) focal deficits  Musculoskeletal:    (   )  normal strength b/l upper  (     ) normal  lower extremities  Skin: No rashes    MEDICATIONS:  MEDICATIONS  (STANDING):  chlorhexidine 2% Cloths 1 Application(s) Topical daily  dextrose 5%. 1000 milliLiter(s) (65 mL/Hr) IV Continuous <Continuous>      LABS: All Labs Reviewed:                        10.7   8.62  )-----------( 107      ( 20 May 2024 05:08 )             34.9     05-20    149<H>  |  108<H>  |  86<H>  ----------------------------<  145<H>  4.2   |  22  |  4.31<H>    Ca    8.5      20 May 2024 17:00  Phos  6.1     05-20  Mg     2.20     05-20    TPro  4.3<L>  /  Alb  2.3<L>  /  TBili  0.6  /  DBili  x   /  AST  19  /  ALT  10  /  AlkPhos  53  05-19          Blood Culture: 05-19 @ 12:30  Organism --  Gram Stain Blood -- Gram Stain --  Specimen Source .Blood Blood-Peripheral  Culture-Blood --    05-19 @ 11:40  Organism --  Gram Stain Blood -- Gram Stain --  Specimen Source .Blood Blood-Peripheral  Culture-Blood --    05-18 @ 19:27  Organism --  Gram Stain Blood -- Gram Stain --  Specimen Source Clean Catch Clean Catch (Midstream)  Culture-Blood --      Urine Culture      RADIOLOGY/EKG:    ASSESSMENT AND PLAN:  89F with dementia, schizophrenia, HLD presents with hematuria, found to have sepsis of unclear etiology with ADI, lactic acidosis and demand ischemia. Palliative consulted for complex decision making regarding goc in setting of advanced dementia.       Problem/Plan - 1:  ·  Problem: Dementia.   ·  Plan: Patient from [facility] (resident since 10/24/2022). patient bedbound, nonverbal  > NPO   > Patient was seen by  during most recent admission (5/3-5/14) - on ramelteon 8mg qhs  > He is amenable to discontinuing IV abx.    Problem/Plan - 2:  ·  Problem: Hematuria.   ·  Plan: Pt brought in for hematuria, getting IVF   > Pressors and ICU care not in line with guardian's goc.    Problem/Plan - 3:  ·  Problem: Dyspnea.   ·  Plan: Start 0.2mg IV dilaudid q8hr ATC   Continue 0.2mg IV dilaudid q2h prn dyspnea   NRB in place- can consider wean to nasal cannula for optimization of patient's comfort.    Problem/Plan - 4:  ·  Problem: Restlessness.   ·  Plan: Patient appeared restless during encounter today, taking off NRB mask.   > QTc on 5/9- 499ms   > Would add IV ativan 0.25mg q6h prn agitation/restlessness if guardian is amenable to symptom directed care. Palliative provider has been unable to reach him despite calling both office and cellphone numbers provided.    Problem/Plan - 5:  ·  Problem: Acute pain.   ·  Plan: Continue 0.2mg IV dilaudid q2h prn severe pain.    Problem/Plan - 6:  ·  Problem: Functional quadriplegia.   ·  Plan: PPSV 10%   Please assist with ADLs   Wound care.    Problem/Plan - 7:  ·  Problem: Advanced care planning/counseling discussion.   ·  Plan: Per chart review, patient has court appointed guardian- Jaciel Marek [phone] (office); [phone] (cellphone), that was established on 2/26/ 2024.  > Appreciate MOLST form completed during prior admission on 5/14 for DNR/DNI  > Appreciate GOC discussion on admission- 5/19 - He is amenable to IVF and IV abx   > 5/20: Palliative team reached out to Jaciel Jara and left voicemail on both office and cellphone. Would plan on discussing transition to comfort and likely inpt hospice services if he calls back.  > 5/21:  patient lethargic and unresponsive guardian agree to discontinue IV fluid and transitioned to inpatient hospice discussed with ACP today that patient can be transferred in a.m.  Thank you for your referral         DVT PPX:    ADVANCED DIRECTIVE:    DISPOSITION:

## 2024-05-21 NOTE — PATIENT PROFILE ADULT - FUNCTIONAL ASSESSMENT - BASIC MOBILITY 6.
1-calculated by average/Not able to assess (calculate score using [facility] averaging method)

## 2024-05-21 NOTE — PROGRESS NOTE ADULT - CONVERSATION DETAILS
Referral for complex decision making and symptom management in setting of advanced illness. Palliative familiar with patient from prior admission. Reviewed patient's clinical course and current clinical condition. Mr. Jara shared that he visited the patient on Sunday and he felt that the patient had appeared to decline significantly and understands the recommendation for transition of care to hospice. We discussed risks and benefits of continuing IVF, especially in the setting of patient's declining clinical condition with advanced dementia. He was amenable to stopping IVF. He is amenable to symptom directed care and understands that patient is requiring IV symptom medications for comfort, therefore patient requires Inpatient hospice transition of care.

## 2024-05-21 NOTE — PROGRESS NOTE ADULT - PROBLEM SELECTOR PLAN 3
Continue 0.2mg IV dilaudid q2h prn dyspnea   NRB in place- can consider wean to nasal cannula for optimization of patient's comfort Start 0.2mg IV dilaudid q8hr ATC   Continue 0.2mg IV dilaudid q2h prn dyspnea   NRB in place- can consider wean to nasal cannula for optimization of patient's comfort

## 2024-05-22 ENCOUNTER — TRANSCRIPTION ENCOUNTER (OUTPATIENT)
Age: 89
End: 2024-05-22

## 2024-05-22 VITALS
RESPIRATION RATE: 20 BRPM | HEART RATE: 91 BPM | SYSTOLIC BLOOD PRESSURE: 117 MMHG | DIASTOLIC BLOOD PRESSURE: 83 MMHG | OXYGEN SATURATION: 98 %

## 2024-05-22 PROCEDURE — 99233 SBSQ HOSP IP/OBS HIGH 50: CPT

## 2024-05-22 RX ORDER — MULTIVIT-MIN/FERROUS GLUCONATE 9 MG/15 ML
1 LIQUID (ML) ORAL
Qty: 0 | Refills: 0 | DISCHARGE

## 2024-05-22 RX ORDER — CHLORHEXIDINE GLUCONATE 213 G/1000ML
1 SOLUTION TOPICAL
Qty: 0 | Refills: 0 | DISCHARGE
Start: 2024-05-22

## 2024-05-22 RX ORDER — HYDROMORPHONE HYDROCHLORIDE 2 MG/ML
1 INJECTION INTRAMUSCULAR; INTRAVENOUS; SUBCUTANEOUS
Qty: 0 | Refills: 0 | DISCHARGE
Start: 2024-05-22

## 2024-05-22 RX ORDER — MIRTAZAPINE 45 MG/1
1 TABLET, ORALLY DISINTEGRATING ORAL
Refills: 0 | DISCHARGE

## 2024-05-22 RX ADMIN — CHLORHEXIDINE GLUCONATE 1 APPLICATION(S): 213 SOLUTION TOPICAL at 11:29

## 2024-05-22 RX ADMIN — HYDROMORPHONE HYDROCHLORIDE 0.2 MILLIGRAM(S): 2 INJECTION INTRAMUSCULAR; INTRAVENOUS; SUBCUTANEOUS at 05:33

## 2024-05-22 RX ADMIN — HYDROMORPHONE HYDROCHLORIDE 0.2 MILLIGRAM(S): 2 INJECTION INTRAMUSCULAR; INTRAVENOUS; SUBCUTANEOUS at 12:14

## 2024-05-22 RX ADMIN — HYDROMORPHONE HYDROCHLORIDE 0.2 MILLIGRAM(S): 2 INJECTION INTRAMUSCULAR; INTRAVENOUS; SUBCUTANEOUS at 05:18

## 2024-05-22 NOTE — PROGRESS NOTE ADULT - PROBLEM SELECTOR PLAN 8
Thank you for allowing us to participate in your patient's care. Please page [phone] for any q's or c's. The Geriatric and Palliative Medicine service has coverage 24 hours a day/ 7 days a week to provide medical recommendations regarding symptom management needs via telephone.    Rubina Beasley D.O.   Palliative Medicine.
On no antipsychotics  - continue mirtazepine  - avoid QTc prolonging agents
Thank you for allowing us to participate in your patient's care. We will continue to follow with you. Please page [phone] for any q's or c's. The Geriatric and Palliative Medicine service has coverage 24 hours a day/ 7 days a week to provide medical recommendations regarding symptom management needs via telephone.    Rubina Beasley D.O.   Palliative Medicine.

## 2024-05-22 NOTE — DISCHARGE NOTE NURSING/CASE MANAGEMENT/SOCIAL WORK - NSDCVIVACCINE_GEN_ALL_CORE_FT
Tdap; 04-Nov-2019 00:15; Rigoberto Brennan (RN); [employer]; [id number] (Exp. Date: 22-Nov-2021); IntraMuscular; Deltoid Left.; 0.5 milliLiter(s); VIS (VIS Published: 09-May-2013, VIS Presented: 04-Nov-2019);   Tdap; 20-Oct-2022 17:46; Jhoana Vizcarra (EDDIE); [employer]; [id number] (Exp. Date: 08-Dec-2024); IntraMuscular; Deltoid Right.; 0.5 milliLiter(s); VIS (VIS Published: 09-May-2013, VIS Presented: 20-Oct-2022);

## 2024-05-22 NOTE — PROGRESS NOTE ADULT - PROBLEM SELECTOR PROBLEM 7
Advanced care planning/counseling discussion
Hypernatremia
Advanced care planning/counseling discussion

## 2024-05-22 NOTE — DISCHARGE NOTE PROVIDER - NSDCMRMEDTOKEN_GEN_ALL_CORE_FT
acetaminophen 325 mg oral tablet: 2 tab(s) orally every 6 hours, As needed, Mild Pain (1 - 3)  Centrum oral tablet: 1 tab(s) orally once a day  melatonin 6 mg oral tablet, disintegratin tab(s) orally once a day (at bedtime)  metoprolol tartrate 25 mg oral tablet: 1 tab(s) orally 2 times a day  mirtazapine 7.5 mg oral tablet: 1 tab(s) orally once a day  Multiple Vitamins oral tablet: 1 tab(s) orally once a day  polyethylene glycol 3350 oral powder for reconstitution: 17 gram(s) orally every 12 hours  senna leaf extract oral tablet: 2 tab(s) orally once a day (at bedtime)  simvastatin 20 mg oral tablet: 1 tab(s) orally once a day (at bedtime) on Monday and Friday   acetaminophen 325 mg oral tablet: 2 tab(s) orally every 6 hours, As needed, Mild Pain (1 - 3)  chlorhexidine 2% topical pad: 1 Apply topically to affected area once a day  HYDROmorphone 0.2 mg/mL-NaCl 0.9% intravenous solution: 1 milliliter(s) intravenous every 2 hours As needed dypnea/work of breathing/pain  HYDROmorphone 0.2 mg/mL-NaCl 0.9% intravenous solution: 1 milliliter(s) intravenous every 8 hours  LORazepam 1 mg/mL-NaCl 0.9% intravenous solution: 0.25 milliliter(s) intravenous every 6 hours As needed agitation/restless

## 2024-05-22 NOTE — PROGRESS NOTE ADULT - PROBLEM SELECTOR PLAN 2
Pt with hypotension, hypothermia, tachycardia  - ddx includes UTI, CT C/A/P without clear source of infection  - check BCx and MRSA nares, follow up UCx  - start broad spectrum abx with vancomycin x1 given ADI and zosyn  - trend VS, lactate
Pt brought in for hematuria, getting IVF   > Pressors and ICU care not in line with guardian's goc.
Pt brought in for hematuria, getting IVF   > Pressors and ICU care not in line with guardian's goc.

## 2024-05-22 NOTE — PROGRESS NOTE ADULT - PROBLEM SELECTOR PLAN 1
Patient from [facility] (resident since 10/24/2022). patient bedbound, nonverbal  > NPO   > Patient was seen by  during most recent admission (5/3-5/14) - on ramelteon 8mg qhs. Can discontinue as patient without PO access   > He is amenable to discontinuing IV abx

## 2024-05-22 NOTE — PROGRESS NOTE ADULT - PROBLEM SELECTOR PLAN 3
Continue 0.2mg IV dilaudid q8hr ATC   Continue 0.2mg IV dilaudid q2h prn dyspnea   NRB in place- can consider wean to nasal cannula for optimization of patient's comfort

## 2024-05-22 NOTE — PROGRESS NOTE ADULT - PROBLEM SELECTOR PLAN 7
Per chart review, patient has court appointed guardian- Jaciel Jara [phone] (office); [phone] (cellphone), that was established on 2/26/ 2024.  > Appreciate MOLST form completed during prior admission on 5/14 for DNR/DNI  > Appreciate GOC discussion on admission- 5/19 - He is amenable to IVF and IV abx   > 5/20: Palliative team reached out to Jaciel Jara and left voicemail on both office and cellphone. Would plan on discussing transition to comfort and likely inpt hospice services if he calls back.  > 5/21: Appreciate goc discussion from primary team (event note from PA on 5/20 evening). Reviewed care coordination notes as guardian was amenable to hospice referral. Re-affirmed dnr/dni, comfort care, symptom directed medications. He was amenable to discontinuing IVF.  > 5/22: Case discussed with Medical team, Hospice team regarding transition to [facility] today.

## 2024-05-22 NOTE — PROGRESS NOTE ADULT - ASSESSMENT
89F with dementia, schizophrenia, HLD presents with hematuria, found to have sepsis of unclear etiology with ADI, lactic acidosis and demand ischemia
89F with dementia, schizophrenia, HLD presents with hematuria, found to have sepsis of unclear etiology with ADI, lactic acidosis and demand ischemia. Palliative consulted for complex decision making regarding goc in setting of advanced dementia. 
89F with dementia, schizophrenia, HLD presents with hematuria, found to have sepsis of unclear etiology with ADI, lactic acidosis and demand ischemia. Palliative consulted for complex decision making regarding goc in setting of advanced dementia.

## 2024-05-22 NOTE — PROGRESS NOTE ADULT - SUBJECTIVE AND OBJECTIVE BOX
CHIEF COMPLAINT:    SUBJECTIVE:     REVIEW OF SYSTEMS:    CONSTITUTIONAL: (  )  weakness,  (  ) fevers or chills  EYES/ENT: (  )visual changes;     NECK: (  ) pain or stiffness  RESPIRATORY:   (  )cough, wheezing, hemoptysis;  (  ) shortness of breath  CARDIOVASCULAR:  (  )chest pain or palpitations  GASTROINTESTINAL:   (  )abdominal or epigastric pain.  (  ) nausea, vomiting, or hematemesis;   (   ) diarrhea or constipation.   GENITOURINARY:   (    ) dysuria, frequency or hematuria  NEUROLOGICAL:  (   ) numbness or weakness   All other review of systems is negative unless indicated above    Vital Signs Last 24 Hrs  T(C): 36.8 (22 May 2024 08:25), Max: 36.8 (22 May 2024 08:25)  T(F): 98.3 (22 May 2024 08:25), Max: 98.3 (22 May 2024 08:25)  HR: 95 (22 May 2024 08:25) (88 - 95)  BP: 121/75 (22 May 2024 08:25) (110/73 - 121/75)  BP(mean): --  RR: 20 (22 May 2024 08:25) (19 - 20)  SpO2: 98% (22 May 2024 08:25) (97% - 98%)    Parameters below as of 22 May 2024 08:25  Patient On (Oxygen Delivery Method): mask, nonrebreather  O2 Flow (L/min): 10      I&O's Summary    21 May 2024 07:01  -  22 May 2024 07:00  --------------------------------------------------------  IN: 0 mL / OUT: 0 mL / NET: 0 mL        CAPILLARY BLOOD GLUCOSE          PHYSICAL EXAM:    Constitutional:  (   ) NAD,   (   )awake and alert  HEENT: PERR, EOMI,    Neck: Soft and supple, No LAD, No JVD  Respiratory:  (    Breath sounds are clear bilaterally,    (   ) wheezing, rales or rhonchi  Cardiovascular:     (   )S1 and S2, regular rate and rhythm, no Murmurs, gallops or rubs  Gastrointestinal:  (   )Bowel Sounds present, soft,   (  )nontender, nondistended,    Extremities:    (  ) peripheral edema  Vascular: 2+ peripheral pulses  Neurological:    (    )A/O x 3,   (  ) focal deficits  Musculoskeletal:    (   )  normal strength b/l upper  (     ) normal  lower extremities  Skin: No rashes    MEDICATIONS:  MEDICATIONS  (STANDING):  chlorhexidine 2% Cloths 1 Application(s) Topical daily  HYDROmorphone  Injectable 0.2 milliGRAM(s) IV Push every 8 hours      LABS: All Labs Reviewed:    05-20    149<H>  |  108<H>  |  86<H>  ----------------------------<  145<H>  4.2   |  22  |  4.31<H>    Ca    8.5      20 May 2024 17:00            Blood Culture: 05-19 @ 12:30  Organism --  Gram Stain Blood -- Gram Stain --  Specimen Source .Blood Blood-Peripheral  Culture-Blood --    05-19 @ 11:40  Organism --  Gram Stain Blood -- Gram Stain --  Specimen Source .Blood Blood-Peripheral  Culture-Blood --    05-18 @ 19:27  Organism --  Gram Stain Blood -- Gram Stain --  Specimen Source Clean Catch Clean Catch (Midstream)  Culture-Blood --      Urine Culture      RADIOLOGY/EKG:    ASSESSMENT AND PLAN:    DVT PPX:    ADVANCED DIRECTIVE:    DISPOSITION: CHIEF COMPLAINT:  Patient seen  with NRB mask on. Patient's work of breathing appears more comfortable with standing IV dilaudid. Patient not responsive and non-verbal     REVIEW OF SYSTEMS:nonverbal    CONSTITUTIONAL: (  )  weakness,  (  ) fevers or chills  EYES/ENT: (  )visual changes;     NECK: (  ) pain or stiffness  RESPIRATORY:   (  )cough, wheezing, hemoptysis;  (  ) shortness of breath  CARDIOVASCULAR:  (  )chest pain or palpitations  GASTROINTESTINAL:   (  )abdominal or epigastric pain.  (  ) nausea, vomiting, or hematemesis;   (   ) diarrhea or constipation.   GENITOURINARY:   (    ) dysuria, frequency or hematuria  NEUROLOGICAL:  (   ) numbness or weakness   All other review of systems is negative unless indicated above    Vital Signs Last 24 Hrs  T(C): 36.8 (22 May 2024 08:25), Max: 36.8 (22 May 2024 08:25)  T(F): 98.3 (22 May 2024 08:25), Max: 98.3 (22 May 2024 08:25)  HR: 95 (22 May 2024 08:25) (88 - 95)  BP: 121/75 (22 May 2024 08:25) (110/73 - 121/75)  BP(mean): --  RR: 20 (22 May 2024 08:25) (19 - 20)  SpO2: 98% (22 May 2024 08:25) (97% - 98%)    Parameters below as of 22 May 2024 08:25  Patient On (Oxygen Delivery Method): mask, nonrebreather  O2 Flow (L/min): 10      I&O's Summary    21 May 2024 07:01  -  22 May 2024 07:00  --------------------------------------------------------  IN: 0 mL / OUT: 0 mL / NET: 0 mL        CAPILLARY BLOOD GLUCOSE          PHYSICAL EXAM:    Constitutional:   unresponsive  HEENT: PERR, EOMI,    Neck: Soft and supple, No LAD, No JVD  Respiratory:  (   diminished  Breath sounds    Cardiovascular:     ( x  )S1 and S2, regular rate and rhythm, no Murmurs, gallops or rubs  Gastrointestinal:  (   )Bowel Sounds present, soft,   (  )nontender, nondistended,    Extremities:    (  ) peripheral edema  Vascular: 2+ peripheral pulses  Neurological:    (    )A/O x 3,   (  ) focal deficits  Musculoskeletal:    (   )  normal strength b/l upper  (     ) normal  lower extremities  Skin: No rashes    MEDICATIONS:  MEDICATIONS  (STANDING):  chlorhexidine 2% Cloths 1 Application(s) Topical daily  HYDROmorphone  Injectable 0.2 milliGRAM(s) IV Push every 8 hours      LABS: All Labs Reviewed:    05-20    149<H>  |  108<H>  |  86<H>  ----------------------------<  145<H>  4.2   |  22  |  4.31<H>    Ca    8.5      20 May 2024 17:00            Blood Culture: 05-19 @ 12:30  Organism --  Gram Stain Blood -- Gram Stain --  Specimen Source .Blood Blood-Peripheral  Culture-Blood --    05-19 @ 11:40  Organism --  Gram Stain Blood -- Gram Stain --  Specimen Source .Blood Blood-Peripheral  Culture-Blood --    05-18 @ 19:27  Organism --  Gram Stain Blood -- Gram Stain --  Specimen Source Clean Catch Clean Catch (Midstream)  Culture-Blood --      Urine Culture      RADIOLOGY/EKG:    ASSESSMENT AND PLAN:  89F with dementia, schizophrenia, HLD presents with hematuria, found to have sepsis of unclear etiology with ADI, lactic acidosis and demand ischemia. Palliative consulted for complex decision making regarding goc in setting of advanced dementia.       Problem/Plan - 1:  ·  Problem: Dementia.   ·  Plan: Patient from [facility] (resident since 10/24/2022). patient bedbound, nonverbal  > NPO   > Patient was seen by  during most recent admission (5/3-5/14) - on ramelteon 8mg qhs. Can discontinue as patient without PO access   > He is amenable to discontinuing IV abx.    Problem/Plan - 2:  ·  Problem: Hematuria.   ·  Plan: Pt brought in for hematuria, getting IVF   > Pressors and ICU care not in line with guardian's goc.    Problem/Plan - 3:  ·  Problem: Dyspnea.   ·  Plan: Continue 0.2mg IV dilaudid q8hr ATC   Continue 0.2mg IV dilaudid q2h prn dyspnea   NRB in place- can consider wean to nasal cannula for optimization of patient's comfort.    Problem/Plan - 4:  ·  Problem: Restlessness.   ·  Plan: > QTc on 5/9- 499ms   > Continue IV ativan 0.25mg q6h prn agitation/restlessness.    Problem/Plan - 5:  ·  Problem: Acute pain.   ·  Plan: Continue 0.2mg IV dilaudid q2h prn severe pain.    Problem/Plan - 6:  ·  Problem: Functional quadriplegia.   ·  Plan: PPSV 10%   Please assist with ADLs   Wound care.    Problem/Plan - 7:  ·  Problem: Advanced care planning/counseling discussion.   ·  Plan: Per chart review, patient has court appointed guardian- Jaciel Jara [phone] (office); [phone] (cellphone), that was established on 2/26/ 2024.  > Appreciate MOLST form completed during prior admission on 5/14 for DNR/DNI  > Appreciate GOC discussion on admission- 5/19 - He is amenable to IVF and IV abx   > 5/20: Palliative team reached out to Jaciel Jara and left voicemail on both office and cellphone. Would plan on discussing transition to comfort and likely inpt hospice services if he calls back.  > 5/21: Appreciate goc discussion from primary team (event note from PA on 5/20 evening). Reviewed care coordination notes as guardian was amenable to hospice referral. Re-affirmed dnr/dni, comfort care, symptom directed medications. He was amenable to discontinuing IVF.  > 5/22: Case discussed with Medical team, patient to be transferred to in hospice    DVT PPX:    ADVANCED DIRECTIVE:    DISPOSITION:

## 2024-05-22 NOTE — DISCHARGE NOTE PROVIDER - NSDCCPCAREPLAN_GEN_ALL_CORE_FT
PRINCIPAL DISCHARGE DIAGNOSIS  Diagnosis: Hematuria  Assessment and Plan of Treatment: Pt medically stable for discahrge to hospice.     PRINCIPAL DISCHARGE DIAGNOSIS  Diagnosis: Hematuria  Assessment and Plan of Treatment: Pt medically stable for discharge to hospice.

## 2024-05-22 NOTE — DISCHARGE NOTE NURSING/CASE MANAGEMENT/SOCIAL WORK - PATIENT PORTAL LINK FT
You can access the FollowMyHealth Patient Portal offered by [facility] by registering at the following website: [email or web address]. By joining [facility]’s FollowMyHealth portal, you will also be able to view your health information using other applications (apps) compatible with our system.

## 2024-05-22 NOTE — PROGRESS NOTE ADULT - PROBLEM SELECTOR PLAN 6
QTc 597  - avoid QT prolonging agents  - continue tele monitoring
PPSV 10%   Please assist with ADLs   Wound care.
PPSV 10%   Please assist with ADLs   Wound care.

## 2024-05-22 NOTE — DISCHARGE NOTE PROVIDER - CARE PROVIDER_API CALL
Jolanta Mitchell [place]  Internal Medicine  [id number] [place] [id number]  Phone: [phone]  Fax: [phone]  Follow Up Time:

## 2024-05-22 NOTE — PROGRESS NOTE ADULT - PROBLEM SELECTOR PLAN 5
Continue 0.2mg IV dilaudid q2h prn severe pain
Troponins rising in setting of sepsis, ADI  - EKG without acute findings  - continue tele monitoring  - TTE as above
Continue 0.2mg IV dilaudid q2h prn severe pain

## 2024-05-22 NOTE — PROGRESS NOTE ADULT - NS ATTEST RISK PROBLEM GEN_ALL_CORE FT
In the setting of parenteral controlled substance administration, clinical monitoring required for side effects such as respiratory depression, constipation and opioid induced neurotoxicity.  This patient is at [x ]high [ ] medium [ ] low risk due to advanced illness.     [x ] The patient is receiving IV and has required  escalation for uncontrolled symptoms.  [ ] To taper and monitor for emergence of symptoms.  [ ] Symptoms controlled with present regimen.

## 2024-05-22 NOTE — PROGRESS NOTE ADULT - SUBJECTIVE AND OBJECTIVE BOX
[facility]  Rubina Beasley Palliative Care Attending  Contact Info: Page [phone] (including Nights/Weekends), message on Microsoft Teams (Rubina Beasley), or leave  at Palliative Office [phone] (non-urgent)   Date of [id number] @ 12:02    SUBJECTIVE AND OBJECTIVE: Patient seen this AM with NRB mask on. Patient's work of breathing appears more comfortable with standing IV dilaudid. Patient not responsive and non-verbal. Patient appears stable for dc to the [facility] for continued EOL care.     Indication for Geriatrics and Palliative Care Services/INTERVAL HPI: sx management and goc in setting of advanced illness.     OVERNIGHT EVENTS:  > 5/21: Over the past 24 hours, patient required PRNs of 0.25mg IV ativan x1.   > 5/22: Patient on IV dilaudid 0.2mg q8hr ATC.     DNR on chart:DNI  DNI      Allergies    No Known Allergies    Intolerances    MEDICATIONS  (STANDING):  chlorhexidine 2% Cloths 1 Application(s) Topical daily  HYDROmorphone  Injectable 0.2 milliGRAM(s) IV Push every 8 hours    MEDICATIONS  (PRN):  HYDROmorphone  Injectable 0.2 milliGRAM(s) IV Push every 2 hours PRN dypnea/work of breathing/pain  LORazepam   Injectable 0.25 milliGRAM(s) IV Push every 6 hours PRN agitation/restless  ondansetron Injectable 4 milliGRAM(s) IV Push every 8 hours PRN Nausea and/or Vomiting      ITEMS UNCHECKED ARE NOT PRESENT    PRESENT SYMPTOMS: [x ]Unable to self-report - see [ ] CPOT [x ] PAINADS [ x] RDOS  Source if other than patient:  [ ]Family   [ ]Team     Pain:  [ ]yes [ ]no  QOL impact -   Location -                    Aggravating factors -  Quality -  Radiation -  Timing-  Severity (0-10 scale):  Minimal acceptable level/ pain goal (0-10 scale):     CPOT:    https://www.sccm.org/getattachment/xzo23e01-3d9p-3t0d-2r1f-4643s4499t3z/Critical-Care-Pain-Observation-Tool-(CPOT)    Dyspnea:                           [ ]Mild [ ]Moderate [ ]Severe  Anxiety:                             [ ]Mild [ ]Moderate [ ]Severe  Fatigue:                             [ ]Mild [ ]Moderate [ ]Severe  Nausea:                             [ ]Mild [ ]Moderate [ ]Severe  Loss of appetite:              [ ]Mild [ ]Moderate [ ]Severe  Constipation:                    [ ]Mild [ ]Moderate [ ]Severe  Other Symptoms:  [ ]All other review of systems negative     PCSSQ[Palliative Care Spiritual Screening Question]   Severity (0-10):  Score of 4 or > indicate consideration of Chaplaincy referral.  Chaplaincy Referral: [ ] yes [ ] refused [ ] following [x ] deferred    Caregiver [place]? : [ ] yes [ ] no [x ] Deferred [ ] Declined             Social work referral [ ] Patient & Family Centered Care Referral [ ]  Anticipatory Grief present?:  [ ] yes [ ] no  [x ] Deferred                  Social work referral [ ] Patient & Family Centered Care Referral [ ]      PHYSICAL EXAM:  Vital Signs Last 24 Hrs  T(C): 36.8 (22 May 2024 08:25), Max: 36.8 (22 May 2024 08:25)  T(F): 98.3 (22 May 2024 08:25), Max: 98.3 (22 May 2024 08:25)  HR: 95 (22 May 2024 08:25) (88 - 95)  BP: 121/75 (22 May 2024 08:25) (110/73 - 121/75)  BP(mean): --  RR: 20 (22 May 2024 08:25) (19 - 20)  SpO2: 98% (22 May 2024 08:25) (97% - 98%)    Parameters below as of 22 May 2024 08:25  Patient On (Oxygen Delivery Method): mask, nonrebreather  O2 Flow (L/min): 10   I&O's Summary    21 May 2024 07:01  -  22 May 2024 07:00  --------------------------------------------------------  IN: 0 mL / OUT: 0 mL / NET: 0 mL       GENERAL: [ ]Cachexia    [ ]Alert  [ ]Oriented x   [ ]Lethargic  [ x]Unarousable  [ ]Verbal  [x ]Non-Verbal  Behavioral:   [ ] Anxiety  [ ] Delirium [ ] Agitation [x ] Other  HEENT:  [ ]Normal   [x]Dry mouth   [ ]ET Tube/Trach  [ ]Oral lesions  PULMONARY:   [ ]Clear [x ]Tachypnea  [ ]Audible excessive secretions   [ ]Rhonchi        [ ]Right [ ]Left [ ]Bilateral  [ ]Crackles        [ ]Right [ ]Left [ ]Bilateral  [ ]Wheezing     [ ]Right [ ]Left [ ]Bilateral  [x ]Diminished breath sounds [ ]right [ ]left [x ]bilateral  CARDIOVASCULAR:    [x ]Regular [ ]Irregular [ ]Tachy  [ ]Waylon [ ]Murmur [ ]Other  GASTROINTESTINAL:  [ x]Soft  [ ]Distended   [ ]+BS  [x ]Non tender [ ]Tender  [ ]Other [ ]PEG [ ]OGT/ NGT  Last BM: fecal incontinence   GENITOURINARY:  [ ]Normal [x ] Incontinent   [ ]Oliguria/Anuria   [ ]Joyner  MUSCULOSKELETAL:   [ ]Normal   [ x]Weakness  [ x]Bed/Wheelchair bound [ ]Edema  NEUROLOGIC:   [ ]No focal deficits  [x ]Cognitive impairment  [ ]Dysphagia [ ]Dysarthria [ ]Paresis [ ]Other   SKIN: Please see flowsheets   [ ]Normal  [ ]Rash  [x ]Other- Left Heel DTI,   [ ]Pressure ulcer(s)       Present on admission [ ]y [ ]n    CRITICAL CARE:  [ ]Shock Present  [ ]Septic [ ]Cardiogenic [ ]Neurologic [ ]Hypovolemic  [ ]Vasopressors [ ]Inotropes  [ ]Respiratory failure present [ ]Mechanical Ventilation [ ]Non-invasive ventilatory support [ ]High-Flow   [ ]Acute  [ ]Chronic [ ]Hypoxic  [ ]Hypercarbic [ ]Other  [ ]Other organ failure     LABS:  05-20    149<H>  |  108<H>  |  86<H>  ----------------------------<  145<H>  4.2   |  22  |  4.31<H>    Ca    8.5      20 May 2024 17:00        Urinalysis Basic - ( 20 May 2024 17:00 )    Color: x / Appearance: x / SG: x / pH: x  Gluc: 145 mg/dL / Ketone: x  / Bili: x / Urobili: x   Blood: x / Protein: x / Nitrite: x   Leuk Esterase: x / RBC: x / WBC x   Sq Epi: x / Non Sq Epi: x / Bacteria: x      RADIOLOGY & ADDITIONAL STUDIES: no new     Protein Calorie Malnutrition Present: [ ]mild [ ]moderate [ ]severe [ ]underweight [ ]morbid obesity  https://www.andeal.org/vault/2500/web/files/ONC/Table_Clinical%20Characteristics%20to%20Document%20Malnutrition-White%20JV%20et%20al%202012.pdf    Height (cm): 157.5 (05-18-24 @ 16:07), 157.5 (05-03-24 @ 05:48)  Weight (kg): 50 (05-22-24 @ 05:37), 39.4 (05-03-24 @ 05:48)  BMI (kg/m2): 20.2 (05-22-24 @ 05:37), 15.9 (05-18-24 @ 16:07), 15.9 (05-03-24 @ 05:48)    [x ]PPSV2 < or = 30%  [ ]significant weight loss [ ]poor nutritional intake [ ]anasarca[ ]Artificial Nutrition    Other REFERRALS:  [x ]Hospice  [ ]Child Life  [ ]Social Work  [ ]Case management [ ]Holistic Therapy

## 2024-05-22 NOTE — DISCHARGE NOTE PROVIDER - HOSPITAL COURSE
89F [facility] with dementia, schizophrenia, HLD presents with hematuria, found to have sepsis of unclear etiology with ADI, lactic acidosis and demand ischemia 89F [facility] with dementia, schizophrenia, HLD presents with hematuria, found to have sepsis of unclear etiology with ADI, lactic acidosis and demand ischemia     Hematuria  Pt brought to ED for concern for hematuria  - UA with 9 RBC and negative blood  - vibha hematuria noted in celeste bag  - ddx incldues infection, trauma  - follow up urine culture  - continue broad spectrum abx- s/p course   - check bladder scan to ensure no urinary retention- not reatining   - palliative eval for GOC, likely hospice.  - family has decided to DC pt to hospice bed plan for DC 5/22     Sepsis, unspecified organism.   -Pt with hypotension, hypothermia, tachycardia  - ddx includes UTI, CT C/A/P without clear source of infection  - check BCx and MRSA nares, follow up UCx= NGTD on UCX and BCX   - start broad spectrum abx with vancomycin x1 given ADI and zosyn- s/p course in hospital   - VSS.     Acute kidney injury (ADI) with acute tubular necrosis (ATN).   ·  Plan: Pt with ADI likely 2/2 ATN given hypotension, sepsis and casts seen on UA  - continue IVF  - check bladder scan, if urinary retention will need to change celeste  - trend BMP, renally dose medications and avoid nephrotoxins  - no HD per MOLST form.    On 5/22/24, case was discussed with Dr. Mitchell, patient is medically cleared and optimized for discharge today. All medications were reviewed with attending, and sent to mutually agreed upon pharmacy.

## 2024-05-22 NOTE — PROGRESS NOTE ADULT - PROBLEM SELECTOR PLAN 4
> QTc on 5/9- 499ms   > Continue IV ativan 0.25mg q6h prn agitation/restlessness
In setting of sepsis, demand ischemia; pt with markers of poor perfusion given symmetric distal cyanosis on b/l hands and feet  - lactate increasing   - check repeat lactate  - continue IVF  - check TTE to evaluate for cardiac etiology, though pt does not appear fluid overloaded pt has prior TTE with reduced LVSF
Patient appeared restless during encounter today, taking off NRB mask.   > QTc on 5/9- 499ms   > Would add IV ativan 0.25mg q6h prn agitation/restlessness if guardian is amenable to symptom directed care. Palliative provider has been unable to reach him despite calling both office and cellphone numbers provided.

## 2024-05-24 LAB
CULTURE RESULTS: SIGNIFICANT CHANGE UP
CULTURE RESULTS: SIGNIFICANT CHANGE UP
SPECIMEN SOURCE: SIGNIFICANT CHANGE UP
SPECIMEN SOURCE: SIGNIFICANT CHANGE UP

## 2024-09-01 NOTE — DIETITIAN INITIAL EVALUATION ADULT - PROBLEM SELECTOR PROBLEM 7
"Pain Service Consultation Note  [facility]      Patient Name: Khoa Cedeño  MRN: [id number]   Age: 42 year old  Sex: female  Date: August 31, 2024                                      Reviewed: Yes    Referring Provider:  Bishop GEGE Ziegler PA-C   Referring Service:  Orthopedics  Reason for Consultation: Post-Operative Pain    Assessment/Recommendations:  42 year old female s/p :    Thoracic 10 to Lumbar 4 Instrumented Posterior Interbody Fusion and Colvin Metcalf Osteotomy with O-Arm/Stealth Navigation, use of allograft, local autograft, and bone morphogenic protein       Patient states that her pain is well controlled at the present time.  It is approximately 3-4/10.  She id POD #1    Plan:    Continue 0xycodone 5-10 mg q4h prn  Continue lidocaine infusion    Thank you for the opportunity to participate in the care of Khoa Cedeño  Pain Service will continue to follow.    Discussed with attending anesthesiologist  Primary Service Contacted with Recommendations? No    Haris Tapia MD  8/31/2024      Chief Complaint:  Post-operative pain      History of Present Illness:  Khoa Cedeño is a 42 year old female with a history of MDD, PTSD, and adolescent idiopathic thoracolumbar scoliosis who underwent thoracic 10 to lumbar 4 instrumented posterior interbody fusion and colvin metcalf osteotomy on 8/30/24 by Dr. Hernandez. She received general anesthesia.  mL. No intra-operative complications noted.      Post-operatively, she reports mild dizziness and blurred vision. Blood pressure has been stable and normotensive. Pain is described as \"ok\" currently. She endorses nausea, but no vomiting. Last BM 8/29. Denies chest pain, SOB, and abdominal pain.    Pain Assessment:   Description of Pain: sharp and stabbing  Location: thoracolumbar spine  Current Pain: 4/10  Goal: 2/10  Baseline Pain Level: 2/10  Onset:post-operative   Relieving/exacerbating factors: edications   Radiating: "
The patient is a 62y Male complaining of syncope.
no   Localized: yes  Constant: yes  Intermittent: no          Past Medical History:  Past Medical History:   Diagnosis Date    Adolescent idiopathic scoliosis of thoracolumbar region     Depression     PTSD (post-traumatic stress disorder)          Family History:    Family History   Problem Relation Age of Onset    Anesthesia Reaction No family hx of     Deep Vein Thrombosis (DVT) No family hx of        Social History:  Social History     Tobacco Use    Smoking status: Former     Types: Cigarettes    Smokeless tobacco: Never   Substance Use Topics    Alcohol use: Yes     Comment: 2 glasses of wine twice a week             Review of Systems:  Complete ROS reviewed. Unless otherwise noted, all other systems found to be negative.        Laboratory Results:  Recent Labs   Lab Test 08/31/24  0620      BUN 9.5       Allergies:  Allergies   Allergen Reactions    Prochlorperazine Dizziness and Visual Disturbance     Loss of vision per patient          Current Pain Related Medications:  Medications related to Pain Management (From now, onward)      Start     Dose/Rate Route Frequency Ordered Stop    09/02/24 0000  acetaminophen (TYLENOL) tablet 650 mg         650 mg Oral EVERY 4 HOURS PRN 08/30/24 1436      08/31/24 0800  polyethylene glycol (MIRALAX) Packet 17 g         17 g Oral DAILY 08/30/24 1614      08/31/24 0800  lamoTRIgine (LaMICtal) tablet 200 mg         200 mg Oral EVERY MORNING 08/30/24 1724      08/30/24 2000  senna-docusate (SENOKOT-S/PERICOLACE) 8.6-50 MG per tablet 1 tablet         1 tablet Oral 2 TIMES DAILY 08/30/24 1614      08/30/24 2000  cyclobenzaprine (FLEXERIL) tablet 10 mg         10 mg Oral 3 TIMES DAILY 08/30/24 1816      08/30/24 1700  acetaminophen (TYLENOL) tablet 975 mg         975 mg Oral EVERY 8 HOURS 08/30/24 1436 09/02/24 1659    08/30/24 1436  lidocaine 1 % 0.1-1 mL         0.1-1 mL Other EVERY 1 HOUR PRN 08/30/24 1436      08/30/24 1436  lidocaine (LMX4) cream          Topical 
"EVERY 1 HOUR PRN 08/30/24 1436      08/30/24 1436  magnesium hydroxide (MILK OF MAGNESIA) suspension 30 mL         30 mL Oral DAILY PRN 08/30/24 1436      08/30/24 1436  bisacodyl (DULCOLAX) suppository 10 mg         10 mg Rectal DAILY PRN 08/30/24 1436      08/30/24 1436  HYDROmorphone (PF) (DILAUDID) injection 0.2 mg        Placed in \"Or\" Linked Group    0.2 mg Intravenous EVERY 2 HOURS PRN 08/30/24 1436 08/30/24 1436  HYDROmorphone (PF) (DILAUDID) injection 0.4 mg        Placed in \"Or\" Linked Group    0.4 mg Intravenous EVERY 2 HOURS PRN 08/30/24 1436 08/30/24 1436  oxyCODONE (ROXICODONE) tablet 5 mg        Placed in \"Or\" Linked Group    5 mg Oral EVERY 4 HOURS PRN 08/30/24 1436 08/30/24 1436  oxyCODONE (ROXICODONE) tablet 10 mg        Placed in \"Or\" Linked Group    10 mg Oral EVERY 4 HOURS PRN 08/30/24 1436                Physical Exam:  Vitals: /68 (BP Location: Right arm, Patient Position: Semi-Almeida's, Cuff Size: Adult Regular)   Pulse 86   Temp 97.9  F (36.6  C) (Oral)   Resp 18   Ht 1.651 m (5' 5\")   Wt 61.6 kg (135 lb 12.9 oz)   SpO2 100%   BMI 22.60 kg/m      Physical Exam:   CONSTITUTIONAL/GENERAL APPEARANCE:  NAD  EYES: EOMI, sclera anicteric, PERRLA  ENT/NECK: atraumatic, lips and oral mucous membranes dry  RESPIRATORY: non-labored breathing. No cough, wheeze  CV: RRR, no audible rubs, gallops, or murmurs  ABDOMEN: Soft, non-tender, non-distended  MUSCULOSKELETAL/BACK/SPINE/EXTREMITIES: Moves all extremities purposefully.  No edema or obvious joint deformities   NEURO: Alert and Oriented x3. Answers questions appropriately  SKIN/VASCULAR EXAM:  No jaundice, no visible rashes or lesions      45 MINUTES SPENT BY ME on the date of service doing chart review, history, exam, documentation & further activities per the note.      Acute Inpatient Pain Service [facility]  Hours of pain coverage 24/7   Page via Amcom- Please Page the Pain Team Via Amcom: \"PAIN MANAGEMENT ACUTE INPATIENT/ "
"[employer] [facility]\"            "
Schizophrenia

## 2024-09-03 NOTE — DISCHARGE NOTE PROVIDER - NSRESEARCHGRANT_HIDDEN_GEN_A_CORE
[facility]                          Katey De La Rosa MBA, BSN, RN-BC Orthopedic  Phone: [phone]    Abdi Palma D.O.  Phone: [phone]    POSTOPERATIVE INSTRUCTIONS: TOTAL HIP & TOTAL KNEE ARTHROPLASTY  General/highlights: Ice and elevate as often as possible.  When elevating keep the foot higher than the hip with the knee straight.  Wear the ALONSO hose during the daytime for the next 14 days, this is especially important in your surgical leg, remove them at night.  Flex/tighten the muscles in the buttocks, thighs and calves often when in chair or bed.  Utilize the incentive spirometer often especially the next 3 days.  Walk at least 10 steps every hour that you are awake.  Take stairs 1 at a time, good leg leads up, bed leg legs down, use the handrails.  You may be weightbearing as tolerated, in general the walker is used for 2 weeks.  PAIN, SWELLING & BRUISING  Some pain, stiffness and swelling is normal for up to 1 year after surgery.  Pain will start to let up over time depending on your activity level.  It is preferable to rest for 24 hours following your surgery  Pain is often delayed for 24-48 hours after surgery.  Pain may be dull/achy, throbbing, or even sharp/nerve sensations  It is normal for swelling and bruising to worsen before it gets better.  These symptoms usually peak 1 week after surgery  Swelling and bruising may appear throughout the leg, all the way down to your toes  Wear your compression stockings every day during the day for 14 days and remove them at night.  This will help control swelling    WOUND CARE INSTRUCTIONS  Your surgical bandage will be removed 2 weeks after surgery at your post-operative visit.  If your bandage becomes compromised, begins to come off before then, or soaks through with drainage call the office immediately.  You may have sutures under the skin that dissolve on their own over time.    As the sutures absorb  occasionally a small suture abscess can develop, this is not uncommon for up to 6 weeks, if this occurs please notify your surgeon immediately.    HYGIENE  You may shower 24 hours after your surgery, provided the Mepilex silver dressing is in place.  No tub bathing or submerging underwater.  Do not scrub directly over the surgical bandage  Do not use any creams, lotions or ointments on the surgical leg for 4 weeks after surgery, or until you have been cleared to do so by your surgeon    GENERAL INSTRUCTIONS  Do not drink alcoholic beverages (beer and wine included) for 24 hours following your surgery, or while you are taking narcotic pain medications  Delay making important decisions until you are fully recovered  You cannot swim or submerge in water for at least 6 weeks after surgery, or until you are cleared by your surgeon.  You may start kneeling 3 months after knee replacement surgery, once you have been cleared by your surgeon.  This may not ever feel “normal” or comfortable    HOME DIET  Resume your normal diet after surgery. If you are on a specific type of diet for your condition, resume that instead.    Choose foods that help promote good bowel habits and prevent constipation, such as foods high in fiber.    POSTOPERATIVE MEDICATIONS  Pain medications have been ordered to help manage pain throughout recovery.  While you are using narcotic pain medication, you should be using a stool softener or laxative to prevent constipation.  My preference is parallax powder once or twice a day when taking the narcotic pain medicine  It is important to eat a small meal or snack before taking pain medications to avoid nausea or stomach upset.    MEDICATION REFILLS - [phone]  If you need to request a medication refill, please call the office between 8:30am-4:30pm, Monday through Friday.    Any calls received outside of this timeframe will be handled on the next business day.    Medication requests received on  "Saturday or Sunday will be handled on Monday.    Please allow 3-5 business days for all medication requests to be processed.    RESTARTING HOME MEDICATIONS  You may restart your home medications the following day after your surgery UNLESS you have been given alternate instructions.    Follow the instructions given to you on your hospital discharge instructions for more information regarding your home medications.    ASSISTIVE DEVICE & MOVEMENT  Initially, you will use a walker or crutches to walk for the first 2 weeks.  Once your therapist feels you are ready, you will wean to one crutch or cane followed by no assistive device.  It is important to keep moving throughout recovery.  Walk at least 10 steps every hour that you are awake.  Stairs are part of your recovery, the \"good \"leg leads on the way up, the \"bad \"leg leads on the way down to, use the handrails and not the walker or crutches.  You should be up and walking around several times per day as well as bending your knee and making sure your knee is going completely straight (for knee replacements).  For anterior hip replacements avoid straight leg raise.    NUMBNESS/CLICKING  Decreased sensation or numbness is common on or around the area of the incision due to sensory nerves that are affected at the time of surgery.  The area of numbness will be lateral to the incision  You might always have numbness but the size of the area of numbness should decrease with time.  It is common for patients to have a “click” in the knee with movement.  This is usually nothing to be concerned about.  There are several reasons why your knee can be making these noises, including the implant components rubbing against each other, or a tendons going over a bony prominence.  Grinding can also occur and is not out of the ordinary.  Grinding can be caused by scar tissue formation  Noises will often settle over time once muscle strength improves.    DIFFICULTY SLEEPING  It is very " common for patients to have difficulty sleeping at night which can be caused pain, medication, or feeling of anxiety.  Sleep disturbance typically worsens 4-6 weeks after surgery.  You are permitted to sleep on your back or side with a  pillow between your legs for comfort    DRIVING & TRAVEL  Your surgeon will address this at your post-op appointment.  You must not be taking narcotic pain medication to be cleared to drive.  LEFT LEG JOINT REPLACMENT:  You may drive once you have regained full control of your leg, typically around 2 week after surgery  RIGHT LEG JOINT REPLACEMENT:  You must speak with your surgeon before you resume driving.  Driving can typically be resumed by 4-6 weeks after surgery.  During long distance travel, you should attempt to change position or stand every hour.  You should complete ankle pumps throughout your travel if you are sitting for long periods of time.  If traveling within the first 2 weeks after surgery, you should wear your compression stockings.    DENTAL & OTHER PROCEDURES  All patients must wait a minimum of 3 months for elective procedures, including routine dental cleanings.  For any dental appointment - cleaning or dental procedures - patients must take a prophylactic antibiotic 1 hour before the appointment.  You will also need to call for an antibiotic prior to any other invasive test, procedure, or surgery.  These prophylactic antibiotics will be needed for the rest of your life, in order to prevent infections.  Please call your surgeons office at [phone] to request the antibiotic.      PHYSICAL THERAPY  Following surgery it is important to progress through recovery with in-home or outpatient physical therapy.  You should continue to complete home exercises provided from the hospital on days that you are not working with a physical therapist.  It is common to have a temporary increase in pain and swelling upon starting outpatient physical therapy and/or changing  your exercise routine.  Continue to use ice to help with symptoms.     FOLLOW-UP APPOINTMENT - [phone]  Your post-surgical appointments will take place approximately 2 weeks, 6 weeks, 3 months and 1 year following surgery.  Joint replacements are monitored thereafter every 2-5 years for life.  If you have any questions or need to make changes to this appointment, please contact the office:    EMERGENCIES & WHEN TO CALL YOUR SURGEON  When to contact our office immediately:  Any falls or injury to the joint replacement  Fever >101.5 for at least 48 hours after surgery or chills.  Excessive bleeding from incision(s). A small amount of drainage is normal and expected.  Signs of infection of incision(s)-excessive drainage that is soaking through your dressing (especially if it is pus-like), redness that is spreading out from the edges of your incision, or increased warmth around the area.  Excruciating pain for which the pain medication, taken as instructed, is not helping.  Severe calf pain.  Go directly to the emergency room or call 911, if you are experiencing chest pain or difficulty breathing.    ICE/COLD THERAPY  Ice is most important during the first 2 weeks after surgery, but should be used for several weeks as needed.  Never place ice, or cold therapy devices directly on the skin.  You should always have a protective layer between your skin and the cold.  You have been prescribed to ice your total joint at a minimum of twice per hour for 20 minutes while awake during the first 6 weeks after surgery if you are using ice packs. This will help with pain control.  If you are using an ice machine, please follow ice machine instructions.  After knee replacement is extremely important to elevate the leg straight on an incline, not flat.    COLD THERAPY MACHINE RECOMMENDATIONS  Cold therapy devices can be used before and after surgery to assist in comfort and help to reduce pain and swelling.  These devices differ  from ice or ice packs whereas the mechanism circulates water through tubing and a pad to provide longer periods of cold therapy to the desired site.  While in the hospital, you can use your cold devices around the clock for optimal comfort.  We recommend using cold therapy after working with therapy or completing exercises on your own.  Once you are discharged home, there is no set schedule in which you must follow while using cold therapy.  Below are a few points to remember when using a cold therapy device:    Read the 's instructions prior to first the use.  Follow instructions for filling the cooler (water first, then ice).  Always make sure there is a layer of protection between the cold pad and your skin (Clothing, Towel, Ace Bandage, etc.)  Allow the device to circulate cold water throughout the pad prior wrapping the pad around your leg (approximately 10 minutes).  Place the pad on your leg in the desired position to meet your pain management needs and use the wraps provided to secure the pad to your body.  The purpose of this device is to use consistently throughout the day.  You do not need to need to use the 20 on, 20 off method when using an ice machine.  During waking hours, remove the cold pad every 1-2 hours to perform a skin check  Detach the pad from the cooler and ambulate at least once every hour  After removing the pad, allow at least 30 minutes before resuming cold therapy  You may wear the cold therapy device during periods of sleep including overnight    If you wake up during the night, you can check the skin at this time.  You do not need to wake up specifically to perform skin checks.  Empty the cooler and pad when device is not in use.  Follow 's instructions for cleaning your cold therapy device.    [facility] can assist with problems related to products purchased through the hospital  [phone] - [place]      Yes

## 2025-01-16 NOTE — PROGRESS NOTE ADULT - SUBJECTIVE AND OBJECTIVE BOX
Patient called and notified: HCG is dropping but should be repeated in 48 hours.  Reports she is still having some intermittent cramping pain in the lower part of her abdomen.  C/o pain and pressure under belly button  when sitting to urinate.  Does not feel that at any other time.  She feels slightly constipated.  Tooke some Senna yesterday and had a small BM which gave some relief but not a lot.  Is going to take some more today.  Encouraged if uncomfortable to try glycerin suppository or Milk of Magnesia.  Bleeding is still similar to the level of a period with some small clots.  Has been continuing to take the ibuprofen and has used a few doses of the Tylenol with codeine.  Aware to return call tomorrow morning if worsening pain or no improvement.  Patient verbalized understanding of POC and has no additional questions or concerns.     Component      Latest Ref Rng 1/8/2025  2:28 PM 1/15/2025  2:34 PM   HCG Quantitative      <=7 mUnits/mL 65,509 (H)  4,962 (H)       CC: F/U for E coli bacteremia    Saw/spoke to patient. No fevers, no chills. Appears overall improved. No new complaints.    Allergies  No Known Allergies    ANTIMICROBIALS:  ertapenem  IVPB 1000 every 24 hours    PE:    Vital Signs Last 24 Hrs  T(C): 36.5 (2019 10:15), Max: 36.6 (2019 21:10)  T(F): 97.7 (2019 10:15), Max: 97.8 (2019 21:10)  HR: 89 (2019 10:15) (79 - 95)  BP: 117/79 (2019 10:15) (92/60 - 144/89)  RR: 18 (2019 10:15) (18 - 18)  SpO2: 98% (2019 10:15) (97% - 98%)    Gen: AOx3, NAD, non-toxic, pleasant  CV: S1+S2 normal, nontachycardic  Resp: Clear bilat, no resp distress, no crackles/wheezes  Abd: Soft, nontender, +BS  Ext: No LE edema, no wounds    LABS:                        11.1   21.72 )-----------( 202      ( 2019 06:06 )             36.6     02-    138  |  103  |  28<H>  ----------------------------<  118<H>  4.9   |  20<L>  |  0.75    Ca    8.7      2019 06:06  Phos  3.9       Mg     2.0         TPro  5.3<L>  /  Alb  2.6<L>  /  TBili  1.0  /  DBili  x   /  AST  40<H>  /  ALT  28  /  AlkPhos  349<H>      Urinalysis Basic - ( 2019 02:55 )    Color: YELLOW / Appearance: Lt TURBID / S.018 / pH: 6.0  Gluc: NEGATIVE / Ketone: NEGATIVE  / Bili: TRACE / Urobili: SMALL   Blood: NEGATIVE / Protein: 20 / Nitrite: NEGATIVE   Leuk Esterase: NEGATIVE / RBC: 3-5 / WBC 3-5   Sq Epi: OCC / Non Sq Epi: x / Bacteria: NEGATIVE    MICROBIOLOGY:    URINE MIDSTREAM  19 NGTD    BLOOD VENOUS  19 --  --  BLOOD CULTURE PCR  Escherichia coli    RADIOLOGY:     USG:    IMPRESSION:     Severe intra and extrahepatic biliary ductal dilatation with evidence of   choledocholithiasis. The common bile duct is tortuous and the distal   portion is not well visualized. Cross-sectional imaging is recommended   for further evaluation/characterization.     ERCP    ERCP completed without complication.  One 10x5 plastic biliary stent placed after sphincterotomy performed. Good flow of bile post-procedure.  Patient will need repeat ERCP for stone and stent removal in 4-6 weeks.  Patient to return to floor/ICU for closer monitoring, IV antibiotics.

## 2025-02-14 NOTE — ED PROVIDER NOTE - ADMIT DISPOSITION PRESENT ON ADMISSION SEPSIS
no muscle cramps/no muscle weakness/no stiffness/no neck pain/no arm pain L/no arm pain R/no leg pain L/no leg pain R No
